# Patient Record
Sex: FEMALE | Race: WHITE | Employment: OTHER | URBAN - NONMETROPOLITAN AREA
[De-identification: names, ages, dates, MRNs, and addresses within clinical notes are randomized per-mention and may not be internally consistent; named-entity substitution may affect disease eponyms.]

---

## 2017-02-07 ENCOUNTER — OFFICE VISIT (OUTPATIENT)
Dept: PRIMARY CARE CLINIC | Age: 72
End: 2017-02-07
Payer: MEDICARE

## 2017-02-07 VITALS
OXYGEN SATURATION: 96 % | SYSTOLIC BLOOD PRESSURE: 118 MMHG | RESPIRATION RATE: 20 BRPM | BODY MASS INDEX: 39.93 KG/M2 | WEIGHT: 217 LBS | DIASTOLIC BLOOD PRESSURE: 68 MMHG | HEART RATE: 82 BPM | HEIGHT: 62 IN

## 2017-02-07 DIAGNOSIS — M85.80 OSTEOPENIA: Primary | ICD-10-CM

## 2017-02-07 DIAGNOSIS — E03.9 ACQUIRED HYPOTHYROIDISM: ICD-10-CM

## 2017-02-07 DIAGNOSIS — I10 ESSENTIAL HYPERTENSION: ICD-10-CM

## 2017-02-07 DIAGNOSIS — N18.30 CKD (CHRONIC KIDNEY DISEASE), STAGE III (HCC): ICD-10-CM

## 2017-02-07 PROCEDURE — G8427 DOCREV CUR MEDS BY ELIG CLIN: HCPCS | Performed by: INTERNAL MEDICINE

## 2017-02-07 PROCEDURE — G8419 CALC BMI OUT NRM PARAM NOF/U: HCPCS | Performed by: INTERNAL MEDICINE

## 2017-02-07 PROCEDURE — 1090F PRES/ABSN URINE INCON ASSESS: CPT | Performed by: INTERNAL MEDICINE

## 2017-02-07 PROCEDURE — 3017F COLORECTAL CA SCREEN DOC REV: CPT | Performed by: INTERNAL MEDICINE

## 2017-02-07 PROCEDURE — 1036F TOBACCO NON-USER: CPT | Performed by: INTERNAL MEDICINE

## 2017-02-07 PROCEDURE — 1123F ACP DISCUSS/DSCN MKR DOCD: CPT | Performed by: INTERNAL MEDICINE

## 2017-02-07 PROCEDURE — 99214 OFFICE O/P EST MOD 30 MIN: CPT | Performed by: INTERNAL MEDICINE

## 2017-02-07 PROCEDURE — G8399 PT W/DXA RESULTS DOCUMENT: HCPCS | Performed by: INTERNAL MEDICINE

## 2017-02-07 PROCEDURE — 4040F PNEUMOC VAC/ADMIN/RCVD: CPT | Performed by: INTERNAL MEDICINE

## 2017-02-07 PROCEDURE — G8484 FLU IMMUNIZE NO ADMIN: HCPCS | Performed by: INTERNAL MEDICINE

## 2017-02-07 PROCEDURE — 3014F SCREEN MAMMO DOC REV: CPT | Performed by: INTERNAL MEDICINE

## 2017-02-07 ASSESSMENT — ENCOUNTER SYMPTOMS
BACK PAIN: 1
DIARRHEA: 0
CONSTIPATION: 0
SORE THROAT: 0
VOMITING: 0
COUGH: 0
NAUSEA: 0
SHORTNESS OF BREATH: 0

## 2017-03-24 RX ORDER — RALOXIFENE HYDROCHLORIDE 60 MG/1
60 TABLET, FILM COATED ORAL DAILY
Qty: 90 TABLET | Refills: 3 | Status: SHIPPED | OUTPATIENT
Start: 2017-03-24 | End: 2018-03-20 | Stop reason: SDUPTHER

## 2017-05-09 DIAGNOSIS — M85.80 OSTEOPENIA: ICD-10-CM

## 2017-05-09 RX ORDER — RISEDRONATE SODIUM 35 MG/1
35 TABLET, FILM COATED ORAL
Qty: 4 TABLET | Refills: 11 | Status: SHIPPED | OUTPATIENT
Start: 2017-05-09 | End: 2018-05-17 | Stop reason: SDUPTHER

## 2017-06-12 DIAGNOSIS — M85.80 OSTEOPENIA: ICD-10-CM

## 2017-06-12 DIAGNOSIS — E03.9 ACQUIRED HYPOTHYROIDISM: ICD-10-CM

## 2017-06-12 DIAGNOSIS — N18.30 CKD (CHRONIC KIDNEY DISEASE), STAGE III (HCC): ICD-10-CM

## 2017-06-12 DIAGNOSIS — I10 ESSENTIAL HYPERTENSION: ICD-10-CM

## 2017-06-12 LAB
ALBUMIN SERPL-MCNC: 4 G/DL (ref 3.5–5.2)
ALP BLD-CCNC: 32 U/L (ref 35–104)
ALT SERPL-CCNC: 15 U/L (ref 5–33)
ANION GAP SERPL CALCULATED.3IONS-SCNC: 16 MMOL/L (ref 7–19)
AST SERPL-CCNC: 17 U/L (ref 5–32)
BACTERIA: ABNORMAL /HPF
BILIRUB SERPL-MCNC: 0.4 MG/DL (ref 0.2–1.2)
BILIRUBIN URINE: NEGATIVE
BLOOD, URINE: ABNORMAL
BUN BLDV-MCNC: 17 MG/DL (ref 8–23)
CALCIUM SERPL-MCNC: 9.3 MG/DL (ref 8.8–10.2)
CHLORIDE BLD-SCNC: 104 MMOL/L (ref 98–111)
CHOLESTEROL, TOTAL: 175 MG/DL (ref 160–199)
CLARITY: ABNORMAL
CO2: 21 MMOL/L (ref 22–29)
COLOR: YELLOW
CREAT SERPL-MCNC: 1.1 MG/DL (ref 0.5–0.9)
CREATININE URINE: 107.7 MG/DL (ref 4.2–622)
EPITHELIAL CELLS, UA: 1 /HPF (ref 0–5)
GFR NON-AFRICAN AMERICAN: 49
GLUCOSE BLD-MCNC: 89 MG/DL (ref 74–109)
GLUCOSE URINE: NEGATIVE MG/DL
HCT VFR BLD CALC: 35.6 % (ref 37–47)
HDLC SERPL-MCNC: 29 MG/DL (ref 65–121)
HEMOGLOBIN: 11.6 G/DL (ref 12–16)
HYALINE CASTS: 2 /HPF (ref 0–8)
KETONES, URINE: NEGATIVE MG/DL
LDL CHOLESTEROL CALCULATED: 66 MG/DL
LEUKOCYTE ESTERASE, URINE: ABNORMAL
MCH RBC QN AUTO: 30.1 PG (ref 27–31)
MCHC RBC AUTO-ENTMCNC: 32.6 G/DL (ref 33–37)
MCV RBC AUTO: 92.2 FL (ref 81–99)
NITRITE, URINE: POSITIVE
PARATHYROID HORMONE INTACT: 81.8 PG/ML (ref 15–65)
PDW BLD-RTO: 13.2 % (ref 11.5–14.5)
PH UA: 5.5
PLATELET # BLD: 166 K/UL (ref 130–400)
PMV BLD AUTO: 9.2 FL (ref 9.4–12.3)
POTASSIUM SERPL-SCNC: 4.2 MMOL/L (ref 3.5–5)
PROTEIN PROTEIN: 27 MG/DL (ref 15–45)
PROTEIN UA: ABNORMAL MG/DL
RBC # BLD: 3.86 M/UL (ref 4.2–5.4)
RBC UA: 3 /HPF (ref 0–4)
SODIUM BLD-SCNC: 141 MMOL/L (ref 136–145)
SPECIFIC GRAVITY UA: 1.01
TOTAL PROTEIN: 7.2 G/DL (ref 6.6–8.7)
TRIGL SERPL-MCNC: 399 MG/DL (ref 150–199)
TSH SERPL DL<=0.05 MIU/L-ACNC: 0.15 UIU/ML (ref 0.27–4.2)
URIC ACID, SERUM: 6.5 MG/DL (ref 2.4–5.7)
UROBILINOGEN, URINE: 0.2 E.U./DL
WBC # BLD: 7.3 K/UL (ref 4.8–10.8)
WBC UA: 37 /HPF (ref 0–5)

## 2017-06-13 RX ORDER — LEVOTHYROXINE SODIUM 88 UG/1
88 TABLET ORAL DAILY
Qty: 30 TABLET | Refills: 3 | Status: SHIPPED | OUTPATIENT
Start: 2017-06-13 | End: 2017-09-13 | Stop reason: SDUPTHER

## 2017-06-14 LAB
ORGANISM: ABNORMAL
URINE CULTURE, ROUTINE: ABNORMAL
URINE CULTURE, ROUTINE: ABNORMAL

## 2017-06-16 ENCOUNTER — TELEPHONE (OUTPATIENT)
Dept: PRIMARY CARE CLINIC | Age: 72
End: 2017-06-16

## 2017-06-16 RX ORDER — PHENAZOPYRIDINE HYDROCHLORIDE 100 MG/1
100 TABLET, FILM COATED ORAL 3 TIMES DAILY PRN
Qty: 10 TABLET | Refills: 0 | Status: SHIPPED | OUTPATIENT
Start: 2017-06-16 | End: 2017-06-19

## 2017-06-16 RX ORDER — LEVOFLOXACIN 500 MG/1
500 TABLET, FILM COATED ORAL DAILY
Qty: 10 TABLET | Refills: 0 | Status: SHIPPED | OUTPATIENT
Start: 2017-06-16 | End: 2017-06-26

## 2017-06-20 ENCOUNTER — OFFICE VISIT (OUTPATIENT)
Dept: PRIMARY CARE CLINIC | Age: 72
End: 2017-06-20
Payer: MEDICARE

## 2017-06-20 VITALS
OXYGEN SATURATION: 96 % | HEART RATE: 69 BPM | HEIGHT: 61 IN | SYSTOLIC BLOOD PRESSURE: 122 MMHG | DIASTOLIC BLOOD PRESSURE: 68 MMHG | RESPIRATION RATE: 20 BRPM | WEIGHT: 220 LBS | BODY MASS INDEX: 41.54 KG/M2

## 2017-06-20 DIAGNOSIS — I10 ESSENTIAL HYPERTENSION: ICD-10-CM

## 2017-06-20 DIAGNOSIS — Z86.39 H/O HYPERALDOSTERONISM: ICD-10-CM

## 2017-06-20 DIAGNOSIS — N18.30 CKD (CHRONIC KIDNEY DISEASE), STAGE III (HCC): Primary | ICD-10-CM

## 2017-06-20 DIAGNOSIS — E03.9 ACQUIRED HYPOTHYROIDISM: ICD-10-CM

## 2017-06-20 PROCEDURE — G8399 PT W/DXA RESULTS DOCUMENT: HCPCS | Performed by: INTERNAL MEDICINE

## 2017-06-20 PROCEDURE — 1090F PRES/ABSN URINE INCON ASSESS: CPT | Performed by: INTERNAL MEDICINE

## 2017-06-20 PROCEDURE — 3017F COLORECTAL CA SCREEN DOC REV: CPT | Performed by: INTERNAL MEDICINE

## 2017-06-20 PROCEDURE — 1123F ACP DISCUSS/DSCN MKR DOCD: CPT | Performed by: INTERNAL MEDICINE

## 2017-06-20 PROCEDURE — 4040F PNEUMOC VAC/ADMIN/RCVD: CPT | Performed by: INTERNAL MEDICINE

## 2017-06-20 PROCEDURE — G8417 CALC BMI ABV UP PARAM F/U: HCPCS | Performed by: INTERNAL MEDICINE

## 2017-06-20 PROCEDURE — G8427 DOCREV CUR MEDS BY ELIG CLIN: HCPCS | Performed by: INTERNAL MEDICINE

## 2017-06-20 PROCEDURE — 3014F SCREEN MAMMO DOC REV: CPT | Performed by: INTERNAL MEDICINE

## 2017-06-20 PROCEDURE — 99214 OFFICE O/P EST MOD 30 MIN: CPT | Performed by: INTERNAL MEDICINE

## 2017-06-20 PROCEDURE — 1036F TOBACCO NON-USER: CPT | Performed by: INTERNAL MEDICINE

## 2017-06-20 RX ORDER — MELOXICAM 7.5 MG/1
7.5 TABLET ORAL DAILY
COMMUNITY
End: 2017-09-13 | Stop reason: SDUPTHER

## 2017-06-20 ASSESSMENT — ENCOUNTER SYMPTOMS
CONSTIPATION: 0
DIARRHEA: 0
BACK PAIN: 1
COUGH: 0
SORE THROAT: 0
SHORTNESS OF BREATH: 0
VOMITING: 0
NAUSEA: 0

## 2017-09-13 ENCOUNTER — OFFICE VISIT (OUTPATIENT)
Dept: PRIMARY CARE CLINIC | Age: 72
End: 2017-09-13
Payer: MEDICARE

## 2017-09-13 VITALS
BODY MASS INDEX: 41.31 KG/M2 | SYSTOLIC BLOOD PRESSURE: 120 MMHG | OXYGEN SATURATION: 95 % | WEIGHT: 218.8 LBS | HEART RATE: 71 BPM | TEMPERATURE: 97.9 F | HEIGHT: 61 IN | DIASTOLIC BLOOD PRESSURE: 78 MMHG

## 2017-09-13 DIAGNOSIS — E03.9 ACQUIRED HYPOTHYROIDISM: Primary | ICD-10-CM

## 2017-09-13 DIAGNOSIS — J40 BRONCHITIS: ICD-10-CM

## 2017-09-13 DIAGNOSIS — E03.9 ACQUIRED HYPOTHYROIDISM: ICD-10-CM

## 2017-09-13 LAB — TSH SERPL DL<=0.05 MIU/L-ACNC: 1.32 UIU/ML (ref 0.27–4.2)

## 2017-09-13 PROCEDURE — 4040F PNEUMOC VAC/ADMIN/RCVD: CPT | Performed by: INTERNAL MEDICINE

## 2017-09-13 PROCEDURE — 1123F ACP DISCUSS/DSCN MKR DOCD: CPT | Performed by: INTERNAL MEDICINE

## 2017-09-13 PROCEDURE — 99213 OFFICE O/P EST LOW 20 MIN: CPT | Performed by: INTERNAL MEDICINE

## 2017-09-13 PROCEDURE — 1036F TOBACCO NON-USER: CPT | Performed by: INTERNAL MEDICINE

## 2017-09-13 PROCEDURE — 1090F PRES/ABSN URINE INCON ASSESS: CPT | Performed by: INTERNAL MEDICINE

## 2017-09-13 PROCEDURE — G8428 CUR MEDS NOT DOCUMENT: HCPCS | Performed by: INTERNAL MEDICINE

## 2017-09-13 PROCEDURE — 3014F SCREEN MAMMO DOC REV: CPT | Performed by: INTERNAL MEDICINE

## 2017-09-13 PROCEDURE — G8399 PT W/DXA RESULTS DOCUMENT: HCPCS | Performed by: INTERNAL MEDICINE

## 2017-09-13 PROCEDURE — 3017F COLORECTAL CA SCREEN DOC REV: CPT | Performed by: INTERNAL MEDICINE

## 2017-09-13 PROCEDURE — G8417 CALC BMI ABV UP PARAM F/U: HCPCS | Performed by: INTERNAL MEDICINE

## 2017-09-13 RX ORDER — ERGOCALCIFEROL 1.25 MG/1
50000 CAPSULE ORAL WEEKLY
Qty: 12 CAPSULE | Refills: 3 | Status: SHIPPED | OUTPATIENT
Start: 2017-09-13 | End: 2017-11-08 | Stop reason: SDUPTHER

## 2017-09-13 RX ORDER — GABAPENTIN 100 MG/1
100 CAPSULE ORAL 3 TIMES DAILY
Qty: 270 CAPSULE | Refills: 3 | Status: SHIPPED | OUTPATIENT
Start: 2017-09-13 | End: 2018-03-20 | Stop reason: SDUPTHER

## 2017-09-13 RX ORDER — ATENOLOL 50 MG/1
50 TABLET ORAL DAILY
Qty: 90 TABLET | Refills: 3 | Status: CANCELLED | OUTPATIENT
Start: 2017-09-13

## 2017-09-13 RX ORDER — LEVOTHYROXINE SODIUM 88 UG/1
88 TABLET ORAL DAILY
Qty: 90 TABLET | Refills: 3 | Status: SHIPPED | OUTPATIENT
Start: 2017-09-13 | End: 2017-09-14 | Stop reason: SDUPTHER

## 2017-09-13 RX ORDER — METOPROLOL SUCCINATE 100 MG/1
100 TABLET, EXTENDED RELEASE ORAL DAILY
Qty: 90 TABLET | Refills: 3 | Status: SHIPPED | OUTPATIENT
Start: 2017-09-13 | End: 2018-12-10 | Stop reason: SDUPTHER

## 2017-09-13 RX ORDER — GABAPENTIN 100 MG/1
100 CAPSULE ORAL 3 TIMES DAILY
Qty: 90 CAPSULE | Refills: 5 | Status: CANCELLED | OUTPATIENT
Start: 2017-09-13

## 2017-09-13 RX ORDER — DULOXETIN HYDROCHLORIDE 30 MG/1
30 CAPSULE, DELAYED RELEASE ORAL EVERY OTHER DAY
Qty: 90 CAPSULE | Refills: 3 | Status: SHIPPED | OUTPATIENT
Start: 2017-09-13 | End: 2017-09-14 | Stop reason: SDUPTHER

## 2017-09-13 RX ORDER — MELOXICAM 7.5 MG/1
7.5 TABLET ORAL DAILY
Qty: 90 TABLET | Refills: 3 | Status: SHIPPED | OUTPATIENT
Start: 2017-09-13 | End: 2017-09-14 | Stop reason: SDUPTHER

## 2017-09-13 RX ORDER — PREDNISONE 20 MG/1
20 TABLET ORAL DAILY
Qty: 7 TABLET | Refills: 0 | Status: SHIPPED | OUTPATIENT
Start: 2017-09-13 | End: 2017-09-20

## 2017-09-13 RX ORDER — OMEPRAZOLE 20 MG/1
20 CAPSULE, DELAYED RELEASE ORAL DAILY
Qty: 90 CAPSULE | Refills: 3 | Status: SHIPPED | OUTPATIENT
Start: 2017-09-13 | End: 2017-09-14 | Stop reason: SDUPTHER

## 2017-09-13 RX ORDER — AZITHROMYCIN 250 MG/1
250 TABLET, FILM COATED ORAL DAILY
Qty: 10 TABLET | Refills: 0 | Status: SHIPPED | OUTPATIENT
Start: 2017-09-13 | End: 2017-09-23

## 2017-09-13 RX ORDER — GUAIFENESIN 600 MG/1
1200 TABLET, EXTENDED RELEASE ORAL 2 TIMES DAILY
Qty: 14 TABLET | Refills: 1 | Status: SHIPPED | OUTPATIENT
Start: 2017-09-13 | End: 2019-08-20

## 2017-09-13 ASSESSMENT — ENCOUNTER SYMPTOMS
COUGH: 1
SINUS PRESSURE: 1
RHINORRHEA: 1
SHORTNESS OF BREATH: 1

## 2017-09-13 NOTE — MR AVS SNAPSHOT
After Visit Summary             Frida Ferris   2017 2:15 PM   Office Visit    Description:  Female : 1945   Provider:  Carlos Walker DO   Department:  [facility]              Your Follow-Up and Future Appointments         Below is a list of your follow-up and future appointments. This may not be a complete list as you may have made appointments directly with providers that we are not aware of or your providers may have made some for you. Please call your providers to confirm appointments. It is important to keep your appointments. Please bring your current insurance card, photo ID, co-pay, and all medication bottles to your appointment. If self-pay, payment is expected at the time of service. Your To-Do List     Future Appointments Provider Department Dept Phone    2017 11:00 AM Carlos Walker DO [facility] [phone]    Please arrive 15 minutes prior to appointment, bring photo ID and insurance card. Future Orders Complete By Expires    TSH without Reflex [[id number] Custom]  2017    Follow-Up    Return if symptoms worsen or fail to improve. Information from Your Visit        Department     Name Address Phone Fax    [address] [phone]      You Were Seen for:         Comments    Acquired hypothyroidism   [[id number]]         Vital Signs     Blood Pressure Pulse Temperature Height Weight Oxygen Saturation    120/78 (Site: Left Arm) 71 97.9 °F (36.6 °C) 5' 1\" (1.549 m) 218 lb 12.8 oz (99.2 kg) 95%    Body Mass Index Smoking Status                41.34 kg/m2 Never Smoker          Additional Information about your Body Mass Index (BMI)           Your BMI as listed above is considered obese (30 or more). BMI is an estimate of body fat, calculated from your height and weight.   The higher your BMI, the greater your risk of heart disease, high blood pressure, type 2 diabetes, stroke, gallstones, arthritis, sleep apnea, and certain cancers. BMI is not perfect. It may overestimate body fat in athletes and people who are more muscular. Even a small weight loss (between 5 and 10 percent of your current weight) by decreasing your calorie intake and becoming more physically active will help lower your risk of developing or worsening diseases associated with obesity. Learn more at: [employer]          Instructions    Estefani Hernández as directed. Prednisone 20 mg once a day until gone. Mucinex twice a day. Continue the remainder of your medications. We will follow up on thyroid tests. Keep regular follow up. Today's Medication Changes          These changes are accurate as of: 9/13/17  3:01 PM.  If you have any questions, ask your nurse or doctor. START taking these medications           azithromycin 250 MG tablet   Commonly known as:  ZITHROMAX Z-ORTIZ   Instructions: Take 1 tablet by mouth daily for 10 days   Quantity:  10 tablet   Refills:  0   Started by:  Gary Ortega, DO       guaiFENesin 600 MG extended release tablet   Commonly known as:  MUCINEX   Instructions: Take 2 tablets by mouth 2 times daily   Quantity:  14 tablet   Refills:  1   Started by:  Gary Ortega, DO       metoprolol succinate 100 MG extended release tablet   Commonly known as:  TOPROL XL   Instructions: Take 1 tablet by mouth daily   Quantity:  90 tablet   Refills:  3   Started by:  Gary Ortega, DO       predniSONE 20 MG tablet   Commonly known as:  DELTASONE   Instructions: Take 1 tablet by mouth daily for 7 days   Quantity:  7 tablet   Refills:  0   Started by:  Gary Ortega, DO         CHANGE how you take these medications           DULoxetine 30 MG extended release capsule   Commonly known as:  CYMBALTA   Instructions:   Take 1 capsule by mouth every other day   Quantity:  90 capsule   Refills:  3 What changed:  medication strength   Changed by:  Alyce Naidu,          STOP taking these medications           atenolol 50 MG tablet   Commonly known as:  TENORMIN   Stopped by:  Alyce Naidu DO            Where to Get Your Medications      These medications were sent to [facility], [address] [facility] [phone]  [address] [address] [place] [address]     Phone:  [phone]     azithromycin 250 MG tablet    guaiFENesin 600 MG extended release tablet    metoprolol succinate 100 MG extended release tablet    predniSONE 20 MG tablet         You can get these medications from any pharmacy     Bring a paper prescription for each of these medications     DULoxetine 30 MG extended release capsule    gabapentin 100 MG capsule    levothyroxine 88 MCG tablet    meloxicam 7.5 MG tablet    omeprazole 20 MG delayed release capsule    vitamin D 64785 units Caps capsule               Your Current Medications Are              gabapentin (NEURONTIN) 100 MG capsule Take 1 capsule by mouth 3 times daily    omeprazole (PRILOSEC) 20 MG delayed release capsule Take 1 capsule by mouth daily    levothyroxine (LEVOTHROID) 88 MCG tablet Take 1 tablet by mouth daily    vitamin D (ERGOCALCIFEROL) 32537 units CAPS capsule Take 1 capsule by mouth once a week    DULoxetine (CYMBALTA) 30 MG extended release capsule Take 1 capsule by mouth every other day    meloxicam (MOBIC) 7.5 MG tablet Take 1 tablet by mouth daily Indications: taking only 1/2 a pill daily    metoprolol succinate (TOPROL XL) 100 MG extended release tablet Take 1 tablet by mouth daily    azithromycin (ZITHROMAX Z-ORTIZ) 250 MG tablet Take 1 tablet by mouth daily for 10 days    guaiFENesin (MUCINEX) 600 MG extended release tablet Take 2 tablets by mouth 2 times daily    predniSONE (DELTASONE) 20 MG tablet Take 1 tablet by mouth daily for 7 days Member Sign Up page. 3. Enter your Backplane Access Code exactly as it appears below. You will not need to use this code after youve completed the sign-up process. If you do not sign up before the expiration date, you must request a new code. Backplane Access Code: [email or web address]  Expires: 11/12/2017  3:01 PM    4. Enter your Social Security Number ([id number]) and Date of Birth (mm/dd/yyyy) as indicated and click Submit. You will be taken to the next sign-up page. 5. Create a Backplane ID. This will be your Backplane login ID and cannot be changed, so think of one that is secure and easy to remember. 6. Create a Backplane password. You can change your password at any time. 7. Enter your Password Reset Question and Answer. This can be used at a later time if you forget your password. 8. Enter your e-mail address. You will receive e-mail notification when new information is available in [address] 9. Click Sign Up. You can now view your medical record. Additional Information  If you have questions, please contact the physician practice where you receive care. Remember, Backplane is NOT to be used for urgent needs. For medical emergencies, dial 911. For questions regarding your Backplane account call [phone]. If you have a clinical question, please call your doctor's office.

## 2017-09-14 ENCOUNTER — TELEPHONE (OUTPATIENT)
Dept: PRIMARY CARE CLINIC | Age: 72
End: 2017-09-14

## 2017-09-14 DIAGNOSIS — Z76.0 MEDICATION REFILL: Primary | ICD-10-CM

## 2017-09-14 RX ORDER — DULOXETIN HYDROCHLORIDE 30 MG/1
30 CAPSULE, DELAYED RELEASE ORAL EVERY OTHER DAY
Qty: 90 CAPSULE | Refills: 3 | Status: ON HOLD | OUTPATIENT
Start: 2017-09-14 | End: 2020-07-22

## 2017-09-14 RX ORDER — OMEPRAZOLE 20 MG/1
20 CAPSULE, DELAYED RELEASE ORAL DAILY
Qty: 90 CAPSULE | Refills: 3 | Status: SHIPPED | OUTPATIENT
Start: 2017-09-14 | End: 2017-12-21 | Stop reason: SDUPTHER

## 2017-09-14 RX ORDER — LEVOTHYROXINE SODIUM 88 UG/1
88 TABLET ORAL DAILY
Qty: 90 TABLET | Refills: 3 | Status: SHIPPED | OUTPATIENT
Start: 2017-09-14 | End: 2017-12-21 | Stop reason: SDUPTHER

## 2017-09-14 RX ORDER — MELOXICAM 7.5 MG/1
7.5 TABLET ORAL DAILY
Qty: 90 TABLET | Refills: 3 | Status: SHIPPED | OUTPATIENT
Start: 2017-09-14 | End: 2019-03-26 | Stop reason: SDUPTHER

## 2017-09-25 ENCOUNTER — TELEPHONE (OUTPATIENT)
Dept: PRIMARY CARE CLINIC | Age: 72
End: 2017-09-25

## 2017-11-08 RX ORDER — ERGOCALCIFEROL 1.25 MG/1
50000 CAPSULE ORAL WEEKLY
Qty: 12 CAPSULE | Refills: 3 | Status: SHIPPED | OUTPATIENT
Start: 2017-11-08 | End: 2018-10-12 | Stop reason: SDUPTHER

## 2017-11-17 RX ORDER — MELOXICAM 7.5 MG/1
7.5 TABLET ORAL DAILY
COMMUNITY

## 2017-11-17 RX ORDER — OMEPRAZOLE 20 MG/1
20 CAPSULE, DELAYED RELEASE ORAL DAILY
COMMUNITY

## 2017-11-17 RX ORDER — GABAPENTIN 100 MG/1
100 CAPSULE ORAL 3 TIMES DAILY
COMMUNITY

## 2017-11-17 RX ORDER — FUROSEMIDE 40 MG/1
40 TABLET ORAL 2 TIMES DAILY
COMMUNITY

## 2017-11-17 RX ORDER — CHLORPHENIRAMINE MALEATE 4 MG/1
4 TABLET ORAL EVERY 6 HOURS PRN
COMMUNITY

## 2017-11-17 RX ORDER — LEVOTHYROXINE SODIUM 0.1 MG/1
100 TABLET ORAL DAILY
COMMUNITY

## 2017-11-17 RX ORDER — ASPIRIN 81 MG/1
81 TABLET ORAL DAILY
COMMUNITY

## 2017-11-17 RX ORDER — DULOXETIN HYDROCHLORIDE 30 MG/1
30 CAPSULE, DELAYED RELEASE ORAL DAILY
COMMUNITY

## 2017-11-17 RX ORDER — CHLORAL HYDRATE 500 MG
CAPSULE ORAL
Status: ON HOLD | COMMUNITY
End: 2018-08-17

## 2017-11-17 RX ORDER — RALOXIFENE HYDROCHLORIDE 60 MG/1
60 TABLET, FILM COATED ORAL DAILY
COMMUNITY

## 2017-11-17 RX ORDER — ATENOLOL 50 MG/1
50 TABLET ORAL DAILY
COMMUNITY

## 2017-12-06 ENCOUNTER — OFFICE VISIT (OUTPATIENT)
Dept: PRIMARY CARE CLINIC | Age: 72
End: 2017-12-06
Payer: MEDICARE

## 2017-12-06 VITALS
OXYGEN SATURATION: 97 % | WEIGHT: 217.4 LBS | DIASTOLIC BLOOD PRESSURE: 80 MMHG | SYSTOLIC BLOOD PRESSURE: 138 MMHG | HEART RATE: 96 BPM | HEIGHT: 61 IN | BODY MASS INDEX: 41.04 KG/M2 | TEMPERATURE: 97.8 F

## 2017-12-06 DIAGNOSIS — I10 ESSENTIAL HYPERTENSION: ICD-10-CM

## 2017-12-06 DIAGNOSIS — N18.30 CKD (CHRONIC KIDNEY DISEASE), STAGE III (HCC): ICD-10-CM

## 2017-12-06 DIAGNOSIS — E03.9 ACQUIRED HYPOTHYROIDISM: Primary | ICD-10-CM

## 2017-12-06 DIAGNOSIS — Z86.39 H/O HYPERALDOSTERONISM: ICD-10-CM

## 2017-12-06 PROCEDURE — G8399 PT W/DXA RESULTS DOCUMENT: HCPCS | Performed by: INTERNAL MEDICINE

## 2017-12-06 PROCEDURE — G8427 DOCREV CUR MEDS BY ELIG CLIN: HCPCS | Performed by: INTERNAL MEDICINE

## 2017-12-06 PROCEDURE — 90688 IIV4 VACCINE SPLT 0.5 ML IM: CPT | Performed by: INTERNAL MEDICINE

## 2017-12-06 PROCEDURE — 1036F TOBACCO NON-USER: CPT | Performed by: INTERNAL MEDICINE

## 2017-12-06 PROCEDURE — 3014F SCREEN MAMMO DOC REV: CPT | Performed by: INTERNAL MEDICINE

## 2017-12-06 PROCEDURE — 3017F COLORECTAL CA SCREEN DOC REV: CPT | Performed by: INTERNAL MEDICINE

## 2017-12-06 PROCEDURE — G8417 CALC BMI ABV UP PARAM F/U: HCPCS | Performed by: INTERNAL MEDICINE

## 2017-12-06 PROCEDURE — G0008 ADMIN INFLUENZA VIRUS VAC: HCPCS | Performed by: INTERNAL MEDICINE

## 2017-12-06 PROCEDURE — 1090F PRES/ABSN URINE INCON ASSESS: CPT | Performed by: INTERNAL MEDICINE

## 2017-12-06 PROCEDURE — G8484 FLU IMMUNIZE NO ADMIN: HCPCS | Performed by: INTERNAL MEDICINE

## 2017-12-06 PROCEDURE — 99213 OFFICE O/P EST LOW 20 MIN: CPT | Performed by: INTERNAL MEDICINE

## 2017-12-06 PROCEDURE — 1123F ACP DISCUSS/DSCN MKR DOCD: CPT | Performed by: INTERNAL MEDICINE

## 2017-12-06 PROCEDURE — 4040F PNEUMOC VAC/ADMIN/RCVD: CPT | Performed by: INTERNAL MEDICINE

## 2017-12-06 RX ORDER — ALLOPURINOL 100 MG/1
100 TABLET ORAL DAILY
Qty: 30 TABLET | Refills: 3 | Status: SHIPPED | OUTPATIENT
Start: 2017-12-06 | End: 2018-05-17 | Stop reason: SDUPTHER

## 2017-12-06 ASSESSMENT — ENCOUNTER SYMPTOMS
BACK PAIN: 0
COUGH: 0
VOMITING: 0
NAUSEA: 0
SHORTNESS OF BREATH: 0
CONSTIPATION: 0
DIARRHEA: 0

## 2017-12-06 NOTE — PROGRESS NOTES
(PRILOSEC) 20 MG delayed release capsule Take 1 capsule by mouth daily 90 capsule 3    vitamin D (ERGOCALCIFEROL) 00768 units CAPS capsule Take 1 capsule by mouth once a week 12 capsule 3    meloxicam (MOBIC) 7.5 MG tablet Take 1 tablet by mouth daily Indications: taking only 1/2 a pill daily 90 tablet 3    DULoxetine (CYMBALTA) 30 MG extended release capsule Take 1 capsule by mouth every other day 90 capsule 3    gabapentin (NEURONTIN) 100 MG capsule Take 1 capsule by mouth 3 times daily 270 capsule 3    metoprolol succinate (TOPROL XL) 100 MG extended release tablet Take 1 tablet by mouth daily 90 tablet 3    guaiFENesin (MUCINEX) 600 MG extended release tablet Take 2 tablets by mouth 2 times daily 14 tablet 1    risedronate (ACTONEL) 35 MG tablet Take 1 tablet by mouth every 7 days 4 tablet 11    raloxifene (EVISTA) 60 MG tablet Take 1 tablet by mouth daily 90 tablet 3    furosemide (LASIX) 20 MG tablet Take 20 mg by mouth every other day Indications: M-W-F       fexofenadine (ALLEGRA ALLERGY) 180 MG tablet Take 180 mg by mouth daily      calcitRIOL (ROCALTROL) 0.25 MCG capsule Take 0.25 mcg by mouth daily      aspirin 81 MG chewable tablet Take 81 mg by mouth daily      Calcium Carb-Cholecalciferol (CALCIUM 600 + D PO) Take by mouth      B Complex-Folic Acid (SUPER B COMPLEX MAXI PO) Take by mouth      Multiple Vitamin (MULTI VITAMIN DAILY PO) Take by mouth       No current facility-administered medications for this visit.       No Known Allergies    Health Maintenance   Topic Date Due    Colon cancer screen colonoscopy  08/30/1994    Zostavax vaccine  08/30/2004    Pneumococcal low/med risk (2 of 2 - PCV13) 11/07/2017    Potassium monitoring  06/12/2018    Creatinine monitoring  06/12/2018    Breast cancer screen  12/11/2019    DTaP/Tdap/Td vaccine (2 - Td) 08/01/2020    Lipid screen  06/12/2022    DEXA (modify frequency per FRAX score)  Completed    Flu vaccine  Completed

## 2017-12-11 ENCOUNTER — HOSPITAL ENCOUNTER (OUTPATIENT)
Dept: WOMENS IMAGING | Age: 72
Discharge: HOME | End: 2017-12-11
Payer: MEDICARE

## 2017-12-11 DIAGNOSIS — Z12.31 ENCOUNTER FOR SCREENING MAMMOGRAM FOR MALIGNANT NEOPLASM OF BREAST: ICD-10-CM

## 2017-12-11 PROCEDURE — 77063 BREAST TOMOSYNTHESIS BI: CPT

## 2017-12-21 DIAGNOSIS — Z76.0 MEDICATION REFILL: ICD-10-CM

## 2017-12-21 RX ORDER — OMEPRAZOLE 20 MG/1
20 CAPSULE, DELAYED RELEASE ORAL DAILY
Qty: 90 CAPSULE | Refills: 3 | Status: SHIPPED | OUTPATIENT
Start: 2017-12-21 | End: 2018-12-29 | Stop reason: SDUPTHER

## 2017-12-21 RX ORDER — LEVOTHYROXINE SODIUM 88 UG/1
88 TABLET ORAL DAILY
Qty: 90 TABLET | Refills: 3 | Status: SHIPPED | OUTPATIENT
Start: 2017-12-21 | End: 2018-01-02 | Stop reason: DRUGHIGH

## 2017-12-21 NOTE — TELEPHONE ENCOUNTER
Patient called to request #90 day supply refill of the Thyroid and reflux medications. E-scripts sent.

## 2018-01-02 RX ORDER — LEVOTHYROXINE SODIUM 0.1 MG/1
100 TABLET ORAL DAILY
Qty: 90 TABLET | Refills: 3 | Status: SHIPPED | OUTPATIENT
Start: 2018-01-02 | End: 2018-05-17 | Stop reason: SDUPTHER

## 2018-03-20 RX ORDER — RALOXIFENE HYDROCHLORIDE 60 MG/1
TABLET, FILM COATED ORAL
Qty: 90 TABLET | Refills: 5 | Status: SHIPPED | OUTPATIENT
Start: 2018-03-20 | End: 2019-04-08 | Stop reason: SDUPTHER

## 2018-03-20 RX ORDER — GABAPENTIN 100 MG/1
CAPSULE ORAL
Qty: 270 CAPSULE | Refills: 3 | Status: SHIPPED | OUTPATIENT
Start: 2018-03-20 | End: 2018-05-17 | Stop reason: SDUPTHER

## 2018-03-28 NOTE — PROGRESS NOTES
Chief Complaint   Patient presents with   • Colonoscopy     10-1-14 had colon polyps     Subjective   HPI    Susy Shepherd is a 72 y.o. female who presents to office for preventative maintenance.  There is  a personal history of colon polyps.  There is not a history of colon cancer.  She does not have complaints of nausea/vomiting, change in bowels, weight loss, no BRBPR, no melena.  There is a family history of colon cancer-mother dx apx age of 78.  There is not a family history of colon polyps.  Pt last colonoscopy-2014 .  Bowels do move on regular basis.  Pt has hx of breast cancer    Mother and sister with breast cancer    CScope (Dr Mcclendon) 2014 tubular adenoma-prox ascending colon, mid ascending colon, hyperplastic polyp removed from 65 cm    Past Medical History:   Diagnosis Date   • Asthma    • Hyperlipidemia    • Hypertension      Past Surgical History:   Procedure Laterality Date   • APPENDECTOMY     • CHOLECYSTECTOMY     • COLONOSCOPY  10/01/2014    three polyps all removed the most complex of which was in the mid ascending colon   • ENDOSCOPY  10/01/2014    gasatritis    • HYSTERECTOMY     • TONSILLECTOMY       Outpatient Prescriptions Marked as Taking for the 3/29/18 encounter (Office Visit) with MAGALIE Chairez   Medication Sig Dispense Refill   • aspirin 81 MG EC tablet Take 81 mg by mouth Daily.     • atenolol (TENORMIN) 50 MG tablet Take 50 mg by mouth Daily.     • calcitriol (ROCALTROL) 0.25 MCG capsule Take 0.25 mcg by mouth Daily.     • calcium citrate-vitamin d (CALCITRATE) 315-250 MG-UNIT tablet tablet Take  by mouth Daily.     • chlorpheniramine (ALLERGY RELIEF) 4 MG tablet Take 4 mg by mouth Every 6 (Six) Hours As Needed for Allergies.     • cholecalciferol (VITAMIN D3) 31095 units capsule Take 50,000 Units by mouth Every 30 (Thirty) Days.     • DULoxetine (CYMBALTA) 30 MG capsule Take 30 mg by mouth Daily.     • furosemide (LASIX) 40 MG tablet Take 40 mg by mouth 2 (Two) Times a  Day.     • gabapentin (NEURONTIN) 100 MG capsule Take 100 mg by mouth 3 (Three) Times a Day.     • levothyroxine (SYNTHROID, LEVOTHROID) 100 MCG tablet Take 100 mcg by mouth Daily.     • meloxicam (MOBIC) 7.5 MG tablet Take 7.5 mg by mouth Daily.     • Multiple Vitamin (MULTI-VITAMIN DAILY PO) Take  by mouth.     • Omega-3 Fatty Acids (FISH OIL) 1000 MG capsule capsule Take  by mouth Daily With Breakfast.     • omeprazole (priLOSEC) 20 MG capsule Take 20 mg by mouth Daily.     • raloxifene (EVISTA) 60 MG tablet Take 60 mg by mouth Daily.     • risedronate (ACTONEL) 150 MG tablet Take 150 mg by mouth Every 30 (Thirty) Days. with water on empty stomach, nothing by mouth or lie down for next 30 minutes.       No Known Allergies  Social History     Social History   • Marital status:      Spouse name: N/A   • Number of children: N/A   • Years of education: N/A     Occupational History   • Not on file.     Social History Main Topics   • Smoking status: Never Smoker   • Smokeless tobacco: Never Used   • Alcohol use No   • Drug use: No   • Sexual activity: Not on file     Other Topics Concern   • Not on file     Social History Narrative   • No narrative on file     Family History   Problem Relation Age of Onset   • Colon cancer Mother    • Colon cancer Paternal Grandfather      Review of Systems   Constitutional: Negative for fatigue, fever and unexpected weight change.   HENT: Negative for hearing loss, sore throat and voice change.    Eyes: Negative for visual disturbance.   Respiratory: Negative for cough, shortness of breath and wheezing.    Cardiovascular: Negative for chest pain and palpitations.   Gastrointestinal: Negative for abdominal pain, blood in stool and vomiting.   Endocrine: Negative for polydipsia and polyuria.   Genitourinary: Negative for difficulty urinating, dysuria, hematuria and urgency.   Musculoskeletal: Negative for joint swelling and myalgias.   Skin: Negative for color change, rash and  wound.   Neurological: Negative for dizziness, tremors, seizures and syncope.   Hematological: Does not bruise/bleed easily.   Psychiatric/Behavioral: Negative for agitation and confusion. The patient is not nervous/anxious.      Objective   Vitals:    03/29/18 0913   BP: 134/76   Pulse: 76   Temp: 98.4 °F (36.9 °C)   SpO2: 98%     Physical Exam   Constitutional: She is oriented to person, place, and time. She appears well-developed and well-nourished. She is cooperative.   HENT:   Head: Normocephalic and atraumatic.   Eyes: Conjunctivae are normal. Pupils are equal, round, and reactive to light. No scleral icterus.   Neck: Normal range of motion. Neck supple. No JVD present. No thyroid mass and no thyromegaly present.   Cardiovascular: Normal rate, regular rhythm and normal heart sounds.  Exam reveals no gallop and no friction rub.    No murmur heard.  Pulmonary/Chest: Effort normal and breath sounds normal. No accessory muscle usage. No respiratory distress. She has no wheezes. She has no rales.   Abdominal: Soft. Normal appearance and bowel sounds are normal. She exhibits no distension, no ascites and no mass. There is no hepatosplenomegaly. There is no tenderness. There is no rebound and no guarding.   Musculoskeletal: Normal range of motion. She exhibits no edema or tenderness.     Vascular Status -  Her right foot exhibits normal foot vasculature  and no edema. Her left foot exhibits normal foot vasculature  and no edema.  Lymphadenopathy:     She has no cervical adenopathy.   Neurological: She is alert and oriented to person, place, and time. She has normal strength. Gait normal.   Skin: Skin is warm, dry and intact. No rash noted.     Imaging Results (most recent)     None        Body mass index is 39.68 kg/m².    Assessment/Plan   Susy was seen today for colonoscopy.    Diagnoses and all orders for this visit:    History of adenomatous polyp of colon  -     Discontinue: polyethylene glycol (GoLYTELY) 236 g  solution; Take 3,785 mL by mouth 1 (One) Time for 1 dose. Take as directed  -     Case Request; Standing  -     Implement Anesthesia Orders Day of Procedure; Standing  -     Obtain Informed Consent; Standing  -     Case Request    Family history of colon cancer  Comments:  mother dx age of 78 and paternal GF      COLONOSCOPY WITH ANESTHESIA (N/A)   Declined Golytely prep, educated on Miralax prep    Advised pt to stop ASA day prior to procedure and to stop use of NSAIDs, Fish Oil, and MV 5 days prior to procedure.  Tylenol based products are ok to take.  Pt verbalized understanding.    Patient is to continue all blood pressure and cardiac medications prior to procedure and has been advised to take medications morning of procedure   Pt verbalized understanding    All risks, benefits, alternatives, and indications of colonoscopy procedure have been discussed with the patient. Risks to include perforation of the colon requiring possible surgery or colostomy, risk of bleeding from biopsies or removal of colon tissue, possibility of missing a colon polyp or cancer, or adverse drug reaction.  Benefits to include the diagnosis and management of disease of the colon and rectum. Alternatives to include barium enema, radiographic evaluation, lab testing or no intervention. Pt verbalizes understanding and agrees.     Discussed the patient's BMI with her. BMI is above normal parameters. Follow-up plan includes:  no follow-up required.    There are no Patient Instructions on file for this visit.

## 2018-03-29 ENCOUNTER — OFFICE VISIT (OUTPATIENT)
Dept: GASTROENTEROLOGY | Facility: CLINIC | Age: 73
End: 2018-03-29

## 2018-03-29 VITALS
WEIGHT: 210 LBS | HEART RATE: 76 BPM | SYSTOLIC BLOOD PRESSURE: 134 MMHG | BODY MASS INDEX: 39.65 KG/M2 | DIASTOLIC BLOOD PRESSURE: 76 MMHG | OXYGEN SATURATION: 98 % | TEMPERATURE: 98.4 F | HEIGHT: 61 IN

## 2018-03-29 DIAGNOSIS — Z86.010 HISTORY OF ADENOMATOUS POLYP OF COLON: Primary | ICD-10-CM

## 2018-03-29 DIAGNOSIS — Z80.0 FAMILY HISTORY OF COLON CANCER: ICD-10-CM

## 2018-03-29 PROCEDURE — S0260 H&P FOR SURGERY: HCPCS | Performed by: NURSE PRACTITIONER

## 2018-03-29 RX ORDER — CALCITRIOL 0.25 UG/1
0.25 CAPSULE, LIQUID FILLED ORAL DAILY
COMMUNITY

## 2018-03-29 RX ORDER — RISEDRONATE SODIUM 150 MG/1
150 TABLET, FILM COATED ORAL WEEKLY
COMMUNITY

## 2018-03-30 PROBLEM — Z86.010 HISTORY OF ADENOMATOUS POLYP OF COLON: Status: ACTIVE | Noted: 2018-03-30

## 2018-05-17 ENCOUNTER — OFFICE VISIT (OUTPATIENT)
Dept: PRIMARY CARE CLINIC | Age: 73
End: 2018-05-17
Payer: MEDICARE

## 2018-05-17 VITALS
TEMPERATURE: 98 F | WEIGHT: 209 LBS | HEART RATE: 81 BPM | OXYGEN SATURATION: 95 % | HEIGHT: 61 IN | DIASTOLIC BLOOD PRESSURE: 80 MMHG | SYSTOLIC BLOOD PRESSURE: 124 MMHG | BODY MASS INDEX: 39.46 KG/M2

## 2018-05-17 DIAGNOSIS — Z86.39 H/O HYPERALDOSTERONISM: ICD-10-CM

## 2018-05-17 DIAGNOSIS — E03.9 ACQUIRED HYPOTHYROIDISM: ICD-10-CM

## 2018-05-17 DIAGNOSIS — I10 ESSENTIAL HYPERTENSION: ICD-10-CM

## 2018-05-17 DIAGNOSIS — Z23 NEED FOR PROPHYLACTIC VACCINATION AND INOCULATION AGAINST VARICELLA: ICD-10-CM

## 2018-05-17 DIAGNOSIS — E03.9 ACQUIRED HYPOTHYROIDISM: Primary | ICD-10-CM

## 2018-05-17 DIAGNOSIS — Z23 NEED FOR PROPHYLACTIC VACCINATION AGAINST STREPTOCOCCUS PNEUMONIAE (PNEUMOCOCCUS): ICD-10-CM

## 2018-05-17 DIAGNOSIS — M85.80 OSTEOPENIA, UNSPECIFIED LOCATION: ICD-10-CM

## 2018-05-17 DIAGNOSIS — G62.9 NEUROPATHY: ICD-10-CM

## 2018-05-17 DIAGNOSIS — N18.30 CKD (CHRONIC KIDNEY DISEASE), STAGE III (HCC): ICD-10-CM

## 2018-05-17 LAB — TSH SERPL DL<=0.05 MIU/L-ACNC: 0.06 UIU/ML (ref 0.27–4.2)

## 2018-05-17 PROCEDURE — 1036F TOBACCO NON-USER: CPT | Performed by: INTERNAL MEDICINE

## 2018-05-17 PROCEDURE — G0009 ADMIN PNEUMOCOCCAL VACCINE: HCPCS | Performed by: INTERNAL MEDICINE

## 2018-05-17 PROCEDURE — G8399 PT W/DXA RESULTS DOCUMENT: HCPCS | Performed by: INTERNAL MEDICINE

## 2018-05-17 PROCEDURE — 1090F PRES/ABSN URINE INCON ASSESS: CPT | Performed by: INTERNAL MEDICINE

## 2018-05-17 PROCEDURE — 90670 PCV13 VACCINE IM: CPT | Performed by: INTERNAL MEDICINE

## 2018-05-17 PROCEDURE — G8417 CALC BMI ABV UP PARAM F/U: HCPCS | Performed by: INTERNAL MEDICINE

## 2018-05-17 PROCEDURE — G8427 DOCREV CUR MEDS BY ELIG CLIN: HCPCS | Performed by: INTERNAL MEDICINE

## 2018-05-17 PROCEDURE — 4040F PNEUMOC VAC/ADMIN/RCVD: CPT | Performed by: INTERNAL MEDICINE

## 2018-05-17 PROCEDURE — 1123F ACP DISCUSS/DSCN MKR DOCD: CPT | Performed by: INTERNAL MEDICINE

## 2018-05-17 PROCEDURE — 3017F COLORECTAL CA SCREEN DOC REV: CPT | Performed by: INTERNAL MEDICINE

## 2018-05-17 PROCEDURE — 99214 OFFICE O/P EST MOD 30 MIN: CPT | Performed by: INTERNAL MEDICINE

## 2018-05-17 RX ORDER — RISEDRONATE SODIUM 35 MG/1
35 TABLET, FILM COATED ORAL
Qty: 12 TABLET | Refills: 3 | Status: SHIPPED | OUTPATIENT
Start: 2018-05-17 | End: 2019-04-25 | Stop reason: SDUPTHER

## 2018-05-17 RX ORDER — GABAPENTIN 100 MG/1
CAPSULE ORAL
Qty: 360 CAPSULE | Refills: 3 | Status: SHIPPED | OUTPATIENT
Start: 2018-05-17 | End: 2019-08-07 | Stop reason: SDUPTHER

## 2018-05-17 RX ORDER — ALLOPURINOL 100 MG/1
100 TABLET ORAL DAILY
Qty: 90 TABLET | Refills: 3 | Status: SHIPPED | OUTPATIENT
Start: 2018-05-17 | End: 2019-06-07 | Stop reason: SDUPTHER

## 2018-05-17 RX ORDER — LEVOTHYROXINE SODIUM 0.1 MG/1
100 TABLET ORAL DAILY
Qty: 90 TABLET | Refills: 3 | Status: SHIPPED | OUTPATIENT
Start: 2018-05-17 | End: 2018-09-11 | Stop reason: SDUPTHER

## 2018-05-17 ASSESSMENT — ENCOUNTER SYMPTOMS
VOMITING: 0
SINUS PAIN: 0
SHORTNESS OF BREATH: 1
DIARRHEA: 0
NAUSEA: 0
COUGH: 0
SINUS PRESSURE: 0
CONSTIPATION: 0
BACK PAIN: 1

## 2018-05-21 ENCOUNTER — TELEPHONE (OUTPATIENT)
Dept: PRIMARY CARE CLINIC | Age: 73
End: 2018-05-21

## 2018-05-22 ENCOUNTER — TELEPHONE (OUTPATIENT)
Dept: PRIMARY CARE CLINIC | Age: 73
End: 2018-05-22

## 2018-05-22 ENCOUNTER — HOSPITAL ENCOUNTER (OUTPATIENT)
Dept: GENERAL RADIOLOGY | Age: 73
Discharge: HOME | End: 2018-05-22
Payer: MEDICARE

## 2018-05-22 ENCOUNTER — OFFICE VISIT (OUTPATIENT)
Dept: PRIMARY CARE CLINIC | Age: 73
End: 2018-05-22
Payer: MEDICARE

## 2018-05-22 VITALS
OXYGEN SATURATION: 97 % | SYSTOLIC BLOOD PRESSURE: 132 MMHG | DIASTOLIC BLOOD PRESSURE: 81 MMHG | HEIGHT: 61 IN | BODY MASS INDEX: 39.23 KG/M2 | WEIGHT: 207.8 LBS | TEMPERATURE: 98.7 F | HEART RATE: 83 BPM

## 2018-05-22 DIAGNOSIS — J40 BRONCHITIS: ICD-10-CM

## 2018-05-22 DIAGNOSIS — E03.9 HYPOTHYROIDISM, UNSPECIFIED TYPE: ICD-10-CM

## 2018-05-22 DIAGNOSIS — J00 ACUTE NASOPHARYNGITIS: Primary | ICD-10-CM

## 2018-05-22 PROCEDURE — 71046 X-RAY EXAM CHEST 2 VIEWS: CPT

## 2018-05-22 PROCEDURE — 99213 OFFICE O/P EST LOW 20 MIN: CPT | Performed by: NURSE PRACTITIONER

## 2018-05-22 RX ORDER — AZITHROMYCIN 250 MG/1
TABLET, FILM COATED ORAL
Qty: 1 PACKET | Refills: 0 | Status: SHIPPED | OUTPATIENT
Start: 2018-05-22 | End: 2019-04-24

## 2018-05-22 RX ORDER — PSEUDOEPHEDRINE HYDROCHLORIDE 30 MG/1
30 TABLET ORAL EVERY 12 HOURS PRN
Qty: 14 TABLET | Refills: 0 | Status: SHIPPED | OUTPATIENT
Start: 2018-05-22 | End: 2018-05-29

## 2018-05-22 RX ORDER — OXYMETAZOLINE HYDROCHLORIDE 0.05 G/100ML
2 SPRAY NASAL 2 TIMES DAILY
Qty: 1 BOTTLE | Refills: 0 | Status: SHIPPED | OUTPATIENT
Start: 2018-05-22 | End: 2018-06-21

## 2018-05-22 ASSESSMENT — ENCOUNTER SYMPTOMS
DIARRHEA: 1
WHEEZING: 1
VOMITING: 0
NAUSEA: 0
EYE PAIN: 0
COUGH: 1
ABDOMINAL PAIN: 0
SINUS PAIN: 0
SINUS PRESSURE: 0
SHORTNESS OF BREATH: 1
SORE THROAT: 1
RHINORRHEA: 1

## 2018-05-22 ASSESSMENT — PATIENT HEALTH QUESTIONNAIRE - PHQ9
2. FEELING DOWN, DEPRESSED OR HOPELESS: 0
SUM OF ALL RESPONSES TO PHQ9 QUESTIONS 1 & 2: 0
1. LITTLE INTEREST OR PLEASURE IN DOING THINGS: 0
SUM OF ALL RESPONSES TO PHQ QUESTIONS 1-9: 0

## 2018-06-01 ENCOUNTER — OFFICE VISIT (OUTPATIENT)
Dept: PRIMARY CARE CLINIC | Age: 73
End: 2018-06-01
Payer: MEDICARE

## 2018-06-01 VITALS
DIASTOLIC BLOOD PRESSURE: 72 MMHG | SYSTOLIC BLOOD PRESSURE: 112 MMHG | BODY MASS INDEX: 39.3 KG/M2 | RESPIRATION RATE: 20 BRPM | TEMPERATURE: 98 F | HEART RATE: 70 BPM | OXYGEN SATURATION: 98 % | WEIGHT: 208 LBS

## 2018-06-01 DIAGNOSIS — J18.9 COMMUNITY ACQUIRED PNEUMONIA OF LEFT LOWER LOBE OF LUNG: Primary | ICD-10-CM

## 2018-06-01 DIAGNOSIS — J20.9 ACUTE BRONCHITIS, UNSPECIFIED ORGANISM: ICD-10-CM

## 2018-06-01 PROCEDURE — 1123F ACP DISCUSS/DSCN MKR DOCD: CPT | Performed by: FAMILY MEDICINE

## 2018-06-01 PROCEDURE — 1036F TOBACCO NON-USER: CPT | Performed by: FAMILY MEDICINE

## 2018-06-01 PROCEDURE — G8399 PT W/DXA RESULTS DOCUMENT: HCPCS | Performed by: FAMILY MEDICINE

## 2018-06-01 PROCEDURE — 4040F PNEUMOC VAC/ADMIN/RCVD: CPT | Performed by: FAMILY MEDICINE

## 2018-06-01 PROCEDURE — 1090F PRES/ABSN URINE INCON ASSESS: CPT | Performed by: FAMILY MEDICINE

## 2018-06-01 PROCEDURE — 3017F COLORECTAL CA SCREEN DOC REV: CPT | Performed by: FAMILY MEDICINE

## 2018-06-01 PROCEDURE — 99213 OFFICE O/P EST LOW 20 MIN: CPT | Performed by: FAMILY MEDICINE

## 2018-06-01 PROCEDURE — G8417 CALC BMI ABV UP PARAM F/U: HCPCS | Performed by: FAMILY MEDICINE

## 2018-06-01 PROCEDURE — G8427 DOCREV CUR MEDS BY ELIG CLIN: HCPCS | Performed by: FAMILY MEDICINE

## 2018-06-01 RX ORDER — LEVOFLOXACIN 750 MG/1
750 TABLET ORAL DAILY
Qty: 10 TABLET | Refills: 0 | Status: SHIPPED | OUTPATIENT
Start: 2018-06-01 | End: 2018-06-11

## 2018-06-01 RX ORDER — BENZONATATE 100 MG/1
100 CAPSULE ORAL 3 TIMES DAILY PRN
Qty: 20 CAPSULE | Refills: 0 | Status: SHIPPED | OUTPATIENT
Start: 2018-06-01 | End: 2018-06-08

## 2018-06-01 RX ORDER — PREDNISONE 20 MG/1
40 TABLET ORAL DAILY
Qty: 10 TABLET | Refills: 0 | Status: SHIPPED | OUTPATIENT
Start: 2018-06-01 | End: 2018-06-06

## 2018-06-01 ASSESSMENT — ENCOUNTER SYMPTOMS
ABDOMINAL PAIN: 0
NAUSEA: 0
COUGH: 1
SHORTNESS OF BREATH: 0
DIARRHEA: 0
SORE THROAT: 1
VOMITING: 0
SINUS PRESSURE: 1
CHEST TIGHTNESS: 0
CONSTIPATION: 0
RHINORRHEA: 1
WHEEZING: 0

## 2018-06-13 ENCOUNTER — OFFICE VISIT (OUTPATIENT)
Dept: PRIMARY CARE CLINIC | Age: 73
End: 2018-06-13
Payer: MEDICARE

## 2018-06-13 VITALS
DIASTOLIC BLOOD PRESSURE: 70 MMHG | WEIGHT: 208 LBS | BODY MASS INDEX: 39.27 KG/M2 | HEART RATE: 73 BPM | OXYGEN SATURATION: 93 % | SYSTOLIC BLOOD PRESSURE: 122 MMHG | RESPIRATION RATE: 20 BRPM | HEIGHT: 61 IN

## 2018-06-13 DIAGNOSIS — R05.8 RECURRENT COUGH: ICD-10-CM

## 2018-06-13 DIAGNOSIS — R06.02 SHORTNESS OF BREATH: ICD-10-CM

## 2018-06-13 DIAGNOSIS — J20.9 ACUTE BRONCHITIS, UNSPECIFIED ORGANISM: ICD-10-CM

## 2018-06-13 DIAGNOSIS — Z87.01 HISTORY OF BACTERIAL PNEUMONIA: Primary | ICD-10-CM

## 2018-06-13 PROCEDURE — 1090F PRES/ABSN URINE INCON ASSESS: CPT | Performed by: FAMILY MEDICINE

## 2018-06-13 PROCEDURE — 1123F ACP DISCUSS/DSCN MKR DOCD: CPT | Performed by: FAMILY MEDICINE

## 2018-06-13 PROCEDURE — 99214 OFFICE O/P EST MOD 30 MIN: CPT | Performed by: FAMILY MEDICINE

## 2018-06-13 PROCEDURE — 1036F TOBACCO NON-USER: CPT | Performed by: FAMILY MEDICINE

## 2018-06-13 PROCEDURE — G8399 PT W/DXA RESULTS DOCUMENT: HCPCS | Performed by: FAMILY MEDICINE

## 2018-06-13 PROCEDURE — G8427 DOCREV CUR MEDS BY ELIG CLIN: HCPCS | Performed by: FAMILY MEDICINE

## 2018-06-13 PROCEDURE — 4040F PNEUMOC VAC/ADMIN/RCVD: CPT | Performed by: FAMILY MEDICINE

## 2018-06-13 PROCEDURE — G8417 CALC BMI ABV UP PARAM F/U: HCPCS | Performed by: FAMILY MEDICINE

## 2018-06-13 PROCEDURE — 3017F COLORECTAL CA SCREEN DOC REV: CPT | Performed by: FAMILY MEDICINE

## 2018-06-13 RX ORDER — ALBUTEROL SULFATE 90 UG/1
2 AEROSOL, METERED RESPIRATORY (INHALATION) EVERY 6 HOURS PRN
Qty: 1 INHALER | Refills: 3 | Status: SHIPPED | OUTPATIENT
Start: 2018-06-13 | End: 2019-04-24 | Stop reason: SDUPTHER

## 2018-06-13 ASSESSMENT — ENCOUNTER SYMPTOMS
CHEST TIGHTNESS: 0
CONSTIPATION: 0
NAUSEA: 0
ABDOMINAL PAIN: 0
DIARRHEA: 0
WHEEZING: 0
SHORTNESS OF BREATH: 0
COUGH: 1
VOMITING: 0

## 2018-06-22 ENCOUNTER — HOSPITAL ENCOUNTER (OUTPATIENT)
Dept: CT IMAGING | Age: 73
Discharge: HOME | End: 2018-06-22
Payer: MEDICARE

## 2018-06-22 DIAGNOSIS — J20.9 ACUTE BRONCHITIS, UNSPECIFIED ORGANISM: ICD-10-CM

## 2018-06-22 DIAGNOSIS — R05.8 RECURRENT COUGH: ICD-10-CM

## 2018-06-22 DIAGNOSIS — Z87.01 HISTORY OF BACTERIAL PNEUMONIA: ICD-10-CM

## 2018-06-22 DIAGNOSIS — R06.02 SHORTNESS OF BREATH: ICD-10-CM

## 2018-06-22 PROCEDURE — 71250 CT THORAX DX C-: CPT

## 2018-06-25 ENCOUNTER — TELEPHONE (OUTPATIENT)
Dept: PRIMARY CARE CLINIC | Age: 73
End: 2018-06-25

## 2018-07-10 ENCOUNTER — TELEPHONE (OUTPATIENT)
Dept: GASTROENTEROLOGY | Facility: CLINIC | Age: 73
End: 2018-07-10

## 2018-07-10 ENCOUNTER — HOSPITAL ENCOUNTER (OUTPATIENT)
Facility: HOSPITAL | Age: 73
Setting detail: HOSPITAL OUTPATIENT SURGERY
Discharge: HOME | End: 2018-07-10
Attending: INTERNAL MEDICINE | Admitting: ANESTHESIOLOGY

## 2018-07-10 ENCOUNTER — ANESTHESIA (OUTPATIENT)
Dept: GASTROENTEROLOGY | Facility: HOSPITAL | Age: 73
End: 2018-07-10

## 2018-07-10 ENCOUNTER — ANESTHESIA EVENT (OUTPATIENT)
Dept: GASTROENTEROLOGY | Facility: HOSPITAL | Age: 73
End: 2018-07-10

## 2018-07-10 VITALS
DIASTOLIC BLOOD PRESSURE: 67 MMHG | WEIGHT: 209 LBS | OXYGEN SATURATION: 100 % | RESPIRATION RATE: 16 BRPM | BODY MASS INDEX: 39.46 KG/M2 | HEIGHT: 61 IN | SYSTOLIC BLOOD PRESSURE: 130 MMHG | HEART RATE: 83 BPM | TEMPERATURE: 98.1 F

## 2018-07-10 DIAGNOSIS — Z86.010 HISTORY OF ADENOMATOUS POLYP OF COLON: ICD-10-CM

## 2018-07-10 PROCEDURE — 88305 TISSUE EXAM BY PATHOLOGIST: CPT | Performed by: INTERNAL MEDICINE

## 2018-07-10 PROCEDURE — 45385 COLONOSCOPY W/LESION REMOVAL: CPT | Performed by: INTERNAL MEDICINE

## 2018-07-10 PROCEDURE — 25010000002 PROPOFOL 10 MG/ML EMULSION: Performed by: NURSE ANESTHETIST, CERTIFIED REGISTERED

## 2018-07-10 RX ORDER — SODIUM CHLORIDE 9 MG/ML
500 INJECTION, SOLUTION INTRAVENOUS CONTINUOUS PRN
Status: DISCONTINUED | OUTPATIENT
Start: 2018-07-10 | End: 2018-07-10 | Stop reason: HOSPADM

## 2018-07-10 RX ORDER — SODIUM CHLORIDE 9 MG/ML
100 INJECTION, SOLUTION INTRAVENOUS CONTINUOUS
Status: DISCONTINUED | OUTPATIENT
Start: 2018-07-10 | End: 2018-07-10 | Stop reason: HOSPADM

## 2018-07-10 RX ORDER — SODIUM CHLORIDE 0.9 % (FLUSH) 0.9 %
3 SYRINGE (ML) INJECTION AS NEEDED
Status: DISCONTINUED | OUTPATIENT
Start: 2018-07-10 | End: 2018-07-10 | Stop reason: HOSPADM

## 2018-07-10 RX ORDER — PROPOFOL 10 MG/ML
VIAL (ML) INTRAVENOUS AS NEEDED
Status: DISCONTINUED | OUTPATIENT
Start: 2018-07-10 | End: 2018-07-10 | Stop reason: SURG

## 2018-07-10 RX ORDER — SODIUM CHLORIDE 0.9 % (FLUSH) 0.9 %
1-10 SYRINGE (ML) INJECTION AS NEEDED
Status: DISCONTINUED | OUTPATIENT
Start: 2018-07-10 | End: 2018-07-10 | Stop reason: HOSPADM

## 2018-07-10 RX ORDER — METOPROLOL TARTRATE 5 MG/5ML
2 INJECTION INTRAVENOUS ONCE AS NEEDED
Status: COMPLETED | OUTPATIENT
Start: 2018-07-10 | End: 2018-07-10

## 2018-07-10 RX ORDER — METOPROLOL TARTRATE 5 MG/5ML
INJECTION INTRAVENOUS AS NEEDED
Status: DISCONTINUED | OUTPATIENT
Start: 2018-07-10 | End: 2018-07-10 | Stop reason: SURG

## 2018-07-10 RX ADMIN — METOPROLOL TARTRATE 2 MG: 5 INJECTION, SOLUTION INTRAVENOUS at 10:12

## 2018-07-10 RX ADMIN — SODIUM CHLORIDE 100 ML/HR: 9 INJECTION, SOLUTION INTRAVENOUS at 10:08

## 2018-07-10 RX ADMIN — LIDOCAINE HYDROCHLORIDE 0.5 ML: 10 INJECTION, SOLUTION EPIDURAL; INFILTRATION; INTRACAUDAL; PERINEURAL at 10:08

## 2018-07-10 RX ADMIN — PROPOFOL 20 MG: 10 INJECTION, EMULSION INTRAVENOUS at 10:30

## 2018-07-10 RX ADMIN — PROPOFOL 30 MG: 10 INJECTION, EMULSION INTRAVENOUS at 10:32

## 2018-07-10 RX ADMIN — PROPOFOL 30 MG: 10 INJECTION, EMULSION INTRAVENOUS at 10:35

## 2018-07-10 RX ADMIN — PROPOFOL 30 MG: 10 INJECTION, EMULSION INTRAVENOUS at 10:28

## 2018-07-10 RX ADMIN — METOPROLOL TARTRATE 2 MG: 5 INJECTION, SOLUTION INTRAVENOUS at 10:09

## 2018-07-10 RX ADMIN — PROPOFOL 50 MG: 10 INJECTION, EMULSION INTRAVENOUS at 10:25

## 2018-07-10 RX ADMIN — PROPOFOL 30 MG: 10 INJECTION, EMULSION INTRAVENOUS at 10:40

## 2018-07-10 RX ADMIN — PROPOFOL 30 MG: 10 INJECTION, EMULSION INTRAVENOUS at 10:37

## 2018-07-10 NOTE — ANESTHESIA PREPROCEDURE EVALUATION
Anesthesia Evaluation     Patient summary reviewed   no history of anesthetic complications:  NPO Solid Status: > 8 hours  NPO Liquid Status: > 8 hours           Airway   Mallampati: III  TM distance: >3 FB  Small opening  Dental          Pulmonary    (+) pneumonia resolved , asthma, shortness of breath,   (-) sleep apnea, not a smoker  Cardiovascular   Exercise tolerance: good (4-7 METS)    Patient on routine beta blocker and Beta blocker not taken-may be given intraoperatively    (+) hypertension, hyperlipidemia,       Neuro/Psych  (-) seizures, TIA, CVA  GI/Hepatic/Renal/Endo    (+) obesity,  GERD,  hypothyroidism,   (-) liver disease, no renal disease, diabetes    Musculoskeletal     Abdominal    Substance History      OB/GYN          Other                        Anesthesia Plan    ASA 3     general   total IV anesthesia  intravenous induction   Anesthetic plan and risks discussed with patient.

## 2018-07-10 NOTE — ANESTHESIA POSTPROCEDURE EVALUATION
Patient: Susy Shepherd    Procedure Summary     Date:  07/10/18 Room / Location:  [facility] ENDOSCOPY 5 / [facility]    Anesthesia Start:  1011 Anesthesia Stop:  1047    Procedure:  COLONOSCOPY WITH ANESTHESIA (N/A ) Diagnosis:       History of adenomatous polyp of colon      (History of adenomatous polyp of colon [Z86.010])    Surgeon:  Jaron Mcclendon DO Provider:  Chung Sahu CRNA    Anesthesia Type:  general ASA Status:  3          Anesthesia Type: general  Last vitals  BP   165/86 (07/10/18 0946)   Temp   98.1 °F (36.7 °C) (07/10/18 0946)   Pulse   98 (07/10/18 0946)   Resp   20 (07/10/18 0946)     SpO2   97 % (07/10/18 0946)     Post Anesthesia Care and Evaluation    Patient location during evaluation: PHASE II  Patient participation: complete - patient participated  Level of consciousness: awake and alert  Pain score: 0  Pain management: adequate  Airway patency: patent  Anesthetic complications: No anesthetic complications  PONV Status: none  Cardiovascular status: acceptable and stable  Respiratory status: acceptable and unassisted  Hydration status: acceptable

## 2018-07-10 NOTE — H&P
[facility] Gastroenterology  Pre Procedure History & Physical    Chief Complaint:   Polyps    Subjective     HPI:   Polyps    Past Medical History:   Past Medical History:   Diagnosis Date   • Arthritis    • Asthma    • Cancer (CMS/HCC)     breast   • Hyperlipidemia    • Hypertension        Past Surgical History:  Past Surgical History:   Procedure Laterality Date   • ADRENAL GLAND SURGERY     • APPENDECTOMY     • BACK SURGERY      cervical   • BREAST SURGERY Right     lumpectomy   • CARPAL TUNNEL RELEASE Bilateral    • CHOLECYSTECTOMY     • COLONOSCOPY  10/01/2014    three polyps all removed the most complex of which was in the mid ascending colon   • ENDOSCOPY  10/01/2014    gasatritis    • EYE SURGERY      cateracts   • GANGLION CYST EXCISION Right    • HYSTERECTOMY     • JOINT REPLACEMENT Bilateral     bilat knee replacement   • SHOULDER SURGERY Right    • TONSILLECTOMY     • TUMOR REMOVAL      fatty tumor off arm       Family History:  Family History   Problem Relation Age of Onset   • Colon cancer Mother    • Colon cancer Paternal Grandfather        Social History:   reports that she has never smoked. She has never used smokeless tobacco. She reports that she does not drink alcohol or use drugs.    Medications:   Prior to Admission medications    Medication Sig Start Date End Date Taking? Authorizing Provider   aspirin 81 MG EC tablet Take 81 mg by mouth Daily.    Historical Provider, MD   atenolol (TENORMIN) 50 MG tablet Take 50 mg by mouth Daily.    Historical Provider, MD   calcitriol (ROCALTROL) 0.25 MCG capsule Take 0.25 mcg by mouth Daily.    Historical Provider, MD   calcium citrate-vitamin d (CALCITRATE) 315-250 MG-UNIT tablet tablet Take  by mouth Daily.    Historical Provider, MD   chlorpheniramine (ALLERGY RELIEF) 4 MG tablet Take 4 mg by mouth Every 6 (Six) Hours As Needed for Allergies.    Historical Provider, MD   cholecalciferol (VITAMIN D3) 29814 units capsule Take 50,000 Units by mouth  "Every 30 (Thirty) Days.    Historical Provider, MD   DULoxetine (CYMBALTA) 30 MG capsule Take 30 mg by mouth Daily.    Historical Provider, MD   furosemide (LASIX) 40 MG tablet Take 40 mg by mouth 2 (Two) Times a Day.    Historical Provider, MD   gabapentin (NEURONTIN) 100 MG capsule Take 100 mg by mouth 3 (Three) Times a Day.    Historical Provider, MD   levothyroxine (SYNTHROID, LEVOTHROID) 100 MCG tablet Take 100 mcg by mouth Daily.    Historical Provider, MD   meloxicam (MOBIC) 7.5 MG tablet Take 7.5 mg by mouth Daily.    Historical Provider, MD   Multiple Vitamin (MULTI-VITAMIN DAILY PO) Take  by mouth.    Historical Provider, MD   Omega-3 Fatty Acids (FISH OIL) 1000 MG capsule capsule Take  by mouth Daily With Breakfast.    Historical Provider, MD   omeprazole (priLOSEC) 20 MG capsule Take 20 mg by mouth Daily.    Historical Provider, MD   raloxifene (EVISTA) 60 MG tablet Take 60 mg by mouth Daily.    Historical Provider, MD   risedronate (ACTONEL) 150 MG tablet Take 150 mg by mouth Every 30 (Thirty) Days. with water on empty stomach, nothing by mouth or lie down for next 30 minutes.    Historical Provider, MD       Allergies:  Patient has no known allergies.    ROS:    General: Weight stable  Resp: No SOA  Cardiovascular: No CP    Objective     Blood pressure 165/86, pulse 98, temperature 98.1 °F (36.7 °C), temperature source Temporal Artery , resp. rate 20, height 154.9 cm (61\"), weight 94.8 kg (209 lb), SpO2 97 %.    Physical Exam   Constitutional: Pt is oriented to person, place, and in no distress.   HENT: Mouth/Throat: Oropharynx is clear.   Cardiovascular: Normal rate, regular rhythm.    Pulmonary/Chest: Effort normal. No respiratory distress. No  wheezes.   Abdominal: Soft. Non-distended.  Skin: Skin is warm and dry.   Psychiatric: Mood, memory, affect and judgment appear normal.     Assessment/Plan     Diagnosis:  Polyps    Anticipated Surgical Procedure:  c-scope    The risks, benefits, and " alternatives of this procedure have been discussed with the patient or the responsible party- the patient understands and agrees to proceed.

## 2018-07-11 ENCOUNTER — OFFICE VISIT (OUTPATIENT)
Dept: PRIMARY CARE CLINIC | Age: 73
End: 2018-07-11
Payer: MEDICARE

## 2018-07-11 DIAGNOSIS — E03.9 ACQUIRED HYPOTHYROIDISM: ICD-10-CM

## 2018-07-11 DIAGNOSIS — N18.30 CKD (CHRONIC KIDNEY DISEASE) STAGE 3, GFR 30-59 ML/MIN (HCC): ICD-10-CM

## 2018-07-11 DIAGNOSIS — J44.9 STAGE 2 MODERATE COPD BY GOLD CLASSIFICATION (HCC): Primary | ICD-10-CM

## 2018-07-11 LAB
ALBUMIN SERPL-MCNC: 3.8 G/DL (ref 3.5–5.2)
ALP BLD-CCNC: 45 U/L (ref 35–104)
ALT SERPL-CCNC: 13 U/L (ref 5–33)
ANION GAP SERPL CALCULATED.3IONS-SCNC: 13 MMOL/L (ref 7–19)
AST SERPL-CCNC: 18 U/L (ref 5–32)
BILIRUB SERPL-MCNC: <0.2 MG/DL (ref 0.2–1.2)
BUN BLDV-MCNC: 17 MG/DL (ref 8–23)
CALCIUM SERPL-MCNC: 9.5 MG/DL (ref 8.8–10.2)
CHLORIDE BLD-SCNC: 109 MMOL/L (ref 98–111)
CO2: 19 MMOL/L (ref 22–29)
CREAT SERPL-MCNC: 1.2 MG/DL (ref 0.5–0.9)
GFR NON-AFRICAN AMERICAN: 44
GLUCOSE BLD-MCNC: 111 MG/DL (ref 74–109)
POTASSIUM SERPL-SCNC: 4 MMOL/L (ref 3.5–5)
SODIUM BLD-SCNC: 141 MMOL/L (ref 136–145)
TOTAL PROTEIN: 7 G/DL (ref 6.6–8.7)

## 2018-07-11 PROCEDURE — 1090F PRES/ABSN URINE INCON ASSESS: CPT | Performed by: FAMILY MEDICINE

## 2018-07-11 PROCEDURE — 94060 EVALUATION OF WHEEZING: CPT | Performed by: FAMILY MEDICINE

## 2018-07-11 PROCEDURE — 4040F PNEUMOC VAC/ADMIN/RCVD: CPT | Performed by: FAMILY MEDICINE

## 2018-07-11 PROCEDURE — G8926 SPIRO NO PERF OR DOC: HCPCS | Performed by: FAMILY MEDICINE

## 2018-07-11 PROCEDURE — 1101F PT FALLS ASSESS-DOCD LE1/YR: CPT | Performed by: FAMILY MEDICINE

## 2018-07-11 PROCEDURE — 1036F TOBACCO NON-USER: CPT | Performed by: FAMILY MEDICINE

## 2018-07-11 PROCEDURE — G8428 CUR MEDS NOT DOCUMENT: HCPCS | Performed by: FAMILY MEDICINE

## 2018-07-11 PROCEDURE — 1123F ACP DISCUSS/DSCN MKR DOCD: CPT | Performed by: FAMILY MEDICINE

## 2018-07-11 PROCEDURE — 3023F SPIROM DOC REV: CPT | Performed by: FAMILY MEDICINE

## 2018-07-11 PROCEDURE — 3017F COLORECTAL CA SCREEN DOC REV: CPT | Performed by: FAMILY MEDICINE

## 2018-07-11 PROCEDURE — 99214 OFFICE O/P EST MOD 30 MIN: CPT | Performed by: FAMILY MEDICINE

## 2018-07-11 PROCEDURE — G8417 CALC BMI ABV UP PARAM F/U: HCPCS | Performed by: FAMILY MEDICINE

## 2018-07-11 PROCEDURE — G8399 PT W/DXA RESULTS DOCUMENT: HCPCS | Performed by: FAMILY MEDICINE

## 2018-07-11 RX ORDER — AZITHROMYCIN 250 MG/1
250 TABLET, FILM COATED ORAL DAILY
Qty: 6 TABLET | Refills: 0 | Status: SHIPPED | OUTPATIENT
Start: 2018-07-11 | End: 2018-07-16

## 2018-07-11 RX ORDER — PREDNISONE 20 MG/1
40 TABLET ORAL DAILY
Qty: 10 TABLET | Refills: 0 | Status: SHIPPED | OUTPATIENT
Start: 2018-07-11 | End: 2018-07-16

## 2018-07-11 ASSESSMENT — ENCOUNTER SYMPTOMS
NAUSEA: 0
ABDOMINAL PAIN: 0
WHEEZING: 0
COUGH: 1
VOMITING: 0
CONSTIPATION: 0
SHORTNESS OF BREATH: 1
DIARRHEA: 0
CHEST TIGHTNESS: 0

## 2018-07-11 NOTE — PROGRESS NOTES
days 12 tablet 3    gabapentin (NEURONTIN) 100 MG capsule Take 2 tablets in the morning and 2 at bedtime. 360 capsule 3    raloxifene (EVISTA) 60 MG tablet TAKE ONE TABLET BY MOUTH ONCE DAILY 90 tablet 5    omeprazole (PRILOSEC) 20 MG delayed release capsule Take 1 capsule by mouth daily 90 capsule 3    vitamin D (ERGOCALCIFEROL) 48875 units CAPS capsule Take 1 capsule by mouth once a week 12 capsule 3    meloxicam (MOBIC) 7.5 MG tablet Take 1 tablet by mouth daily Indications: taking only 1/2 a pill daily 90 tablet 3    DULoxetine (CYMBALTA) 30 MG extended release capsule Take 1 capsule by mouth every other day 90 capsule 3    metoprolol succinate (TOPROL XL) 100 MG extended release tablet Take 1 tablet by mouth daily 90 tablet 3    guaiFENesin (MUCINEX) 600 MG extended release tablet Take 2 tablets by mouth 2 times daily 14 tablet 1    furosemide (LASIX) 20 MG tablet Take 20 mg by mouth every other day Indications: M-W-F       fexofenadine (ALLEGRA ALLERGY) 180 MG tablet Take 180 mg by mouth daily      calcitRIOL (ROCALTROL) 0.25 MCG capsule Take 0.25 mcg by mouth daily      aspirin 81 MG chewable tablet Take 81 mg by mouth daily      Calcium Carb-Cholecalciferol (CALCIUM 600 + D PO) Take by mouth      B Complex-Folic Acid (SUPER B COMPLEX MAXI PO) Take by mouth      Multiple Vitamin (MULTI VITAMIN DAILY PO) Take by mouth       No current facility-administered medications for this visit.         No Known Allergies    Past Surgical History:   Procedure Laterality Date    ADRENALECTOMY      APPENDECTOMY      BACK SURGERY      BREAST LUMPECTOMY      CARPAL TUNNEL RELEASE      CARPAL TUNNEL RELEASE  1990    CATARACT REMOVAL      CORONARY ANGIOPLASTY      GALLBLADDER SURGERY      PARTIAL HYSTERECTOMY      TONSILLECTOMY  1997    TOTAL KNEE ARTHROPLASTY         Social History   Substance Use Topics    Smoking status: Never Smoker    Smokeless tobacco: Never Used    Alcohol use No       No

## 2018-07-11 NOTE — PATIENT INSTRUCTIONS
Follow COPD action plan daily. Start spiriva daily. Please arrive 15 minutes early to next follow up appointment in 2 months or schedule an appointment sooner if needed.

## 2018-07-13 LAB — TSH, 3RD GENERATION: 0.12 MU/L (ref 0.3–4)

## 2018-07-16 ENCOUNTER — TELEPHONE (OUTPATIENT)
Dept: PRIMARY CARE CLINIC | Age: 73
End: 2018-07-16

## 2018-07-17 LAB
CYTO UR: NORMAL
LAB AP CASE REPORT: NORMAL
LAB AP CLINICAL INFORMATION: NORMAL
PATH REPORT.FINAL DX SPEC: NORMAL
PATH REPORT.GROSS SPEC: NORMAL

## 2018-07-18 ENCOUNTER — TELEPHONE (OUTPATIENT)
Dept: GASTROENTEROLOGY | Facility: CLINIC | Age: 73
End: 2018-07-18

## 2018-07-18 NOTE — TELEPHONE ENCOUNTER
----- Message from Jaron Mcclendon DO sent at 7/18/2018  7:13 AM CDT -----  Can u call and add her on to my office for thurs to discuss polyp bx results      Called patient scheduled her for 2 tomorrow 7-19-18

## 2018-07-19 ENCOUNTER — OFFICE VISIT (OUTPATIENT)
Dept: GASTROENTEROLOGY | Facility: CLINIC | Age: 73
End: 2018-07-19

## 2018-07-19 ENCOUNTER — TELEPHONE (OUTPATIENT)
Dept: GASTROENTEROLOGY | Facility: CLINIC | Age: 73
End: 2018-07-19

## 2018-07-19 VITALS
SYSTOLIC BLOOD PRESSURE: 134 MMHG | BODY MASS INDEX: 39.65 KG/M2 | TEMPERATURE: 97 F | DIASTOLIC BLOOD PRESSURE: 74 MMHG | HEIGHT: 61 IN | OXYGEN SATURATION: 97 % | HEART RATE: 80 BPM | WEIGHT: 210 LBS

## 2018-07-19 DIAGNOSIS — C18.9 MALIGNANT NEOPLASM OF COLON, UNSPECIFIED PART OF COLON (HCC): Primary | ICD-10-CM

## 2018-07-19 PROCEDURE — 99213 OFFICE O/P EST LOW 20 MIN: CPT | Performed by: INTERNAL MEDICINE

## 2018-07-19 RX ORDER — ALBUTEROL SULFATE 90 UG/1
2 AEROSOL, METERED RESPIRATORY (INHALATION) EVERY 4 HOURS PRN
COMMUNITY

## 2018-07-19 NOTE — PROGRESS NOTES
Chief Complaint   Patient presents with   • Colonoscopy     7-10-18 had colon here for biopsy results       Subjective     Colon Cancer   This is a new problem. The current episode started 1 to 4 weeks ago. The problem has been resolved. Pertinent negatives include no abdominal pain, change in bowel habit, chest pain, coughing, fatigue, fever, joint swelling, myalgias, nausea, rash, sore throat, vomiting or weakness. Nothing aggravates the symptoms.       Past Medical History:   Diagnosis Date   • Arthritis    • Asthma    • Cancer (CMS/HCC)     breast   • Hyperlipidemia    • Hypertension        Past Surgical History:   Procedure Laterality Date   • ADRENAL GLAND SURGERY     • APPENDECTOMY     • BACK SURGERY      cervical   • BREAST SURGERY Right     lumpectomy   • CARPAL TUNNEL RELEASE Bilateral    • CHOLECYSTECTOMY     • COLONOSCOPY  10/01/2014    three polyps all removed the most complex of which was in the mid ascending colon   • COLONOSCOPY N/A 7/10/2018    Procedure: COLONOSCOPY WITH ANESTHESIA;  Surgeon: Jaron Mcclendon DO;  Location: [facility] ENDOSCOPY;  Service: Gastroenterology   • ENDOSCOPY  10/01/2014    gasatritis    • EYE SURGERY      cateracts   • GANGLION CYST EXCISION Right    • HYSTERECTOMY     • JOINT REPLACEMENT Bilateral     bilat knee replacement   • SHOULDER SURGERY Right    • TONSILLECTOMY     • TUMOR REMOVAL      fatty tumor off arm       Outpatient Prescriptions Marked as Taking for the 7/19/18 encounter (Office Visit) with Jaron Mcclendon DO   Medication Sig Dispense Refill   • albuterol (PROVENTIL HFA;VENTOLIN HFA) 108 (90 Base) MCG/ACT inhaler Inhale 2 puffs Every 4 (Four) Hours As Needed for Wheezing.     • aspirin 81 MG EC tablet Take 81 mg by mouth Daily.     • atenolol (TENORMIN) 50 MG tablet Take 50 mg by mouth Daily.     • calcitriol (ROCALTROL) 0.25 MCG capsule Take 0.25 mcg by mouth Daily.     • calcium citrate-vitamin d (CALCITRATE) 315-250 MG-UNIT tablet tablet Take  by mouth  Daily.     • chlorpheniramine (ALLERGY RELIEF) 4 MG tablet Take 4 mg by mouth Every 6 (Six) Hours As Needed for Allergies.     • cholecalciferol (VITAMIN D3) 71597 units capsule Take 50,000 Units by mouth Every 30 (Thirty) Days.     • DULoxetine (CYMBALTA) 30 MG capsule Take 30 mg by mouth Daily.     • furosemide (LASIX) 40 MG tablet Take 40 mg by mouth 2 (Two) Times a Day.     • gabapentin (NEURONTIN) 100 MG capsule Take 100 mg by mouth 3 (Three) Times a Day.     • levothyroxine (SYNTHROID, LEVOTHROID) 100 MCG tablet Take 100 mcg by mouth Daily.     • meloxicam (MOBIC) 7.5 MG tablet Take 7.5 mg by mouth Daily.     • Multiple Vitamin (MULTI-VITAMIN DAILY PO) Take  by mouth.     • Omega-3 Fatty Acids (FISH OIL) 1000 MG capsule capsule Take  by mouth Daily With Breakfast.     • omeprazole (priLOSEC) 20 MG capsule Take 20 mg by mouth Daily.     • raloxifene (EVISTA) 60 MG tablet Take 60 mg by mouth Daily.     • risedronate (ACTONEL) 150 MG tablet Take 150 mg by mouth Every 30 (Thirty) Days. with water on empty stomach, nothing by mouth or lie down for next 30 minutes.     • tiotropium (SPIRIVA HANDIHALER) 18 MCG per inhalation capsule Place 1 capsule into inhaler and inhale Daily.         No Known Allergies    Social History     Social History   • Marital status:      Spouse name: N/A   • Number of children: N/A   • Years of education: N/A     Occupational History   • Not on file.     Social History Main Topics   • Smoking status: Never Smoker   • Smokeless tobacco: Never Used   • Alcohol use No   • Drug use: No   • Sexual activity: Defer     Other Topics Concern   • Not on file     Social History Narrative   • No narrative on file       Family History   Problem Relation Age of Onset   • Colon cancer Mother    • Colon cancer Paternal Grandfather        Review of Systems   Constitutional: Negative for fatigue, fever and unexpected weight change.   HENT: Negative for hearing loss, sore throat and voice change.   "  Eyes: Negative for visual disturbance.   Respiratory: Negative for cough, shortness of breath and wheezing.    Cardiovascular: Negative for chest pain and palpitations.   Gastrointestinal: Negative for abdominal pain, blood in stool, change in bowel habit, nausea and vomiting.   Endocrine: Negative for polydipsia and polyuria.   Genitourinary: Negative for difficulty urinating, dysuria, hematuria and urgency.   Musculoskeletal: Negative for joint swelling and myalgias.   Skin: Negative for color change, rash and wound.   Neurological: Negative for dizziness, tremors, seizures, syncope and weakness.   Hematological: Does not bruise/bleed easily.   Psychiatric/Behavioral: Negative for agitation and confusion. The patient is not nervous/anxious.        Objective     Vitals:    07/19/18 1437   BP: 134/74   Pulse: 80   Temp: 97 °F (36.1 °C)   SpO2: 97%   Weight: 95.3 kg (210 lb)   Height: 154.9 cm (61\")     Body mass index is 39.68 kg/m².    Physical Exam   Constitutional: She is oriented to person, place, and time. She appears well-developed and well-nourished.   HENT:   Head: Normocephalic and atraumatic.   Eyes: Pupils are equal, round, and reactive to light. Conjunctivae are normal. No scleral icterus.   Neck: No JVD present. No thyroid mass and no thyromegaly present.   Cardiovascular: Normal rate, regular rhythm and normal heart sounds.  Exam reveals no gallop and no friction rub.    No murmur heard.  Pulmonary/Chest: Effort normal and breath sounds normal. No accessory muscle usage. No respiratory distress. She has no wheezes. She has no rales.   Abdominal: Soft. Bowel sounds are normal. She exhibits no distension, no ascites and no mass. There is no splenomegaly or hepatomegaly. There is no tenderness. There is no rebound and no guarding.   Genitourinary:   Genitourinary Comments: Rectal-Did not examine   Musculoskeletal: Normal range of motion. She exhibits no edema.   Neurological: She is alert and oriented " to person, place, and time.   Deemed a reliable historian, able to converse without difficulty and able to move all extremities without difficulty   Skin: Skin is warm and dry.   Psychiatric: She has a normal mood and affect. Her behavior is normal.       Imaging Results (most recent)     None          Body mass index is 39.68 kg/m².    Assessment/Plan     Susy was seen today for colonoscopy.    Diagnoses and all orders for this visit:    Malignant neoplasm of colon, unspecified part of colon (CMS/HCC)  -     Case Request; Standing  -     Case Request    Other orders  -     Follow Anesthesia Guidelines / Standing Orders; Future  -     Implement Anesthesia Orders Day of Procedure; Standing  -     Obtain Informed Consent; Standing      Pt wants to talk to Dr gates ( for the possibility of colon resection) if after I re cehck and tatoo the area  COLONOSCOPY WITH ANESTHESIA (N/A)    Patient's Body mass index is 39.68 kg/m². BMI is above normal parameters. Recommendations include: no follow-up required.      There are no Patient Instructions on file for this visit.

## 2018-08-02 ENCOUNTER — TELEPHONE (OUTPATIENT)
Dept: GASTROENTEROLOGY | Facility: CLINIC | Age: 73
End: 2018-08-02

## 2018-08-02 PROBLEM — C18.9 MALIGNANT NEOPLASM OF COLON (HCC): Status: ACTIVE | Noted: 2018-08-02

## 2018-08-17 ENCOUNTER — TELEPHONE (OUTPATIENT)
Dept: GASTROENTEROLOGY | Facility: CLINIC | Age: 73
End: 2018-08-17

## 2018-08-17 ENCOUNTER — ANESTHESIA EVENT (OUTPATIENT)
Dept: GASTROENTEROLOGY | Facility: HOSPITAL | Age: 73
End: 2018-08-17

## 2018-08-17 ENCOUNTER — ANESTHESIA (OUTPATIENT)
Dept: GASTROENTEROLOGY | Facility: HOSPITAL | Age: 73
End: 2018-08-17

## 2018-08-17 ENCOUNTER — HOSPITAL ENCOUNTER (OUTPATIENT)
Facility: HOSPITAL | Age: 73
Setting detail: HOSPITAL OUTPATIENT SURGERY
Discharge: HOME | End: 2018-08-17
Attending: INTERNAL MEDICINE | Admitting: ANESTHESIOLOGY

## 2018-08-17 VITALS
DIASTOLIC BLOOD PRESSURE: 73 MMHG | OXYGEN SATURATION: 99 % | HEART RATE: 65 BPM | BODY MASS INDEX: 38.51 KG/M2 | WEIGHT: 204 LBS | TEMPERATURE: 97.4 F | HEIGHT: 61 IN | SYSTOLIC BLOOD PRESSURE: 114 MMHG | RESPIRATION RATE: 19 BRPM

## 2018-08-17 PROCEDURE — 45381 COLONOSCOPY SUBMUCOUS NJX: CPT | Performed by: INTERNAL MEDICINE

## 2018-08-17 PROCEDURE — 25010000002 PROPOFOL 10 MG/ML EMULSION: Performed by: NURSE ANESTHETIST, CERTIFIED REGISTERED

## 2018-08-17 RX ORDER — PROPOFOL 10 MG/ML
VIAL (ML) INTRAVENOUS AS NEEDED
Status: DISCONTINUED | OUTPATIENT
Start: 2018-08-17 | End: 2018-08-17 | Stop reason: SURG

## 2018-08-17 RX ORDER — LIDOCAINE HYDROCHLORIDE 20 MG/ML
INJECTION, SOLUTION INFILTRATION; PERINEURAL AS NEEDED
Status: DISCONTINUED | OUTPATIENT
Start: 2018-08-17 | End: 2018-08-17 | Stop reason: SURG

## 2018-08-17 RX ORDER — SODIUM CHLORIDE 9 MG/ML
500 INJECTION, SOLUTION INTRAVENOUS CONTINUOUS PRN
Status: DISCONTINUED | OUTPATIENT
Start: 2018-08-17 | End: 2018-08-17 | Stop reason: HOSPADM

## 2018-08-17 RX ORDER — SODIUM CHLORIDE 0.9 % (FLUSH) 0.9 %
1-10 SYRINGE (ML) INJECTION AS NEEDED
Status: CANCELLED | OUTPATIENT
Start: 2018-08-17

## 2018-08-17 RX ORDER — SODIUM CHLORIDE 9 MG/ML
100 INJECTION, SOLUTION INTRAVENOUS CONTINUOUS
Status: CANCELLED | OUTPATIENT
Start: 2018-08-17

## 2018-08-17 RX ORDER — SODIUM CHLORIDE 0.9 % (FLUSH) 0.9 %
3 SYRINGE (ML) INJECTION AS NEEDED
Status: DISCONTINUED | OUTPATIENT
Start: 2018-08-17 | End: 2018-08-17 | Stop reason: HOSPADM

## 2018-08-17 RX ADMIN — LIDOCAINE HYDROCHLORIDE 50 MG: 20 INJECTION, SOLUTION INFILTRATION; PERINEURAL at 09:59

## 2018-08-17 RX ADMIN — PROPOFOL 280 MG: 10 INJECTION, EMULSION INTRAVENOUS at 09:59

## 2018-08-17 RX ADMIN — SODIUM CHLORIDE 500 ML: 9 INJECTION, SOLUTION INTRAVENOUS at 09:21

## 2018-08-17 RX ADMIN — LIDOCAINE HYDROCHLORIDE 0.5 ML: 10 INJECTION, SOLUTION EPIDURAL; INFILTRATION; INTRACAUDAL; PERINEURAL at 09:21

## 2018-08-17 NOTE — H&P (VIEW-ONLY)
Chief Complaint   Patient presents with   • Colonoscopy     7-10-18 had colon here for biopsy results       Subjective     Colon Cancer   This is a new problem. The current episode started 1 to 4 weeks ago. The problem has been resolved. Pertinent negatives include no abdominal pain, change in bowel habit, chest pain, coughing, fatigue, fever, joint swelling, myalgias, nausea, rash, sore throat, vomiting or weakness. Nothing aggravates the symptoms.       Past Medical History:   Diagnosis Date   • Arthritis    • Asthma    • Cancer (CMS/HCC)     breast   • Hyperlipidemia    • Hypertension        Past Surgical History:   Procedure Laterality Date   • ADRENAL GLAND SURGERY     • APPENDECTOMY     • BACK SURGERY      cervical   • BREAST SURGERY Right     lumpectomy   • CARPAL TUNNEL RELEASE Bilateral    • CHOLECYSTECTOMY     • COLONOSCOPY  10/01/2014    three polyps all removed the most complex of which was in the mid ascending colon   • COLONOSCOPY N/A 7/10/2018    Procedure: COLONOSCOPY WITH ANESTHESIA;  Surgeon: Jaron Mcclendon DO;  Location: [facility] ENDOSCOPY;  Service: Gastroenterology   • ENDOSCOPY  10/01/2014    gasatritis    • EYE SURGERY      cateracts   • GANGLION CYST EXCISION Right    • HYSTERECTOMY     • JOINT REPLACEMENT Bilateral     bilat knee replacement   • SHOULDER SURGERY Right    • TONSILLECTOMY     • TUMOR REMOVAL      fatty tumor off arm       Outpatient Prescriptions Marked as Taking for the 7/19/18 encounter (Office Visit) with Jaron Mcclendon DO   Medication Sig Dispense Refill   • albuterol (PROVENTIL HFA;VENTOLIN HFA) 108 (90 Base) MCG/ACT inhaler Inhale 2 puffs Every 4 (Four) Hours As Needed for Wheezing.     • aspirin 81 MG EC tablet Take 81 mg by mouth Daily.     • atenolol (TENORMIN) 50 MG tablet Take 50 mg by mouth Daily.     • calcitriol (ROCALTROL) 0.25 MCG capsule Take 0.25 mcg by mouth Daily.     • calcium citrate-vitamin d (CALCITRATE) 315-250 MG-UNIT tablet tablet Take  by mouth  Daily.     • chlorpheniramine (ALLERGY RELIEF) 4 MG tablet Take 4 mg by mouth Every 6 (Six) Hours As Needed for Allergies.     • cholecalciferol (VITAMIN D3) 03741 units capsule Take 50,000 Units by mouth Every 30 (Thirty) Days.     • DULoxetine (CYMBALTA) 30 MG capsule Take 30 mg by mouth Daily.     • furosemide (LASIX) 40 MG tablet Take 40 mg by mouth 2 (Two) Times a Day.     • gabapentin (NEURONTIN) 100 MG capsule Take 100 mg by mouth 3 (Three) Times a Day.     • levothyroxine (SYNTHROID, LEVOTHROID) 100 MCG tablet Take 100 mcg by mouth Daily.     • meloxicam (MOBIC) 7.5 MG tablet Take 7.5 mg by mouth Daily.     • Multiple Vitamin (MULTI-VITAMIN DAILY PO) Take  by mouth.     • Omega-3 Fatty Acids (FISH OIL) 1000 MG capsule capsule Take  by mouth Daily With Breakfast.     • omeprazole (priLOSEC) 20 MG capsule Take 20 mg by mouth Daily.     • raloxifene (EVISTA) 60 MG tablet Take 60 mg by mouth Daily.     • risedronate (ACTONEL) 150 MG tablet Take 150 mg by mouth Every 30 (Thirty) Days. with water on empty stomach, nothing by mouth or lie down for next 30 minutes.     • tiotropium (SPIRIVA HANDIHALER) 18 MCG per inhalation capsule Place 1 capsule into inhaler and inhale Daily.         No Known Allergies    Social History     Social History   • Marital status:      Spouse name: N/A   • Number of children: N/A   • Years of education: N/A     Occupational History   • Not on file.     Social History Main Topics   • Smoking status: Never Smoker   • Smokeless tobacco: Never Used   • Alcohol use No   • Drug use: No   • Sexual activity: Defer     Other Topics Concern   • Not on file     Social History Narrative   • No narrative on file       Family History   Problem Relation Age of Onset   • Colon cancer Mother    • Colon cancer Paternal Grandfather        Review of Systems   Constitutional: Negative for fatigue, fever and unexpected weight change.   HENT: Negative for hearing loss, sore throat and voice change.   "  Eyes: Negative for visual disturbance.   Respiratory: Negative for cough, shortness of breath and wheezing.    Cardiovascular: Negative for chest pain and palpitations.   Gastrointestinal: Negative for abdominal pain, blood in stool, change in bowel habit, nausea and vomiting.   Endocrine: Negative for polydipsia and polyuria.   Genitourinary: Negative for difficulty urinating, dysuria, hematuria and urgency.   Musculoskeletal: Negative for joint swelling and myalgias.   Skin: Negative for color change, rash and wound.   Neurological: Negative for dizziness, tremors, seizures, syncope and weakness.   Hematological: Does not bruise/bleed easily.   Psychiatric/Behavioral: Negative for agitation and confusion. The patient is not nervous/anxious.        Objective     Vitals:    07/19/18 1437   BP: 134/74   Pulse: 80   Temp: 97 °F (36.1 °C)   SpO2: 97%   Weight: 95.3 kg (210 lb)   Height: 154.9 cm (61\")     Body mass index is 39.68 kg/m².    Physical Exam   Constitutional: She is oriented to person, place, and time. She appears well-developed and well-nourished.   HENT:   Head: Normocephalic and atraumatic.   Eyes: Pupils are equal, round, and reactive to light. Conjunctivae are normal. No scleral icterus.   Neck: No JVD present. No thyroid mass and no thyromegaly present.   Cardiovascular: Normal rate, regular rhythm and normal heart sounds.  Exam reveals no gallop and no friction rub.    No murmur heard.  Pulmonary/Chest: Effort normal and breath sounds normal. No accessory muscle usage. No respiratory distress. She has no wheezes. She has no rales.   Abdominal: Soft. Bowel sounds are normal. She exhibits no distension, no ascites and no mass. There is no splenomegaly or hepatomegaly. There is no tenderness. There is no rebound and no guarding.   Genitourinary:   Genitourinary Comments: Rectal-Did not examine   Musculoskeletal: Normal range of motion. She exhibits no edema.   Neurological: She is alert and oriented " to person, place, and time.   Deemed a reliable historian, able to converse without difficulty and able to move all extremities without difficulty   Skin: Skin is warm and dry.   Psychiatric: She has a normal mood and affect. Her behavior is normal.       Imaging Results (most recent)     None          Body mass index is 39.68 kg/m².    Assessment/Plan     Susy was seen today for colonoscopy.    Diagnoses and all orders for this visit:    Malignant neoplasm of colon, unspecified part of colon (CMS/HCC)  -     Case Request; Standing  -     Case Request    Other orders  -     Follow Anesthesia Guidelines / Standing Orders; Future  -     Implement Anesthesia Orders Day of Procedure; Standing  -     Obtain Informed Consent; Standing      Pt wants to talk to Dr gates ( for the possibility of colon resection) if after I re cehck and tatoo the area  COLONOSCOPY WITH ANESTHESIA (N/A)    Patient's Body mass index is 39.68 kg/m². BMI is above normal parameters. Recommendations include: no follow-up required.      There are no Patient Instructions on file for this visit.

## 2018-08-17 NOTE — ANESTHESIA POSTPROCEDURE EVALUATION
"Patient: Susy Shepherd    Procedure Summary     Date:  08/17/18 Room / Location:  [facility] ENDOSCOPY 5 / [facility] ENDOSCOPY    Anesthesia Start:  0954 Anesthesia Stop:  1024    Procedure:  COLONOSCOPY WITH ANESTHESIA (N/A ) Diagnosis:       Malignant neoplasm of colon, unspecified part of colon (CMS/HCC)      (Malignant neoplasm of colon, unspecified part of colon (CMS/HCC) [C18.9])    Surgeon:  Jaron Mcclendon DO Provider:  Fernando Garcia CRNA    Anesthesia Type:  general ASA Status:  3          Anesthesia Type: general  Last vitals  BP   150/68 (08/17/18 0848)   Temp   97.4 °F (36.3 °C) (08/17/18 0848)   Pulse   84 (08/17/18 0848)   Resp   20 (08/17/18 0848)     SpO2   98 % (08/17/18 0848)     Post Anesthesia Care and Evaluation    Patient location during evaluation: PHASE II  Patient participation: complete - patient participated  Level of consciousness: awake and alert  Pain management: adequate  Airway patency: patent  Anesthetic complications: No anesthetic complications    Cardiovascular status: acceptable  Respiratory status: acceptable  Hydration status: acceptable    Comments: Blood pressure 150/68, pulse 84, temperature 97.4 °F (36.3 °C), temperature source Temporal Artery , resp. rate 20, height 154.9 cm (61\"), weight 92.5 kg (204 lb), SpO2 98 %.    Pt discharged from PACU based on ruth ann score >8      "

## 2018-08-17 NOTE — ANESTHESIA PREPROCEDURE EVALUATION
Anesthesia Evaluation     history of anesthetic complications: PONV  NPO Solid Status: > 8 hours  NPO Liquid Status: > 2 hours           Airway   Mallampati: III  TM distance: >3 FB  Neck ROM: limited  Comment: Previous cervical surgery  Dental - normal exam         Pulmonary - normal exam    breath sounds clear to auscultation  (+) asthma,   (-) recent URI, sleep apnea, not a smoker  Cardiovascular - normal exam  Exercise tolerance: good (4-7 METS)    Patient on routine beta blocker and Beta blocker given within 24 hours of surgery  Rhythm: regular  Rate: normal    (+) hypertension well controlled, hyperlipidemia,   (-) pacemaker, past MI, angina, cardiac stents, CABG      Neuro/Psych  (-) seizures, TIA, CVA  GI/Hepatic/Renal/Endo    (+) obesity,  GERD,  renal disease CRI, hypothyroidism,   (-) liver disease, diabetes, hyperthyroidism    Musculoskeletal     Abdominal    Substance History      OB/GYN          Other                        Anesthesia Plan    ASA 3     general   total IV anesthesia  intravenous induction   Anesthetic plan and risks discussed with patient.

## 2018-09-11 ENCOUNTER — OFFICE VISIT (OUTPATIENT)
Dept: PRIMARY CARE CLINIC | Age: 73
End: 2018-09-11
Payer: MEDICARE

## 2018-09-11 VITALS
SYSTOLIC BLOOD PRESSURE: 112 MMHG | WEIGHT: 213 LBS | RESPIRATION RATE: 20 BRPM | DIASTOLIC BLOOD PRESSURE: 70 MMHG | OXYGEN SATURATION: 97 % | HEIGHT: 61 IN | TEMPERATURE: 98 F | HEART RATE: 76 BPM | BODY MASS INDEX: 40.22 KG/M2

## 2018-09-11 DIAGNOSIS — E66.01 MORBID OBESITY WITH BMI OF 40.0-44.9, ADULT (HCC): ICD-10-CM

## 2018-09-11 DIAGNOSIS — E03.9 ACQUIRED HYPOTHYROIDISM: ICD-10-CM

## 2018-09-11 DIAGNOSIS — J44.9 STAGE 2 MODERATE COPD BY GOLD CLASSIFICATION (HCC): Primary | ICD-10-CM

## 2018-09-11 PROCEDURE — G8399 PT W/DXA RESULTS DOCUMENT: HCPCS | Performed by: FAMILY MEDICINE

## 2018-09-11 PROCEDURE — G0008 ADMIN INFLUENZA VIRUS VAC: HCPCS | Performed by: FAMILY MEDICINE

## 2018-09-11 PROCEDURE — 1123F ACP DISCUSS/DSCN MKR DOCD: CPT | Performed by: FAMILY MEDICINE

## 2018-09-11 PROCEDURE — G8926 SPIRO NO PERF OR DOC: HCPCS | Performed by: FAMILY MEDICINE

## 2018-09-11 PROCEDURE — 1036F TOBACCO NON-USER: CPT | Performed by: FAMILY MEDICINE

## 2018-09-11 PROCEDURE — 1090F PRES/ABSN URINE INCON ASSESS: CPT | Performed by: FAMILY MEDICINE

## 2018-09-11 PROCEDURE — 4040F PNEUMOC VAC/ADMIN/RCVD: CPT | Performed by: FAMILY MEDICINE

## 2018-09-11 PROCEDURE — 3023F SPIROM DOC REV: CPT | Performed by: FAMILY MEDICINE

## 2018-09-11 PROCEDURE — G8417 CALC BMI ABV UP PARAM F/U: HCPCS | Performed by: FAMILY MEDICINE

## 2018-09-11 PROCEDURE — 99214 OFFICE O/P EST MOD 30 MIN: CPT | Performed by: FAMILY MEDICINE

## 2018-09-11 PROCEDURE — 3017F COLORECTAL CA SCREEN DOC REV: CPT | Performed by: FAMILY MEDICINE

## 2018-09-11 PROCEDURE — 1101F PT FALLS ASSESS-DOCD LE1/YR: CPT | Performed by: FAMILY MEDICINE

## 2018-09-11 PROCEDURE — 90662 IIV NO PRSV INCREASED AG IM: CPT | Performed by: FAMILY MEDICINE

## 2018-09-11 PROCEDURE — G8427 DOCREV CUR MEDS BY ELIG CLIN: HCPCS | Performed by: FAMILY MEDICINE

## 2018-09-11 RX ORDER — LEVOTHYROXINE SODIUM 88 UG/1
88 TABLET ORAL DAILY
Qty: 90 TABLET | Refills: 3
Start: 2018-09-11 | End: 2019-03-06 | Stop reason: SDUPTHER

## 2018-09-11 ASSESSMENT — ENCOUNTER SYMPTOMS
COUGH: 0
WHEEZING: 0
DIARRHEA: 0
NAUSEA: 0
CONSTIPATION: 0
ABDOMINAL PAIN: 0
CHEST TIGHTNESS: 0
VOMITING: 0
SHORTNESS OF BREATH: 0

## 2018-09-11 NOTE — PATIENT INSTRUCTIONS
Stay on current medications. Get your labs checked in [address] of [address] building. Please arrive 15 minutes early to next follow up appointment in 3 months or schedule an appointment sooner if needed.

## 2018-09-11 NOTE — PROGRESS NOTES
Constitutional: She is oriented to person, place, and time. She appears well-developed and well-nourished. No distress. HENT:   Head: Normocephalic and atraumatic. Cardiovascular: Normal rate, regular rhythm and normal heart sounds. No murmur heard. Pulmonary/Chest: Effort normal. No respiratory distress. She has decreased breath sounds. She has no wheezes. She has no rales. Abdominal: Soft. Bowel sounds are normal. She exhibits no distension. There is no tenderness. Musculoskeletal:   No pretibial edema b/l. Neurological: She is alert and oriented to person, place, and time. Skin: Skin is warm and dry. She is not diaphoretic. No cyanosis. Nursing note and vitals reviewed. Assessment:    ICD-10-CM ICD-9-CM    1. Stage 2 moderate COPD by GOLD classification ([facility]) J44.9 496    2. Acquired hypothyroidism E03.9 244.9 TSH 3RD GENERATION   3. Morbid obesity with BMI of 40.0-44.9, adult ([facility] E66.01 278.01     Z68.41 V85.41    4. Morbid obesity with BMI of 40.0-44.9, adult ([facility]) E66.01 278.01     Z68.41 V85.41        Plan:   Hypothyroidism remains uncontrolled, we will follow-up TSH and adjust accordingly. Stage II COPD she treated and also provided additional education today with a action plan update. Obesity remains stable, she is to increase her activity as tolerated with her breathing. Orders Placed This Encounter   Procedures    INFLUENZA, HIGH DOSE, 65 YRS +, IM, PF, PREFILL SYR, 0.5ML (FLUZONE HD)    TSH 3RD GENERATION     Standing Status:   Standing     Number of Occurrences:   15     Standing Expiration Date:   8/24/2029     Orders Placed This Encounter   Medications    levothyroxine (SYNTHROID) 88 MCG tablet     Sig: Take 1 tablet by mouth Daily     Dispense:  90 tablet     Refill:  3     Medications Discontinued During This Encounter   Medication Reason    levothyroxine (SYNTHROID) 100 MCG tablet REORDER     Patient Instructions   Stay on current medications.      Get your labs

## 2018-09-13 LAB — TSH, 3RD GENERATION: 0.31 MU/L (ref 0.3–4)

## 2018-10-14 RX ORDER — ERGOCALCIFEROL 1.25 MG/1
CAPSULE ORAL
Qty: 12 CAPSULE | Refills: 3 | Status: SHIPPED | OUTPATIENT
Start: 2018-10-14 | End: 2019-09-27 | Stop reason: SDUPTHER

## 2018-11-18 DIAGNOSIS — G62.9 NEUROPATHY: Primary | ICD-10-CM

## 2018-11-19 RX ORDER — GABAPENTIN 100 MG/1
CAPSULE ORAL
Qty: 90 CAPSULE | Refills: 0 | OUTPATIENT
Start: 2018-11-19 | End: 2018-12-29 | Stop reason: SDUPTHER

## 2018-12-29 DIAGNOSIS — G62.9 NEUROPATHY: ICD-10-CM

## 2018-12-30 RX ORDER — GABAPENTIN 100 MG/1
CAPSULE ORAL
Qty: 90 CAPSULE | Refills: 0 | OUTPATIENT
Start: 2018-12-30 | End: 2019-06-07 | Stop reason: SDUPTHER

## 2018-12-30 NOTE — TELEPHONE ENCOUNTER
Ez sent request for refill on gabapentin-.  Last office visit 9-11 with next scheduled appointment 12-11    Last UDS n/a  Last fill 11-19   Called to pharmacy with no further refills until appt

## 2019-01-16 DIAGNOSIS — I10 ESSENTIAL HYPERTENSION: ICD-10-CM

## 2019-01-16 RX ORDER — METOPROLOL SUCCINATE 100 MG/1
TABLET, EXTENDED RELEASE ORAL
Qty: 30 TABLET | Refills: 0 | OUTPATIENT
Start: 2019-01-16

## 2019-01-16 RX ORDER — METOPROLOL SUCCINATE 100 MG/1
TABLET, EXTENDED RELEASE ORAL
Qty: 90 TABLET | Refills: 0 | Status: SHIPPED | OUTPATIENT
Start: 2019-01-16 | End: 2019-04-18 | Stop reason: SDUPTHER

## 2019-01-22 ENCOUNTER — TELEPHONE (OUTPATIENT)
Dept: PRIMARY CARE CLINIC | Age: 74
End: 2019-01-22

## 2019-01-25 ENCOUNTER — HOSPITAL ENCOUNTER (OUTPATIENT)
Dept: WOMENS IMAGING | Age: 74
Discharge: HOME | End: 2019-01-25
Payer: MEDICARE

## 2019-01-25 DIAGNOSIS — Z12.39 BREAST CANCER SCREENING: ICD-10-CM

## 2019-01-25 PROCEDURE — 77063 BREAST TOMOSYNTHESIS BI: CPT

## 2019-01-30 DIAGNOSIS — Z76.0 MEDICATION REFILL: ICD-10-CM

## 2019-01-30 RX ORDER — OMEPRAZOLE 20 MG/1
CAPSULE, DELAYED RELEASE ORAL
Qty: 30 CAPSULE | Refills: 1 | Status: SHIPPED | OUTPATIENT
Start: 2019-01-30 | End: 2019-04-08 | Stop reason: SDUPTHER

## 2019-03-06 RX ORDER — LEVOTHYROXINE SODIUM 88 UG/1
88 TABLET ORAL DAILY
Qty: 90 TABLET | Refills: 3 | Status: SHIPPED | OUTPATIENT
Start: 2019-03-06 | End: 2020-06-04

## 2019-03-26 DIAGNOSIS — Z76.0 MEDICATION REFILL: ICD-10-CM

## 2019-03-26 RX ORDER — MELOXICAM 7.5 MG/1
7.5 TABLET ORAL DAILY
Qty: 90 TABLET | Refills: 3 | Status: SHIPPED | OUTPATIENT
Start: 2019-03-26 | End: 2019-12-11 | Stop reason: SDUPTHER

## 2019-04-08 DIAGNOSIS — Z76.0 MEDICATION REFILL: ICD-10-CM

## 2019-04-08 RX ORDER — RALOXIFENE HYDROCHLORIDE 60 MG/1
TABLET, FILM COATED ORAL
Qty: 30 TABLET | Refills: 0 | Status: SHIPPED | OUTPATIENT
Start: 2019-04-08 | End: 2019-05-03 | Stop reason: SDUPTHER

## 2019-04-08 RX ORDER — OMEPRAZOLE 20 MG/1
CAPSULE, DELAYED RELEASE ORAL
Qty: 30 CAPSULE | Refills: 1 | Status: SHIPPED | OUTPATIENT
Start: 2019-04-08 | End: 2019-06-07 | Stop reason: SDUPTHER

## 2019-04-18 DIAGNOSIS — I10 ESSENTIAL HYPERTENSION: ICD-10-CM

## 2019-04-18 RX ORDER — METOPROLOL SUCCINATE 100 MG/1
TABLET, EXTENDED RELEASE ORAL
Qty: 90 TABLET | Refills: 3 | Status: SHIPPED | OUTPATIENT
Start: 2019-04-18 | End: 2019-05-01 | Stop reason: ALTCHOICE

## 2019-04-24 ENCOUNTER — OFFICE VISIT (OUTPATIENT)
Dept: PRIMARY CARE CLINIC | Age: 74
End: 2019-04-24
Payer: MEDICARE

## 2019-04-24 ENCOUNTER — HOSPITAL ENCOUNTER (OUTPATIENT)
Dept: GENERAL RADIOLOGY | Age: 74
Discharge: HOME | End: 2019-04-24
Payer: MEDICARE

## 2019-04-24 ENCOUNTER — NURSE TRIAGE (OUTPATIENT)
Dept: OTHER | Facility: CLINIC | Age: 74
End: 2019-04-24

## 2019-04-24 VITALS
BODY MASS INDEX: 41.72 KG/M2 | TEMPERATURE: 99 F | HEIGHT: 61 IN | OXYGEN SATURATION: 98 % | HEART RATE: 90 BPM | SYSTOLIC BLOOD PRESSURE: 174 MMHG | DIASTOLIC BLOOD PRESSURE: 86 MMHG | WEIGHT: 221 LBS

## 2019-04-24 DIAGNOSIS — J44.9 STAGE 2 MODERATE COPD BY GOLD CLASSIFICATION (HCC): ICD-10-CM

## 2019-04-24 DIAGNOSIS — R60.9 EDEMA, UNSPECIFIED TYPE: ICD-10-CM

## 2019-04-24 DIAGNOSIS — R06.00 DYSPNEA, UNSPECIFIED TYPE: ICD-10-CM

## 2019-04-24 DIAGNOSIS — R07.89 CHEST PRESSURE: ICD-10-CM

## 2019-04-24 DIAGNOSIS — I10 HYPERTENSION, ESSENTIAL, BENIGN: ICD-10-CM

## 2019-04-24 DIAGNOSIS — R06.00 DYSPNEA, UNSPECIFIED TYPE: Primary | ICD-10-CM

## 2019-04-24 DIAGNOSIS — E55.9 VITAMIN D DEFICIENCY: ICD-10-CM

## 2019-04-24 DIAGNOSIS — R68.89 ACTIVITY INTOLERANCE: ICD-10-CM

## 2019-04-24 DIAGNOSIS — E03.9 HYPOTHYROIDISM, UNSPECIFIED TYPE: ICD-10-CM

## 2019-04-24 DIAGNOSIS — E03.9 ACQUIRED HYPOTHYROIDISM: ICD-10-CM

## 2019-04-24 LAB
ALBUMIN SERPL-MCNC: 4.1 G/DL (ref 3.5–5.2)
ALP BLD-CCNC: 44 U/L (ref 35–104)
ALT SERPL-CCNC: 22 U/L (ref 5–33)
ANION GAP SERPL CALCULATED.3IONS-SCNC: 14 MMOL/L (ref 7–19)
AST SERPL-CCNC: 30 U/L (ref 5–32)
BACTERIA: NORMAL /HPF
BASOPHILS ABSOLUTE: 0 K/UL (ref 0–0.2)
BASOPHILS RELATIVE PERCENT: 0.4 % (ref 0–1)
BILIRUB SERPL-MCNC: <0.2 MG/DL (ref 0.2–1.2)
BILIRUBIN URINE: NEGATIVE
BLOOD, URINE: ABNORMAL
BUN BLDV-MCNC: 22 MG/DL (ref 8–23)
CALCIUM SERPL-MCNC: 10.2 MG/DL (ref 8.8–10.2)
CHLORIDE BLD-SCNC: 106 MMOL/L (ref 98–111)
CLARITY: CLEAR
CO2: 23 MMOL/L (ref 22–29)
COLOR: YELLOW
CREAT SERPL-MCNC: 1.4 MG/DL (ref 0.5–0.9)
EOSINOPHILS ABSOLUTE: 0.2 K/UL (ref 0–0.6)
EOSINOPHILS RELATIVE PERCENT: 2 % (ref 0–5)
EPITHELIAL CELLS, UA: 0 /HPF (ref 0–5)
GFR NON-AFRICAN AMERICAN: 37
GLUCOSE BLD-MCNC: 85 MG/DL (ref 74–109)
GLUCOSE URINE: NEGATIVE MG/DL
HCT VFR BLD CALC: 37.9 % (ref 37–47)
HEMOGLOBIN: 12.1 G/DL (ref 12–16)
HYALINE CASTS: 1 /HPF (ref 0–8)
KETONES, URINE: NEGATIVE MG/DL
LEUKOCYTE ESTERASE, URINE: NEGATIVE
LYMPHOCYTES ABSOLUTE: 3.1 K/UL (ref 1.1–4.5)
LYMPHOCYTES RELATIVE PERCENT: 38.7 % (ref 20–40)
MCH RBC QN AUTO: 30.6 PG (ref 27–31)
MCHC RBC AUTO-ENTMCNC: 31.9 G/DL (ref 33–37)
MCV RBC AUTO: 95.9 FL (ref 81–99)
MONOCYTES ABSOLUTE: 0.6 K/UL (ref 0–0.9)
MONOCYTES RELATIVE PERCENT: 7.6 % (ref 0–10)
NEUTROPHILS ABSOLUTE: 4 K/UL (ref 1.5–7.5)
NEUTROPHILS RELATIVE PERCENT: 49.3 % (ref 50–65)
NITRITE, URINE: NEGATIVE
PDW BLD-RTO: 14.5 % (ref 11.5–14.5)
PH UA: 5.5 (ref 5–8)
PLATELET # BLD: 241 K/UL (ref 130–400)
PMV BLD AUTO: 9.2 FL (ref 9.4–12.3)
POTASSIUM SERPL-SCNC: 4 MMOL/L (ref 3.5–5)
PRO-BNP: 124 PG/ML (ref 0–900)
PROTEIN UA: NEGATIVE MG/DL
RBC # BLD: 3.95 M/UL (ref 4.2–5.4)
RBC UA: 1 /HPF (ref 0–4)
SODIUM BLD-SCNC: 143 MMOL/L (ref 136–145)
SPECIFIC GRAVITY UA: 1.01 (ref 1–1.03)
TOTAL PROTEIN: 7.9 G/DL (ref 6.6–8.7)
TSH SERPL DL<=0.05 MIU/L-ACNC: 0.47 UIU/ML (ref 0.27–4.2)
UROBILINOGEN, URINE: 0.2 E.U./DL
VITAMIN D 25-HYDROXY: 46.2 NG/ML
WBC # BLD: 8.1 K/UL (ref 4.8–10.8)
WBC UA: 3 /HPF (ref 0–5)

## 2019-04-24 PROCEDURE — G8926 SPIRO NO PERF OR DOC: HCPCS | Performed by: NURSE PRACTITIONER

## 2019-04-24 PROCEDURE — 3017F COLORECTAL CA SCREEN DOC REV: CPT | Performed by: NURSE PRACTITIONER

## 2019-04-24 PROCEDURE — G8427 DOCREV CUR MEDS BY ELIG CLIN: HCPCS | Performed by: NURSE PRACTITIONER

## 2019-04-24 PROCEDURE — 3023F SPIROM DOC REV: CPT | Performed by: NURSE PRACTITIONER

## 2019-04-24 PROCEDURE — 1090F PRES/ABSN URINE INCON ASSESS: CPT | Performed by: NURSE PRACTITIONER

## 2019-04-24 PROCEDURE — G8399 PT W/DXA RESULTS DOCUMENT: HCPCS | Performed by: NURSE PRACTITIONER

## 2019-04-24 PROCEDURE — 1123F ACP DISCUSS/DSCN MKR DOCD: CPT | Performed by: NURSE PRACTITIONER

## 2019-04-24 PROCEDURE — 4040F PNEUMOC VAC/ADMIN/RCVD: CPT | Performed by: NURSE PRACTITIONER

## 2019-04-24 PROCEDURE — 1036F TOBACCO NON-USER: CPT | Performed by: NURSE PRACTITIONER

## 2019-04-24 PROCEDURE — 71046 X-RAY EXAM CHEST 2 VIEWS: CPT

## 2019-04-24 PROCEDURE — 99214 OFFICE O/P EST MOD 30 MIN: CPT | Performed by: NURSE PRACTITIONER

## 2019-04-24 PROCEDURE — 99497 ADVNCD CARE PLAN 30 MIN: CPT | Performed by: NURSE PRACTITIONER

## 2019-04-24 PROCEDURE — G8417 CALC BMI ABV UP PARAM F/U: HCPCS | Performed by: NURSE PRACTITIONER

## 2019-04-24 RX ORDER — AMLODIPINE BESYLATE 2.5 MG/1
2.5 TABLET ORAL DAILY
Qty: 30 TABLET | Refills: 3 | Status: SHIPPED | OUTPATIENT
Start: 2019-04-24 | End: 2019-08-20

## 2019-04-24 RX ORDER — ALBUTEROL SULFATE 90 UG/1
2 AEROSOL, METERED RESPIRATORY (INHALATION) EVERY 6 HOURS PRN
Qty: 1 INHALER | Refills: 3 | Status: ON HOLD | OUTPATIENT
Start: 2019-04-24 | End: 2020-07-22

## 2019-04-24 ASSESSMENT — ENCOUNTER SYMPTOMS
TROUBLE SWALLOWING: 0
ABDOMINAL PAIN: 0
RHINORRHEA: 0
SORE THROAT: 0
VOICE CHANGE: 0
DIARRHEA: 0
CONSTIPATION: 0
WHEEZING: 0
VOMITING: 0
BLOOD IN STOOL: 0
NAUSEA: 0
CHEST TIGHTNESS: 0
COUGH: 0
EYE REDNESS: 0
SHORTNESS OF BREATH: 1

## 2019-04-24 ASSESSMENT — PATIENT HEALTH QUESTIONNAIRE - PHQ9
1. LITTLE INTEREST OR PLEASURE IN DOING THINGS: 0
SUM OF ALL RESPONSES TO PHQ9 QUESTIONS 1 & 2: 0
2. FEELING DOWN, DEPRESSED OR HOPELESS: 0
SUM OF ALL RESPONSES TO PHQ QUESTIONS 1-9: 0
SUM OF ALL RESPONSES TO PHQ QUESTIONS 1-9: 0

## 2019-04-24 NOTE — PATIENT INSTRUCTIONS
Take metoprolol 100mg at bedtime. Have labs on the 2nd floor     Have chest xray on the 1st floor. Begin norvasc 2.5mg daily. Stop the spiriva and begin trelegy inhaler one inhalation daily.

## 2019-04-24 NOTE — PROGRESS NOTES
meloxicam (MOBIC) 7.5 MG tablet Take 1 tablet by mouth daily Indications: taking only 1/2 a pill daily 90 tablet 3    levothyroxine (SYNTHROID) 88 MCG tablet Take 1 tablet by mouth Daily 90 tablet 3    vitamin D (ERGOCALCIFEROL) 81948 units CAPS capsule TAKE ONE CAPSULE BY MOUTH ONCE A WEEK 12 capsule 3    tiotropium (SPIRIVA RESPIMAT) 2.5 MCG/ACT AERS inhaler Inhale 2 puffs into the lungs daily 1 Inhaler 3    allopurinol (ZYLOPRIM) 100 MG tablet Take 1 tablet by mouth daily 90 tablet 3    gabapentin (NEURONTIN) 100 MG capsule Take 2 tablets in the morning and 2 at bedtime. 360 capsule 3    DULoxetine (CYMBALTA) 30 MG extended release capsule Take 1 capsule by mouth every other day 90 capsule 3    guaiFENesin (MUCINEX) 600 MG extended release tablet Take 2 tablets by mouth 2 times daily 14 tablet 1    furosemide (LASIX) 20 MG tablet Take 20 mg by mouth every other day Indications: M-W-F       fexofenadine (ALLEGRA ALLERGY) 180 MG tablet Take 180 mg by mouth daily      calcitRIOL (ROCALTROL) 0.25 MCG capsule Take 0.25 mcg by mouth daily      risedronate (ACTONEL) 35 MG tablet Take 1 tablet by mouth every 7 days 12 tablet 3    gabapentin (NEURONTIN) 100 MG capsule TAKE 1 CAPSULE BY MOUTH THREE TIMES DAILY 90 capsule 0     No current facility-administered medications for this visit. No Known Allergies    Lab Review not applicable  notapplicable    Subjective:   Review of Systems   Constitutional: Positive for activity change and fatigue. Negative for appetite change, fever and unexpected weight change. HENT: Negative for congestion, ear pain, nosebleeds, rhinorrhea, sore throat, trouble swallowing and voice change. Eyes: Negative for redness and visual disturbance. Respiratory: Positive for shortness of breath. Negative for cough, chest tightness and wheezing. Cardiovascular: Positive for chest pain and palpitations. Negative for leg swelling.    Gastrointestinal: Negative for abdominal pain, blood in stool, constipation, diarrhea, nausea and vomiting. Endocrine: Negative for polydipsia, polyphagia and polyuria. Genitourinary: Negative for dysuria, frequency and urgency. Musculoskeletal: Negative for myalgias. Skin: Negative for rash and wound. Neurological: Negative for dizziness, speech difficulty, light-headedness and headaches. Psychiatric/Behavioral: Negative for agitation, confusion, self-injury and suicidal ideas. The patient is not nervous/anxious. Objective:     Physical Exam   Constitutional: She is oriented to person, place, and time. She appears well-developed and well-nourished. No distress. HENT:   Head: Normocephalic and atraumatic. Cardiovascular: Normal rate, regular rhythm and normal heart sounds. No murmur heard. Pulmonary/Chest: Effort normal. No respiratory distress. She has decreased breath sounds. She has no wheezes. She has no rales. Abdominal: Soft. Bowel sounds are normal. She exhibits no distension. There is no tenderness. Musculoskeletal:   No pretibial edema b/l. Neurological: She is alert and oriented to person, place, and time. Skin: Skin is warm and dry. She is not diaphoretic. No cyanosis. Nursing note and vitals reviewed. BP (!) 174/86   Pulse 90   Temp 99 °F (37.2 °C)   Ht 5' 1\" (1.549 m)   Wt 221 lb (100.2 kg)   SpO2 98%   BMI 41.76 kg/m²     Assessment:      Diagnosis Orders   1. Dyspnea, unspecified type  Comprehensive Metabolic Panel    CBC Auto Differential    proBNP, N-TERMINAL    Urinalysis    XR CHEST STANDARD (2 VW)    ECHO (Dobutamine) Pharmacological Stress Test    EKG 12 lead    CT ADVANCED CARE PLAN FACE TO FACE, 1ST 30MIN   2. Vitamin D deficiency  Vitamin D 25 Hydroxy    CT ADVANCED CARE PLAN FACE TO FACE, 1ST 30MIN   3. Chest pressure  ECHO (Dobutamine) Pharmacological Stress Test    EKG 12 lead    CT ADVANCED CARE PLAN FACE TO FACE, 1ST 30MIN   4.  Edema, unspecified type  ECHO (Dobutamine) Pharmacological Stress Test    MO ADVANCED CARE PLAN FACE TO FACE, 1ST 30MIN   5. Activity intolerance  proBNP, N-TERMINAL    ECHO (Dobutamine) Pharmacological Stress Test    MO ADVANCED CARE PLAN FACE TO FACE, 1ST 30MIN   6. Hypertension, essential, benign  amLODIPine (NORVASC) 2.5 MG tablet    EKG 12 lead    MO ADVANCED CARE PLAN FACE TO FACE, 1ST 30MIN   7. Stage 2 moderate COPD by GOLD classification ([facility])  albuterol sulfate HFA (PROAIR HFA) 108 (90 Base) MCG/ACT inhaler    Fluticasone-Umeclidin-Vilant 100-62.5-25 MCG/INH AEPB    MO ADVANCED CARE PLAN FACE TO FACE, 1ST 30MIN     No results found for this visit on 04/24/19. Plan:     1. Dyspnea, unspecified type    2. Vitamin D deficiency    3. Chest pressure    4. Edema, unspecified type    5. Activity intolerance    6. Hypertension, essential, benign    7. Stage 2 moderate COPD by GOLD classification ([address]    More than 50% of the time was spent counseling and coordinating care for a total time of greater than 30 min face to face. Return in about 2 weeks (around 5/8/2019) for 30 min appointment, medication recheck.   Orders Placed This Encounter   Procedures    XR CHEST STANDARD (2 VW)     Standing Status:   Future     Number of Occurrences:   1     Standing Expiration Date:   4/23/2020     Order Specific Question:   Reason for exam:     Answer:   dyspnea    Comprehensive Metabolic Panel     Standing Status:   Future     Number of Occurrences:   1     Standing Expiration Date:   4/23/2020    CBC Auto Differential     Standing Status:   Future     Number of Occurrences:   1     Standing Expiration Date:   4/23/2020    proBNP, N-TERMINAL     Standing Status:   Future     Number of Occurrences:   1     Standing Expiration Date:   4/23/2020    Urinalysis     Standing Status:   Future     Number of Occurrences:   1     Standing Expiration Date:   4/23/2020    Vitamin D 25 Hydroxy     Standing Status:   Future     Number of Occurrences:   1     Standing Expiration Date:   4/24/2020    EKG 12 lead     Standing Status:   Future     Standing Expiration Date:   5/24/2019     Order Specific Question:   Reason for Exam?     Answer:   Chest pain     Comments:   dyspnea    ECHO (Dobutamine) Pharmacological Stress Test     Standing Status:   Future     Standing Expiration Date:   6/23/2019     Order Specific Question:   Reason for exam:     Answer:   dyspnea    AR ADVANCED CARE PLAN FACE TO FACE, 1ST 30MIN     Orders Placed This Encounter   Medications    amLODIPine (NORVASC) 2.5 MG tablet     Sig: Take 1 tablet by mouth daily     Dispense:  30 tablet     Refill:  3    albuterol sulfate HFA (PROAIR HFA) 108 (90 Base) MCG/ACT inhaler     Sig: Inhale 2 puffs into the lungs every 6 hours as needed for Wheezing     Dispense:  1 Inhaler     Refill:  3    Fluticasone-Umeclidin-Vilant 100-62.5-25 MCG/INH AEPB     Sig: One inhalation daily. Dispense:  1 each     Refill:  0       Patient Instructions   Take metoprolol 100mg at bedtime. Have labs on the 2nd floor     Have chest xray on the 1st floor. Begin norvasc 2.5mg daily. Stop the spiriva and begin trelegy inhaler one inhalation daily. Patient/family given educational materials - see patient instructions. Discussed use, benefit, and side effects of prescribed medications. All patient/family questions answered and voiced understanding. Instructed to continue current medications, diet and exercise. Pt/family agreed with treatment plan. Follow up as directed and sooner if needed. Patient/ family instructed that is symptoms worsen or persist they are to contact office or report to nearest ER. They voice understanding and agreement with this plan.      Electronically signed by DAVID Hooper on 4/29/2019 at 1:25 PM

## 2019-04-25 DIAGNOSIS — M85.80 OSTEOPENIA, UNSPECIFIED LOCATION: ICD-10-CM

## 2019-04-25 RX ORDER — RISEDRONATE SODIUM 35 MG/1
35 TABLET, FILM COATED ORAL
Qty: 12 TABLET | Refills: 3 | Status: SHIPPED | OUTPATIENT
Start: 2019-04-25 | End: 2020-01-01 | Stop reason: ALTCHOICE

## 2019-04-26 LAB — TSH, 3RD GENERATION: 0.5 MU/L (ref 0.3–4)

## 2019-05-01 ENCOUNTER — TELEPHONE (OUTPATIENT)
Dept: PRIMARY CARE CLINIC | Age: 74
End: 2019-05-01

## 2019-05-01 ENCOUNTER — OFFICE VISIT (OUTPATIENT)
Dept: PRIMARY CARE CLINIC | Age: 74
End: 2019-05-01
Payer: MEDICARE

## 2019-05-01 VITALS
WEIGHT: 215 LBS | HEART RATE: 107 BPM | TEMPERATURE: 98.8 F | SYSTOLIC BLOOD PRESSURE: 138 MMHG | OXYGEN SATURATION: 96 % | DIASTOLIC BLOOD PRESSURE: 82 MMHG | BODY MASS INDEX: 40.59 KG/M2 | HEIGHT: 61 IN

## 2019-05-01 DIAGNOSIS — I10 HYPERTENSION, ESSENTIAL, BENIGN: ICD-10-CM

## 2019-05-01 DIAGNOSIS — J18.9 PNEUMONIA DUE TO INFECTIOUS ORGANISM, UNSPECIFIED LATERALITY, UNSPECIFIED PART OF LUNG: Primary | ICD-10-CM

## 2019-05-01 DIAGNOSIS — J44.9 STAGE 2 MODERATE COPD BY GOLD CLASSIFICATION (HCC): Primary | ICD-10-CM

## 2019-05-01 PROCEDURE — G8417 CALC BMI ABV UP PARAM F/U: HCPCS | Performed by: NURSE PRACTITIONER

## 2019-05-01 PROCEDURE — 99214 OFFICE O/P EST MOD 30 MIN: CPT | Performed by: NURSE PRACTITIONER

## 2019-05-01 PROCEDURE — 4040F PNEUMOC VAC/ADMIN/RCVD: CPT | Performed by: NURSE PRACTITIONER

## 2019-05-01 PROCEDURE — G8427 DOCREV CUR MEDS BY ELIG CLIN: HCPCS | Performed by: NURSE PRACTITIONER

## 2019-05-01 PROCEDURE — 94640 AIRWAY INHALATION TREATMENT: CPT | Performed by: NURSE PRACTITIONER

## 2019-05-01 PROCEDURE — 1123F ACP DISCUSS/DSCN MKR DOCD: CPT | Performed by: NURSE PRACTITIONER

## 2019-05-01 PROCEDURE — G8399 PT W/DXA RESULTS DOCUMENT: HCPCS | Performed by: NURSE PRACTITIONER

## 2019-05-01 PROCEDURE — 1090F PRES/ABSN URINE INCON ASSESS: CPT | Performed by: NURSE PRACTITIONER

## 2019-05-01 PROCEDURE — 3017F COLORECTAL CA SCREEN DOC REV: CPT | Performed by: NURSE PRACTITIONER

## 2019-05-01 PROCEDURE — 1036F TOBACCO NON-USER: CPT | Performed by: NURSE PRACTITIONER

## 2019-05-01 RX ORDER — ALBUTEROL SULFATE 2.5 MG/3ML
2.5 SOLUTION RESPIRATORY (INHALATION) EVERY 6 HOURS PRN
Qty: 120 EACH | Refills: 1 | Status: ON HOLD | OUTPATIENT
Start: 2019-05-01 | End: 2020-07-22

## 2019-05-01 RX ORDER — IPRATROPIUM BROMIDE AND ALBUTEROL SULFATE 2.5; .5 MG/3ML; MG/3ML
1 SOLUTION RESPIRATORY (INHALATION) ONCE
Status: COMPLETED | OUTPATIENT
Start: 2019-05-01 | End: 2019-05-01

## 2019-05-01 RX ORDER — LEVOFLOXACIN 500 MG/1
250 TABLET, FILM COATED ORAL DAILY
Qty: 7 TABLET | Refills: 0 | Status: SHIPPED | OUTPATIENT
Start: 2019-05-01 | End: 2019-05-08

## 2019-05-01 RX ORDER — METOPROLOL SUCCINATE 50 MG/1
50 TABLET, EXTENDED RELEASE ORAL DAILY
Qty: 30 TABLET | Refills: 5 | Status: SHIPPED | OUTPATIENT
Start: 2019-05-01 | End: 2020-06-22 | Stop reason: ALTCHOICE

## 2019-05-01 RX ADMIN — IPRATROPIUM BROMIDE AND ALBUTEROL SULFATE 1 AMPULE: 2.5; .5 SOLUTION RESPIRATORY (INHALATION) at 13:42

## 2019-05-01 NOTE — PATIENT INSTRUCTIONS
Begin albuterol breathing treatments four times a day while on Levaquin. Take Levaquin for 7 days and have follow up cxr for resolution. Restart the metoprolol at 50 mg at bedtime. Take Norvasc in the morning. Have chest xray on the 1st floor next Tuesday. Call for results and please tell phone nurses that you need to talk to Roberta Sutherland about results of your chest xray for you have stress test on Wednesday.

## 2019-05-02 ENCOUNTER — TELEPHONE (OUTPATIENT)
Dept: PRIMARY CARE CLINIC | Age: 74
End: 2019-05-02

## 2019-05-02 NOTE — TELEPHONE ENCOUNTER
Result Notes for TSH without Reflex     Notes recorded by DAVID Dye on 4/24/2019 at 7:34 PM CDT  Please notify patient of normal result. Looks like Mariam Reynoso saw her today and ordered labs This came to me. Please let her know when you call her with her other labs Thanks. Contacted pt and notified of normal results above. Pt verbalized understanding.  PP, LPN

## 2019-05-03 RX ORDER — RALOXIFENE HYDROCHLORIDE 60 MG/1
TABLET, FILM COATED ORAL
Qty: 30 TABLET | Refills: 0 | Status: SHIPPED | OUTPATIENT
Start: 2019-05-03 | End: 2019-06-07 | Stop reason: SDUPTHER

## 2019-05-06 ASSESSMENT — ENCOUNTER SYMPTOMS
VOICE CHANGE: 0
SHORTNESS OF BREATH: 1
SORE THROAT: 0
TROUBLE SWALLOWING: 0
VOMITING: 0
RHINORRHEA: 0
ABDOMINAL PAIN: 0
CONSTIPATION: 0
BLOOD IN STOOL: 0
COUGH: 0
EYE REDNESS: 0
DIARRHEA: 0
NAUSEA: 0
WHEEZING: 0
CHEST TIGHTNESS: 0

## 2019-05-07 ENCOUNTER — HOSPITAL ENCOUNTER (OUTPATIENT)
Dept: GENERAL RADIOLOGY | Age: 74
Discharge: HOME | End: 2019-05-07
Payer: MEDICARE

## 2019-05-07 DIAGNOSIS — J18.9 PNEUMONIA DUE TO INFECTIOUS ORGANISM, UNSPECIFIED LATERALITY, UNSPECIFIED PART OF LUNG: ICD-10-CM

## 2019-05-07 PROCEDURE — 71046 X-RAY EXAM CHEST 2 VIEWS: CPT

## 2019-05-08 ENCOUNTER — HOSPITAL ENCOUNTER (OUTPATIENT)
Dept: NON INVASIVE DIAGNOSTICS | Age: 74
Discharge: HOME | End: 2019-05-08
Payer: MEDICARE

## 2019-05-08 VITALS
SYSTOLIC BLOOD PRESSURE: 143 MMHG | WEIGHT: 211.64 LBS | HEART RATE: 99 BPM | BODY MASS INDEX: 39.99 KG/M2 | DIASTOLIC BLOOD PRESSURE: 57 MMHG

## 2019-05-08 DIAGNOSIS — R60.9 EDEMA, UNSPECIFIED TYPE: ICD-10-CM

## 2019-05-08 DIAGNOSIS — R07.89 CHEST PRESSURE: ICD-10-CM

## 2019-05-08 DIAGNOSIS — R06.00 DYSPNEA, UNSPECIFIED TYPE: ICD-10-CM

## 2019-05-08 DIAGNOSIS — R68.89 ACTIVITY INTOLERANCE: ICD-10-CM

## 2019-05-08 PROCEDURE — 93350 STRESS TTE ONLY: CPT

## 2019-05-08 PROCEDURE — 2580000003 HC RX 258: Performed by: INTERNAL MEDICINE

## 2019-05-08 PROCEDURE — 6360000002 HC RX W HCPCS: Performed by: INTERNAL MEDICINE

## 2019-05-08 RX ORDER — DOBUTAMINE HYDROCHLORIDE 200 MG/100ML
10 INJECTION INTRAVENOUS CONTINUOUS PRN
Status: DISCONTINUED | OUTPATIENT
Start: 2019-05-08 | End: 2019-05-09 | Stop reason: HOSPADM

## 2019-05-08 RX ORDER — SODIUM CHLORIDE 9 MG/ML
INJECTION, SOLUTION INTRAVENOUS
Status: COMPLETED | OUTPATIENT
Start: 2019-05-08 | End: 2019-05-08

## 2019-05-08 RX ADMIN — DOBUTAMINE HYDROCHLORIDE 10 MCG/KG/MIN: 200 INJECTION INTRAVENOUS at 15:45

## 2019-05-08 RX ADMIN — SODIUM CHLORIDE: 9 INJECTION, SOLUTION INTRAVENOUS at 15:45

## 2019-05-09 ENCOUNTER — TELEPHONE (OUTPATIENT)
Dept: PRIMARY CARE CLINIC | Age: 74
End: 2019-05-09

## 2019-05-14 ENCOUNTER — OFFICE VISIT (OUTPATIENT)
Dept: PRIMARY CARE CLINIC | Age: 74
End: 2019-05-14
Payer: MEDICARE

## 2019-05-14 VITALS
BODY MASS INDEX: 41.16 KG/M2 | OXYGEN SATURATION: 97 % | DIASTOLIC BLOOD PRESSURE: 78 MMHG | HEIGHT: 61 IN | HEART RATE: 80 BPM | WEIGHT: 218 LBS | SYSTOLIC BLOOD PRESSURE: 126 MMHG | TEMPERATURE: 98.8 F

## 2019-05-14 DIAGNOSIS — J44.9 STAGE 2 MODERATE COPD BY GOLD CLASSIFICATION (HCC): Primary | ICD-10-CM

## 2019-05-14 DIAGNOSIS — I10 ESSENTIAL HYPERTENSION: ICD-10-CM

## 2019-05-14 PROCEDURE — G8399 PT W/DXA RESULTS DOCUMENT: HCPCS | Performed by: NURSE PRACTITIONER

## 2019-05-14 PROCEDURE — 3017F COLORECTAL CA SCREEN DOC REV: CPT | Performed by: NURSE PRACTITIONER

## 2019-05-14 PROCEDURE — G8427 DOCREV CUR MEDS BY ELIG CLIN: HCPCS | Performed by: NURSE PRACTITIONER

## 2019-05-14 PROCEDURE — 1036F TOBACCO NON-USER: CPT | Performed by: NURSE PRACTITIONER

## 2019-05-14 PROCEDURE — 1090F PRES/ABSN URINE INCON ASSESS: CPT | Performed by: NURSE PRACTITIONER

## 2019-05-14 PROCEDURE — 4040F PNEUMOC VAC/ADMIN/RCVD: CPT | Performed by: NURSE PRACTITIONER

## 2019-05-14 PROCEDURE — 99213 OFFICE O/P EST LOW 20 MIN: CPT | Performed by: NURSE PRACTITIONER

## 2019-05-14 PROCEDURE — G8417 CALC BMI ABV UP PARAM F/U: HCPCS | Performed by: NURSE PRACTITIONER

## 2019-05-14 PROCEDURE — G8926 SPIRO NO PERF OR DOC: HCPCS | Performed by: NURSE PRACTITIONER

## 2019-05-14 PROCEDURE — 1123F ACP DISCUSS/DSCN MKR DOCD: CPT | Performed by: NURSE PRACTITIONER

## 2019-05-14 PROCEDURE — 3023F SPIROM DOC REV: CPT | Performed by: NURSE PRACTITIONER

## 2019-05-14 ASSESSMENT — ENCOUNTER SYMPTOMS
CHEST TIGHTNESS: 0
SHORTNESS OF BREATH: 1
EYE REDNESS: 0
BLOOD IN STOOL: 0
COUGH: 0
WHEEZING: 0
SORE THROAT: 0
DIARRHEA: 0
TROUBLE SWALLOWING: 0
VOICE CHANGE: 0
ABDOMINAL PAIN: 0
NAUSEA: 0
RHINORRHEA: 0
CONSTIPATION: 0
VOMITING: 0

## 2019-05-14 NOTE — PROGRESS NOTES
[facility]  [address] [place] [address]  [id number]  [place] [place] [employer] [id number]  Dept: [phone]  Dept Fax: [phone]  Loc: [phone]        Anders Pallas is a 75 y.o. female who presents today for her medical conditions/ complaints as noted below. Anders Pallas is c/o Follow-up (pneumo, htn); Medication Check (begin albuterol, restart metoprolol, take levaquin and norvasc); and Discuss Labs (chest xr)        Chief Complaint   Patient presents with    Follow-up     pneumo, htn    Medication Check     begin albuterol, restart metoprolol, take levaquin and norvasc    Discuss Labs     chest xr       HPI:     HPI    Pneumonia: 5/14/19 Pt states that she has completed the levaquin and is not using albuterol or the trelegy inhaler at this time. Discussed with pt that she is to return to using the trelegy inhaler medication everyday. CXR 5/7/19  History:   79-year-old with pneumonia. Shortness of breath. Reference:   Chest radiograph April 24, 2019. Findings:   Frontal and lateral chest radiographs performed. The heart and mediastinum appear normal. The lungs are clear. No   pleural fluid or pneumothorax. No acute osseous abnormality. Degenerative changes along the thoracic spine. Prior ACDF.     5/1/19: Pt has completed zithromax and steroids. She has also been taking trelegy inhaler. She states that she thinks that this is, \"ok. \" Pt has not been taking albuterol nebulizer but has been using albuterol every 6 hours. HTN 5/14/19 Pt states that she is taking norvasc in the morning and metoprolol at bedtime. V/s stable today. Dobutamine stress echocardiogram without clinical, electrocardiographic,   or echocardiographic evidence of myocardial ischemia.   Test was supervised by Dr. Leopoldo Patch      5/1/19Pt stopped metoprolol 100mg due to the fact that she was confused about continuing this medication and beginning norvasc. Pt b/p is stable today but hr is elevated at 107.    Patient reports that they have been compliant with taking medications as directed. History reviewed. No pertinent past medical history. Past Surgical History:   Procedure Laterality Date    ADRENALECTOMY      APPENDECTOMY      BACK SURGERY      BREAST LUMPECTOMY      CARPAL TUNNEL RELEASE      CARPAL TUNNEL RELEASE  1990    CATARACT REMOVAL      CORONARY ANGIOPLASTY      GALLBLADDER SURGERY      PARTIAL HYSTERECTOMY      TONSILLECTOMY  1997    TOTAL KNEE ARTHROPLASTY         Social History     Socioeconomic History    Marital status:      Spouse name: None    Number of children: None    Years of education: None    Highest education level: None   Occupational History    None   Social Needs    Financial resource strain: None    Food insecurity:     Worry: None     Inability: None    Transportation needs:     Medical: None     Non-medical: None   Tobacco Use    Smoking status: Never Smoker    Smokeless tobacco: Never Used   Substance and Sexual Activity    Alcohol use: No    Drug use: No    Sexual activity: None   Lifestyle    Physical activity:     Days per week: None     Minutes per session: None    Stress: None   Relationships    Social connections:     Talks on phone: None     Gets together: None     Attends Restoration service: None     Active member of club or organization: None     Attends meetings of clubs or organizations: None     Relationship status: None    Intimate partner violence:     Fear of current or ex partner: None     Emotionally abused: None     Physically abused: None     Forced sexual activity: None   Other Topics Concern    None   Social History Narrative    None       History reviewed. No pertinent family history.     Current Outpatient Medications   Medication Sig Dispense Refill    Fluticasone-Umeclidin-Vilant 100-62.5-25 MCG/INH AEPB Inhale 1 Inhaler into the lungs daily 1 each 2    raloxifene (EVISTA) 60 MG tablet TAKE 1 TABLET BY MOUTH ONCE DAILY 30 tablet 0    albuterol (PROVENTIL) (2.5 MG/3ML) 0.083% nebulizer solution Take 3 mLs by nebulization every 6 hours as needed for Wheezing or Shortness of Breath 120 each 1    metoprolol succinate (TOPROL XL) 50 MG extended release tablet Take 1 tablet by mouth daily 30 tablet 5    Nebulizers (AIRIAL COMPACT MINI NEBULIZER) MISC 1 Units by Does not apply route 2 times daily 1 each 0    risedronate (ACTONEL) 35 MG tablet Take 1 tablet by mouth every 7 days 12 tablet 3    amLODIPine (NORVASC) 2.5 MG tablet Take 1 tablet by mouth daily 30 tablet 3    albuterol sulfate HFA (PROAIR HFA) 108 (90 Base) MCG/ACT inhaler Inhale 2 puffs into the lungs every 6 hours as needed for Wheezing 1 Inhaler 3    omeprazole (PRILOSEC) 20 MG delayed release capsule TAKE ONE CAPSULE BY MOUTH ONCE DAILY 30 capsule 1    meloxicam (MOBIC) 7.5 MG tablet Take 1 tablet by mouth daily Indications: taking only 1/2 a pill daily 90 tablet 3    levothyroxine (SYNTHROID) 88 MCG tablet Take 1 tablet by mouth Daily 90 tablet 3    vitamin D (ERGOCALCIFEROL) 93121 units CAPS capsule TAKE ONE CAPSULE BY MOUTH ONCE A WEEK 12 capsule 3    gabapentin (NEURONTIN) 100 MG capsule Take 2 tablets in the morning and 2 at bedtime. 360 capsule 3    DULoxetine (CYMBALTA) 30 MG extended release capsule Take 1 capsule by mouth every other day 90 capsule 3    guaiFENesin (MUCINEX) 600 MG extended release tablet Take 2 tablets by mouth 2 times daily 14 tablet 1    furosemide (LASIX) 20 MG tablet Take 20 mg by mouth every other day Indications: M-W-F       fexofenadine (ALLEGRA ALLERGY) 180 MG tablet Take 180 mg by mouth daily      calcitRIOL (ROCALTROL) 0.25 MCG capsule Take 0.25 mcg by mouth daily      gabapentin (NEURONTIN) 100 MG capsule TAKE 1 CAPSULE BY MOUTH THREE TIMES DAILY 90 capsule 0    allopurinol (ZYLOPRIM) 100 MG tablet Take 1 tablet by mouth daily 90 tablet 3     No current facility-administered medications for this visit.         No Known Allergies    Lab Review not applicable  notapplicable    Subjective:   Review of Systems   Constitutional: Negative for activity change, appetite change, fatigue, fever and unexpected weight change. HENT: Negative for congestion, ear pain, nosebleeds, rhinorrhea, sore throat, trouble swallowing and voice change. Eyes: Negative for redness and visual disturbance. Respiratory: Positive for shortness of breath (improving). Negative for cough, chest tightness and wheezing. Cardiovascular: Negative for chest pain, palpitations and leg swelling. Gastrointestinal: Negative for abdominal pain, blood in stool, constipation, diarrhea, nausea and vomiting. Endocrine: Negative for polydipsia, polyphagia and polyuria. Genitourinary: Negative for dysuria, frequency and urgency. Musculoskeletal: Negative for myalgias. Skin: Negative for rash and wound. Neurological: Negative for dizziness, speech difficulty, light-headedness and headaches. Psychiatric/Behavioral: Negative for agitation, confusion, self-injury and suicidal ideas. The patient is not nervous/anxious. Objective:     Physical Exam   Constitutional: She is oriented to person, place, and time. She appears well-developed and well-nourished. No distress. HENT:   Head: Normocephalic and atraumatic. Cardiovascular: Normal rate, regular rhythm and normal heart sounds. No murmur heard. Pulmonary/Chest: Effort normal. No respiratory distress. She has decreased breath sounds. She has no wheezes. She has no rales. Abdominal: Soft. Bowel sounds are normal. She exhibits no distension. There is no tenderness. Musculoskeletal:   No pretibial edema b/l. Neurological: She is alert and oriented to person, place, and time. Skin: Skin is warm and dry. She is not diaphoretic. No cyanosis. Nursing note and vitals reviewed.     /78   Pulse 80   Temp 98.8 °F (37.1 °C)   Ht 5' 1\" (1.549 m)   Wt 218 lb (98.9 kg)   LMP  (LMP Unknown)   SpO2 97%   BMI 41.19 kg/m²     Assessment:      Diagnosis Orders   1. Stage 2 moderate COPD by GOLD classification ([address]  Fluticasone-Umeclidin-Vilant 100-62.5-25 MCG/INH AEPB   2. Essential hypertension       No results found for this visit on 05/14/19. Plan:     1. Stage 2 moderate COPD by GOLD classification ([address]    2. Essential hypertension      Return in about 3 months (around 8/14/2019) for 30 min appointment, medication recheck. No orders of the defined types were placed in this encounter. Orders Placed This Encounter   Medications    Fluticasone-Umeclidin-Vilant 100-62.5-25 MCG/INH AEPB     Sig: Inhale 1 Inhaler into the lungs daily     Dispense:  1 each     Refill:  2       Patient Instructions   Continue trelegy    Continue metoprolol 25 mg at bedtime    Continue norvasc 2.5 mg      Patient/family given educational materials - see patient instructions. Discussed use, benefit, and side effects of prescribed medications. All patient/family questions answered and voiced understanding. Instructed to continue current medications, diet and exercise. Pt/family agreed with treatment plan. Follow up as directed and sooner if needed. Patient/ family instructed that is symptoms worsen or persist they are to contact office or report to nearest ER. They voice understanding and agreement with this plan.      Electronically signed by DAVID Odell on 5/20/2019 at 1:02 PM

## 2019-05-23 RX ORDER — CALCITRIOL 0.25 UG/1
0.25 CAPSULE, LIQUID FILLED ORAL DAILY
Qty: 30 CAPSULE | Refills: 3 | Status: SHIPPED | OUTPATIENT
Start: 2019-05-23 | End: 2019-09-27 | Stop reason: SDUPTHER

## 2019-06-07 DIAGNOSIS — Z76.0 MEDICATION REFILL: ICD-10-CM

## 2019-06-07 DIAGNOSIS — G62.9 NEUROPATHY: ICD-10-CM

## 2019-06-07 RX ORDER — ALLOPURINOL 100 MG/1
100 TABLET ORAL DAILY
Qty: 90 TABLET | Refills: 3 | Status: SHIPPED | OUTPATIENT
Start: 2019-06-07 | End: 2020-06-21

## 2019-06-07 RX ORDER — GABAPENTIN 100 MG/1
CAPSULE ORAL
Qty: 180 CAPSULE | Refills: 0 | OUTPATIENT
Start: 2019-06-07 | End: 2019-08-20 | Stop reason: SDUPTHER

## 2019-06-07 RX ORDER — RALOXIFENE HYDROCHLORIDE 60 MG/1
TABLET, FILM COATED ORAL
Qty: 90 TABLET | Refills: 0 | Status: SHIPPED | OUTPATIENT
Start: 2019-06-07 | End: 2019-09-11 | Stop reason: SDUPTHER

## 2019-06-07 RX ORDER — OMEPRAZOLE 20 MG/1
CAPSULE, DELAYED RELEASE ORAL
Qty: 30 CAPSULE | Refills: 1 | Status: SHIPPED | OUTPATIENT
Start: 2019-06-07 | End: 2019-08-14 | Stop reason: SDUPTHER

## 2019-08-07 ENCOUNTER — OFFICE VISIT (OUTPATIENT)
Dept: PRIMARY CARE CLINIC | Age: 74
End: 2019-08-07
Payer: MEDICARE

## 2019-08-07 VITALS
TEMPERATURE: 98.4 F | DIASTOLIC BLOOD PRESSURE: 78 MMHG | OXYGEN SATURATION: 97 % | HEART RATE: 97 BPM | SYSTOLIC BLOOD PRESSURE: 130 MMHG

## 2019-08-07 DIAGNOSIS — J06.9 ACUTE UPPER RESPIRATORY INFECTION: Primary | ICD-10-CM

## 2019-08-07 DIAGNOSIS — K02.9 DENTAL DECAY: ICD-10-CM

## 2019-08-07 PROCEDURE — G8399 PT W/DXA RESULTS DOCUMENT: HCPCS | Performed by: NURSE PRACTITIONER

## 2019-08-07 PROCEDURE — 4040F PNEUMOC VAC/ADMIN/RCVD: CPT | Performed by: NURSE PRACTITIONER

## 2019-08-07 PROCEDURE — 3017F COLORECTAL CA SCREEN DOC REV: CPT | Performed by: NURSE PRACTITIONER

## 2019-08-07 PROCEDURE — 99214 OFFICE O/P EST MOD 30 MIN: CPT | Performed by: NURSE PRACTITIONER

## 2019-08-07 PROCEDURE — 1123F ACP DISCUSS/DSCN MKR DOCD: CPT | Performed by: NURSE PRACTITIONER

## 2019-08-07 PROCEDURE — G8417 CALC BMI ABV UP PARAM F/U: HCPCS | Performed by: NURSE PRACTITIONER

## 2019-08-07 PROCEDURE — G8427 DOCREV CUR MEDS BY ELIG CLIN: HCPCS | Performed by: NURSE PRACTITIONER

## 2019-08-07 PROCEDURE — 1090F PRES/ABSN URINE INCON ASSESS: CPT | Performed by: NURSE PRACTITIONER

## 2019-08-07 PROCEDURE — 1036F TOBACCO NON-USER: CPT | Performed by: NURSE PRACTITIONER

## 2019-08-07 RX ORDER — AMOXICILLIN 875 MG/1
875 TABLET, COATED ORAL 2 TIMES DAILY
Qty: 20 TABLET | Refills: 0 | Status: SHIPPED | OUTPATIENT
Start: 2019-08-07 | End: 2019-08-17

## 2019-08-07 ASSESSMENT — ENCOUNTER SYMPTOMS
PHOTOPHOBIA: 0
NAUSEA: 0
VOICE CHANGE: 0
COLOR CHANGE: 0
SHORTNESS OF BREATH: 0
COUGH: 1
BACK PAIN: 0
VOMITING: 0
RHINORRHEA: 1

## 2019-08-07 NOTE — PROGRESS NOTES
[facility]  [address] [address]  [address] [address]  Dept: [phone]  Dept Fax: [phone]  Loc: [phone]    Catherine Deshpande is a 75 y.o. female who presents today for her medical conditions/complaints as noted below. Catherine Deshpande is c/o of Pharyngitis (Sore throat, cough that started Saturday, no fever)        HPI:     HPI   Chief Complaint   Patient presents with    Pharyngitis     Sore throat, cough that started Saturday, no fever     Patient presents today for evaluation of sore throat and cough that has been present for 5 days. She denies fever. She states she is having thick mucous production; it is clear. She states she gets pneumonia easily. She has been coughing when she lays down and feels like she has drainage to back of throat. Patient states she has been having dental pain to left lower teeth; she has appointment with dentist this month for likely root canal. She is in a lot of pain and having chills. No past medical history on file. Past Surgical History:   Procedure Laterality Date    ADRENALECTOMY      APPENDECTOMY      BACK SURGERY      BREAST LUMPECTOMY      CARPAL TUNNEL RELEASE      CARPAL TUNNEL RELEASE  1990    CATARACT REMOVAL      CORONARY ANGIOPLASTY      GALLBLADDER SURGERY      PARTIAL HYSTERECTOMY      TONSILLECTOMY  1997    TOTAL KNEE ARTHROPLASTY         Vitals 8/7/2019 5/14/2019 5/8/2019 5/8/2019 5/8/2019 2/6/6218   SYSTOLIC [phone] - [phone]   DIASTOLIC 78 78 57 - 75 82   Site Right Upper Arm - - - - -   Position Sitting - - - - -   Pulse 97 80 99 - 90 107   Temp 98.4 98.8 - - - 98.8   Resp - - - - - -   SpO2 97 97 - - - 96   Weight - 218 lb - 211 lb 10.3 oz - 215 lb   Height - 5' 1\" - - - 5' 1\"   BMI (wt*703/ht~2) - 41.19 kg/m2 - - - 40.62 kg/m2   Some recent data might be hidden       No family history on file.     Social History     Tobacco Use    Smoking status: Never Smoker   

## 2019-08-14 DIAGNOSIS — Z76.0 MEDICATION REFILL: ICD-10-CM

## 2019-08-14 RX ORDER — OMEPRAZOLE 20 MG/1
CAPSULE, DELAYED RELEASE ORAL
Qty: 90 CAPSULE | Refills: 1 | Status: SHIPPED | OUTPATIENT
Start: 2019-08-14 | End: 2020-02-20

## 2019-08-14 NOTE — TELEPHONE ENCOUNTER
Dafne Hopkins called to request a refill on her medication.       Last office visit : 8/7/2019   Next office visit : 8/20/2019     Requested Prescriptions     Pending Prescriptions Disp Refills    omeprazole (PRILOSEC) 20 MG delayed release capsule 30 capsule 1     Sig: TAKE ONE CAPSULE BY MOUTH ONCE DAILY            Leonel Murphy

## 2019-08-18 NOTE — PROGRESS NOTES
Chief Complaint   Patient presents with   • Colonoscopy     8-17-18 had colon 1 year surveillance       PCP: Marquise Reed MD  REFER: No ref. provider found    Subjective     HPI    Susy Shepherd is a 73 y.o. female who presents to office for preventative maintenance.  There is  a personal history of colon polyps.  There is not a history of colon cancer.  She does not have complaints of nausea/vomiting, change in bowels, weight loss, no BRBPR, no melena.  There is a family history of colon cancer-mother.  There is not a family history of colon polyps.  She last colonoscopy-2018 .  Bowels do move on regular basis.    CScope (Dr Mcclendon) 8/2018-normal, previous polyp site inked    CScope (Dr Mcclendon) 7/10/2018-tubulovillous adenoma, tubular adenoma in prox ascending colon, adenocarcinoma arising in adenomatous polyp at 70 cm      Past Medical History:   Diagnosis Date   • Arthritis    • Asthma    • Cancer (CMS/HCC)     breast   • Hyperlipidemia    • Hypertension    • PONV (postoperative nausea and vomiting)      Past Surgical History:   Procedure Laterality Date   • ADRENAL GLAND SURGERY     • APPENDECTOMY     • BACK SURGERY      cervical   • BREAST SURGERY Right     lumpectomy   • CARPAL TUNNEL RELEASE Bilateral    • CHOLECYSTECTOMY     • COLONOSCOPY  10/01/2014    three polyps all removed the most complex of which was in the mid ascending colon   • COLONOSCOPY N/A 7/10/2018    Procedure: COLONOSCOPY WITH ANESTHESIA;  Surgeon: Jaron Mcclendon DO;  Location: [facility] ENDOSCOPY;  Service: Gastroenterology   • COLONOSCOPY N/A 8/17/2018    Procedure: COLONOSCOPY WITH ANESTHESIA;  Surgeon: Jaron Mcclendon DO;  Location: [facility] ENDOSCOPY;  Service: Gastroenterology   • ENDOSCOPY  10/01/2014    gasatritis    • EYE SURGERY      cateracts   • GANGLION CYST EXCISION Right    • HYSTERECTOMY     • JOINT REPLACEMENT Bilateral     bilat knee replacement   • SHOULDER SURGERY Right    • TONSILLECTOMY     • TUMOR REMOVAL       fatty tumor off arm     Outpatient Medications Marked as Taking for the 8/19/19 encounter (Office Visit) with Madhu Monge APRN   Medication Sig Dispense Refill   • albuterol (PROVENTIL HFA;VENTOLIN HFA) 108 (90 Base) MCG/ACT inhaler Inhale 2 puffs Every 4 (Four) Hours As Needed for Wheezing.     • allopurinol (ZYLOPRIM) 100 MG tablet Take 100 mg by mouth Daily.     • aspirin 81 MG EC tablet Take 81 mg by mouth Daily.     • calcitriol (ROCALTROL) 0.25 MCG capsule Take 0.25 mcg by mouth Daily.     • calcium citrate-vitamin d (CALCITRATE) 315-250 MG-UNIT tablet tablet Take  by mouth Daily.     • chlorpheniramine (ALLERGY RELIEF) 4 MG tablet Take 4 mg by mouth Every 6 (Six) Hours As Needed for Allergies.     • cholecalciferol (VITAMIN D3) 72330 units capsule Take 50,000 Units by mouth 1 (One) Time Per Week.     • DULoxetine (CYMBALTA) 30 MG capsule Take 30 mg by mouth Daily.     • furosemide (LASIX) 40 MG tablet Take 40 mg by mouth 2 (Two) Times a Day.     • gabapentin (NEURONTIN) 100 MG capsule Take 100 mg by mouth 3 (Three) Times a Day.     • levothyroxine (SYNTHROID, LEVOTHROID) 100 MCG tablet Take 100 mcg by mouth Daily.     • meloxicam (MOBIC) 7.5 MG tablet Take 7.5 mg by mouth Daily.     • metoprolol tartrate (LOPRESSOR) 100 MG tablet Take 100 mg by mouth 2 (Two) Times a Day.     • omeprazole (priLOSEC) 20 MG capsule Take 20 mg by mouth Daily.     • raloxifene (EVISTA) 60 MG tablet Take 60 mg by mouth Daily.     • risedronate (ACTONEL) 150 MG tablet Take 150 mg by mouth 1 (One) Time Per Week. with water on empty stomach, nothing by mouth or lie down for next 30 minutes.      • tiotropium (SPIRIVA HANDIHALER) 18 MCG per inhalation capsule Place 1 capsule into inhaler and inhale Daily.       No Known Allergies  Social History     Socioeconomic History   • Marital status:      Spouse name: Not on file   • Number of children: Not on file   • Years of education: Not on file   • Highest education level:  Not on file   Tobacco Use   • Smoking status: Never Smoker   • Smokeless tobacco: Never Used   Substance and Sexual Activity   • Alcohol use: No   • Drug use: No   • Sexual activity: Defer     Family History   Problem Relation Age of Onset   • Colon cancer Mother    • Colon cancer Paternal Grandfather      Review of Systems   Constitutional: Negative for fatigue, fever and unexpected weight change.   HENT: Negative for hearing loss, sore throat and voice change.    Eyes: Negative for visual disturbance.   Respiratory: Negative for cough, shortness of breath and wheezing.    Cardiovascular: Negative for chest pain and palpitations.   Gastrointestinal: Negative for abdominal pain, blood in stool and vomiting.   Endocrine: Negative for polydipsia and polyuria.   Genitourinary: Negative for difficulty urinating, dysuria, hematuria and urgency.   Musculoskeletal: Negative for joint swelling and myalgias.   Skin: Negative for color change, rash and wound.   Neurological: Negative for dizziness, tremors, seizures and syncope.   Hematological: Does not bruise/bleed easily.   Psychiatric/Behavioral: Negative for agitation and confusion. The patient is not nervous/anxious.      Objective   Vitals:    08/19/19 0942   BP: 124/80   Pulse: 63   Temp: 98.4 °F (36.9 °C)   SpO2: 98%     Physical Exam   Constitutional: She is oriented to person, place, and time. She appears well-developed and well-nourished. She is cooperative.   HENT:   Head: Normocephalic and atraumatic.   Eyes: Conjunctivae are normal. Pupils are equal, round, and reactive to light. No scleral icterus.   Neck: Normal range of motion. Neck supple. No JVD present. No thyroid mass and no thyromegaly present.   Cardiovascular: Normal rate, regular rhythm and normal heart sounds. Exam reveals no gallop and no friction rub.   No murmur heard.  Pulmonary/Chest: Effort normal and breath sounds normal. No accessory muscle usage. No respiratory distress. She has no wheezes.  She has no rales.   Abdominal: Soft. Normal appearance and bowel sounds are normal. She exhibits no distension, no ascites and no mass. There is no hepatosplenomegaly. There is no tenderness. There is no rebound and no guarding.   Musculoskeletal: Normal range of motion. She exhibits no edema or tenderness.     Vascular Status -  Her right foot exhibits normal foot vasculature  and no edema. Her left foot exhibits normal foot vasculature  and no edema.  Lymphadenopathy:     She has no cervical adenopathy.   Neurological: She is alert and oriented to person, place, and time. She has normal strength. Gait normal.   Skin: Skin is warm, dry and intact. No rash noted.     Imaging Results (most recent)     None        Body mass index is 39.3 kg/m².    Assessment/Plan   Susy was seen today for colonoscopy.    Diagnoses and all orders for this visit:    Malignant neoplasm of colon, unspecified part of colon (CMS/HCC)  -     Case Request; Standing  -     Implement Anesthesia Orders Day of Procedure; Standing  -     Obtain Informed Consent; Standing  -     Case Request    Family history of colon cancer  Comments:  mother    Other orders  -     polyethylene glycol (GoLYTELY) 236 g solution; Take 3,785 mL by mouth 1 (One) Time for 1 dose. Take as directed      COLONOSCOPY WITH ANESTHESIA (N/A)  Declined Golytely, educated on miralax/gatorade    Patient is to continue all blood pressure and cardiac medications prior to procedure and has been advised to take medications morning of procedure   Pt verbalized understanding    Advised pt to stop ASA, use of NSAIDs, Fish Oil, and MV 5 days prior to procedure, per Dr Mcclendon protocol.  Tylenol based products are ok to take.  Pt verbalized understanding.    All risks, benefits, alternatives, and indications of colonoscopy procedure have been discussed with the patient. Risks to include perforation of the colon requiring possible surgery or colostomy, risk of bleeding from biopsies or  removal of colon tissue, possibility of missing a colon polyp or cancer, or adverse drug reaction.  Benefits to include the diagnosis and management of disease of the colon and rectum. Alternatives to include barium enema, radiographic evaluation, lab testing or no intervention. She verbalizes understanding and agrees.     Patient's Body mass index is 39.3 kg/m². BMI is above normal parameters. Recommendations include: no follow up.      There are no Patient Instructions on file for this visit.

## 2019-08-19 ENCOUNTER — OFFICE VISIT (OUTPATIENT)
Dept: GASTROENTEROLOGY | Facility: CLINIC | Age: 74
End: 2019-08-19

## 2019-08-19 VITALS
WEIGHT: 208 LBS | BODY MASS INDEX: 39.27 KG/M2 | SYSTOLIC BLOOD PRESSURE: 124 MMHG | TEMPERATURE: 98.4 F | OXYGEN SATURATION: 98 % | DIASTOLIC BLOOD PRESSURE: 80 MMHG | HEART RATE: 63 BPM | HEIGHT: 61 IN

## 2019-08-19 DIAGNOSIS — C18.9 MALIGNANT NEOPLASM OF COLON, UNSPECIFIED PART OF COLON (HCC): Primary | ICD-10-CM

## 2019-08-19 DIAGNOSIS — Z80.0 FAMILY HISTORY OF COLON CANCER: ICD-10-CM

## 2019-08-19 PROCEDURE — S0260 H&P FOR SURGERY: HCPCS | Performed by: NURSE PRACTITIONER

## 2019-08-19 RX ORDER — METOPROLOL TARTRATE 100 MG/1
100 TABLET ORAL DAILY
COMMUNITY

## 2019-08-19 RX ORDER — ALLOPURINOL 100 MG/1
100 TABLET ORAL DAILY
COMMUNITY

## 2019-08-20 ENCOUNTER — OFFICE VISIT (OUTPATIENT)
Dept: PRIMARY CARE CLINIC | Age: 74
End: 2019-08-20
Payer: MEDICARE

## 2019-08-20 VITALS
HEART RATE: 82 BPM | RESPIRATION RATE: 20 BRPM | OXYGEN SATURATION: 98 % | HEIGHT: 61 IN | WEIGHT: 210 LBS | DIASTOLIC BLOOD PRESSURE: 68 MMHG | SYSTOLIC BLOOD PRESSURE: 136 MMHG | BODY MASS INDEX: 39.65 KG/M2

## 2019-08-20 DIAGNOSIS — E03.9 ACQUIRED HYPOTHYROIDISM: ICD-10-CM

## 2019-08-20 DIAGNOSIS — Z00.00 ROUTINE GENERAL MEDICAL EXAMINATION AT A HEALTH CARE FACILITY: Primary | ICD-10-CM

## 2019-08-20 DIAGNOSIS — G62.9 NEUROPATHY: ICD-10-CM

## 2019-08-20 DIAGNOSIS — R42 ORTHOSTATIC DIZZINESS: ICD-10-CM

## 2019-08-20 DIAGNOSIS — J44.9 STAGE 2 MODERATE COPD BY GOLD CLASSIFICATION (HCC): ICD-10-CM

## 2019-08-20 DIAGNOSIS — I10 HYPERTENSION, ESSENTIAL, BENIGN: ICD-10-CM

## 2019-08-20 DIAGNOSIS — N18.30 CHRONIC KIDNEY DISEASE, STAGE III (MODERATE) (HCC): ICD-10-CM

## 2019-08-20 PROCEDURE — G8926 SPIRO NO PERF OR DOC: HCPCS | Performed by: FAMILY MEDICINE

## 2019-08-20 PROCEDURE — 3017F COLORECTAL CA SCREEN DOC REV: CPT | Performed by: FAMILY MEDICINE

## 2019-08-20 PROCEDURE — 4040F PNEUMOC VAC/ADMIN/RCVD: CPT | Performed by: FAMILY MEDICINE

## 2019-08-20 PROCEDURE — 99213 OFFICE O/P EST LOW 20 MIN: CPT | Performed by: FAMILY MEDICINE

## 2019-08-20 PROCEDURE — G8427 DOCREV CUR MEDS BY ELIG CLIN: HCPCS | Performed by: FAMILY MEDICINE

## 2019-08-20 PROCEDURE — G8399 PT W/DXA RESULTS DOCUMENT: HCPCS | Performed by: FAMILY MEDICINE

## 2019-08-20 PROCEDURE — 3023F SPIROM DOC REV: CPT | Performed by: FAMILY MEDICINE

## 2019-08-20 PROCEDURE — 1036F TOBACCO NON-USER: CPT | Performed by: FAMILY MEDICINE

## 2019-08-20 PROCEDURE — 1123F ACP DISCUSS/DSCN MKR DOCD: CPT | Performed by: FAMILY MEDICINE

## 2019-08-20 PROCEDURE — 1090F PRES/ABSN URINE INCON ASSESS: CPT | Performed by: FAMILY MEDICINE

## 2019-08-20 PROCEDURE — G8417 CALC BMI ABV UP PARAM F/U: HCPCS | Performed by: FAMILY MEDICINE

## 2019-08-20 PROCEDURE — G0438 PPPS, INITIAL VISIT: HCPCS | Performed by: FAMILY MEDICINE

## 2019-08-20 RX ORDER — GABAPENTIN 100 MG/1
CAPSULE ORAL
Qty: 180 CAPSULE | Refills: 5 | Status: SHIPPED | OUTPATIENT
Start: 2019-08-20 | End: 2020-01-01 | Stop reason: SDUPTHER

## 2019-08-20 ASSESSMENT — PATIENT HEALTH QUESTIONNAIRE - PHQ9
SUM OF ALL RESPONSES TO PHQ QUESTIONS 1-9: 0
SUM OF ALL RESPONSES TO PHQ QUESTIONS 1-9: 0

## 2019-08-20 NOTE — PROGRESS NOTES
Screenings with Interventions:     General Health:  General  In general, how would you say your health is?: Very Good  In the past 7 days, have you experienced any of the following? New or Increased Pain, New or Increased Fatigue, Loneliness, Social Isolation, Stress or Anger?: None of These  Do you get the social and emotional support that you need?: (!) No  Do you have a Living Will?: (!) No  General Health Risk Interventions:  · Inadequate social/emotional support: patient declines any further intervention for this issue  · No Living Will: provided the state-specific advance directive document to the patient    Health Habits/Nutrition:  Health Habits/Nutrition  Do you exercise for at least 20 minutes 2-3 times per week?: Yes  Have you lost any weight without trying in the past 3 months?: No  Do you eat fewer than 2 meals per day?: No  Have you seen a dentist within the past year?: Yes  Body mass index is 39.68 kg/m².   Health Habits/Nutrition Interventions:  · Inadequate physical activity:  educational materials provided to promote increased physical activity    Personalized Preventive Plan   Current Health Maintenance Status  Immunization History   Administered Date(s) Administered    Influenza, High Dose (Fluzone 65 yrs and older) 09/11/2018    Influenza, Quadv, IM, (6 mo and older Fluzone, Flulaval, Fluarix and 3 yrs and older Afluria) 12/06/2017    Pneumococcal Conjugate 13-valent ([id number]) 05/17/2018    Pneumococcal Polysaccharide ([id number]) 11/07/2016    Tdap (Boostrix, Adacel) 08/01/2010        Health Maintenance   Topic Date Due    Shingles Vaccine (1 of 2) 08/30/1994    Flu vaccine (1) 09/01/2019    TSH testing  04/24/2020    Potassium monitoring  04/24/2020    Creatinine monitoring  04/24/2020    Colon cancer screen colonoscopy  07/10/2020    DTaP/Tdap/Td vaccine (2 - Td) 08/01/2020    Annual Wellness Visit (AWV)  08/19/2020    Lipid screen  06/12/2022    DEXA (modify frequency per FRAX score)  Completed    Pneumococcal 65+ years Vaccine  Completed     Recommendations for Preventive Services Due: see orders and patient instructions/AVS.  . Recommended screening schedule for the next 5-10 years is provided to the patient in written form: see Patient Instructions/AVS.    Qing Beverly was seen today for 3 month follow-up and medicare awv. Diagnoses and all orders for this visit:    Routine general medical examination at a health care facility    Orthostatic dizziness    Chronic kidney disease, stage III (moderate) (HCC)  -     Lipid Panel; Standing  -     Comprehensive Metabolic Panel; Standing  -     CBC; Standing  -     TSH 3RD GENERATION; Standing  -     Urinalysis; Standing    Neuropathy  -     gabapentin (NEURONTIN) 100 MG capsule; Indications: Takes 2 in AM and 1 QHS TAKE 1 CAPSULE BY MOUTH THREE TIMES DAILY  -     TSH 3RD GENERATION; Standing    Hypertension, essential, benign  -     Lipid Panel; Standing    Stage 2 moderate COPD by GOLD classification ([address]    Acquired hypothyroidism  -     TSH 3RD GENERATION; Standing    Other orders  -     zoster recombinant adjuvanted vaccine (SHINGRIX) 50 MCG/0.5ML SUSR injection;  Inject 0.5 mLs into the muscle once for 1 dose

## 2019-09-03 PROBLEM — N18.30 CHRONIC KIDNEY DISEASE, STAGE III (MODERATE) (HCC): Status: ACTIVE | Noted: 2019-09-03

## 2019-09-03 ASSESSMENT — ENCOUNTER SYMPTOMS
WHEEZING: 0
DIARRHEA: 0
COUGH: 0
VOMITING: 0
CHEST TIGHTNESS: 0
NAUSEA: 0
ABDOMINAL PAIN: 0
SHORTNESS OF BREATH: 0
CONSTIPATION: 0

## 2019-09-03 NOTE — PROGRESS NOTES
Standing Status:   Standing     Number of Occurrences:   15     Standing Expiration Date:   8/2/2030    TSH 3RD GENERATION     Standing Status:   Standing     Number of Occurrences:   15     Standing Expiration Date:   8/2/2030    Urinalysis     Standing Status:   Standing     Number of Occurrences:   15     Standing Expiration Date:   8/17/2029     Orders Placed This Encounter   Medications    gabapentin (NEURONTIN) 100 MG capsule     Sig: Indications: Takes 2 in AM and 1 QHS TAKE 1 CAPSULE BY MOUTH THREE TIMES DAILY     Dispense:  180 capsule     Refill:  5    zoster recombinant adjuvanted vaccine (SHINGRIX) 50 MCG/0.5ML SUSR injection     Sig: Inject 0.5 mLs into the muscle once for 1 dose     Dispense:  1 each     Refill:  1        Medications Discontinued During This Encounter   Medication Reason    amLODIPine (NORVASC) 2.5 MG tablet LIST CLEANUP    guaiFENesin (MUCINEX) 600 MG extended release tablet LIST CLEANUP    gabapentin (NEURONTIN) 100 MG capsule REORDER     Patient Instructions     Personalized Preventive Plan for Anival De La Torre - 8/20/2019  Medicare offers a range of preventive health benefits. Some of the tests and screenings are paid in full while other may be subject to a deductible, co-insurance, and/or copay. Some of these benefits include a comprehensive review of your medical history including lifestyle, illnesses that may run in your family, and various assessments and screenings as appropriate. After reviewing your medical record and screening and assessments performed today your provider may have ordered immunizations, labs, imaging, and/or referrals for you. A list of these orders (if applicable) as well as your Preventive Care list are included within your After Visit Summary for your review.     Other Preventive Recommendations:    · A preventive eye exam performed by an eye specialist is recommended every 1-2 years to screen for glaucoma; cataracts, macular

## 2019-09-10 ENCOUNTER — TELEPHONE (OUTPATIENT)
Dept: GASTROENTEROLOGY | Facility: CLINIC | Age: 74
End: 2019-09-10

## 2019-09-10 ENCOUNTER — ANESTHESIA (OUTPATIENT)
Dept: GASTROENTEROLOGY | Facility: HOSPITAL | Age: 74
End: 2019-09-10

## 2019-09-10 ENCOUNTER — HOSPITAL ENCOUNTER (OUTPATIENT)
Facility: HOSPITAL | Age: 74
Setting detail: HOSPITAL OUTPATIENT SURGERY
Discharge: HOME | End: 2019-09-10
Attending: INTERNAL MEDICINE | Admitting: INTERNAL MEDICINE

## 2019-09-10 ENCOUNTER — ANESTHESIA EVENT (OUTPATIENT)
Dept: GASTROENTEROLOGY | Facility: HOSPITAL | Age: 74
End: 2019-09-10

## 2019-09-10 VITALS
WEIGHT: 206 LBS | SYSTOLIC BLOOD PRESSURE: 130 MMHG | RESPIRATION RATE: 16 BRPM | TEMPERATURE: 96.4 F | HEIGHT: 61 IN | OXYGEN SATURATION: 100 % | DIASTOLIC BLOOD PRESSURE: 61 MMHG | BODY MASS INDEX: 38.89 KG/M2 | HEART RATE: 59 BPM

## 2019-09-10 DIAGNOSIS — C18.9 MALIGNANT NEOPLASM OF COLON, UNSPECIFIED PART OF COLON (HCC): ICD-10-CM

## 2019-09-10 PROCEDURE — 25010000002 PROPOFOL 10 MG/ML EMULSION: Performed by: NURSE ANESTHETIST, CERTIFIED REGISTERED

## 2019-09-10 PROCEDURE — 88305 TISSUE EXAM BY PATHOLOGIST: CPT | Performed by: INTERNAL MEDICINE

## 2019-09-10 PROCEDURE — 45385 COLONOSCOPY W/LESION REMOVAL: CPT | Performed by: INTERNAL MEDICINE

## 2019-09-10 RX ORDER — PROPOFOL 10 MG/ML
VIAL (ML) INTRAVENOUS AS NEEDED
Status: DISCONTINUED | OUTPATIENT
Start: 2019-09-10 | End: 2019-09-10 | Stop reason: SURG

## 2019-09-10 RX ORDER — SODIUM CHLORIDE 9 MG/ML
500 INJECTION, SOLUTION INTRAVENOUS CONTINUOUS PRN
Status: DISCONTINUED | OUTPATIENT
Start: 2019-09-10 | End: 2019-09-10 | Stop reason: HOSPADM

## 2019-09-10 RX ORDER — SODIUM CHLORIDE 0.9 % (FLUSH) 0.9 %
10 SYRINGE (ML) INJECTION AS NEEDED
Status: DISCONTINUED | OUTPATIENT
Start: 2019-09-10 | End: 2019-09-10 | Stop reason: HOSPADM

## 2019-09-10 RX ADMIN — SODIUM CHLORIDE: 0.9 INJECTION, SOLUTION INTRAVENOUS at 09:31

## 2019-09-10 RX ADMIN — LIDOCAINE HYDROCHLORIDE 100 MG: 20 INJECTION, SOLUTION INTRAVENOUS at 09:34

## 2019-09-10 RX ADMIN — PROPOFOL 250 MG: 10 INJECTION, EMULSION INTRAVENOUS at 09:34

## 2019-09-10 NOTE — ANESTHESIA POSTPROCEDURE EVALUATION
"Patient: Susy Shepherd    Procedure Summary     Date:  09/10/19 Room / Location:  [facility] ENDOSCOPY 6 / [facility] ENDOSCOPY    Anesthesia Start:  0931 Anesthesia Stop:  0947    Procedure:  COLONOSCOPY WITH ANESTHESIA (N/A ) Diagnosis:       Malignant neoplasm of colon, unspecified part of colon (CMS/HCC)      (Malignant neoplasm of colon, unspecified part of colon (CMS/HCC) [C18.9])    Surgeon:  Jaron Mcclendon DO Provider:  Dean Carrillo CRNA    Anesthesia Type:  MAC ASA Status:  3          Anesthesia Type: MAC  Last vitals  BP   108/50 (09/10/19 0951)   Temp   96.4 °F (35.8 °C) (09/10/19 0748)   Pulse   66 (09/10/19 0951)   Resp   23 (09/10/19 0951)     SpO2   98 % (09/10/19 0951)     Post Anesthesia Care and Evaluation    Patient location during evaluation: PHASE II  Patient participation: complete - patient participated  Level of consciousness: awake and alert  Pain score: 0  Pain management: adequate  Airway patency: patent  Anesthetic complications: No anesthetic complications  PONV Status: none  Cardiovascular status: acceptable  Respiratory status: acceptable  Hydration status: acceptable    Comments: Blood pressure 108/50, pulse 66, temperature 96.4 °F (35.8 °C), temperature source Tympanic, resp. rate 23, height 154.9 cm (61\"), weight 93.4 kg (206 lb), SpO2 98 %, not currently breastfeeding.    Pt discharged from PACU based on ruth ann score >8      "

## 2019-09-10 NOTE — ANESTHESIA PREPROCEDURE EVALUATION
Anesthesia Evaluation     no history of anesthetic complications:  NPO Solid Status: > 8 hours  NPO Liquid Status: > 2 hours           Airway   Mallampati: III  TM distance: >3 FB  Neck ROM: limited  Comment: Previous cervical surgery  Dental - normal exam         Pulmonary    (+) asthma,   (-) recent URI, sleep apnea, not a smoker  Cardiovascular   Exercise tolerance: good (4-7 METS)    Patient on routine beta blocker and Beta blocker given within 24 hours of surgery    (+) hypertension well controlled, hyperlipidemia,   (-) pacemaker, past MI, angina, cardiac stents, CABG      Neuro/Psych  (-) seizures, TIA, CVA  GI/Hepatic/Renal/Endo    (+) obesity,  GERD,  hypothyroidism,   (-) liver disease, no renal disease, diabetes, hyperthyroidism    Musculoskeletal     Abdominal    Substance History      OB/GYN          Other      history of cancer (breast)                      Anesthesia Plan    ASA 3     MAC     intravenous induction   Anesthetic plan, all risks, benefits, and alternatives have been provided, discussed and informed consent has been obtained with: patient.

## 2019-09-11 DIAGNOSIS — E66.01 MORBID OBESITY WITH BMI OF 40.0-44.9, ADULT (HCC): Primary | ICD-10-CM

## 2019-09-11 LAB
CYTO UR: NORMAL
LAB AP CASE REPORT: NORMAL
PATH REPORT.FINAL DX SPEC: NORMAL
PATH REPORT.GROSS SPEC: NORMAL

## 2019-09-11 RX ORDER — RALOXIFENE HYDROCHLORIDE 60 MG/1
TABLET, FILM COATED ORAL
Qty: 90 TABLET | Refills: 0 | Status: SHIPPED | OUTPATIENT
Start: 2019-09-11 | End: 2019-12-20

## 2019-09-11 NOTE — TELEPHONE ENCOUNTER
Kimberley Lazaro called to request a refill on her medication.       Last office visit : 8/20/2019   Next office visit : 11/20/2019     Requested Prescriptions     Signed Prescriptions Disp Refills    raloxifene (EVISTA) 60 MG tablet 90 tablet 0     Sig: TAKE 1 TABLET BY MOUTH ONCE DAILY     Authorizing Provider: Jeanette Regan     Ordering User: Alonso Arreola MA

## 2019-09-27 DIAGNOSIS — E55.9 VITAMIN D DEFICIENCY: Primary | ICD-10-CM

## 2019-09-27 RX ORDER — CALCITRIOL 0.25 UG/1
0.25 CAPSULE, LIQUID FILLED ORAL DAILY
Qty: 90 CAPSULE | Refills: 3 | Status: SHIPPED | OUTPATIENT
Start: 2019-09-27 | End: 2020-06-22 | Stop reason: ALTCHOICE

## 2019-09-27 RX ORDER — ERGOCALCIFEROL 1.25 MG/1
CAPSULE ORAL
Qty: 12 CAPSULE | Refills: 3 | Status: SHIPPED | OUTPATIENT
Start: 2019-09-27 | End: 2020-01-01

## 2019-09-27 NOTE — TELEPHONE ENCOUNTER
Patient called for a 90 day supply refill on 2 medications Calcitirol and Vit D to [facility]. E-script sent.

## 2019-12-11 ENCOUNTER — OFFICE VISIT (OUTPATIENT)
Dept: PRIMARY CARE CLINIC | Age: 74
End: 2019-12-11
Payer: MEDICARE

## 2019-12-11 VITALS
OXYGEN SATURATION: 97 % | HEIGHT: 61 IN | HEART RATE: 78 BPM | WEIGHT: 219 LBS | TEMPERATURE: 97.8 F | RESPIRATION RATE: 18 BRPM | BODY MASS INDEX: 41.35 KG/M2 | DIASTOLIC BLOOD PRESSURE: 82 MMHG | SYSTOLIC BLOOD PRESSURE: 152 MMHG

## 2019-12-11 DIAGNOSIS — Z76.0 MEDICATION REFILL: ICD-10-CM

## 2019-12-11 DIAGNOSIS — N32.81 OVERACTIVE BLADDER: ICD-10-CM

## 2019-12-11 DIAGNOSIS — J06.9 ACUTE UPPER RESPIRATORY INFECTION: Primary | ICD-10-CM

## 2019-12-11 PROCEDURE — 4040F PNEUMOC VAC/ADMIN/RCVD: CPT | Performed by: NURSE PRACTITIONER

## 2019-12-11 PROCEDURE — 1123F ACP DISCUSS/DSCN MKR DOCD: CPT | Performed by: NURSE PRACTITIONER

## 2019-12-11 PROCEDURE — 3017F COLORECTAL CA SCREEN DOC REV: CPT | Performed by: NURSE PRACTITIONER

## 2019-12-11 PROCEDURE — 1090F PRES/ABSN URINE INCON ASSESS: CPT | Performed by: NURSE PRACTITIONER

## 2019-12-11 PROCEDURE — G8417 CALC BMI ABV UP PARAM F/U: HCPCS | Performed by: NURSE PRACTITIONER

## 2019-12-11 PROCEDURE — 99214 OFFICE O/P EST MOD 30 MIN: CPT | Performed by: NURSE PRACTITIONER

## 2019-12-11 PROCEDURE — G8482 FLU IMMUNIZE ORDER/ADMIN: HCPCS | Performed by: NURSE PRACTITIONER

## 2019-12-11 PROCEDURE — 1036F TOBACCO NON-USER: CPT | Performed by: NURSE PRACTITIONER

## 2019-12-11 PROCEDURE — G8427 DOCREV CUR MEDS BY ELIG CLIN: HCPCS | Performed by: NURSE PRACTITIONER

## 2019-12-11 PROCEDURE — G8399 PT W/DXA RESULTS DOCUMENT: HCPCS | Performed by: NURSE PRACTITIONER

## 2019-12-11 RX ORDER — METHYLPREDNISOLONE 4 MG/1
TABLET ORAL
Qty: 1 KIT | Refills: 0 | Status: SHIPPED | OUTPATIENT
Start: 2019-12-11 | End: 2020-02-06

## 2019-12-11 RX ORDER — OXYBUTYNIN CHLORIDE 10 MG/1
10 TABLET, EXTENDED RELEASE ORAL DAILY
Qty: 30 TABLET | Refills: 3 | Status: SHIPPED | OUTPATIENT
Start: 2019-12-11 | End: 2020-04-21

## 2019-12-11 RX ORDER — MELOXICAM 7.5 MG/1
7.5 TABLET ORAL DAILY
Qty: 90 TABLET | Refills: 3 | Status: SHIPPED | OUTPATIENT
Start: 2019-12-11 | End: 2019-12-11

## 2019-12-11 RX ORDER — FUROSEMIDE 20 MG/1
20 TABLET ORAL EVERY OTHER DAY
Qty: 60 TABLET | Refills: 3 | Status: SHIPPED | OUTPATIENT
Start: 2019-12-11 | End: 2020-01-01 | Stop reason: SDUPTHER

## 2019-12-11 RX ORDER — CEFDINIR 300 MG/1
300 CAPSULE ORAL 2 TIMES DAILY
Qty: 20 CAPSULE | Refills: 0 | Status: SHIPPED | OUTPATIENT
Start: 2019-12-11 | End: 2020-02-06

## 2019-12-11 RX ORDER — MELOXICAM 7.5 MG/1
TABLET ORAL
Qty: 45 TABLET | Refills: 3 | Status: ON HOLD | OUTPATIENT
Start: 2019-12-11 | End: 2020-07-22

## 2019-12-11 ASSESSMENT — ENCOUNTER SYMPTOMS
RHINORRHEA: 1
SHORTNESS OF BREATH: 0
VOMITING: 0
COUGH: 1
BACK PAIN: 0
NAUSEA: 0
VOICE CHANGE: 0
COLOR CHANGE: 0
PHOTOPHOBIA: 0

## 2019-12-20 DIAGNOSIS — E66.01 MORBID OBESITY WITH BMI OF 40.0-44.9, ADULT (HCC): ICD-10-CM

## 2019-12-20 RX ORDER — RALOXIFENE HYDROCHLORIDE 60 MG/1
TABLET, FILM COATED ORAL
Qty: 90 TABLET | Refills: 0 | Status: SHIPPED | OUTPATIENT
Start: 2019-12-20 | End: 2020-03-29

## 2020-01-01 ENCOUNTER — APPOINTMENT (OUTPATIENT)
Dept: CT IMAGING | Age: 75
DRG: 242 | End: 2020-01-01
Payer: MEDICARE

## 2020-01-01 ENCOUNTER — TELEPHONE (OUTPATIENT)
Dept: PRIMARY CARE CLINIC | Age: 75
End: 2020-01-01

## 2020-01-01 ENCOUNTER — OFFICE VISIT (OUTPATIENT)
Dept: CARDIOLOGY | Age: 75
End: 2020-01-01
Payer: MEDICARE

## 2020-01-01 ENCOUNTER — APPOINTMENT (OUTPATIENT)
Dept: GENERAL RADIOLOGY | Age: 75
DRG: 260 | End: 2020-01-01
Attending: INTERNAL MEDICINE
Payer: MEDICARE

## 2020-01-01 ENCOUNTER — OFFICE VISIT (OUTPATIENT)
Dept: PRIMARY CARE CLINIC | Age: 75
End: 2020-01-01
Payer: MEDICARE

## 2020-01-01 ENCOUNTER — CARE COORDINATION (OUTPATIENT)
Dept: CASE MANAGEMENT | Age: 75
End: 2020-01-01

## 2020-01-01 ENCOUNTER — NURSE ONLY (OUTPATIENT)
Dept: CARDIOLOGY | Age: 75
End: 2020-01-01

## 2020-01-01 ENCOUNTER — APPOINTMENT (OUTPATIENT)
Dept: GENERAL RADIOLOGY | Age: 75
DRG: 242 | End: 2020-01-01
Payer: MEDICARE

## 2020-01-01 ENCOUNTER — HOSPITAL ENCOUNTER (INPATIENT)
Age: 75
LOS: 2 days | Discharge: HOME | DRG: 260 | End: 2020-08-08
Attending: INTERNAL MEDICINE | Admitting: INTERNAL MEDICINE
Payer: MEDICARE

## 2020-01-01 ENCOUNTER — TELEPHONE (OUTPATIENT)
Dept: CARDIOLOGY | Age: 75
End: 2020-01-01

## 2020-01-01 ENCOUNTER — OFFICE VISIT (OUTPATIENT)
Dept: CARDIOLOGY | Age: 75
End: 2020-01-01

## 2020-01-01 ENCOUNTER — HOSPITAL ENCOUNTER (OUTPATIENT)
Dept: GENERAL RADIOLOGY | Age: 75
Discharge: HOME | End: 2020-08-05
Payer: MEDICARE

## 2020-01-01 ENCOUNTER — HOSPITAL ENCOUNTER (OUTPATIENT)
Dept: ULTRASOUND IMAGING | Age: 75
Discharge: HOME | End: 2020-09-29
Payer: MEDICARE

## 2020-01-01 VITALS
DIASTOLIC BLOOD PRESSURE: 65 MMHG | OXYGEN SATURATION: 95 % | BODY MASS INDEX: 40.63 KG/M2 | SYSTOLIC BLOOD PRESSURE: 139 MMHG | TEMPERATURE: 96.9 F | HEIGHT: 61 IN | HEART RATE: 73 BPM | RESPIRATION RATE: 16 BRPM | WEIGHT: 215.2 LBS

## 2020-01-01 VITALS
BODY MASS INDEX: 41.16 KG/M2 | SYSTOLIC BLOOD PRESSURE: 134 MMHG | WEIGHT: 218 LBS | HEIGHT: 61 IN | DIASTOLIC BLOOD PRESSURE: 68 MMHG | HEART RATE: 68 BPM

## 2020-01-01 VITALS
DIASTOLIC BLOOD PRESSURE: 68 MMHG | TEMPERATURE: 98.7 F | HEIGHT: 61 IN | SYSTOLIC BLOOD PRESSURE: 132 MMHG | HEART RATE: 70 BPM | OXYGEN SATURATION: 98 % | BODY MASS INDEX: 39.27 KG/M2 | WEIGHT: 208 LBS | RESPIRATION RATE: 20 BRPM

## 2020-01-01 VITALS
HEIGHT: 61 IN | BODY MASS INDEX: 40.4 KG/M2 | SYSTOLIC BLOOD PRESSURE: 146 MMHG | HEART RATE: 64 BPM | WEIGHT: 214 LBS | DIASTOLIC BLOOD PRESSURE: 88 MMHG

## 2020-01-01 VITALS
WEIGHT: 215 LBS | BODY MASS INDEX: 40.59 KG/M2 | DIASTOLIC BLOOD PRESSURE: 70 MMHG | TEMPERATURE: 98.2 F | OXYGEN SATURATION: 97 % | HEIGHT: 61 IN | HEART RATE: 68 BPM | SYSTOLIC BLOOD PRESSURE: 126 MMHG

## 2020-01-01 VITALS
RESPIRATION RATE: 18 BRPM | DIASTOLIC BLOOD PRESSURE: 66 MMHG | HEIGHT: 61 IN | SYSTOLIC BLOOD PRESSURE: 134 MMHG | HEART RATE: 65 BPM | TEMPERATURE: 98.2 F | WEIGHT: 224.25 LBS | BODY MASS INDEX: 42.34 KG/M2 | OXYGEN SATURATION: 98 %

## 2020-01-01 VITALS
HEIGHT: 60 IN | WEIGHT: 217 LBS | DIASTOLIC BLOOD PRESSURE: 74 MMHG | BODY MASS INDEX: 42.6 KG/M2 | RESPIRATION RATE: 18 BRPM | TEMPERATURE: 98.9 F | HEART RATE: 69 BPM | SYSTOLIC BLOOD PRESSURE: 128 MMHG | OXYGEN SATURATION: 98 %

## 2020-01-01 LAB
ALBUMIN SERPL-MCNC: 3.2 G/DL (ref 3.5–5.2)
ALBUMIN SERPL-MCNC: 3.7 G/DL (ref 3.5–5.2)
ALP BLD-CCNC: 36 U/L (ref 35–104)
ALP BLD-CCNC: 39 U/L (ref 35–104)
ALT SERPL-CCNC: 16 U/L (ref 5–33)
ALT SERPL-CCNC: 19 U/L (ref 5–33)
ANAEROBIC CULTURE: ABNORMAL
ANAEROBIC CULTURE: NORMAL
ANION GAP SERPL CALCULATED.3IONS-SCNC: 11 MMOL/L (ref 7–19)
ANION GAP SERPL CALCULATED.3IONS-SCNC: 12 MMOL/L (ref 7–19)
ANION GAP SERPL CALCULATED.3IONS-SCNC: 13 MMOL/L (ref 7–19)
ANION GAP SERPL CALCULATED.3IONS-SCNC: 14 MMOL/L (ref 7–19)
ANION GAP SERPL CALCULATED.3IONS-SCNC: 15 MMOL/L (ref 7–19)
ANION GAP SERPL CALCULATED.3IONS-SCNC: 8 MMOL/L (ref 7–19)
APTT: 29.6 SEC (ref 26–36.2)
APTT: 36.2 SEC (ref 26–36.2)
APTT: 41.2 SEC (ref 26–36.2)
APTT: 50.3 SEC (ref 26–36.2)
APTT: 58.3 SEC (ref 26–36.2)
APTT: 71 SEC (ref 26–36.2)
APTT: >200 SEC (ref 26–36.2)
AST SERPL-CCNC: 32 U/L (ref 5–32)
AST SERPL-CCNC: 33 U/L (ref 5–32)
BACTERIA: ABNORMAL /HPF
BILIRUB SERPL-MCNC: 0.3 MG/DL (ref 0.2–1.2)
BILIRUB SERPL-MCNC: 0.4 MG/DL (ref 0.2–1.2)
BILIRUBIN URINE: NEGATIVE
BLOOD, URINE: ABNORMAL
BUN BLDV-MCNC: 18 MG/DL (ref 8–23)
BUN BLDV-MCNC: 21 MG/DL (ref 8–23)
BUN BLDV-MCNC: 22 MG/DL (ref 8–23)
BUN BLDV-MCNC: 23 MG/DL (ref 8–23)
BUN BLDV-MCNC: 26 MG/DL (ref 8–23)
BUN BLDV-MCNC: 33 MG/DL (ref 8–23)
BUN BLDV-MCNC: 36 MG/DL (ref 8–23)
CALCIUM SERPL-MCNC: 10 MG/DL (ref 8.8–10.2)
CALCIUM SERPL-MCNC: 10.1 MG/DL (ref 8.8–10.2)
CALCIUM SERPL-MCNC: 10.2 MG/DL (ref 8.8–10.2)
CALCIUM SERPL-MCNC: 10.6 MG/DL (ref 8.8–10.2)
CALCIUM SERPL-MCNC: 9.2 MG/DL (ref 8.8–10.2)
CALCIUM SERPL-MCNC: 9.5 MG/DL (ref 8.8–10.2)
CALCIUM SERPL-MCNC: 9.8 MG/DL (ref 8.8–10.2)
CALCIUM SERPL-MCNC: 9.9 MG/DL (ref 8.8–10.2)
CALCIUM SERPL-MCNC: 9.9 MG/DL (ref 8.8–10.2)
CHLORIDE BLD-SCNC: 106 MMOL/L (ref 98–111)
CHLORIDE BLD-SCNC: 107 MMOL/L (ref 98–111)
CHLORIDE BLD-SCNC: 109 MMOL/L (ref 98–111)
CHLORIDE BLD-SCNC: 114 MMOL/L (ref 98–111)
CLARITY: ABNORMAL
CO2: 19 MMOL/L (ref 22–29)
CO2: 20 MMOL/L (ref 22–29)
CO2: 22 MMOL/L (ref 22–29)
CO2: 23 MMOL/L (ref 22–29)
CO2: 23 MMOL/L (ref 22–29)
COLOR: YELLOW
CREAT SERPL-MCNC: 1.1 MG/DL (ref 0.5–0.9)
CREAT SERPL-MCNC: 1.3 MG/DL (ref 0.5–0.9)
CREAT SERPL-MCNC: 1.5 MG/DL (ref 0.5–0.9)
CREAT SERPL-MCNC: 1.6 MG/DL (ref 0.5–0.9)
CREAT SERPL-MCNC: 1.7 MG/DL (ref 0.5–0.9)
CRYSTALS, UA: ABNORMAL /HPF
D DIMER: 16.5 UG/ML FEU (ref 0–0.48)
EKG P AXIS: 38 DEGREES
EKG P AXIS: 47 DEGREES
EKG P AXIS: NORMAL DEGREES
EKG P-R INTERVAL: 264 MS
EKG P-R INTERVAL: 278 MS
EKG P-R INTERVAL: NORMAL MS
EKG Q-T INTERVAL: 464 MS
EKG Q-T INTERVAL: 470 MS
EKG Q-T INTERVAL: 474 MS
EKG Q-T INTERVAL: 490 MS
EKG Q-T INTERVAL: 504 MS
EKG Q-T INTERVAL: 504 MS
EKG Q-T INTERVAL: 530 MS
EKG Q-T INTERVAL: 534 MS
EKG QRS DURATION: 136 MS
EKG QRS DURATION: 148 MS
EKG QRS DURATION: 150 MS
EKG QRS DURATION: 154 MS
EKG QRS DURATION: 154 MS
EKG QRS DURATION: 186 MS
EKG QRS DURATION: 192 MS
EKG QRS DURATION: 192 MS
EKG QTC CALCULATION (BAZETT): 445 MS
EKG QTC CALCULATION (BAZETT): 459 MS
EKG QTC CALCULATION (BAZETT): 462 MS
EKG QTC CALCULATION (BAZETT): 476 MS
EKG QTC CALCULATION (BAZETT): 477 MS
EKG QTC CALCULATION (BAZETT): 482 MS
EKG QTC CALCULATION (BAZETT): 488 MS
EKG QTC CALCULATION (BAZETT): 489 MS
EKG T AXIS: -1 DEGREES
EKG T AXIS: -5 DEGREES
EKG T AXIS: -7 DEGREES
EKG T AXIS: 1 DEGREES
EKG T AXIS: 4 DEGREES
EKG T AXIS: 70 DEGREES
EKG T AXIS: 80 DEGREES
EKG T AXIS: 85 DEGREES
EPITHELIAL CELLS, UA: 0 /HPF (ref 0–5)
GFR AFRICAN AMERICAN: 35
GFR AFRICAN AMERICAN: 38
GFR AFRICAN AMERICAN: 41
GFR AFRICAN AMERICAN: 48
GFR AFRICAN AMERICAN: 58
GFR NON-AFRICAN AMERICAN: 29
GFR NON-AFRICAN AMERICAN: 31
GFR NON-AFRICAN AMERICAN: 34
GFR NON-AFRICAN AMERICAN: 40
GFR NON-AFRICAN AMERICAN: 48
GLUCOSE BLD-MCNC: 109 MG/DL (ref 74–109)
GLUCOSE BLD-MCNC: 111 MG/DL (ref 74–109)
GLUCOSE BLD-MCNC: 111 MG/DL (ref 74–109)
GLUCOSE BLD-MCNC: 114 MG/DL (ref 74–109)
GLUCOSE BLD-MCNC: 116 MG/DL (ref 74–109)
GLUCOSE BLD-MCNC: 118 MG/DL (ref 74–109)
GLUCOSE BLD-MCNC: 120 MG/DL (ref 74–109)
GLUCOSE BLD-MCNC: 121 MG/DL (ref 74–109)
GLUCOSE BLD-MCNC: 130 MG/DL (ref 74–109)
GLUCOSE URINE: NEGATIVE MG/DL
GRAM STAIN RESULT: ABNORMAL
GRAM STAIN RESULT: NORMAL
HCT VFR BLD CALC: 33.1 % (ref 37–47)
HCT VFR BLD CALC: 33.8 % (ref 37–47)
HCT VFR BLD CALC: 34.7 % (ref 37–47)
HCT VFR BLD CALC: 34.8 % (ref 37–47)
HCT VFR BLD CALC: 35 % (ref 37–47)
HCT VFR BLD CALC: 35.4 % (ref 37–47)
HCT VFR BLD CALC: 35.5 % (ref 37–47)
HCT VFR BLD CALC: 36 % (ref 37–47)
HCT VFR BLD CALC: 37.3 % (ref 37–47)
HEMOGLOBIN: 10.2 G/DL (ref 12–16)
HEMOGLOBIN: 10.7 G/DL (ref 12–16)
HEMOGLOBIN: 10.8 G/DL (ref 12–16)
HEMOGLOBIN: 11.2 G/DL (ref 12–16)
HEMOGLOBIN: 11.5 G/DL (ref 12–16)
HEMOGLOBIN: 11.5 G/DL (ref 12–16)
HEMOGLOBIN: 11.8 G/DL (ref 12–16)
HYALINE CASTS: 0 /HPF (ref 0–8)
INR BLD: 1.05 (ref 0.88–1.18)
INR BLD: 1.36 (ref 0.88–1.18)
KETONES, URINE: NEGATIVE MG/DL
LEUKOCYTE ESTERASE, URINE: ABNORMAL
LV EF: 55 %
LVEF MODALITY: NORMAL
MCH RBC QN AUTO: 30.7 PG (ref 27–31)
MCH RBC QN AUTO: 30.9 PG (ref 27–31)
MCH RBC QN AUTO: 30.9 PG (ref 27–31)
MCH RBC QN AUTO: 31 PG (ref 27–31)
MCH RBC QN AUTO: 31.2 PG (ref 27–31)
MCH RBC QN AUTO: 31.3 PG (ref 27–31)
MCH RBC QN AUTO: 31.3 PG (ref 27–31)
MCH RBC QN AUTO: 31.6 PG (ref 27–31)
MCH RBC QN AUTO: 31.7 PG (ref 27–31)
MCHC RBC AUTO-ENTMCNC: 30.8 G/DL (ref 33–37)
MCHC RBC AUTO-ENTMCNC: 31 G/DL (ref 33–37)
MCHC RBC AUTO-ENTMCNC: 31.1 G/DL (ref 33–37)
MCHC RBC AUTO-ENTMCNC: 31.6 G/DL (ref 33–37)
MCHC RBC AUTO-ENTMCNC: 31.7 G/DL (ref 33–37)
MCHC RBC AUTO-ENTMCNC: 32 G/DL (ref 33–37)
MCHC RBC AUTO-ENTMCNC: 32.3 G/DL (ref 33–37)
MCHC RBC AUTO-ENTMCNC: 32.4 G/DL (ref 33–37)
MCHC RBC AUTO-ENTMCNC: 32.5 G/DL (ref 33–37)
MCV RBC AUTO: 101.8 FL (ref 81–99)
MCV RBC AUTO: 102.8 FL (ref 81–99)
MCV RBC AUTO: 95.1 FL (ref 81–99)
MCV RBC AUTO: 95.4 FL (ref 81–99)
MCV RBC AUTO: 96.5 FL (ref 81–99)
MCV RBC AUTO: 97.5 FL (ref 81–99)
MCV RBC AUTO: 97.9 FL (ref 81–99)
MCV RBC AUTO: 98.8 FL (ref 81–99)
MCV RBC AUTO: 99.4 FL (ref 81–99)
NITRITE, URINE: POSITIVE
ORGANISM: ABNORMAL
PDW BLD-RTO: 14.3 % (ref 11.5–14.5)
PDW BLD-RTO: 14.5 % (ref 11.5–14.5)
PDW BLD-RTO: 14.6 % (ref 11.5–14.5)
PDW BLD-RTO: 14.7 % (ref 11.5–14.5)
PDW BLD-RTO: 14.7 % (ref 11.5–14.5)
PDW BLD-RTO: 14.8 % (ref 11.5–14.5)
PDW BLD-RTO: 15.1 % (ref 11.5–14.5)
PH UA: 5.5 (ref 5–8)
PLATELET # BLD: 137 K/UL (ref 130–400)
PLATELET # BLD: 144 K/UL (ref 130–400)
PLATELET # BLD: 147 K/UL (ref 130–400)
PLATELET # BLD: 156 K/UL (ref 130–400)
PLATELET # BLD: 160 K/UL (ref 130–400)
PLATELET # BLD: 166 K/UL (ref 130–400)
PLATELET # BLD: 169 K/UL (ref 130–400)
PLATELET # BLD: 216 K/UL (ref 130–400)
PLATELET # BLD: 230 K/UL (ref 130–400)
PMV BLD AUTO: 10 FL (ref 9.4–12.3)
PMV BLD AUTO: 10.1 FL (ref 9.4–12.3)
PMV BLD AUTO: 10.1 FL (ref 9.4–12.3)
PMV BLD AUTO: 10.2 FL (ref 9.4–12.3)
PMV BLD AUTO: 9.7 FL (ref 9.4–12.3)
PMV BLD AUTO: 9.7 FL (ref 9.4–12.3)
POTASSIUM REFLEX MAGNESIUM: 4 MMOL/L (ref 3.5–5)
POTASSIUM REFLEX MAGNESIUM: 4.2 MMOL/L (ref 3.5–5)
POTASSIUM REFLEX MAGNESIUM: 4.3 MMOL/L (ref 3.5–5)
POTASSIUM REFLEX MAGNESIUM: 4.3 MMOL/L (ref 3.5–5)
POTASSIUM REFLEX MAGNESIUM: 4.4 MMOL/L (ref 3.5–5)
POTASSIUM REFLEX MAGNESIUM: 4.4 MMOL/L (ref 3.5–5)
POTASSIUM REFLEX MAGNESIUM: 4.5 MMOL/L (ref 3.5–5)
POTASSIUM REFLEX MAGNESIUM: 4.8 MMOL/L (ref 3.5–5)
POTASSIUM REFLEX MAGNESIUM: 5 MMOL/L (ref 3.5–5)
PROTEIN UA: NEGATIVE MG/DL
PROTHROMBIN TIME: 13.6 SEC (ref 12–14.6)
PROTHROMBIN TIME: 16.9 SEC (ref 12–14.6)
RBC # BLD: 3.22 M/UL (ref 4.2–5.4)
RBC # BLD: 3.42 M/UL (ref 4.2–5.4)
RBC # BLD: 3.42 M/UL (ref 4.2–5.4)
RBC # BLD: 3.59 M/UL (ref 4.2–5.4)
RBC # BLD: 3.62 M/UL (ref 4.2–5.4)
RBC # BLD: 3.65 M/UL (ref 4.2–5.4)
RBC # BLD: 3.67 M/UL (ref 4.2–5.4)
RBC # BLD: 3.72 M/UL (ref 4.2–5.4)
RBC # BLD: 3.81 M/UL (ref 4.2–5.4)
RBC UA: 1 /HPF (ref 0–4)
SODIUM BLD-SCNC: 137 MMOL/L (ref 136–145)
SODIUM BLD-SCNC: 139 MMOL/L (ref 136–145)
SODIUM BLD-SCNC: 140 MMOL/L (ref 136–145)
SODIUM BLD-SCNC: 141 MMOL/L (ref 136–145)
SODIUM BLD-SCNC: 142 MMOL/L (ref 136–145)
SODIUM BLD-SCNC: 143 MMOL/L (ref 136–145)
SPECIFIC GRAVITY UA: 1.01 (ref 1–1.03)
TOTAL PROTEIN: 5.7 G/DL (ref 6.6–8.7)
TOTAL PROTEIN: 6.1 G/DL (ref 6.6–8.7)
UROBILINOGEN, URINE: 0.2 E.U./DL
WBC # BLD: 6.8 K/UL (ref 4.8–10.8)
WBC # BLD: 7 K/UL (ref 4.8–10.8)
WBC # BLD: 7.5 K/UL (ref 4.8–10.8)
WBC # BLD: 7.6 K/UL (ref 4.8–10.8)
WBC # BLD: 8 K/UL (ref 4.8–10.8)
WBC # BLD: 8 K/UL (ref 4.8–10.8)
WBC # BLD: 8.5 K/UL (ref 4.8–10.8)
WBC # BLD: 8.8 K/UL (ref 4.8–10.8)
WBC # BLD: 9.8 K/UL (ref 4.8–10.8)
WBC UA: 112 /HPF (ref 0–5)
WOUND/ABSCESS: ABNORMAL
WOUND/ABSCESS: NORMAL

## 2020-01-01 PROCEDURE — 94640 AIRWAY INHALATION TREATMENT: CPT

## 2020-01-01 PROCEDURE — 2580000003 HC RX 258: Performed by: FAMILY MEDICINE

## 2020-01-01 PROCEDURE — 99152 MOD SED SAME PHYS/QHP 5/>YRS: CPT

## 2020-01-01 PROCEDURE — 93005 ELECTROCARDIOGRAM TRACING: CPT | Performed by: INTERNAL MEDICINE

## 2020-01-01 PROCEDURE — 6370000000 HC RX 637 (ALT 250 FOR IP): Performed by: FAMILY MEDICINE

## 2020-01-01 PROCEDURE — 87070 CULTURE OTHR SPECIMN AEROBIC: CPT

## 2020-01-01 PROCEDURE — 36415 COLL VENOUS BLD VENIPUNCTURE: CPT

## 2020-01-01 PROCEDURE — 1036F TOBACCO NON-USER: CPT | Performed by: NURSE PRACTITIONER

## 2020-01-01 PROCEDURE — 2140000000 HC CCU INTERMEDIATE R&B

## 2020-01-01 PROCEDURE — 71045 X-RAY EXAM CHEST 1 VIEW: CPT

## 2020-01-01 PROCEDURE — 33208 INSRT HEART PM ATRIAL & VENT: CPT | Performed by: INTERNAL MEDICINE

## 2020-01-01 PROCEDURE — 99024 POSTOP FOLLOW-UP VISIT: CPT | Performed by: CLINICAL NURSE SPECIALIST

## 2020-01-01 PROCEDURE — G8417 CALC BMI ABV UP PARAM F/U: HCPCS | Performed by: INTERNAL MEDICINE

## 2020-01-01 PROCEDURE — G8427 DOCREV CUR MEDS BY ELIG CLIN: HCPCS | Performed by: INTERNAL MEDICINE

## 2020-01-01 PROCEDURE — 1123F ACP DISCUSS/DSCN MKR DOCD: CPT | Performed by: FAMILY MEDICINE

## 2020-01-01 PROCEDURE — 80048 BASIC METABOLIC PNL TOTAL CA: CPT

## 2020-01-01 PROCEDURE — 6360000002 HC RX W HCPCS: Performed by: FAMILY MEDICINE

## 2020-01-01 PROCEDURE — 93010 ELECTROCARDIOGRAM REPORT: CPT | Performed by: INTERNAL MEDICINE

## 2020-01-01 PROCEDURE — 33215 REPOSITION PACING-DEFIB LEAD: CPT | Performed by: INTERNAL MEDICINE

## 2020-01-01 PROCEDURE — 85027 COMPLETE CBC AUTOMATED: CPT

## 2020-01-01 PROCEDURE — G0378 HOSPITAL OBSERVATION PER HR: HCPCS

## 2020-01-01 PROCEDURE — 6370000000 HC RX 637 (ALT 250 FOR IP): Performed by: INTERNAL MEDICINE

## 2020-01-01 PROCEDURE — 99024 POSTOP FOLLOW-UP VISIT: CPT | Performed by: INTERNAL MEDICINE

## 2020-01-01 PROCEDURE — 6360000002 HC RX W HCPCS

## 2020-01-01 PROCEDURE — 2580000003 HC RX 258: Performed by: INTERNAL MEDICINE

## 2020-01-01 PROCEDURE — 99232 SBSQ HOSP IP/OBS MODERATE 35: CPT | Performed by: INTERNAL MEDICINE

## 2020-01-01 PROCEDURE — G0439 PPPS, SUBSEQ VISIT: HCPCS | Performed by: FAMILY MEDICINE

## 2020-01-01 PROCEDURE — 87077 CULTURE AEROBIC IDENTIFY: CPT

## 2020-01-01 PROCEDURE — 85610 PROTHROMBIN TIME: CPT

## 2020-01-01 PROCEDURE — C1785 PMKR, DUAL, RATE-RESP: HCPCS

## 2020-01-01 PROCEDURE — 93971 EXTREMITY STUDY: CPT

## 2020-01-01 PROCEDURE — 2500000003 HC RX 250 WO HCPCS

## 2020-01-01 PROCEDURE — 1036F TOBACCO NON-USER: CPT | Performed by: INTERNAL MEDICINE

## 2020-01-01 PROCEDURE — 99152 MOD SED SAME PHYS/QHP 5/>YRS: CPT | Performed by: INTERNAL MEDICINE

## 2020-01-01 PROCEDURE — 96365 THER/PROPH/DIAG IV INF INIT: CPT

## 2020-01-01 PROCEDURE — G8399 PT W/DXA RESULTS DOCUMENT: HCPCS | Performed by: NURSE PRACTITIONER

## 2020-01-01 PROCEDURE — G8417 CALC BMI ABV UP PARAM F/U: HCPCS | Performed by: FAMILY MEDICINE

## 2020-01-01 PROCEDURE — 99153 MOD SED SAME PHYS/QHP EA: CPT

## 2020-01-01 PROCEDURE — 93350 STRESS TTE ONLY: CPT

## 2020-01-01 PROCEDURE — 90694 VACC AIIV4 NO PRSRV 0.5ML IM: CPT | Performed by: FAMILY MEDICINE

## 2020-01-01 PROCEDURE — 6360000002 HC RX W HCPCS: Performed by: INTERNAL MEDICINE

## 2020-01-01 PROCEDURE — 80053 COMPREHEN METABOLIC PANEL: CPT

## 2020-01-01 PROCEDURE — 99223 1ST HOSP IP/OBS HIGH 75: CPT | Performed by: INTERNAL MEDICINE

## 2020-01-01 PROCEDURE — 93005 ELECTROCARDIOGRAM TRACING: CPT | Performed by: FAMILY MEDICINE

## 2020-01-01 PROCEDURE — 85379 FIBRIN DEGRADATION QUANT: CPT

## 2020-01-01 PROCEDURE — 1036F TOBACCO NON-USER: CPT | Performed by: FAMILY MEDICINE

## 2020-01-01 PROCEDURE — 87075 CULTR BACTERIA EXCEPT BLOOD: CPT

## 2020-01-01 PROCEDURE — 2780000010 HC IMPLANT OTHER

## 2020-01-01 PROCEDURE — G8417 CALC BMI ABV UP PARAM F/U: HCPCS | Performed by: NURSE PRACTITIONER

## 2020-01-01 PROCEDURE — 93294 REM INTERROG EVL PM/LDLS PM: CPT | Performed by: CLINICAL NURSE SPECIALIST

## 2020-01-01 PROCEDURE — G8399 PT W/DXA RESULTS DOCUMENT: HCPCS | Performed by: INTERNAL MEDICINE

## 2020-01-01 PROCEDURE — 93308 TTE F-UP OR LMTD: CPT

## 2020-01-01 PROCEDURE — G8484 FLU IMMUNIZE NO ADMIN: HCPCS | Performed by: NURSE PRACTITIONER

## 2020-01-01 PROCEDURE — 71275 CT ANGIOGRAPHY CHEST: CPT

## 2020-01-01 PROCEDURE — 77080 DXA BONE DENSITY AXIAL: CPT

## 2020-01-01 PROCEDURE — 87205 SMEAR GRAM STAIN: CPT

## 2020-01-01 PROCEDURE — G0379 DIRECT REFER HOSPITAL OBSERV: HCPCS

## 2020-01-01 PROCEDURE — 33999 UNLISTED PX CARDIAC SURGERY: CPT

## 2020-01-01 PROCEDURE — 99213 OFFICE O/P EST LOW 20 MIN: CPT | Performed by: INTERNAL MEDICINE

## 2020-01-01 PROCEDURE — 0JH606Z INSERTION OF PACEMAKER, DUAL CHAMBER INTO CHEST SUBCUTANEOUS TISSUE AND FASCIA, OPEN APPROACH: ICD-10-PCS | Performed by: INTERNAL MEDICINE

## 2020-01-01 PROCEDURE — 99213 OFFICE O/P EST LOW 20 MIN: CPT | Performed by: NURSE PRACTITIONER

## 2020-01-01 PROCEDURE — 6360000004 HC RX CONTRAST MEDICATION: Performed by: FAMILY MEDICINE

## 2020-01-01 PROCEDURE — 02H63JZ INSERTION OF PACEMAKER LEAD INTO RIGHT ATRIUM, PERCUTANEOUS APPROACH: ICD-10-PCS | Performed by: INTERNAL MEDICINE

## 2020-01-01 PROCEDURE — 99495 TRANSJ CARE MGMT MOD F2F 14D: CPT | Performed by: NURSE PRACTITIONER

## 2020-01-01 PROCEDURE — 93280 PM DEVICE PROGR EVAL DUAL: CPT | Performed by: INTERNAL MEDICINE

## 2020-01-01 PROCEDURE — 96366 THER/PROPH/DIAG IV INF ADDON: CPT

## 2020-01-01 PROCEDURE — G0008 ADMIN INFLUENZA VIRUS VAC: HCPCS | Performed by: FAMILY MEDICINE

## 2020-01-01 PROCEDURE — 4040F PNEUMOC VAC/ADMIN/RCVD: CPT | Performed by: INTERNAL MEDICINE

## 2020-01-01 PROCEDURE — 2709999900 HC NON-CHARGEABLE SUPPLY

## 2020-01-01 PROCEDURE — 3E0102A INTRODUCTION OF ANTI-INFECTIVE ENVELOPE INTO SUBCUTANEOUS TISSUE, OPEN APPROACH: ICD-10-PCS | Performed by: INTERNAL MEDICINE

## 2020-01-01 PROCEDURE — 02HK3JZ INSERTION OF PACEMAKER LEAD INTO RIGHT VENTRICLE, PERCUTANEOUS APPROACH: ICD-10-PCS | Performed by: INTERNAL MEDICINE

## 2020-01-01 PROCEDURE — 93296 REM INTERROG EVL PM/IDS: CPT | Performed by: CLINICAL NURSE SPECIALIST

## 2020-01-01 PROCEDURE — G8399 PT W/DXA RESULTS DOCUMENT: HCPCS | Performed by: FAMILY MEDICINE

## 2020-01-01 PROCEDURE — 99231 SBSQ HOSP IP/OBS SF/LOW 25: CPT | Performed by: INTERNAL MEDICINE

## 2020-01-01 PROCEDURE — G8427 DOCREV CUR MEDS BY ELIG CLIN: HCPCS | Performed by: FAMILY MEDICINE

## 2020-01-01 PROCEDURE — 99213 OFFICE O/P EST LOW 20 MIN: CPT | Performed by: FAMILY MEDICINE

## 2020-01-01 PROCEDURE — 85730 THROMBOPLASTIN TIME PARTIAL: CPT

## 2020-01-01 PROCEDURE — 1123F ACP DISCUSS/DSCN MKR DOCD: CPT | Performed by: INTERNAL MEDICINE

## 2020-01-01 PROCEDURE — 33215 REPOSITION PACING-DEFIB LEAD: CPT

## 2020-01-01 PROCEDURE — 1090F PRES/ABSN URINE INCON ASSESS: CPT | Performed by: NURSE PRACTITIONER

## 2020-01-01 PROCEDURE — 33208 INSRT HEART PM ATRIAL & VENT: CPT

## 2020-01-01 PROCEDURE — 1090F PRES/ABSN URINE INCON ASSESS: CPT | Performed by: INTERNAL MEDICINE

## 2020-01-01 PROCEDURE — G8427 DOCREV CUR MEDS BY ELIG CLIN: HCPCS | Performed by: NURSE PRACTITIONER

## 2020-01-01 PROCEDURE — 4040F PNEUMOC VAC/ADMIN/RCVD: CPT | Performed by: NURSE PRACTITIONER

## 2020-01-01 PROCEDURE — 02WA0MZ REVISION OF CARDIAC LEAD IN HEART, OPEN APPROACH: ICD-10-PCS | Performed by: INTERNAL MEDICINE

## 2020-01-01 PROCEDURE — 1123F ACP DISCUSS/DSCN MKR DOCD: CPT | Performed by: NURSE PRACTITIONER

## 2020-01-01 PROCEDURE — 81003 URINALYSIS AUTO W/O SCOPE: CPT

## 2020-01-01 PROCEDURE — 1111F DSCHRG MED/CURRENT MED MERGE: CPT | Performed by: NURSE PRACTITIONER

## 2020-01-01 PROCEDURE — 4040F PNEUMOC VAC/ADMIN/RCVD: CPT | Performed by: FAMILY MEDICINE

## 2020-01-01 PROCEDURE — 1090F PRES/ABSN URINE INCON ASSESS: CPT | Performed by: FAMILY MEDICINE

## 2020-01-01 PROCEDURE — C1898 LEAD, PMKR, OTHER THAN TRANS: HCPCS

## 2020-01-01 RX ORDER — SODIUM CHLORIDE 0.9 % (FLUSH) 0.9 %
10 SYRINGE (ML) INJECTION PRN
Status: ACTIVE | OUTPATIENT
Start: 2020-01-01 | End: 2020-01-01

## 2020-01-01 RX ORDER — ERGOCALCIFEROL 1.25 MG/1
CAPSULE ORAL
Qty: 12 CAPSULE | Refills: 0 | Status: SHIPPED | OUTPATIENT
Start: 2020-01-01 | End: 2021-01-01

## 2020-01-01 RX ORDER — HYDROCORTISONE 25 MG/G
CREAM TOPICAL
Qty: 28.4 G | Refills: 2 | Status: SHIPPED | OUTPATIENT
Start: 2020-01-01 | End: 2021-01-01 | Stop reason: ALTCHOICE

## 2020-01-01 RX ORDER — LEVOTHYROXINE SODIUM 88 UG/1
88 TABLET ORAL DAILY
Status: DISCONTINUED | OUTPATIENT
Start: 2020-01-01 | End: 2020-01-01 | Stop reason: HOSPADM

## 2020-01-01 RX ORDER — ATENOLOL 50 MG/1
50 TABLET ORAL NIGHTLY
Status: DISCONTINUED | OUTPATIENT
Start: 2020-01-01 | End: 2020-01-01 | Stop reason: HOSPADM

## 2020-01-01 RX ORDER — CHLORHEXIDINE GLUCONATE 4 G/100ML
SOLUTION TOPICAL ONCE
Status: COMPLETED | OUTPATIENT
Start: 2020-01-01 | End: 2020-01-01

## 2020-01-01 RX ORDER — ACETAMINOPHEN 325 MG/1
650 TABLET ORAL EVERY 6 HOURS PRN
Status: DISCONTINUED | OUTPATIENT
Start: 2020-01-01 | End: 2020-01-01 | Stop reason: HOSPADM

## 2020-01-01 RX ORDER — SODIUM CHLORIDE 9 MG/ML
INJECTION, SOLUTION INTRAVENOUS
Status: COMPLETED | OUTPATIENT
Start: 2020-01-01 | End: 2020-01-01

## 2020-01-01 RX ORDER — FUROSEMIDE 20 MG/1
20 TABLET ORAL
Qty: 30 TABLET | Refills: 1 | Status: SHIPPED | OUTPATIENT
Start: 2020-01-01 | End: 2020-01-01

## 2020-01-01 RX ORDER — SODIUM CHLORIDE 9 MG/ML
500 INJECTION, SOLUTION INTRAVENOUS CONTINUOUS PRN
Status: ACTIVE | OUTPATIENT
Start: 2020-01-01 | End: 2020-01-01

## 2020-01-01 RX ORDER — LACTOBACILLUS RHAMNOSUS GG 10B CELL
1 CAPSULE ORAL DAILY
Status: DISCONTINUED | OUTPATIENT
Start: 2020-01-01 | End: 2020-01-01 | Stop reason: HOSPADM

## 2020-01-01 RX ORDER — ATENOLOL 50 MG/1
50 TABLET ORAL NIGHTLY
Qty: 30 TABLET | Refills: 3 | Status: SHIPPED | OUTPATIENT
Start: 2020-01-01 | End: 2021-01-01 | Stop reason: SDUPTHER

## 2020-01-01 RX ORDER — AMOXICILLIN AND CLAVULANATE POTASSIUM 875; 125 MG/1; MG/1
1 TABLET, FILM COATED ORAL 2 TIMES DAILY
Qty: 20 TABLET | Refills: 0 | Status: SHIPPED | OUTPATIENT
Start: 2020-01-01 | End: 2020-01-01

## 2020-01-01 RX ORDER — DOBUTAMINE HYDROCHLORIDE 200 MG/100ML
10 INJECTION INTRAVENOUS CONTINUOUS
Status: DISCONTINUED | OUTPATIENT
Start: 2020-01-01 | End: 2020-01-01 | Stop reason: HOSPADM

## 2020-01-01 RX ORDER — OXYBUTYNIN CHLORIDE 5 MG/1
10 TABLET, EXTENDED RELEASE ORAL NIGHTLY
Status: DISCONTINUED | OUTPATIENT
Start: 2020-01-01 | End: 2020-01-01 | Stop reason: HOSPADM

## 2020-01-01 RX ORDER — ONDANSETRON 2 MG/ML
4 INJECTION INTRAMUSCULAR; INTRAVENOUS EVERY 6 HOURS PRN
Status: DISCONTINUED | OUTPATIENT
Start: 2020-01-01 | End: 2020-01-01 | Stop reason: HOSPADM

## 2020-01-01 RX ORDER — ALBUTEROL SULFATE 2.5 MG/3ML
2.5 SOLUTION RESPIRATORY (INHALATION) EVERY 4 HOURS PRN
Status: DISCONTINUED | OUTPATIENT
Start: 2020-01-01 | End: 2020-01-01 | Stop reason: HOSPADM

## 2020-01-01 RX ORDER — SODIUM CHLORIDE 0.9 % (FLUSH) 0.9 %
10 SYRINGE (ML) INJECTION PRN
Status: DISCONTINUED | OUTPATIENT
Start: 2020-01-01 | End: 2020-01-01 | Stop reason: HOSPADM

## 2020-01-01 RX ORDER — ALBUTEROL SULFATE 90 UG/1
2 AEROSOL, METERED RESPIRATORY (INHALATION) PRN
Status: ACTIVE | OUTPATIENT
Start: 2020-01-01 | End: 2020-01-01

## 2020-01-01 RX ORDER — OXYBUTYNIN CHLORIDE 10 MG/1
TABLET, EXTENDED RELEASE ORAL
Qty: 30 TABLET | Refills: 0 | Status: SHIPPED | OUTPATIENT
Start: 2020-01-01 | End: 2020-01-01

## 2020-01-01 RX ORDER — NITROGLYCERIN 0.4 MG/1
0.4 TABLET SUBLINGUAL EVERY 5 MIN PRN
Status: ACTIVE | OUTPATIENT
Start: 2020-01-01 | End: 2020-01-01

## 2020-01-01 RX ORDER — GABAPENTIN 100 MG/1
100 CAPSULE ORAL 2 TIMES DAILY
Status: DISCONTINUED | OUTPATIENT
Start: 2020-01-01 | End: 2020-01-01 | Stop reason: HOSPADM

## 2020-01-01 RX ORDER — CETIRIZINE HYDROCHLORIDE 10 MG/1
10 TABLET ORAL DAILY
Status: DISCONTINUED | OUTPATIENT
Start: 2020-01-01 | End: 2020-01-01 | Stop reason: HOSPADM

## 2020-01-01 RX ORDER — RISEDRONATE SODIUM 35 MG/1
35 TABLET, FILM COATED ORAL WEEKLY
Status: DISCONTINUED | OUTPATIENT
Start: 2020-01-01 | End: 2020-01-01 | Stop reason: RX

## 2020-01-01 RX ORDER — SODIUM CHLORIDE 9 MG/ML
INJECTION, SOLUTION INTRAVENOUS CONTINUOUS
Status: DISCONTINUED | OUTPATIENT
Start: 2020-01-01 | End: 2020-01-01 | Stop reason: HOSPADM

## 2020-01-01 RX ORDER — GABAPENTIN 100 MG/1
CAPSULE ORAL
Qty: 180 CAPSULE | Refills: 5 | Status: SHIPPED | OUTPATIENT
Start: 2020-01-01 | End: 2021-01-01 | Stop reason: ALTCHOICE

## 2020-01-01 RX ORDER — PROMETHAZINE HYDROCHLORIDE 12.5 MG/1
12.5 TABLET ORAL EVERY 6 HOURS PRN
Status: DISCONTINUED | OUTPATIENT
Start: 2020-01-01 | End: 2020-01-01 | Stop reason: HOSPADM

## 2020-01-01 RX ORDER — CEFDINIR 300 MG/1
300 CAPSULE ORAL 2 TIMES DAILY
Qty: 20 CAPSULE | Refills: 0 | Status: SHIPPED | OUTPATIENT
Start: 2020-01-01 | End: 2021-01-01 | Stop reason: ALTCHOICE

## 2020-01-01 RX ORDER — SODIUM CHLORIDE 0.9 % (FLUSH) 0.9 %
10 SYRINGE (ML) INJECTION EVERY 12 HOURS SCHEDULED
Status: DISCONTINUED | OUTPATIENT
Start: 2020-01-01 | End: 2020-01-01 | Stop reason: HOSPADM

## 2020-01-01 RX ORDER — HEPARIN SODIUM 10000 [USP'U]/100ML
9.93 INJECTION, SOLUTION INTRAVENOUS CONTINUOUS
Status: DISPENSED | OUTPATIENT
Start: 2020-01-01 | End: 2020-01-01

## 2020-01-01 RX ORDER — ALLOPURINOL 100 MG/1
TABLET ORAL
Qty: 90 TABLET | Refills: 1 | Status: SHIPPED | OUTPATIENT
Start: 2020-01-01 | End: 2021-01-01

## 2020-01-01 RX ORDER — ERGOCALCIFEROL 1.25 MG/1
CAPSULE ORAL
Qty: 12 CAPSULE | Refills: 0 | Status: SHIPPED | OUTPATIENT
Start: 2020-01-01 | End: 2020-01-01

## 2020-01-01 RX ORDER — METHYLPREDNISOLONE 4 MG/1
TABLET ORAL
Qty: 1 KIT | Refills: 0 | Status: SHIPPED | OUTPATIENT
Start: 2020-01-01 | End: 2021-01-01 | Stop reason: ALTCHOICE

## 2020-01-01 RX ORDER — IODIXANOL 320 MG/ML
50 INJECTION, SOLUTION INTRAVASCULAR
Status: COMPLETED | OUTPATIENT
Start: 2020-01-01 | End: 2020-01-01

## 2020-01-01 RX ORDER — ASPIRIN 81 MG/1
81 TABLET, CHEWABLE ORAL DAILY
Status: DISCONTINUED | OUTPATIENT
Start: 2020-01-01 | End: 2020-01-01 | Stop reason: HOSPADM

## 2020-01-01 RX ORDER — HEPARIN SODIUM 1000 [USP'U]/ML
4000 INJECTION, SOLUTION INTRAVENOUS; SUBCUTANEOUS ONCE
Status: COMPLETED | OUTPATIENT
Start: 2020-01-01 | End: 2020-01-01

## 2020-01-01 RX ORDER — FUROSEMIDE 20 MG/1
TABLET ORAL
Qty: 36 TABLET | Refills: 0 | Status: SHIPPED | OUTPATIENT
Start: 2020-01-01 | End: 2021-01-01

## 2020-01-01 RX ORDER — ALLOPURINOL 100 MG/1
100 TABLET ORAL NIGHTLY
Status: DISCONTINUED | OUTPATIENT
Start: 2020-01-01 | End: 2020-01-01 | Stop reason: HOSPADM

## 2020-01-01 RX ORDER — DOBUTAMINE HYDROCHLORIDE 200 MG/100ML
10 INJECTION INTRAVENOUS CONTINUOUS PRN
Status: ACTIVE | OUTPATIENT
Start: 2020-01-01 | End: 2020-01-01

## 2020-01-01 RX ORDER — RALOXIFENE HYDROCHLORIDE 60 MG/1
TABLET, FILM COATED ORAL
Qty: 90 TABLET | Refills: 3 | Status: SHIPPED | OUTPATIENT
Start: 2020-01-01

## 2020-01-01 RX ORDER — HEPARIN SODIUM 1000 [USP'U]/ML
4000 INJECTION, SOLUTION INTRAVENOUS; SUBCUTANEOUS PRN
Status: DISCONTINUED | OUTPATIENT
Start: 2020-01-01 | End: 2020-01-01 | Stop reason: ALTCHOICE

## 2020-01-01 RX ORDER — FUROSEMIDE 20 MG/1
20 TABLET ORAL
Status: DISCONTINUED | OUTPATIENT
Start: 2020-01-01 | End: 2020-01-01 | Stop reason: HOSPADM

## 2020-01-01 RX ORDER — CYANOCOBALAMIN (VITAMIN B-12) 5000 MCG
5000 TABLET,DISINTEGRATING ORAL NIGHTLY
Status: DISCONTINUED | OUTPATIENT
Start: 2020-01-01 | End: 2020-01-01 | Stop reason: HOSPADM

## 2020-01-01 RX ORDER — LEVOTHYROXINE SODIUM 75 UG/1
75 TABLET ORAL DAILY
Qty: 90 TABLET | Refills: 1 | Status: ON HOLD | OUTPATIENT
Start: 2020-01-01 | End: 2021-01-01

## 2020-01-01 RX ORDER — SODIUM CHLORIDE 0.9 % (FLUSH) 0.9 %
10 SYRINGE (ML) INJECTION PRN
Status: DISCONTINUED | OUTPATIENT
Start: 2020-01-01 | End: 2020-01-01 | Stop reason: SDUPTHER

## 2020-01-01 RX ORDER — ATROPINE SULFATE 0.1 MG/ML
0.5 INJECTION INTRAVENOUS EVERY 5 MIN PRN
Status: ACTIVE | OUTPATIENT
Start: 2020-01-01 | End: 2020-01-01

## 2020-01-01 RX ORDER — SODIUM CHLORIDE 0.9 % (FLUSH) 0.9 %
10 SYRINGE (ML) INJECTION EVERY 12 HOURS SCHEDULED
Status: DISCONTINUED | OUTPATIENT
Start: 2020-01-01 | End: 2020-01-01

## 2020-01-01 RX ORDER — HEPARIN SODIUM 1000 [USP'U]/ML
2000 INJECTION, SOLUTION INTRAVENOUS; SUBCUTANEOUS PRN
Status: DISCONTINUED | OUTPATIENT
Start: 2020-01-01 | End: 2020-01-01 | Stop reason: ALTCHOICE

## 2020-01-01 RX ORDER — ACETAMINOPHEN 650 MG/1
650 SUPPOSITORY RECTAL EVERY 6 HOURS PRN
Status: DISCONTINUED | OUTPATIENT
Start: 2020-01-01 | End: 2020-01-01 | Stop reason: HOSPADM

## 2020-01-01 RX ORDER — POLYETHYLENE GLYCOL 3350 17 G/17G
17 POWDER, FOR SOLUTION ORAL DAILY PRN
Status: DISCONTINUED | OUTPATIENT
Start: 2020-01-01 | End: 2020-01-01 | Stop reason: HOSPADM

## 2020-01-01 RX ORDER — ATROPINE SULFATE 0.1 MG/ML
1 INJECTION INTRAVENOUS PRN
Status: ACTIVE | OUTPATIENT
Start: 2020-01-01 | End: 2020-01-01

## 2020-01-01 RX ORDER — METOPROLOL TARTRATE 5 MG/5ML
5 INJECTION INTRAVENOUS EVERY 5 MIN PRN
Status: ACTIVE | OUTPATIENT
Start: 2020-01-01 | End: 2020-01-01

## 2020-01-01 RX ORDER — ONDANSETRON 2 MG/ML
4 INJECTION INTRAMUSCULAR; INTRAVENOUS EVERY 6 HOURS PRN
Status: DISCONTINUED | OUTPATIENT
Start: 2020-01-01 | End: 2020-01-01 | Stop reason: SDUPTHER

## 2020-01-01 RX ORDER — RALOXIFENE HYDROCHLORIDE 60 MG/1
60 TABLET, FILM COATED ORAL DAILY
Status: DISCONTINUED | OUTPATIENT
Start: 2020-01-01 | End: 2020-01-01 | Stop reason: RX

## 2020-01-01 RX ORDER — OXYBUTYNIN CHLORIDE 10 MG/1
TABLET, EXTENDED RELEASE ORAL
Qty: 30 TABLET | Refills: 0 | Status: SHIPPED | OUTPATIENT
Start: 2020-01-01 | End: 2020-01-01 | Stop reason: SDUPTHER

## 2020-01-01 RX ADMIN — IPRATROPIUM BROMIDE 0.5 MG: 0.5 SOLUTION RESPIRATORY (INHALATION) at 19:16

## 2020-01-01 RX ADMIN — GABAPENTIN 100 MG: 100 CAPSULE ORAL at 09:56

## 2020-01-01 RX ADMIN — ENOXAPARIN SODIUM 40 MG: 40 INJECTION SUBCUTANEOUS at 10:03

## 2020-01-01 RX ADMIN — CETIRIZINE HYDROCHLORIDE 10 MG: 10 TABLET, FILM COATED ORAL at 10:03

## 2020-01-01 RX ADMIN — GABAPENTIN 100 MG: 100 CAPSULE ORAL at 21:43

## 2020-01-01 RX ADMIN — IPRATROPIUM BROMIDE AND ALBUTEROL SULFATE 1 AMPULE: .5; 3 SOLUTION RESPIRATORY (INHALATION) at 18:41

## 2020-01-01 RX ADMIN — ALLOPURINOL 100 MG: 100 TABLET ORAL at 20:16

## 2020-01-01 RX ADMIN — IPRATROPIUM BROMIDE AND ALBUTEROL SULFATE 1 AMPULE: .5; 3 SOLUTION RESPIRATORY (INHALATION) at 07:14

## 2020-01-01 RX ADMIN — GABAPENTIN 100 MG: 100 CAPSULE ORAL at 22:32

## 2020-01-01 RX ADMIN — LEVOTHYROXINE SODIUM 88 MCG: 0.09 TABLET ORAL at 06:25

## 2020-01-01 RX ADMIN — Medication 10 ML: at 21:43

## 2020-01-01 RX ADMIN — IPRATROPIUM BROMIDE AND ALBUTEROL SULFATE 1 AMPULE: .5; 3 SOLUTION RESPIRATORY (INHALATION) at 10:26

## 2020-01-01 RX ADMIN — ALLOPURINOL 100 MG: 100 TABLET ORAL at 20:26

## 2020-01-01 RX ADMIN — HEPARIN SODIUM 4000 UNITS: 1000 INJECTION INTRAVENOUS; SUBCUTANEOUS at 20:09

## 2020-01-01 RX ADMIN — SODIUM CHLORIDE: 9 INJECTION, SOLUTION INTRAVENOUS at 21:35

## 2020-01-01 RX ADMIN — CEFAZOLIN SODIUM 2 G: 10 INJECTION, POWDER, FOR SOLUTION INTRAVENOUS at 18:55

## 2020-01-01 RX ADMIN — LEVOTHYROXINE SODIUM 88 MCG: 0.09 TABLET ORAL at 06:15

## 2020-01-01 RX ADMIN — IPRATROPIUM BROMIDE 0.5 MG: 0.5 SOLUTION RESPIRATORY (INHALATION) at 10:16

## 2020-01-01 RX ADMIN — IPRATROPIUM BROMIDE 0.5 MG: 0.5 SOLUTION RESPIRATORY (INHALATION) at 14:55

## 2020-01-01 RX ADMIN — Medication 1 CAPSULE: at 08:05

## 2020-01-01 RX ADMIN — IPRATROPIUM BROMIDE 0.5 MG: 0.5 SOLUTION RESPIRATORY (INHALATION) at 10:53

## 2020-01-01 RX ADMIN — Medication 1 CAPSULE: at 08:37

## 2020-01-01 RX ADMIN — HEPARIN SODIUM 9.93 UNITS/KG/HR: 10000 INJECTION, SOLUTION INTRAVENOUS at 20:10

## 2020-01-01 RX ADMIN — ASPIRIN 81 MG: 81 TABLET, CHEWABLE ORAL at 15:49

## 2020-01-01 RX ADMIN — CETIRIZINE HYDROCHLORIDE 10 MG: 10 TABLET, FILM COATED ORAL at 13:36

## 2020-01-01 RX ADMIN — ATENOLOL 50 MG: 50 TABLET ORAL at 21:26

## 2020-01-01 RX ADMIN — LEVOTHYROXINE SODIUM 88 MCG: 88 TABLET ORAL at 07:16

## 2020-01-01 RX ADMIN — CHLORHEXIDINE GLUCONATE: 4 SOLUTION TOPICAL at 00:36

## 2020-01-01 RX ADMIN — Medication 10 ML: at 20:26

## 2020-01-01 RX ADMIN — ALLOPURINOL 100 MG: 100 TABLET ORAL at 21:20

## 2020-01-01 RX ADMIN — CETIRIZINE HYDROCHLORIDE 10 MG: 10 TABLET, FILM COATED ORAL at 08:47

## 2020-01-01 RX ADMIN — IPRATROPIUM BROMIDE AND ALBUTEROL SULFATE 1 AMPULE: .5; 3 SOLUTION RESPIRATORY (INHALATION) at 18:40

## 2020-01-01 RX ADMIN — SODIUM CHLORIDE: 9 INJECTION, SOLUTION INTRAVENOUS at 06:19

## 2020-01-01 RX ADMIN — Medication 1 CAPSULE: at 10:07

## 2020-01-01 RX ADMIN — GABAPENTIN 100 MG: 100 CAPSULE ORAL at 10:01

## 2020-01-01 RX ADMIN — OXYBUTYNIN CHLORIDE 10 MG: 5 TABLET, EXTENDED RELEASE ORAL at 21:34

## 2020-01-01 RX ADMIN — SODIUM CHLORIDE, PRESERVATIVE FREE 10 ML: 5 INJECTION INTRAVENOUS at 20:17

## 2020-01-01 RX ADMIN — Medication 1 CAPSULE: at 09:38

## 2020-01-01 RX ADMIN — ASPIRIN 81 MG: 81 TABLET, CHEWABLE ORAL at 17:04

## 2020-01-01 RX ADMIN — CEFAZOLIN SODIUM 1 G: 1 INJECTION, POWDER, FOR SOLUTION INTRAMUSCULAR; INTRAVENOUS at 00:41

## 2020-01-01 RX ADMIN — ATENOLOL 50 MG: 50 TABLET ORAL at 21:20

## 2020-01-01 RX ADMIN — Medication 10 ML: at 10:03

## 2020-01-01 RX ADMIN — ACETAMINOPHEN 650 MG: 325 TABLET, FILM COATED ORAL at 15:53

## 2020-01-01 RX ADMIN — IPRATROPIUM BROMIDE 0.5 MG: 0.5 SOLUTION RESPIRATORY (INHALATION) at 18:48

## 2020-01-01 RX ADMIN — GABAPENTIN 100 MG: 100 CAPSULE ORAL at 20:26

## 2020-01-01 RX ADMIN — IPRATROPIUM BROMIDE 0.5 MG: 0.5 SOLUTION RESPIRATORY (INHALATION) at 15:16

## 2020-01-01 RX ADMIN — CETIRIZINE HYDROCHLORIDE 10 MG: 10 TABLET, FILM COATED ORAL at 09:37

## 2020-01-01 RX ADMIN — IPRATROPIUM BROMIDE AND ALBUTEROL SULFATE 1 AMPULE: .5; 3 SOLUTION RESPIRATORY (INHALATION) at 10:12

## 2020-01-01 RX ADMIN — CHLORHEXIDINE GLUCONATE: 213 SOLUTION TOPICAL at 21:41

## 2020-01-01 RX ADMIN — LEVOTHYROXINE SODIUM 88 MCG: 0.09 TABLET ORAL at 13:36

## 2020-01-01 RX ADMIN — SODIUM CHLORIDE, PRESERVATIVE FREE 10 ML: 5 INJECTION INTRAVENOUS at 22:33

## 2020-01-01 RX ADMIN — OXYBUTYNIN CHLORIDE 10 MG: 5 TABLET, EXTENDED RELEASE ORAL at 21:19

## 2020-01-01 RX ADMIN — OXYBUTYNIN CHLORIDE 10 MG: 5 TABLET, EXTENDED RELEASE ORAL at 22:32

## 2020-01-01 RX ADMIN — Medication 1 CAPSULE: at 10:02

## 2020-01-01 RX ADMIN — Medication 1 CAPSULE: at 13:38

## 2020-01-01 RX ADMIN — IPRATROPIUM BROMIDE 0.5 MG: 0.5 SOLUTION RESPIRATORY (INHALATION) at 07:25

## 2020-01-01 RX ADMIN — GABAPENTIN 100 MG: 100 CAPSULE ORAL at 21:24

## 2020-01-01 RX ADMIN — GABAPENTIN 100 MG: 100 CAPSULE ORAL at 08:36

## 2020-01-01 RX ADMIN — ATENOLOL 50 MG: 50 TABLET ORAL at 21:34

## 2020-01-01 RX ADMIN — OXYBUTYNIN CHLORIDE 10 MG: 5 TABLET, EXTENDED RELEASE ORAL at 21:38

## 2020-01-01 RX ADMIN — IPRATROPIUM BROMIDE AND ALBUTEROL SULFATE 1 AMPULE: .5; 3 SOLUTION RESPIRATORY (INHALATION) at 18:34

## 2020-01-01 RX ADMIN — GABAPENTIN 100 MG: 100 CAPSULE ORAL at 10:03

## 2020-01-01 RX ADMIN — Medication 10 ML: at 08:36

## 2020-01-01 RX ADMIN — SODIUM CHLORIDE: 9 INJECTION, SOLUTION INTRAVENOUS at 15:02

## 2020-01-01 RX ADMIN — ENOXAPARIN SODIUM 40 MG: 40 INJECTION SUBCUTANEOUS at 10:01

## 2020-01-01 RX ADMIN — SODIUM CHLORIDE: 9 INJECTION, SOLUTION INTRAVENOUS at 14:32

## 2020-01-01 RX ADMIN — ASPIRIN 81 MG: 81 TABLET, COATED ORAL at 21:43

## 2020-01-01 RX ADMIN — ALLOPURINOL 100 MG: 100 TABLET ORAL at 21:38

## 2020-01-01 RX ADMIN — SODIUM CHLORIDE, PRESERVATIVE FREE 10 ML: 5 INJECTION INTRAVENOUS at 08:04

## 2020-01-01 RX ADMIN — CETIRIZINE HYDROCHLORIDE 10 MG: 10 TABLET, FILM COATED ORAL at 08:03

## 2020-01-01 RX ADMIN — ALLOPURINOL 100 MG: 100 TABLET ORAL at 22:32

## 2020-01-01 RX ADMIN — ALLOPURINOL 100 MG: 100 TABLET ORAL at 21:43

## 2020-01-01 RX ADMIN — SODIUM CHLORIDE, PRESERVATIVE FREE 10 ML: 5 INJECTION INTRAVENOUS at 13:37

## 2020-01-01 RX ADMIN — CETIRIZINE HYDROCHLORIDE 10 MG: 10 TABLET, FILM COATED ORAL at 08:36

## 2020-01-01 RX ADMIN — IPRATROPIUM BROMIDE AND ALBUTEROL SULFATE 1 AMPULE: .5; 3 SOLUTION RESPIRATORY (INHALATION) at 06:57

## 2020-01-01 RX ADMIN — APIXABAN 10 MG: 5 TABLET, FILM COATED ORAL at 09:37

## 2020-01-01 RX ADMIN — OXYBUTYNIN CHLORIDE 10 MG: 5 TABLET, EXTENDED RELEASE ORAL at 21:24

## 2020-01-01 RX ADMIN — GABAPENTIN 100 MG: 100 CAPSULE ORAL at 08:47

## 2020-01-01 RX ADMIN — Medication 10 ML: at 10:01

## 2020-01-01 RX ADMIN — ALLOPURINOL 100 MG: 100 TABLET ORAL at 21:39

## 2020-01-01 RX ADMIN — LEVOTHYROXINE SODIUM 88 MCG: 88 TABLET ORAL at 08:04

## 2020-01-01 RX ADMIN — LEVOTHYROXINE SODIUM 88 MCG: 0.09 TABLET ORAL at 09:00

## 2020-01-01 RX ADMIN — ALLOPURINOL 100 MG: 100 TABLET ORAL at 21:34

## 2020-01-01 RX ADMIN — CEFAZOLIN SODIUM 1 G: 1 INJECTION, POWDER, FOR SOLUTION INTRAMUSCULAR; INTRAVENOUS at 09:56

## 2020-01-01 RX ADMIN — IPRATROPIUM BROMIDE 0.5 MG: 0.5 SOLUTION RESPIRATORY (INHALATION) at 19:53

## 2020-01-01 RX ADMIN — ACETAMINOPHEN 325 MG: 325 TABLET, FILM COATED ORAL at 21:24

## 2020-01-01 RX ADMIN — OXYBUTYNIN CHLORIDE 10 MG: 5 TABLET, EXTENDED RELEASE ORAL at 20:15

## 2020-01-01 RX ADMIN — SODIUM CHLORIDE, PRESERVATIVE FREE 10 ML: 5 INJECTION INTRAVENOUS at 09:38

## 2020-01-01 RX ADMIN — SODIUM CHLORIDE, PRESERVATIVE FREE 10 ML: 5 INJECTION INTRAVENOUS at 21:20

## 2020-01-01 RX ADMIN — IPRATROPIUM BROMIDE 0.5 MG: 0.5 SOLUTION RESPIRATORY (INHALATION) at 14:23

## 2020-01-01 RX ADMIN — Medication 10 ML: at 08:47

## 2020-01-01 RX ADMIN — IPRATROPIUM BROMIDE AND ALBUTEROL SULFATE 1 AMPULE: .5; 3 SOLUTION RESPIRATORY (INHALATION) at 06:34

## 2020-01-01 RX ADMIN — IPRATROPIUM BROMIDE AND ALBUTEROL SULFATE 1 AMPULE: .5; 3 SOLUTION RESPIRATORY (INHALATION) at 11:14

## 2020-01-01 RX ADMIN — CETIRIZINE HYDROCHLORIDE 10 MG: 10 TABLET, FILM COATED ORAL at 10:01

## 2020-01-01 RX ADMIN — Medication 10 ML: at 21:39

## 2020-01-01 RX ADMIN — IOPAMIDOL 90 ML: 755 INJECTION, SOLUTION INTRAVENOUS at 17:41

## 2020-01-01 RX ADMIN — GABAPENTIN 100 MG: 100 CAPSULE ORAL at 08:04

## 2020-01-01 RX ADMIN — CETIRIZINE HYDROCHLORIDE 10 MG: 10 TABLET, FILM COATED ORAL at 09:55

## 2020-01-01 RX ADMIN — GABAPENTIN 100 MG: 100 CAPSULE ORAL at 09:13

## 2020-01-01 RX ADMIN — ALLOPURINOL 100 MG: 100 TABLET ORAL at 21:25

## 2020-01-01 RX ADMIN — ENOXAPARIN SODIUM 40 MG: 40 INJECTION SUBCUTANEOUS at 14:26

## 2020-01-01 RX ADMIN — IODIXANOL 40 ML: 320 INJECTION, SOLUTION INTRAVASCULAR at 10:58

## 2020-01-01 RX ADMIN — APIXABAN 10 MG: 5 TABLET, FILM COATED ORAL at 21:25

## 2020-01-01 RX ADMIN — SODIUM CHLORIDE, PRESERVATIVE FREE 10 ML: 5 INJECTION INTRAVENOUS at 09:57

## 2020-01-01 RX ADMIN — GABAPENTIN 100 MG: 100 CAPSULE ORAL at 21:38

## 2020-01-01 RX ADMIN — IPRATROPIUM BROMIDE AND ALBUTEROL SULFATE 1 AMPULE: .5; 3 SOLUTION RESPIRATORY (INHALATION) at 10:05

## 2020-01-01 RX ADMIN — ACETAMINOPHEN 650 MG: 325 TABLET, FILM COATED ORAL at 02:05

## 2020-01-01 RX ADMIN — LEVOTHYROXINE SODIUM 88 MCG: 88 TABLET ORAL at 09:55

## 2020-01-01 RX ADMIN — IPRATROPIUM BROMIDE AND ALBUTEROL SULFATE 1 AMPULE: .5; 3 SOLUTION RESPIRATORY (INHALATION) at 18:45

## 2020-01-01 RX ADMIN — GABAPENTIN 100 MG: 100 CAPSULE ORAL at 08:03

## 2020-01-01 RX ADMIN — GABAPENTIN 100 MG: 100 CAPSULE ORAL at 20:15

## 2020-01-01 RX ADMIN — LEVOTHYROXINE SODIUM 88 MCG: 0.09 TABLET ORAL at 08:39

## 2020-01-01 RX ADMIN — LEVOTHYROXINE SODIUM 88 MCG: 0.09 TABLET ORAL at 10:03

## 2020-01-01 RX ADMIN — GABAPENTIN 100 MG: 100 CAPSULE ORAL at 21:20

## 2020-01-01 RX ADMIN — IPRATROPIUM BROMIDE AND ALBUTEROL SULFATE 1 AMPULE: .5; 3 SOLUTION RESPIRATORY (INHALATION) at 14:46

## 2020-01-01 RX ADMIN — Medication 1 CAPSULE: at 10:03

## 2020-01-01 RX ADMIN — IPRATROPIUM BROMIDE 0.5 MG: 0.5 SOLUTION RESPIRATORY (INHALATION) at 14:22

## 2020-01-01 RX ADMIN — CETIRIZINE HYDROCHLORIDE 10 MG: 10 TABLET, FILM COATED ORAL at 08:04

## 2020-01-01 RX ADMIN — ASPIRIN 81 MG: 81 TABLET, CHEWABLE ORAL at 09:55

## 2020-01-01 RX ADMIN — CETIRIZINE HYDROCHLORIDE 10 MG: 10 TABLET, FILM COATED ORAL at 09:13

## 2020-01-01 RX ADMIN — IPRATROPIUM BROMIDE AND ALBUTEROL SULFATE 1 AMPULE: .5; 3 SOLUTION RESPIRATORY (INHALATION) at 19:15

## 2020-01-01 RX ADMIN — OXYBUTYNIN CHLORIDE 10 MG: 5 TABLET, EXTENDED RELEASE ORAL at 21:43

## 2020-01-01 RX ADMIN — GABAPENTIN 100 MG: 100 CAPSULE ORAL at 21:39

## 2020-01-01 RX ADMIN — IPRATROPIUM BROMIDE AND ALBUTEROL SULFATE 1 AMPULE: .5; 3 SOLUTION RESPIRATORY (INHALATION) at 07:01

## 2020-01-01 RX ADMIN — IPRATROPIUM BROMIDE 0.5 MG: 0.5 SOLUTION RESPIRATORY (INHALATION) at 06:48

## 2020-01-01 RX ADMIN — IPRATROPIUM BROMIDE AND ALBUTEROL SULFATE 1 AMPULE: .5; 3 SOLUTION RESPIRATORY (INHALATION) at 14:48

## 2020-01-01 RX ADMIN — IPRATROPIUM BROMIDE AND ALBUTEROL SULFATE 1 AMPULE: .5; 3 SOLUTION RESPIRATORY (INHALATION) at 14:28

## 2020-01-01 RX ADMIN — ASPIRIN 81 MG: 81 TABLET, COATED ORAL at 20:26

## 2020-01-01 RX ADMIN — CEFAZOLIN SODIUM 2 G: 10 INJECTION, POWDER, FOR SOLUTION INTRAVENOUS at 01:00

## 2020-01-01 RX ADMIN — ATROPINE SULFATE 1 MG: 0.1 INJECTION PARENTERAL at 15:11

## 2020-01-01 RX ADMIN — SODIUM CHLORIDE, PRESERVATIVE FREE 10 ML: 5 INJECTION INTRAVENOUS at 21:00

## 2020-01-01 RX ADMIN — GABAPENTIN 100 MG: 100 CAPSULE ORAL at 21:34

## 2020-01-01 RX ADMIN — IPRATROPIUM BROMIDE AND ALBUTEROL SULFATE 1 AMPULE: .5; 3 SOLUTION RESPIRATORY (INHALATION) at 07:22

## 2020-01-01 RX ADMIN — GABAPENTIN 100 MG: 100 CAPSULE ORAL at 09:37

## 2020-01-01 RX ADMIN — GABAPENTIN 100 MG: 100 CAPSULE ORAL at 13:37

## 2020-01-01 RX ADMIN — DOBUTAMINE HYDROCHLORIDE 10 MCG/KG/MIN: 200 INJECTION INTRAVENOUS at 15:02

## 2020-01-01 RX ADMIN — SODIUM CHLORIDE, PRESERVATIVE FREE 10 ML: 5 INJECTION INTRAVENOUS at 09:23

## 2020-01-01 RX ADMIN — Medication 1 CAPSULE: at 09:23

## 2020-01-01 RX ADMIN — MUPIROCIN: 20 OINTMENT TOPICAL at 08:04

## 2020-01-01 RX ADMIN — ASPIRIN 81 MG: 81 TABLET, COATED ORAL at 21:38

## 2020-01-01 RX ADMIN — IPRATROPIUM BROMIDE 0.5 MG: 0.5 SOLUTION RESPIRATORY (INHALATION) at 06:41

## 2020-01-01 RX ADMIN — OXYBUTYNIN CHLORIDE 10 MG: 5 TABLET, EXTENDED RELEASE ORAL at 21:39

## 2020-01-01 RX ADMIN — OXYBUTYNIN CHLORIDE 10 MG: 5 TABLET, EXTENDED RELEASE ORAL at 20:26

## 2020-01-01 RX ADMIN — FUROSEMIDE 20 MG: 20 TABLET ORAL at 08:03

## 2020-01-01 RX ADMIN — Medication 10 ML: at 21:38

## 2020-01-01 RX ADMIN — SODIUM CHLORIDE: 9 INJECTION, SOLUTION INTRAVENOUS at 13:37

## 2020-01-01 RX ADMIN — ENOXAPARIN SODIUM 40 MG: 40 INJECTION SUBCUTANEOUS at 08:36

## 2020-01-01 RX ADMIN — ENOXAPARIN SODIUM 40 MG: 40 INJECTION SUBCUTANEOUS at 09:13

## 2020-01-01 ASSESSMENT — ENCOUNTER SYMPTOMS
BACK PAIN: 0
RESPIRATORY NEGATIVE: 1
ABDOMINAL PAIN: 0
VOMITING: 0
SINUS PRESSURE: 0
RESPIRATORY NEGATIVE: 1
VOMITING: 0
WHEEZING: 0
VOICE CHANGE: 0
VOMITING: 0
EYES NEGATIVE: 1
COUGH: 0
SINUS PRESSURE: 1
NAUSEA: 0
EYES NEGATIVE: 1
RESPIRATORY NEGATIVE: 1
SHORTNESS OF BREATH: 0
DIARRHEA: 0
VOMITING: 0
DIARRHEA: 0
DIARRHEA: 0
NAUSEA: 0
RHINORRHEA: 1
RHINORRHEA: 0
DIARRHEA: 0
VOMITING: 0
PHOTOPHOBIA: 0
SHORTNESS OF BREATH: 0
GASTROINTESTINAL NEGATIVE: 1
EYES NEGATIVE: 1
GASTROINTESTINAL NEGATIVE: 1
NAUSEA: 0
SORE THROAT: 1
COLOR CHANGE: 0
VOMITING: 0
SHORTNESS OF BREATH: 0
GASTROINTESTINAL NEGATIVE: 1
NAUSEA: 0
SHORTNESS OF BREATH: 0
SHORTNESS OF BREATH: 0
COUGH: 0
EYES NEGATIVE: 1
DIARRHEA: 0
SINUS PRESSURE: 1
CONSTIPATION: 0
VOMITING: 0
WHEEZING: 0
DIARRHEA: 0
GASTROINTESTINAL NEGATIVE: 1
NAUSEA: 0
RESPIRATORY NEGATIVE: 1
SHORTNESS OF BREATH: 0
ABDOMINAL PAIN: 0
EYES NEGATIVE: 1
RESPIRATORY NEGATIVE: 1
DIARRHEA: 0
EYE PAIN: 0
SINUS PAIN: 0
VOMITING: 0
SHORTNESS OF BREATH: 1
GASTROINTESTINAL NEGATIVE: 1
BACK PAIN: 0
COUGH: 0
NAUSEA: 0
CHEST TIGHTNESS: 0
SHORTNESS OF BREATH: 0

## 2020-01-01 ASSESSMENT — PAIN SCALES - GENERAL
PAINLEVEL_OUTOF10: 0
PAINLEVEL_OUTOF10: 4
PAINLEVEL_OUTOF10: 0
PAINLEVEL_OUTOF10: 0
PAINLEVEL_OUTOF10: 8
PAINLEVEL_OUTOF10: 5
PAINLEVEL_OUTOF10: 0
PAINLEVEL_OUTOF10: 3
PAINLEVEL_OUTOF10: 0

## 2020-01-01 ASSESSMENT — PATIENT HEALTH QUESTIONNAIRE - PHQ9
SUM OF ALL RESPONSES TO PHQ QUESTIONS 1-9: 0
SUM OF ALL RESPONSES TO PHQ9 QUESTIONS 1 & 2: 0
2. FEELING DOWN, DEPRESSED OR HOPELESS: 0
1. LITTLE INTEREST OR PLEASURE IN DOING THINGS: 0
SUM OF ALL RESPONSES TO PHQ QUESTIONS 1-9: 0

## 2020-01-01 ASSESSMENT — PAIN DESCRIPTION - LOCATION: LOCATION: HEAD

## 2020-01-01 ASSESSMENT — LIFESTYLE VARIABLES: HOW OFTEN DO YOU HAVE A DRINK CONTAINING ALCOHOL: 0

## 2020-01-03 ENCOUNTER — OFFICE VISIT (OUTPATIENT)
Dept: PRIMARY CARE CLINIC | Age: 75
End: 2020-01-03
Payer: MEDICARE

## 2020-01-03 VITALS
HEART RATE: 86 BPM | SYSTOLIC BLOOD PRESSURE: 138 MMHG | WEIGHT: 219 LBS | OXYGEN SATURATION: 97 % | DIASTOLIC BLOOD PRESSURE: 80 MMHG | HEIGHT: 61 IN | BODY MASS INDEX: 41.35 KG/M2 | TEMPERATURE: 98.2 F

## 2020-01-03 DIAGNOSIS — I10 ESSENTIAL HYPERTENSION: ICD-10-CM

## 2020-01-03 DIAGNOSIS — E03.9 ACQUIRED HYPOTHYROIDISM: ICD-10-CM

## 2020-01-03 LAB
ALBUMIN SERPL-MCNC: 4.2 G/DL (ref 3.5–5.2)
ALP BLD-CCNC: 38 U/L (ref 35–104)
ALT SERPL-CCNC: 19 U/L (ref 5–33)
ANION GAP SERPL CALCULATED.3IONS-SCNC: 17 MMOL/L (ref 7–19)
AST SERPL-CCNC: 24 U/L (ref 5–32)
BACTERIA: NEGATIVE /HPF
BASOPHILS ABSOLUTE: 0 K/UL (ref 0–0.2)
BASOPHILS RELATIVE PERCENT: 0.6 % (ref 0–1)
BILIRUB SERPL-MCNC: 0.3 MG/DL (ref 0.2–1.2)
BILIRUBIN URINE: NEGATIVE
BLOOD, URINE: ABNORMAL
BUN BLDV-MCNC: 22 MG/DL (ref 8–23)
CALCIUM SERPL-MCNC: 10.5 MG/DL (ref 8.8–10.2)
CHLORIDE BLD-SCNC: 106 MMOL/L (ref 98–111)
CHOLESTEROL, TOTAL: 197 MG/DL (ref 160–199)
CLARITY: CLEAR
CO2: 21 MMOL/L (ref 22–29)
COLOR: ABNORMAL
CREAT SERPL-MCNC: 1.5 MG/DL (ref 0.5–0.9)
EOSINOPHILS ABSOLUTE: 0.3 K/UL (ref 0–0.6)
EOSINOPHILS RELATIVE PERCENT: 3.6 % (ref 0–5)
EPITHELIAL CELLS, UA: 0 /HPF (ref 0–5)
GFR NON-AFRICAN AMERICAN: 34
GLUCOSE BLD-MCNC: 117 MG/DL (ref 74–109)
GLUCOSE URINE: NEGATIVE MG/DL
HBA1C MFR BLD: 6.2 % (ref 4–6)
HCT VFR BLD CALC: 41.4 % (ref 37–47)
HDLC SERPL-MCNC: 38 MG/DL (ref 65–121)
HEMOGLOBIN: 13.1 G/DL (ref 12–16)
HYALINE CASTS: 2 /HPF (ref 0–8)
IMMATURE GRANULOCYTES #: 0.1 K/UL
KETONES, URINE: NEGATIVE MG/DL
LDL CHOLESTEROL CALCULATED: 79 MG/DL
LEUKOCYTE ESTERASE, URINE: NEGATIVE
LYMPHOCYTES ABSOLUTE: 3.1 K/UL (ref 1.1–4.5)
LYMPHOCYTES RELATIVE PERCENT: 43.3 % (ref 20–40)
MCH RBC QN AUTO: 30.4 PG (ref 27–31)
MCHC RBC AUTO-ENTMCNC: 31.6 G/DL (ref 33–37)
MCV RBC AUTO: 96.1 FL (ref 81–99)
MONOCYTES ABSOLUTE: 0.5 K/UL (ref 0–0.9)
MONOCYTES RELATIVE PERCENT: 7.2 % (ref 0–10)
NEUTROPHILS ABSOLUTE: 3.1 K/UL (ref 1.5–7.5)
NEUTROPHILS RELATIVE PERCENT: 44.4 % (ref 50–65)
NITRITE, URINE: NEGATIVE
PDW BLD-RTO: 14.9 % (ref 11.5–14.5)
PH UA: 5.5 (ref 5–8)
PLATELET # BLD: 175 K/UL (ref 130–400)
PMV BLD AUTO: 9.3 FL (ref 9.4–12.3)
POTASSIUM SERPL-SCNC: 4.5 MMOL/L (ref 3.5–5)
PRO-BNP: 42 PG/ML (ref 0–1800)
PROTEIN UA: NEGATIVE MG/DL
RBC # BLD: 4.31 M/UL (ref 4.2–5.4)
RBC UA: 1 /HPF (ref 0–4)
SODIUM BLD-SCNC: 144 MMOL/L (ref 136–145)
SPECIFIC GRAVITY UA: 1.01 (ref 1–1.03)
TOTAL PROTEIN: 7.3 G/DL (ref 6.6–8.7)
TRIGL SERPL-MCNC: 400 MG/DL (ref 0–149)
TSH SERPL DL<=0.05 MIU/L-ACNC: 0.46 UIU/ML (ref 0.27–4.2)
URINE REFLEX TO CULTURE: ABNORMAL
UROBILINOGEN, URINE: 0.2 E.U./DL
WBC # BLD: 7 K/UL (ref 4.8–10.8)
WBC UA: 1 /HPF (ref 0–5)

## 2020-01-03 PROCEDURE — 1036F TOBACCO NON-USER: CPT | Performed by: NURSE PRACTITIONER

## 2020-01-03 PROCEDURE — 4040F PNEUMOC VAC/ADMIN/RCVD: CPT | Performed by: NURSE PRACTITIONER

## 2020-01-03 PROCEDURE — G8482 FLU IMMUNIZE ORDER/ADMIN: HCPCS | Performed by: NURSE PRACTITIONER

## 2020-01-03 PROCEDURE — G8427 DOCREV CUR MEDS BY ELIG CLIN: HCPCS | Performed by: NURSE PRACTITIONER

## 2020-01-03 PROCEDURE — G8399 PT W/DXA RESULTS DOCUMENT: HCPCS | Performed by: NURSE PRACTITIONER

## 2020-01-03 PROCEDURE — G8417 CALC BMI ABV UP PARAM F/U: HCPCS | Performed by: NURSE PRACTITIONER

## 2020-01-03 PROCEDURE — 99214 OFFICE O/P EST MOD 30 MIN: CPT | Performed by: NURSE PRACTITIONER

## 2020-01-03 PROCEDURE — 1090F PRES/ABSN URINE INCON ASSESS: CPT | Performed by: NURSE PRACTITIONER

## 2020-01-03 PROCEDURE — 1123F ACP DISCUSS/DSCN MKR DOCD: CPT | Performed by: NURSE PRACTITIONER

## 2020-01-03 PROCEDURE — 3017F COLORECTAL CA SCREEN DOC REV: CPT | Performed by: NURSE PRACTITIONER

## 2020-01-03 ASSESSMENT — PATIENT HEALTH QUESTIONNAIRE - PHQ9
SUM OF ALL RESPONSES TO PHQ QUESTIONS 1-9: 0
1. LITTLE INTEREST OR PLEASURE IN DOING THINGS: 0
SUM OF ALL RESPONSES TO PHQ9 QUESTIONS 1 & 2: 0
2. FEELING DOWN, DEPRESSED OR HOPELESS: 0
SUM OF ALL RESPONSES TO PHQ QUESTIONS 1-9: 0

## 2020-01-03 NOTE — PROGRESS NOTES
No pertinent family history. Social History     Tobacco Use    Smoking status: Never Smoker    Smokeless tobacco: Never Used   Substance Use Topics    Alcohol use: No      Current Outpatient Medications   Medication Sig Dispense Refill    raloxifene (EVISTA) 60 MG tablet TAKE 1 TABLET BY MOUTH ONCE DAILY 90 tablet 0    furosemide (LASIX) 20 MG tablet Take 1 tablet by mouth every other day Indications: M-W-F 60 tablet 3    meloxicam (MOBIC) 7.5 MG tablet Indications: taking only 1/2 a pill daily Take 1/2 tablet daily 45 tablet 3    vitamin D (ERGOCALCIFEROL) 66832 units CAPS capsule TAKE ONE CAPSULE BY MOUTH ONCE A WEEK 12 capsule 3    omeprazole (PRILOSEC) 20 MG delayed release capsule TAKE ONE CAPSULE BY MOUTH ONCE DAILY 90 capsule 1    allopurinol (ZYLOPRIM) 100 MG tablet Take 1 tablet by mouth daily 90 tablet 3    Nebulizers (AIRIAL COMPACT MINI NEBULIZER) MISC 1 Units by Does not apply route 2 times daily 1 each 0    risedronate (ACTONEL) 35 MG tablet Take 1 tablet by mouth every 7 days 12 tablet 3    albuterol sulfate HFA (PROAIR HFA) 108 (90 Base) MCG/ACT inhaler Inhale 2 puffs into the lungs every 6 hours as needed for Wheezing 1 Inhaler 3    methylPREDNISolone (MEDROL DOSEPACK) 4 MG tablet Take by mouth.  (Patient not taking: Reported on 1/3/2020) 1 kit 0    cefdinir (OMNICEF) 300 MG capsule Take 1 capsule by mouth 2 times daily (Patient not taking: Reported on 1/3/2020) 20 capsule 0    oxybutynin (DITROPAN XL) 10 MG extended release tablet Take 1 tablet by mouth daily (Patient not taking: Reported on 1/3/2020) 30 tablet 3    calcitRIOL (ROCALTROL) 0.25 MCG capsule Take 1 capsule by mouth daily 90 capsule 3    gabapentin (NEURONTIN) 100 MG capsule Indications: Takes 2 in AM and 1 QHS TAKE 1 CAPSULE BY MOUTH THREE TIMES DAILY 180 capsule 5    albuterol (PROVENTIL) (2.5 MG/3ML) 0.083% nebulizer solution Take 3 mLs by nebulization every 6 hours as needed for Wheezing or Shortness of Breath that goes up into left ear. She has obvious dental decay and has follow-up with dentist.      Patient given educational materials -see patient instructions. Discussed use, benefit, and side effects of prescribed medications. All patient questions answered. Pt voiced understanding. Reviewed health maintenance. Instructed to continue currentmedications, diet and exercise. Patient agreed with treatment plan. Follow up as directed. MEDICATIONS:  No orders of the defined types were placed in this encounter. ORDERS:  Orders Placed This Encounter   Procedures    CBC Auto Differential    Comprehensive Metabolic Panel    Lipid Panel    Hemoglobin A1C    TSH without Reflex    Urinalysis Reflex to Culture    Brain Natriuretic Peptide       Follow-up:  No follow-ups on file. PATIENT INSTRUCTIONS:  There are no Patient Instructions on file for this visit. Electronically signed by DAVID Valadez on 1/6/2020 at 1:11 PM    EMR Dragon/transcription disclaimer:  Much of thisencounter note is electronic transcription/translation of spoken language to printed texts. The electronic translation of spoken language may be erroneous, or at times, nonsensical words or phrases may be inadvertentlytranscribed.   Although I have reviewed the note for such errors, some may still exist.

## 2020-01-06 ENCOUNTER — TELEPHONE (OUTPATIENT)
Dept: PRIMARY CARE CLINIC | Age: 75
End: 2020-01-06

## 2020-01-06 ASSESSMENT — ENCOUNTER SYMPTOMS
VOICE CHANGE: 0
NAUSEA: 0
COUGH: 0
SHORTNESS OF BREATH: 0
BACK PAIN: 0
PHOTOPHOBIA: 0
COLOR CHANGE: 0
RHINORRHEA: 0
VOMITING: 0

## 2020-01-22 LAB
ALBUMIN SERPL-MCNC: 4 G/DL (ref 3.5–5.2)
ALP BLD-CCNC: 42 U/L (ref 35–104)
ALT SERPL-CCNC: 18 U/L (ref 5–33)
ANION GAP SERPL CALCULATED.3IONS-SCNC: 15 MMOL/L (ref 7–19)
AST SERPL-CCNC: 27 U/L (ref 5–32)
BACTERIA: NEGATIVE /HPF
BASOPHILS ABSOLUTE: 0.1 K/UL (ref 0–0.2)
BASOPHILS RELATIVE PERCENT: 0.6 % (ref 0–1)
BILIRUB SERPL-MCNC: 0.3 MG/DL (ref 0.2–1.2)
BILIRUBIN URINE: NEGATIVE
BLOOD, URINE: ABNORMAL
BUN BLDV-MCNC: 18 MG/DL (ref 8–23)
CALCIUM SERPL-MCNC: 10.6 MG/DL (ref 8.8–10.2)
CHLORIDE BLD-SCNC: 105 MMOL/L (ref 98–111)
CLARITY: CLEAR
CO2: 21 MMOL/L (ref 22–29)
COLOR: YELLOW
CREAT SERPL-MCNC: 1.3 MG/DL (ref 0.5–0.9)
CREATININE URINE: 71.2 MG/DL (ref 4.2–622)
EOSINOPHILS ABSOLUTE: 0.2 K/UL (ref 0–0.6)
EOSINOPHILS RELATIVE PERCENT: 2.8 % (ref 0–5)
EPITHELIAL CELLS, UA: 1 /HPF (ref 0–5)
GFR NON-AFRICAN AMERICAN: 40
GLUCOSE BLD-MCNC: 88 MG/DL (ref 74–109)
GLUCOSE URINE: NEGATIVE MG/DL
HCT VFR BLD CALC: 38.7 % (ref 37–47)
HEMOGLOBIN: 12.4 G/DL (ref 12–16)
HYALINE CASTS: 2 /HPF (ref 0–8)
IMMATURE GRANULOCYTES #: 0.1 K/UL
KETONES, URINE: NEGATIVE MG/DL
LEUKOCYTE ESTERASE, URINE: ABNORMAL
LYMPHOCYTES ABSOLUTE: 3 K/UL (ref 1.1–4.5)
LYMPHOCYTES RELATIVE PERCENT: 34.3 % (ref 20–40)
MAGNESIUM: 1.7 MG/DL (ref 1.6–2.4)
MCH RBC QN AUTO: 30.4 PG (ref 27–31)
MCHC RBC AUTO-ENTMCNC: 32 G/DL (ref 33–37)
MCV RBC AUTO: 94.9 FL (ref 81–99)
MONOCYTES ABSOLUTE: 0.6 K/UL (ref 0–0.9)
MONOCYTES RELATIVE PERCENT: 7.1 % (ref 0–10)
NEUTROPHILS ABSOLUTE: 4.7 K/UL (ref 1.5–7.5)
NEUTROPHILS RELATIVE PERCENT: 54.2 % (ref 50–65)
NITRITE, URINE: NEGATIVE
PARATHYROID HORMONE INTACT: 55.4 PG/ML (ref 15–65)
PDW BLD-RTO: 14.6 % (ref 11.5–14.5)
PH UA: 5.5 (ref 5–8)
PHOSPHORUS: 2.9 MG/DL (ref 2.5–4.5)
PLATELET # BLD: 159 K/UL (ref 130–400)
PMV BLD AUTO: 9.7 FL (ref 9.4–12.3)
POTASSIUM SERPL-SCNC: 4.2 MMOL/L (ref 3.5–5)
PROTEIN PROTEIN: 8 MG/DL (ref 15–45)
PROTEIN UA: NEGATIVE MG/DL
RBC # BLD: 4.08 M/UL (ref 4.2–5.4)
RBC UA: 0 /HPF (ref 0–4)
SODIUM BLD-SCNC: 141 MMOL/L (ref 136–145)
SPECIFIC GRAVITY UA: 1.01 (ref 1–1.03)
TOTAL PROTEIN: 7 G/DL (ref 6.6–8.7)
URIC ACID, SERUM: 5.5 MG/DL (ref 2.4–5.7)
URINE REFLEX TO CULTURE: YES
UROBILINOGEN, URINE: 0.2 E.U./DL
VITAMIN D 25-HYDROXY: 35.5 NG/ML
WBC # BLD: 8.6 K/UL (ref 4.8–10.8)
WBC UA: 9 /HPF (ref 0–5)

## 2020-01-24 LAB — URINE CULTURE, ROUTINE: NORMAL

## 2020-02-06 ENCOUNTER — OFFICE VISIT (OUTPATIENT)
Dept: PRIMARY CARE CLINIC | Age: 75
End: 2020-02-06
Payer: MEDICARE

## 2020-02-06 VITALS
WEIGHT: 218 LBS | RESPIRATION RATE: 20 BRPM | DIASTOLIC BLOOD PRESSURE: 80 MMHG | HEIGHT: 61 IN | BODY MASS INDEX: 41.16 KG/M2 | SYSTOLIC BLOOD PRESSURE: 120 MMHG

## 2020-02-06 PROBLEM — C50.211 MALIGNANT NEOPLASM OF UPPER-INNER QUADRANT OF RIGHT FEMALE BREAST (HCC): Status: ACTIVE | Noted: 2020-02-06

## 2020-02-06 PROBLEM — C50.211 MALIGNANT NEOPLASM OF UPPER-INNER QUADRANT OF RIGHT FEMALE BREAST (HCC): Chronic | Status: ACTIVE | Noted: 2020-02-06

## 2020-02-06 PROCEDURE — 99214 OFFICE O/P EST MOD 30 MIN: CPT | Performed by: FAMILY MEDICINE

## 2020-02-06 PROCEDURE — 1090F PRES/ABSN URINE INCON ASSESS: CPT | Performed by: FAMILY MEDICINE

## 2020-02-06 PROCEDURE — 3017F COLORECTAL CA SCREEN DOC REV: CPT | Performed by: FAMILY MEDICINE

## 2020-02-06 PROCEDURE — G8417 CALC BMI ABV UP PARAM F/U: HCPCS | Performed by: FAMILY MEDICINE

## 2020-02-06 PROCEDURE — 1123F ACP DISCUSS/DSCN MKR DOCD: CPT | Performed by: FAMILY MEDICINE

## 2020-02-06 PROCEDURE — 4040F PNEUMOC VAC/ADMIN/RCVD: CPT | Performed by: FAMILY MEDICINE

## 2020-02-06 PROCEDURE — G8427 DOCREV CUR MEDS BY ELIG CLIN: HCPCS | Performed by: FAMILY MEDICINE

## 2020-02-06 PROCEDURE — 1036F TOBACCO NON-USER: CPT | Performed by: FAMILY MEDICINE

## 2020-02-06 PROCEDURE — G8399 PT W/DXA RESULTS DOCUMENT: HCPCS | Performed by: FAMILY MEDICINE

## 2020-02-06 PROCEDURE — G8482 FLU IMMUNIZE ORDER/ADMIN: HCPCS | Performed by: FAMILY MEDICINE

## 2020-02-06 RX ORDER — IBANDRONATE SODIUM 150 MG/1
150 TABLET, FILM COATED ORAL
Qty: 3 TABLET | Refills: 3 | Status: SHIPPED | OUTPATIENT
Start: 2020-02-06 | End: 2020-03-11

## 2020-02-06 RX ORDER — ALENDRONATE SODIUM 70 MG/1
70 TABLET ORAL
Qty: 12 TABLET | Refills: 5 | Status: ON HOLD | OUTPATIENT
Start: 2020-02-06 | End: 2020-07-22

## 2020-02-06 RX ORDER — PREDNISONE 20 MG/1
TABLET ORAL
Qty: 30 TABLET | Refills: 0 | Status: SHIPPED | OUTPATIENT
Start: 2020-02-06 | End: 2020-03-11

## 2020-02-06 RX ORDER — CEFDINIR 300 MG/1
300 CAPSULE ORAL 2 TIMES DAILY
Qty: 56 CAPSULE | Refills: 0 | Status: SHIPPED | OUTPATIENT
Start: 2020-02-06 | End: 2020-03-05

## 2020-02-06 RX ORDER — AZELASTINE 1 MG/ML
1 SPRAY, METERED NASAL 2 TIMES DAILY
Qty: 2 BOTTLE | Refills: 1 | Status: ON HOLD | OUTPATIENT
Start: 2020-02-06 | End: 2020-07-22

## 2020-02-06 ASSESSMENT — ENCOUNTER SYMPTOMS
WHEEZING: 0
CHEST TIGHTNESS: 0
VOMITING: 0
ABDOMINAL PAIN: 0
SORE THROAT: 1
NAUSEA: 0
DIARRHEA: 0
CONSTIPATION: 0
COUGH: 0
SHORTNESS OF BREATH: 0

## 2020-02-06 NOTE — PATIENT INSTRUCTIONS
Start a probiotic. Please arrive 15 minutes early to next follow up appointment in 4 weeks or schedule an appointment sooner if needed.

## 2020-02-06 NOTE — PROGRESS NOTES
Hellen Watson is a 75 y.o. female who presents today for   Chief Complaint   Patient presents with    3 Month Follow-Up    Hypertension    Other     got spine injection from [facility] and having a side effect.  Ear Fullness       HPI  Patient is here for f/u HTN. She c/o chronic ongoing pharyngitis and ear fullness. She reports Hx of back surgery and states they approached through the anterior neck. She c/o of a HA and BLE weakness following lower spine injection by Dr. Eugenia Phelps several days ago. She states she was told to consume caffeine. She notes Sx have improved and that she still has dizziness when turning her head. Pt reports Dr. Kenney Silverio recommended she try injections before having surgery. She states she has felt better previously with using antibiotics and Flonase but that Sx return and that she feels chronically fatigued. She states she continues to use Flonase daily. Pt notes Hx of b/l tonsillectomy in her 46s and Hx of R breast lumpectomy with cystosarcoma finding. She reports FHx of breast CA. No change in PMH, family, social, or surgical history unless mentioned above. Review of Systems   Constitutional: Positive for fatigue. Negative for chills and fever. HENT: Positive for sore throat. Ear pain: ear fullness. Respiratory: Negative for cough, chest tightness, shortness of breath and wheezing. Cardiovascular: Negative for chest pain, palpitations and leg swelling. Gastrointestinal: Negative for abdominal pain, constipation, diarrhea, nausea and vomiting. Genitourinary: Negative for difficulty urinating, dysuria and frequency. Neurological: Positive for dizziness and weakness. No past medical history on file.     Current Outpatient Medications   Medication Sig Dispense Refill    azelastine (ASTELIN) 0.1 % nasal spray 1 spray by Nasal route 2 times daily Use in each nostril as directed 2 Bottle 1    cefdinir (OMNICEF) 300 MG capsule Take 1 capsule by mouth 2 times daily for 28 days 56 capsule 0    predniSONE (DELTASONE) 20 MG tablet Take 4 tabs for 3 days, then 3 tabs for 3 days, then 2 tabs for 3 days, then 1 tab for 3 days. 30 tablet 0    alendronate (FOSAMAX) 70 MG tablet Take 1 tablet by mouth every 7 days 12 tablet 5    ibandronate (BONIVA) 150 MG tablet Take 1 tablet by mouth every 30 days Take one (1) tablet once per month in the morning with a full glass of water, on an empty stomach, and do not take anything else by mouth or lie down for the next 30 minutes.  3 tablet 3    raloxifene (EVISTA) 60 MG tablet TAKE 1 TABLET BY MOUTH ONCE DAILY 90 tablet 0    furosemide (LASIX) 20 MG tablet Take 1 tablet by mouth every other day Indications: M-W-F 60 tablet 3    oxybutynin (DITROPAN XL) 10 MG extended release tablet Take 1 tablet by mouth daily 30 tablet 3    meloxicam (MOBIC) 7.5 MG tablet Indications: taking only 1/2 a pill daily Take 1/2 tablet daily 45 tablet 3    vitamin D (ERGOCALCIFEROL) 93046 units CAPS capsule TAKE ONE CAPSULE BY MOUTH ONCE A WEEK 12 capsule 3    omeprazole (PRILOSEC) 20 MG delayed release capsule TAKE ONE CAPSULE BY MOUTH ONCE DAILY 90 capsule 1    allopurinol (ZYLOPRIM) 100 MG tablet Take 1 tablet by mouth daily 90 tablet 3    metoprolol succinate (TOPROL XL) 50 MG extended release tablet Take 1 tablet by mouth daily 30 tablet 5    Nebulizers (AIRIAL COMPACT MINI NEBULIZER) MISC 1 Units by Does not apply route 2 times daily 1 each 0    risedronate (ACTONEL) 35 MG tablet Take 1 tablet by mouth every 7 days 12 tablet 3    albuterol sulfate HFA (PROAIR HFA) 108 (90 Base) MCG/ACT inhaler Inhale 2 puffs into the lungs every 6 hours as needed for Wheezing 1 Inhaler 3    DULoxetine (CYMBALTA) 30 MG extended release capsule Take 1 capsule by mouth every other day 90 capsule 3    fexofenadine (ALLEGRA ALLERGY) 180 MG tablet Take 180 mg by mouth daily      calcitRIOL (ROCALTROL) 0.25 MCG capsule Take 1 capsule by mouth daily (Patient taking differently: Take 0.25 mcg by mouth daily Take M-W-F) 90 capsule 3    gabapentin (NEURONTIN) 100 MG capsule Indications: Takes 2 in AM and 1 QHS TAKE 1 CAPSULE BY MOUTH THREE TIMES DAILY 180 capsule 5    albuterol (PROVENTIL) (2.5 MG/3ML) 0.083% nebulizer solution Take 3 mLs by nebulization every 6 hours as needed for Wheezing or Shortness of Breath 120 each 1    levothyroxine (SYNTHROID) 88 MCG tablet Take 1 tablet by mouth Daily 90 tablet 3     No current facility-administered medications for this visit. No Known Allergies    Past Surgical History:   Procedure Laterality Date    ADRENALECTOMY      APPENDECTOMY      BACK SURGERY      BREAST LUMPECTOMY      CARPAL TUNNEL RELEASE      CARPAL TUNNEL RELEASE  1990    CATARACT REMOVAL      CORONARY ANGIOPLASTY      GALLBLADDER SURGERY      PARTIAL HYSTERECTOMY      TONSILLECTOMY  1997    TOTAL KNEE ARTHROPLASTY         Social History     Tobacco Use    Smoking status: Never Smoker    Smokeless tobacco: Never Used   Substance Use Topics    Alcohol use: No    Drug use: No       No family history on file. /80   Resp 20   Ht 5' 1\" (1.549 m)   Wt 218 lb (98.9 kg)   LMP  (LMP Unknown)   BMI 41.19 kg/m²     Physical Exam  Vitals signs and nursing note reviewed. Constitutional:       General: She is not in acute distress. Appearance: She is well-developed. She is not diaphoretic. HENT:      Head: Normocephalic and atraumatic. Left Ear: Tympanic membrane is retracted. Nose: Rhinorrhea present. Mouth/Throat:      Pharynx: Posterior oropharyngeal erythema present. Cardiovascular:      Rate and Rhythm: Normal rate and regular rhythm. Heart sounds: Murmur present. Pulmonary:      Effort: Pulmonary effort is normal. No respiratory distress. Breath sounds: Normal breath sounds. No wheezing or rales. Abdominal:      General: Bowel sounds are normal. There is no distension. Palpations: Abdomen is soft.

## 2020-02-10 ENCOUNTER — HOSPITAL ENCOUNTER (OUTPATIENT)
Dept: WOMENS IMAGING | Age: 75
Discharge: HOME | End: 2020-02-10
Payer: MEDICARE

## 2020-02-10 PROCEDURE — 77067 SCR MAMMO BI INCL CAD: CPT

## 2020-03-11 ENCOUNTER — OFFICE VISIT (OUTPATIENT)
Dept: PRIMARY CARE CLINIC | Age: 75
End: 2020-03-11
Payer: MEDICARE

## 2020-03-11 VITALS
TEMPERATURE: 97.4 F | WEIGHT: 221 LBS | SYSTOLIC BLOOD PRESSURE: 134 MMHG | HEART RATE: 69 BPM | HEIGHT: 61 IN | OXYGEN SATURATION: 98 % | DIASTOLIC BLOOD PRESSURE: 70 MMHG | BODY MASS INDEX: 41.72 KG/M2

## 2020-03-11 PROCEDURE — G8926 SPIRO NO PERF OR DOC: HCPCS | Performed by: FAMILY MEDICINE

## 2020-03-11 PROCEDURE — G8482 FLU IMMUNIZE ORDER/ADMIN: HCPCS | Performed by: FAMILY MEDICINE

## 2020-03-11 PROCEDURE — G8427 DOCREV CUR MEDS BY ELIG CLIN: HCPCS | Performed by: FAMILY MEDICINE

## 2020-03-11 PROCEDURE — 1123F ACP DISCUSS/DSCN MKR DOCD: CPT | Performed by: FAMILY MEDICINE

## 2020-03-11 PROCEDURE — 4040F PNEUMOC VAC/ADMIN/RCVD: CPT | Performed by: FAMILY MEDICINE

## 2020-03-11 PROCEDURE — 3017F COLORECTAL CA SCREEN DOC REV: CPT | Performed by: FAMILY MEDICINE

## 2020-03-11 PROCEDURE — G8399 PT W/DXA RESULTS DOCUMENT: HCPCS | Performed by: FAMILY MEDICINE

## 2020-03-11 PROCEDURE — 99213 OFFICE O/P EST LOW 20 MIN: CPT | Performed by: FAMILY MEDICINE

## 2020-03-11 PROCEDURE — G8417 CALC BMI ABV UP PARAM F/U: HCPCS | Performed by: FAMILY MEDICINE

## 2020-03-11 PROCEDURE — 1090F PRES/ABSN URINE INCON ASSESS: CPT | Performed by: FAMILY MEDICINE

## 2020-03-11 PROCEDURE — 3023F SPIROM DOC REV: CPT | Performed by: FAMILY MEDICINE

## 2020-03-11 PROCEDURE — 1036F TOBACCO NON-USER: CPT | Performed by: FAMILY MEDICINE

## 2020-03-11 ASSESSMENT — ENCOUNTER SYMPTOMS
COUGH: 0
ABDOMINAL PAIN: 0
DIARRHEA: 0
NAUSEA: 0
CONSTIPATION: 0
BACK PAIN: 1
CHEST TIGHTNESS: 0
SHORTNESS OF BREATH: 0
VOMITING: 0
WHEEZING: 0

## 2020-03-11 NOTE — PROGRESS NOTES
file.    /70   Pulse 69   Temp 97.4 °F (36.3 °C)   Ht 5' 1\" (1.549 m)   Wt 221 lb (100.2 kg)   LMP  (LMP Unknown)   SpO2 98%   BMI 41.76 kg/m²     Physical Exam  Vitals signs and nursing note reviewed. Constitutional:       General: She is not in acute distress. Appearance: She is well-developed. She is not diaphoretic. Comments: General inspection: overall improved. HENT:      Head: Normocephalic and atraumatic. Left Ear: Tympanic membrane is retracted. Cardiovascular:      Rate and Rhythm: Normal rate and regular rhythm. Heart sounds: Normal heart sounds. No murmur. Pulmonary:      Effort: Pulmonary effort is normal. No respiratory distress. Breath sounds: Normal breath sounds. No wheezing or rales. Abdominal:      General: Bowel sounds are normal. There is no distension. Palpations: Abdomen is soft. Tenderness: There is no abdominal tenderness. Musculoskeletal:      Comments: No pretibial edema b/l. Skin:     General: Skin is warm and dry. Neurological:      Mental Status: She is alert and oriented to person, place, and time. Assessment:    ICD-10-CM    1. Chronic pansinusitis J32.4    2. Stage 2 moderate COPD by GOLD classification ([facility] [address] J44.9    3. Osteoporosis of multiple sites M81.0    4. Malignant neoplasm of upper-inner quadrant of right female breast, unspecified estrogen receptor status [facility] C50.211        Plan:   Would prefer to continue Raloxifene due to Hx of breast CA and osteoporosis that has been severe, as it would have multiple benefits. In mean time, and long term, would benefit from Raloxifene. No orders of the defined types were placed in this encounter. No orders of the defined types were placed in this encounter.     Medications Discontinued During This Encounter   Medication Reason    predniSONE (DELTASONE) 20 MG tablet     ibandronate (BONIVA) 150 MG tablet LIST CLEANUP     Patient Instructions   Start flonase

## 2020-03-19 ENCOUNTER — TELEPHONE (OUTPATIENT)
Dept: PRIMARY CARE CLINIC | Age: 75
End: 2020-03-19

## 2020-03-31 NOTE — TELEPHONE ENCOUNTER
Spoke with Shayy Stallworth. She is going to check with her insurance company and see why it more this year. She does not understand the concept of the co-pay and deductible. She mentioned that if you want to try her on something else that would be fine.    Alisha CASTILLO

## 2020-04-21 RX ORDER — OXYBUTYNIN CHLORIDE 10 MG/1
TABLET, EXTENDED RELEASE ORAL
Qty: 30 TABLET | Refills: 0 | Status: SHIPPED | OUTPATIENT
Start: 2020-04-21 | End: 2020-05-21

## 2020-05-21 RX ORDER — OXYBUTYNIN CHLORIDE 10 MG/1
TABLET, EXTENDED RELEASE ORAL
Qty: 30 TABLET | Refills: 0 | Status: SHIPPED | OUTPATIENT
Start: 2020-05-21 | End: 2020-06-19

## 2020-06-04 RX ORDER — LEVOTHYROXINE SODIUM 88 UG/1
TABLET ORAL
Qty: 90 TABLET | Refills: 0 | Status: SHIPPED | OUTPATIENT
Start: 2020-06-04 | End: 2020-01-01 | Stop reason: DRUGHIGH

## 2020-06-19 RX ORDER — OXYBUTYNIN CHLORIDE 10 MG/1
TABLET, EXTENDED RELEASE ORAL
Qty: 30 TABLET | Refills: 0 | Status: SHIPPED | OUTPATIENT
Start: 2020-06-19 | End: 2020-01-01

## 2020-06-22 ENCOUNTER — VIRTUAL VISIT (OUTPATIENT)
Dept: PRIMARY CARE CLINIC | Age: 75
End: 2020-06-22
Payer: MEDICARE

## 2020-06-22 PROBLEM — C74.90: Chronic | Status: ACTIVE | Noted: 2020-06-22

## 2020-06-22 PROCEDURE — 1123F ACP DISCUSS/DSCN MKR DOCD: CPT | Performed by: FAMILY MEDICINE

## 2020-06-22 PROCEDURE — 1036F TOBACCO NON-USER: CPT | Performed by: FAMILY MEDICINE

## 2020-06-22 PROCEDURE — G8417 CALC BMI ABV UP PARAM F/U: HCPCS | Performed by: FAMILY MEDICINE

## 2020-06-22 PROCEDURE — G8428 CUR MEDS NOT DOCUMENT: HCPCS | Performed by: FAMILY MEDICINE

## 2020-06-22 PROCEDURE — G8399 PT W/DXA RESULTS DOCUMENT: HCPCS | Performed by: FAMILY MEDICINE

## 2020-06-22 PROCEDURE — 1090F PRES/ABSN URINE INCON ASSESS: CPT | Performed by: FAMILY MEDICINE

## 2020-06-22 PROCEDURE — 3017F COLORECTAL CA SCREEN DOC REV: CPT | Performed by: FAMILY MEDICINE

## 2020-06-22 PROCEDURE — 99214 OFFICE O/P EST MOD 30 MIN: CPT | Performed by: FAMILY MEDICINE

## 2020-06-22 PROCEDURE — 4040F PNEUMOC VAC/ADMIN/RCVD: CPT | Performed by: FAMILY MEDICINE

## 2020-06-22 RX ORDER — AMOXICILLIN AND CLAVULANATE POTASSIUM 875; 125 MG/1; MG/1
1 TABLET, FILM COATED ORAL 2 TIMES DAILY
Qty: 20 TABLET | Refills: 0 | Status: SHIPPED | OUTPATIENT
Start: 2020-06-22 | End: 2020-07-02

## 2020-06-22 RX ORDER — ATENOLOL 50 MG/1
50 TABLET ORAL DAILY
Qty: 90 TABLET | Refills: 3 | Status: ON HOLD | OUTPATIENT
Start: 2020-06-22 | End: 2020-01-01 | Stop reason: HOSPADM

## 2020-06-22 ASSESSMENT — ENCOUNTER SYMPTOMS
NAUSEA: 0
COUGH: 0
SHORTNESS OF BREATH: 0
CONSTIPATION: 0
ABDOMINAL PAIN: 0
CHEST TIGHTNESS: 0
DIARRHEA: 0
WHEEZING: 0
VOMITING: 0

## 2020-06-22 NOTE — PROGRESS NOTES
daily for 10 days Yes Oliver Gregory MD   allopurinol (ZYLOPRIM) 100 MG tablet Take 1 tablet by mouth once daily  Oliver Gregory MD   oxybutynin (DITROPAN-XL) 10 MG extended release tablet Take 1 tablet by mouth once daily  DAVID Carolina   EUTHYROX 88 MCG tablet Take 1 tablet by mouth once daily  Oliver Gregory MD   raloxifene (EVISTA) 60 MG tablet Take 1 tablet by mouth once daily  Oliver Gregory MD   azelastine (ASTELIN) 0.1 % nasal spray 1 spray by Nasal route 2 times daily Use in each nostril as directed  Oliver Gregory MD   alendronate (FOSAMAX) 70 MG tablet Take 1 tablet by mouth every 7 days  Oliver Gregory MD   furosemide (LASIX) 20 MG tablet Take 1 tablet by mouth every other day Indications: M-W-F  DAVID Carolina   meloxicam (MOBIC) 7.5 MG tablet Indications: taking only 1/2 a pill daily Take 1/2 tablet daily  Oliver Gregory MD   vitamin D (ERGOCALCIFEROL) 20257 units CAPS capsule TAKE ONE CAPSULE BY MOUTH ONCE A WEEK  Oliver Gregory MD   gabapentin (NEURONTIN) 100 MG capsule Indications: Takes 2 in AM and 1 QHS TAKE 1 CAPSULE BY MOUTH THREE TIMES DAILY  Oliver Gregory MD   albuterol (PROVENTIL) (2.5 MG/3ML) 0.083% nebulizer solution Take 3 mLs by nebulization every 6 hours as needed for Wheezing or Shortness of Breath  DAVID Metzger   Nebulizers (AIRIAL COMPACT MINI NEBULIZER) MISC 1 Units by Does not apply route 2 times daily  DAVID Metzger   risedronate (ACTONEL) 35 MG tablet Take 1 tablet by mouth every 7 days  Patient not taking: Reported on 3/11/2020  Oliver Gregory MD   albuterol sulfate HFA (PROAIR HFA) 108 (90 Base) MCG/ACT inhaler Inhale 2 puffs into the lungs every 6 hours as needed for Wheezing  DAVID Metzger   DULoxetine (CYMBALTA) 30 MG extended release capsule Take 1 capsule by mouth every other day  Branden Victor DO   fexofenadine (ALLEGRA ALLERGY) 180 MG tablet Take 180 mg by mouth daily  Historical Provider, MD       Social colonoscopy  09/10/2021    Lipid screen  01/03/2025    DEXA (modify frequency per FRAX score)  Completed    Flu vaccine  Completed    Pneumococcal 65+ years Vaccine  Completed    Hepatitis A vaccine  Aged Out    Hepatitis B vaccine  Aged Out    Hib vaccine  Aged Out    Meningococcal (ACWY) vaccine  Aged Out       PHYSICAL EXAMINATION:  [ INSTRUCTIONS:  \"[x]\" Indicates a positive item  \"[]\" Indicates a negative item  -- DELETE ALL ITEMS NOT EXAMINED]  Vital Signs: (As obtained by patient/caregiver or practitioner observation)    Blood pressure-  Heart rate-    Respiratory rate-    Temperature-  Pulse oximetry-     Constitutional: [x] Appears well-developed and well-nourished [] No apparent distress      [] Abnormal-   Mental status  [x] Alert and awake  [x] Oriented to person/place/time [x]Able to follow commands      Eyes:  EOM    [x]  Normal  [] Abnormal-  Sclera  [x]  Normal  [] Abnormal -         Discharge []  None visible  [] Abnormal -    HENT:   [x] Normocephalic, atraumatic. [] Abnormal   [x] Mouth/Throat: Mucous membranes are moist.     External Ears [x] Normal  [] Abnormal-     Neck: [x] No visualized mass     Pulmonary/Chest: [x] Respiratory effort normal.  [x] No visualized signs of difficulty breathing or respiratory distress        [] Abnormal-      Musculoskeletal:   [x] Normal gait with no signs of ataxia         [x] Normal range of motion of neck        [] Abnormal-       Neurological:        [x] No Facial Asymmetry (Cranial nerve 7 motor function) (limited exam to video visit)          [x] No gaze palsy        [] Abnormal-         Skin:        [x] No significant exanthematous lesions or discoloration noted on facial skin         [] Abnormal-            Psychiatric:       [x] Normal Affect [x] No Hallucinations        [] Abnormal-     Other pertinent observable physical exam findings-     ASSESSMENT/PLAN:    ICD-10-CM    1.  Acute dysfunction of Eustachian tube, bilateral H69.83 Cortisol AM, Total   2. Adrenal gland cancer, unspecified laterality ([facility] [address] C74.90     unilateral removal - in remission        Appears to have b/l eustachian tube dysfunction. Based on dizziness it is likely from this but with ongoing fatigue and Hx of adrenal gland removal, will test for cortisol deficiency. Stockings for venous insufficiency. Orders Placed This Encounter   Medications    atenolol (TENORMIN) 50 MG tablet     Sig: Take 1 tablet by mouth daily     Dispense:  90 tablet     Refill:  3    Compression Stockings MISC     Sig: by Does not apply route Dx: Venous insufficiency, below knee medium compression bilateral     Dispense:  1 each     Refill:  0    amoxicillin-clavulanate (AUGMENTIN) 875-125 MG per tablet     Sig: Take 1 tablet by mouth 2 times daily for 10 days     Dispense:  20 tablet     Refill:  0       Orders Placed This Encounter   Procedures    Cortisol AM, Total     Standing Status:   Future     Standing Expiration Date:   6/22/2021     Scheduling Instructions:      [facility] [address] [place] [address] sushil Ross       Return in about 3 months (around 9/22/2020) for medicare AWV - OV (M-W am). Juan Diego Cummins is a 75 y.o. female being evaluated by a Virtual Visit (video visit) encounter to address concerns as mentioned above. A caregiver was present when appropriate. Due to this being a TeleHealth encounter ([facility]), evaluation of the following organ systems was limited: Vitals/Constitutional/EENT/Resp/CV/GI//MS/Neuro/Skin/Heme-Lymph-Imm. Pursuant to the emergency declaration under the [address] [place] [address], [address] [place] [address] authority and the [employer] Act, this Virtual Visit was conducted with patient's (and/or legal guardian's) consent, to reduce the patient's risk of exposure to COVID-19 and provide necessary medical care.   The patient (and/or legal guardian) has also been advised to contact this office for worsening conditions or problems, and seek emergency medical treatment and/or call 911 if deemed necessary. Services were provided through a video synchronous discussion virtually to substitute for in-person clinic visit. Patient and provider were located at their individual homes or provider at secure site for business. --Merlinda Fear on 6/22/2020 at 9:40 AM  I, Dr. Constance Bruce, the medical provider for the encounter with patient on 6/30/2020 at 6:49 PM CDT have reviewed my scribe's documentation in earnest and take sole ownership of the intellectual property represented in documentation by my medical scribe and myself within this document. Some errors may occur in proofreading. An electronic signature was used to authenticate this note.

## 2020-07-01 ENCOUNTER — HOSPITAL ENCOUNTER (OUTPATIENT)
Dept: NON INVASIVE DIAGNOSTICS | Age: 75
Discharge: HOME | End: 2020-07-01
Payer: MEDICARE

## 2020-07-01 LAB
LV EF: 58 %
LVEF MODALITY: NORMAL

## 2020-07-01 PROCEDURE — 93226 XTRNL ECG REC<48 HR SCAN A/R: CPT

## 2020-07-01 PROCEDURE — 93225 XTRNL ECG REC<48 HRS REC: CPT

## 2020-07-01 PROCEDURE — 93227 XTRNL ECG REC<48 HR R&I: CPT | Performed by: INTERNAL MEDICINE

## 2020-07-01 PROCEDURE — 93306 TTE W/DOPPLER COMPLETE: CPT

## 2020-07-07 NOTE — PROCEDURES
[id number] [facility]    ZIO / Holter Monitor Report      Miky Medina    :       Test Date:  2020    Analysis Date:  2020    Date Interpreted: 2020        1. Nearly 24 hours of rhythm are processed. 2.  The underlying rhythm is normal sinus rhythm at a mean heart rate of 54 bpm, with a range of 34 to 75 bpm. Bradycardia during sleep hours    3. The were frequent extra supraventricular beats. The majority of these were single isolated PAC's; there was a short runs of nonsustained supraventricular tachycardia reported up to 5 beats. No significant documentation of recordings noted. 4.  The were rare extra ventricular beats. The majority of these were single isolated PVC's, there was reported short nonsustained VT but these appear artifactual.    5.  2nd degree type I heart block and first-degree heart block was noted. .    6.  Symptoms were not reported for rhythm - symptom correlation. Impression: This Holter monitor shows supraventricular and ventricular ectopic beats with 2nd degree type I heart block noted and first-degree heart block noted in recordings.     Electronically signed by Brenton Flahrety MD on 20

## 2020-07-09 ENCOUNTER — TELEPHONE (OUTPATIENT)
Dept: PRIMARY CARE CLINIC | Age: 75
End: 2020-07-09

## 2020-07-22 ENCOUNTER — HOSPITAL ENCOUNTER (INPATIENT)
Age: 75
LOS: 7 days | Discharge: HOME | DRG: 242 | End: 2020-07-29
Attending: EMERGENCY MEDICINE | Admitting: FAMILY MEDICINE
Payer: MEDICARE

## 2020-07-22 ENCOUNTER — APPOINTMENT (OUTPATIENT)
Dept: GENERAL RADIOLOGY | Age: 75
DRG: 242 | End: 2020-07-22
Payer: MEDICARE

## 2020-07-22 PROBLEM — R07.9 CHEST PAIN: Status: ACTIVE | Noted: 2020-07-22

## 2020-07-22 PROBLEM — R00.1 BRADYCARDIA: Status: ACTIVE | Noted: 2020-07-22

## 2020-07-22 LAB
ALBUMIN SERPL-MCNC: 4.2 G/DL (ref 3.5–5.2)
ALP BLD-CCNC: 40 U/L (ref 35–104)
ALT SERPL-CCNC: 19 U/L (ref 5–33)
ANION GAP SERPL CALCULATED.3IONS-SCNC: 12 MMOL/L (ref 7–19)
APTT: 29.8 SEC (ref 26–36.2)
AST SERPL-CCNC: 31 U/L (ref 5–32)
BASOPHILS ABSOLUTE: 0.1 K/UL (ref 0–0.2)
BASOPHILS RELATIVE PERCENT: 0.6 % (ref 0–1)
BILIRUB SERPL-MCNC: 0.5 MG/DL (ref 0.2–1.2)
BUN BLDV-MCNC: 35 MG/DL (ref 8–23)
CALCIUM SERPL-MCNC: 10.4 MG/DL (ref 8.8–10.2)
CHLORIDE BLD-SCNC: 107 MMOL/L (ref 98–111)
CO2: 25 MMOL/L (ref 22–29)
CREAT SERPL-MCNC: 1.7 MG/DL (ref 0.5–0.9)
EOSINOPHILS ABSOLUTE: 0.4 K/UL (ref 0–0.6)
EOSINOPHILS RELATIVE PERCENT: 4 % (ref 0–5)
GFR AFRICAN AMERICAN: 35
GFR NON-AFRICAN AMERICAN: 29
GLUCOSE BLD-MCNC: 102 MG/DL (ref 74–109)
HCT VFR BLD CALC: 40 % (ref 37–47)
HEMOGLOBIN: 13 G/DL (ref 12–16)
IMMATURE GRANULOCYTES #: 0.1 K/UL
INR BLD: 1.05 (ref 0.88–1.18)
LV EF: 58 %
LVEF MODALITY: NORMAL
LYMPHOCYTES ABSOLUTE: 3.7 K/UL (ref 1.1–4.5)
LYMPHOCYTES RELATIVE PERCENT: 41.3 % (ref 20–40)
MCH RBC QN AUTO: 31 PG (ref 27–31)
MCHC RBC AUTO-ENTMCNC: 32.5 G/DL (ref 33–37)
MCV RBC AUTO: 95.5 FL (ref 81–99)
MONOCYTES ABSOLUTE: 0.7 K/UL (ref 0–0.9)
MONOCYTES RELATIVE PERCENT: 7.7 % (ref 0–10)
NEUTROPHILS ABSOLUTE: 4.1 K/UL (ref 1.5–7.5)
NEUTROPHILS RELATIVE PERCENT: 45.7 % (ref 50–65)
PDW BLD-RTO: 15.8 % (ref 11.5–14.5)
PLATELET # BLD: 191 K/UL (ref 130–400)
PMV BLD AUTO: 10.5 FL (ref 9.4–12.3)
POTASSIUM SERPL-SCNC: 5.4 MMOL/L (ref 3.5–5)
PRO-BNP: 825 PG/ML (ref 0–1800)
PROTHROMBIN TIME: 13.6 SEC (ref 12–14.6)
RBC # BLD: 4.19 M/UL (ref 4.2–5.4)
REASON FOR REJECTION: NORMAL
REJECTED TEST: NORMAL
SODIUM BLD-SCNC: 144 MMOL/L (ref 136–145)
TOTAL PROTEIN: 6.7 G/DL (ref 6.6–8.7)
TROPONIN: <0.01 NG/ML (ref 0–0.03)
TROPONIN: <0.01 NG/ML (ref 0–0.03)
WBC # BLD: 8.9 K/UL (ref 4.8–10.8)

## 2020-07-22 PROCEDURE — 6370000000 HC RX 637 (ALT 250 FOR IP): Performed by: FAMILY MEDICINE

## 2020-07-22 PROCEDURE — 83880 ASSAY OF NATRIURETIC PEPTIDE: CPT

## 2020-07-22 PROCEDURE — 2580000003 HC RX 258: Performed by: FAMILY MEDICINE

## 2020-07-22 PROCEDURE — 85025 COMPLETE CBC W/AUTO DIFF WBC: CPT

## 2020-07-22 PROCEDURE — 93005 ELECTROCARDIOGRAM TRACING: CPT | Performed by: EMERGENCY MEDICINE

## 2020-07-22 PROCEDURE — 36415 COLL VENOUS BLD VENIPUNCTURE: CPT

## 2020-07-22 PROCEDURE — 2140000000 HC CCU INTERMEDIATE R&B

## 2020-07-22 PROCEDURE — 85730 THROMBOPLASTIN TIME PARTIAL: CPT

## 2020-07-22 PROCEDURE — 71045 X-RAY EXAM CHEST 1 VIEW: CPT

## 2020-07-22 PROCEDURE — 84484 ASSAY OF TROPONIN QUANT: CPT

## 2020-07-22 PROCEDURE — 85610 PROTHROMBIN TIME: CPT

## 2020-07-22 PROCEDURE — 6360000002 HC RX W HCPCS: Performed by: FAMILY MEDICINE

## 2020-07-22 PROCEDURE — 80053 COMPREHEN METABOLIC PANEL: CPT

## 2020-07-22 PROCEDURE — C8929 TTE W OR WO FOL WCON,DOPPLER: HCPCS

## 2020-07-22 PROCEDURE — 94640 AIRWAY INHALATION TREATMENT: CPT

## 2020-07-22 PROCEDURE — 99285 EMERGENCY DEPT VISIT HI MDM: CPT

## 2020-07-22 RX ORDER — NITROGLYCERIN 0.4 MG/1
0.4 TABLET SUBLINGUAL EVERY 5 MIN PRN
Status: DISCONTINUED | OUTPATIENT
Start: 2020-07-22 | End: 2020-01-01 | Stop reason: HOSPADM

## 2020-07-22 RX ORDER — ACETAMINOPHEN 325 MG/1
650 TABLET ORAL EVERY 6 HOURS PRN
Status: DISCONTINUED | OUTPATIENT
Start: 2020-07-22 | End: 2020-01-01 | Stop reason: HOSPADM

## 2020-07-22 RX ORDER — ACETAMINOPHEN 650 MG/1
650 SUPPOSITORY RECTAL EVERY 6 HOURS PRN
Status: DISCONTINUED | OUTPATIENT
Start: 2020-07-22 | End: 2020-01-01 | Stop reason: HOSPADM

## 2020-07-22 RX ORDER — IPRATROPIUM BROMIDE AND ALBUTEROL SULFATE 2.5; .5 MG/3ML; MG/3ML
1 SOLUTION RESPIRATORY (INHALATION)
Status: DISCONTINUED | OUTPATIENT
Start: 2020-07-22 | End: 2020-01-01 | Stop reason: HOSPADM

## 2020-07-22 RX ORDER — ATENOLOL 50 MG/1
50 TABLET ORAL NIGHTLY
Status: DISCONTINUED | OUTPATIENT
Start: 2020-07-22 | End: 2020-01-01 | Stop reason: HOSPADM

## 2020-07-22 RX ORDER — SODIUM CHLORIDE 0.9 % (FLUSH) 0.9 %
10 SYRINGE (ML) INJECTION EVERY 12 HOURS SCHEDULED
Status: DISCONTINUED | OUTPATIENT
Start: 2020-07-22 | End: 2020-01-01 | Stop reason: SDUPTHER

## 2020-07-22 RX ORDER — CETIRIZINE HYDROCHLORIDE 10 MG/1
10 TABLET ORAL DAILY
Status: DISCONTINUED | OUTPATIENT
Start: 2020-07-22 | End: 2020-01-01 | Stop reason: HOSPADM

## 2020-07-22 RX ORDER — SODIUM POLYSTYRENE SULFONATE 15 G/60ML
15 SUSPENSION ORAL; RECTAL ONCE
Status: COMPLETED | OUTPATIENT
Start: 2020-07-22 | End: 2020-07-22

## 2020-07-22 RX ORDER — SODIUM CHLORIDE 0.9 % (FLUSH) 0.9 %
10 SYRINGE (ML) INJECTION PRN
Status: DISCONTINUED | OUTPATIENT
Start: 2020-07-22 | End: 2020-01-01 | Stop reason: SDUPTHER

## 2020-07-22 RX ORDER — OXYBUTYNIN CHLORIDE 5 MG/1
10 TABLET, EXTENDED RELEASE ORAL NIGHTLY
Status: DISCONTINUED | OUTPATIENT
Start: 2020-07-22 | End: 2020-01-01 | Stop reason: HOSPADM

## 2020-07-22 RX ORDER — LACTOBACILLUS RHAMNOSUS GG 10B CELL
1 CAPSULE ORAL DAILY
Status: DISCONTINUED | OUTPATIENT
Start: 2020-07-22 | End: 2020-01-01 | Stop reason: HOSPADM

## 2020-07-22 RX ORDER — ASPIRIN 81 MG/1
81 TABLET ORAL NIGHTLY
Status: DISCONTINUED | OUTPATIENT
Start: 2020-07-22 | End: 2020-01-01

## 2020-07-22 RX ORDER — POLYETHYLENE GLYCOL 3350 17 G/17G
17 POWDER, FOR SOLUTION ORAL DAILY PRN
Status: DISCONTINUED | OUTPATIENT
Start: 2020-07-22 | End: 2020-01-01 | Stop reason: HOSPADM

## 2020-07-22 RX ORDER — ALBUTEROL SULFATE 90 UG/1
2 AEROSOL, METERED RESPIRATORY (INHALATION) EVERY 6 HOURS PRN
COMMUNITY

## 2020-07-22 RX ORDER — ASPIRIN 81 MG/1
81 TABLET ORAL NIGHTLY
COMMUNITY

## 2020-07-22 RX ORDER — LACTOBACILLUS RHAMNOSUS GG 10B CELL
1 CAPSULE ORAL DAILY
COMMUNITY

## 2020-07-22 RX ORDER — CYANOCOBALAMIN (VITAMIN B-12) 5000 MCG
1 TABLET,DISINTEGRATING ORAL NIGHTLY
COMMUNITY

## 2020-07-22 RX ORDER — ONDANSETRON 2 MG/ML
4 INJECTION INTRAMUSCULAR; INTRAVENOUS EVERY 6 HOURS PRN
Status: DISCONTINUED | OUTPATIENT
Start: 2020-07-22 | End: 2020-01-01

## 2020-07-22 RX ORDER — PROMETHAZINE HYDROCHLORIDE 25 MG/1
12.5 TABLET ORAL EVERY 6 HOURS PRN
Status: DISCONTINUED | OUTPATIENT
Start: 2020-07-22 | End: 2020-01-01 | Stop reason: HOSPADM

## 2020-07-22 RX ORDER — LEVOTHYROXINE SODIUM 88 UG/1
88 TABLET ORAL DAILY
Status: DISCONTINUED | OUTPATIENT
Start: 2020-07-22 | End: 2020-01-01 | Stop reason: HOSPADM

## 2020-07-22 RX ORDER — RISEDRONATE SODIUM 35 MG/1
35 TABLET, FILM COATED ORAL WEEKLY
Status: DISCONTINUED | OUTPATIENT
Start: 2020-01-01 | End: 2020-07-22

## 2020-07-22 RX ORDER — ALLOPURINOL 100 MG/1
100 TABLET ORAL NIGHTLY
Status: DISCONTINUED | OUTPATIENT
Start: 2020-07-22 | End: 2020-01-01 | Stop reason: HOSPADM

## 2020-07-22 RX ORDER — RALOXIFENE HYDROCHLORIDE 60 MG/1
60 TABLET, FILM COATED ORAL DAILY
Status: DISCONTINUED | OUTPATIENT
Start: 2020-07-22 | End: 2020-07-22

## 2020-07-22 RX ORDER — GABAPENTIN 100 MG/1
100 CAPSULE ORAL 2 TIMES DAILY
Status: DISCONTINUED | OUTPATIENT
Start: 2020-07-22 | End: 2020-01-01 | Stop reason: HOSPADM

## 2020-07-22 RX ADMIN — IPRATROPIUM BROMIDE AND ALBUTEROL SULFATE 1 AMPULE: .5; 3 SOLUTION RESPIRATORY (INHALATION) at 18:50

## 2020-07-22 RX ADMIN — ASPIRIN 81 MG: 81 TABLET, COATED ORAL at 20:51

## 2020-07-22 RX ADMIN — OXYBUTYNIN CHLORIDE 10 MG: 5 TABLET, EXTENDED RELEASE ORAL at 20:51

## 2020-07-22 RX ADMIN — ALLOPURINOL 100 MG: 100 TABLET ORAL at 20:51

## 2020-07-22 RX ADMIN — Medication 10 ML: at 20:52

## 2020-07-22 RX ADMIN — SODIUM POLYSTYRENE SULFONATE 15 G: 15 SUSPENSION ORAL; RECTAL at 20:51

## 2020-07-22 RX ADMIN — ENOXAPARIN SODIUM 40 MG: 40 INJECTION SUBCUTANEOUS at 18:11

## 2020-07-22 RX ADMIN — GABAPENTIN 100 MG: 100 CAPSULE ORAL at 20:51

## 2020-07-22 ASSESSMENT — ENCOUNTER SYMPTOMS
RHINORRHEA: 0
ABDOMINAL PAIN: 0
VOMITING: 0
DIARRHEA: 0
SORE THROAT: 0
BACK PAIN: 0
NAUSEA: 0
SHORTNESS OF BREATH: 0

## 2020-07-22 ASSESSMENT — PAIN SCALES - GENERAL
PAINLEVEL_OUTOF10: 0

## 2020-07-22 NOTE — PROGRESS NOTES
4 Eyes Skin Assessment    Daniel [place] is being assessed upon: Admission    I agree that I, Babak Pierce, along with *** (either 2 RN's or 1 LPN and 1 RN) have performed a thorough Head to Toe Skin Assessment on the patient. ALL assessment sites listed below have been assessed. Areas assessed by both nurses:     [x]   Head, Face, and Ears   [x]   Shoulders, Back, and Chest  [x]   Arms, Elbows, and Hands   [x]   Coccyx, Sacrum, and Ischium  [x]   Legs, Feet, and Heels    Does the Patient have Skin Breakdown? No    Sandro Prevention initiated: NA  Wound Care Orders initiated: NA    [facility] nurse consulted for Pressure Injury (Stage 3,4, Unstageable, DTI, NWPT, and Complex wounds) and New or Established Ostomies: NA        Primary Nurse eSignature:  Babak Pierce RN on 7/22/2020 at 4:27 PM      Co-Signer eSignature: {Esignature:[id number]}

## 2020-07-22 NOTE — PROGRESS NOTES
PHARMACY NOTE  Damon Hayward was ordered actonel. Per the [address], this medication is non-formulary and not stocked by pharmacy. It has been discontinued. The medication can be reordered at discharge.      Electronically signed by Earl Beltran [facility] on 7/22/2020 at 5:02 PM

## 2020-07-22 NOTE — PROGRESS NOTES
PHARMACY NOTE  Dequan Marquez was ordered evista. Per the [address], this medication is non-formulary and not stocked by pharmacy. It has been discontinued. The medication can be reordered at discharge.      Electronically signed by Spike Dumont [facility] on 7/22/2020 at 5:01 PM

## 2020-07-22 NOTE — H&P
Hospital Medicine  History and Physical    Patient:  Leon Aguilera  MRN: [id number]    CHIEF COMPLAINT:  Dyspnea on exertion    History Obtained From: patient, chart,     PCP: Espinoza Steiner MD    HISTORY OF PRESENT ILLNESS:  75year old white female sent to the emergency department from nephrology clinic where she presented for follow up of routine labs. She was noted to be bradycardic down to a heart rate of 28. She was first diagnosed with bradycardia one month ago. She underwent echocardiogram and 24 hour Holter monitor and was diagnosed with second degree AV block. The patient does not get presyncopal. She does have a complaint of chest heaviness and dyspnea on exertion for which she has been seeking care for several weeks. This has progressed to the point that she has increase in dyspnea bending over to fold laundry, requiring frequent breaks to rest with any activity. No known heart disease. No treatment. Relieved by rest.    REVIEW OF SYSTEMS:  14 systems reviewed and negative except as noted above. Past Medical History:      Diagnosis Date    Acquired hypothyroidism 7/11/2018    Chronic kidney disease, stage III (moderate) ([address] 9/3/2019    Mom and sister + breast CA Lumpectomy - remission    Community acquired pneumonia of left lower lobe of lung ([address] 6/1/2018    Malignant neoplasm of upper-inner quadrant of right female breast ([address] 2/6/2020    Stage 2 moderate COPD by GOLD classification ([address] 7/11/2018       Past Surgical History:      Procedure Laterality Date    ADRENALECTOMY      APPENDECTOMY      BACK SURGERY      BREAST LUMPECTOMY      CARPAL TUNNEL RELEASE      CARPAL TUNNEL RELEASE  1990    CATARACT REMOVAL      CORONARY ANGIOPLASTY      GALLBLADDER SURGERY      PARTIAL HYSTERECTOMY      TONSILLECTOMY  1997    TOTAL KNEE ARTHROPLASTY         Medications Prior to Admission:    Prior to Admission medications    Medication Sig Start Date End Date Taking?  Authorizing Provider   tiotropium (SPIRIVA RESPIMAT) 2.5 MCG/ACT AERS inhaler Inhale 2 puffs into the lungs daily   Yes Historical Provider, MD   albuterol sulfate HFA (VENTOLIN HFA) 108 (90 Base) MCG/ACT inhaler Inhale 2 puffs into the lungs every 6 hours as needed for Wheezing   Yes Historical Provider, MD   aspirin EC 81 MG EC tablet Take 81 mg by mouth nightly   Yes Historical Provider, MD   lactobacillus (CULTURELLE) capsule Take 1 capsule by mouth daily   Yes Historical Provider, MD   Cyanocobalamin (VITAMIN B-12) 5000 MCG TBDP Take 1 tablet by mouth nightly   Yes Historical Provider, MD   Compression Stockings MISC by Does not apply route Dx:   Venous insufficiency, below knee medium compression bilateral 6/24/20   Jamin Bruce MD   atenolol (TENORMIN) 50 MG tablet Take 1 tablet by mouth daily  Patient taking differently: Take 50 mg by mouth nightly  6/22/20   Jamin Bruce MD   allopurinol (ZYLOPRIM) 100 MG tablet Take 1 tablet by mouth once daily  Patient taking differently: nightly  6/21/20   Jamin Bruce MD   oxybutynin (DITROPAN-XL) 10 MG extended release tablet Take 1 tablet by mouth once daily  Patient taking differently: nightly  5/38/25   DAVID Wisdom   EUTHYROX 88 MCG tablet Take 1 tablet by mouth once daily 6/4/20   Jamin Bruce MD   raloxifene (EVISTA) 60 MG tablet Take 1 tablet by mouth once daily 3/29/20   Jamin Bruce MD   furosemide (LASIX) 20 MG tablet Take 1 tablet by mouth every other day Indications: M-W-F  Patient taking differently: Take 20 mg by mouth three times a week Indications: M-W-F Mon, Wed, Fri 93/04/85   DAVID Wisdom   vitamin D (ERGOCALCIFEROL) 04617 units CAPS capsule TAKE ONE CAPSULE BY MOUTH ONCE A WEEK  Patient taking differently: once a week On tuesday 9/27/19   Jamin Bruce MD   gabapentin (NEURONTIN) 100 MG capsule Indications: Takes 2 in AM and 1 QHS TAKE 1 CAPSULE BY MOUTH THREE TIMES DAILY  Patient taking differently: Take 100 mg by mouth 2 times daily.  8/20/19 12/11/19  Joanna Walters MD   risedronate (ACTONEL) 35 MG tablet Take 1 tablet by mouth every 7 days  Patient taking differently: Take 35 mg by mouth once a week On Thursday 4/25/19   Joanna Walters MD   fexofenadine TY [facility] ALLERGY) 180 MG tablet Take 180 mg by mouth daily    Historical Provider, MD       Allergies:  Patient has no known allergies. Social History:   TOBACCO:   reports that she has never smoked. She has never used smokeless tobacco.  ETOH:   reports no history of alcohol use. Family History:   History reviewed. No pertinent family history. Physical Exam:    Vitals: /72   Pulse (!) 30   Temp 97.2 °F (36.2 °C) (Temporal)   Resp 16   Ht 5' 1\" (1.549 m)   Wt 216 lb 6 oz (98.1 kg)   LMP  (LMP Unknown)   SpO2 98%   BMI 40.88 kg/m²   24HR INTAKE/OUTPUT:  No intake or output data in the 24 hours ending 07/22/20 1653  General appearance: alert and cooperative with exam  HEENT: atraumatic, eyes with clear conjunctiva and normal lids, pupils and irises normal, external ears and nose are normal, lips normal. Neck without masses, lympadenopathy, bruit, thyroid normal  Lungs: no increased work of breathing, diminished breath sounds bilaterally  Heart: bradycardic but regular, no discernable murmur  Abdomen: soft, non-tender; bowel sounds normal; no masses,  no organomegaly and obese  Extremities: edema trace bilaterally, modified Sasha's negative  Neurologic: No focal neurologic deficits, normal sensation, alert and oriented, affect and mood appropriate. Skin: no rashes, nodules. CBC:   Recent Labs     07/22/20  1254   WBC 8.9   HGB 13.0        BMP:  No results for input(s): NA, K, CL, CO2, BUN, CREATININE, GLUCOSE in the last 72 hours. Hepatic: No results for input(s): AST, ALT, ALB, BILITOT, ALKPHOS in the last 72 hours. Troponin T: No results for input(s): TROPONINI in the last 72 hours.   ABGs: No results for input(s): PH, PCO2, PO2, HCO3, BE, O2SAT in the last 72 hours. INR:   Recent Labs     07/22/20  1254   INR 1.05     Lactic Acid: No results for input(s): LACTA in the last 72 hours. -----------------------------------------------------------------  PA/lat CXR: chronic interstitial disease, poor inspiratory effort    EKG: Bradycardia, RBBB    Assessment and Plan   Principal Problem:    Bradycardia  Active Problems:    Stage 2 moderate COPD by GOLD classification ([facility])    Acquired hypothyroidism    Morbid obesity with BMI of 40.0-44.9, adult ([facility])    Chronic kidney disease, stage III (moderate) ([facility])    Chest pain  Resolved Problems:    * No resolved hospital problems. *    Admit to PCU. Cardiology consult, appreciate assistance. Documented bradycardia down to rate of 28. Serial troponin, ECG, telemetry. Nitroglycerin as needed for chest pain. Home medications reconciled.     Honorio Elliott DO  Admitting Hospitalist

## 2020-07-22 NOTE — LETTER
[facility]  Cardiac Rehab Department  [address]  [phone]  Toll Free [phone]              July 31, 2020    Dear Morenita Sr,    Please find enclosed information concerning the care of your pacemaker insertion site and the guidelines you should follow for the next four weeks of your recovery. Each of these recommendations apply unless you were specifically instructed otherwise by your cardiologist.    If you have not already received one, a [employer] 'MyCareLink' patient transmitter has been ordered for you on your behalf. When in your possession and appropriate, unbox the transmitter, plug it in and complete your first pacemaker check by following the instructional pictures in the easy-to-follow pamphlet or on the transmitter itself. In the event you have a 'smartphone', an deloris may have been downloaded on your phone during your hospitilization to allow you to use your phone for the same purpose. If any questions or concerns arise or you experience difficulty completing the steps stated above, you should contact your cardiologist's office for assistance. Rest assured that the use of your transmitter will be reviewed with you as needed during your upcoming follow-up visit in 's office or via teleconference. Thank you. To Your [facility],        [facility] Cardiac Rehab Staff    Analisa Singh, RN Arneta Jeans, BS, MA  Registered Nurse  Registered Nurse   Exercise Physiologist

## 2020-07-22 NOTE — ED PROVIDER NOTES
pneumonia of left lower lobe of lung ([facility] 6/1/2018    Malignant neoplasm of upper-inner quadrant of right female breast ([facility] 2/6/2020    Stage 2 moderate COPD by GOLD classification ([facility] 7/11/2018         SURGICAL HISTORY       Past Surgical History:   Procedure Laterality Date    ADRENALECTOMY      APPENDECTOMY      BACK SURGERY      BREAST LUMPECTOMY      CARPAL TUNNEL RELEASE      CARPAL TUNNEL RELEASE  1990    CATARACT REMOVAL      CORONARY ANGIOPLASTY      GALLBLADDER SURGERY      PARTIAL HYSTERECTOMY      TONSILLECTOMY  1997    TOTAL KNEE ARTHROPLASTY           CURRENT MEDICATIONS       Previous Medications    ALBUTEROL (PROVENTIL) (2.5 MG/3ML) 0.083% NEBULIZER SOLUTION    Take 3 mLs by nebulization every 6 hours as needed for Wheezing or Shortness of Breath    ALBUTEROL SULFATE HFA (PROAIR HFA) 108 (90 BASE) MCG/ACT INHALER    Inhale 2 puffs into the lungs every 6 hours as needed for Wheezing    ALENDRONATE (FOSAMAX) 70 MG TABLET    Take 1 tablet by mouth every 7 days    ALLOPURINOL (ZYLOPRIM) 100 MG TABLET    Take 1 tablet by mouth once daily    ATENOLOL (TENORMIN) 50 MG TABLET    Take 1 tablet by mouth daily    AZELASTINE (ASTELIN) 0.1 % NASAL SPRAY    1 spray by Nasal route 2 times daily Use in each nostril as directed    COMPRESSION STOCKINGS MISC    by Does not apply route Dx:   Venous insufficiency, below knee medium compression bilateral    DULOXETINE (CYMBALTA) 30 MG EXTENDED RELEASE CAPSULE    Take 1 capsule by mouth every other day    EUTHYROX 88 MCG TABLET    Take 1 tablet by mouth once daily    FEXOFENADINE (ALLEGRA ALLERGY) 180 MG TABLET    Take 180 mg by mouth daily    FUROSEMIDE (LASIX) 20 MG TABLET    Take 1 tablet by mouth every other day Indications: M-W-F    GABAPENTIN (NEURONTIN) 100 MG CAPSULE    Indications: Takes 2 in AM and 1 QHS TAKE 1 CAPSULE BY MOUTH THREE TIMES DAILY    MELOXICAM (MOBIC) 7.5 MG TABLET    Indications: taking only 1/2 a pill daily Take 1/2 tablet daily    NEBULIZERS (AIRIAL COMPACT MINI NEBULIZER) MISC    1 Units by Does not apply route 2 times daily    OXYBUTYNIN (DITROPAN-XL) 10 MG EXTENDED RELEASE TABLET    Take 1 tablet by mouth once daily    RALOXIFENE (EVISTA) 60 MG TABLET    Take 1 tablet by mouth once daily    RISEDRONATE (ACTONEL) 35 MG TABLET    Take 1 tablet by mouth every 7 days    VITAMIN D (ERGOCALCIFEROL) 87252 UNITS CAPS CAPSULE    TAKE ONE CAPSULE BY MOUTH ONCE A WEEK       ALLERGIES     Patient has no known allergies. FAMILY HISTORY     History reviewed. No pertinent family history.        SOCIAL HISTORY       Social History     Socioeconomic History    Marital status:      Spouse name: None    Number of children: None    Years of education: None    Highest education level: None   Occupational History    None   Social Needs    Financial resource strain: None    Food insecurity     Worry: None     Inability: None    Transportation needs     Medical: None     Non-medical: None   Tobacco Use    Smoking status: Never Smoker    Smokeless tobacco: Never Used   Substance and Sexual Activity    Alcohol use: No    Drug use: No    Sexual activity: None   Lifestyle    Physical activity     Days per week: None     Minutes per session: None    Stress: None   Relationships    Social connections     Talks on phone: None     Gets together: None     Attends Anabaptist service: None     Active member of club or organization: None     Attends meetings of clubs or organizations: None     Relationship status: None    Intimate partner violence     Fear of current or ex partner: None     Emotionally abused: None     Physically abused: None     Forced sexual activity: None   Other Topics Concern    None   Social History Narrative    None       SCREENINGS             PHYSICAL EXAM    (up to 7 for level 4, 8 or more for level 5)     ED Triage Vitals [07/22/20 1236]   BP Temp Temp src Pulse Resp SpO2 Height Weight   (!) 185/88 97.9 °F (36.6 °C) -- (!) 30 17 97 % -- 219 lb (99.3 kg)       Physical Exam  Vitals signs and nursing note reviewed. Constitutional:       General: She is not in acute distress. Appearance: She is well-developed. She is not diaphoretic. HENT:      Head: Normocephalic and atraumatic. Eyes:      Pupils: Pupils are equal, round, and reactive to light. Neck:      Musculoskeletal: Normal range of motion and neck supple. Cardiovascular:      Rate and Rhythm: Regular rhythm. Bradycardia present. Heart sounds: Normal heart sounds. Pulmonary:      Effort: Pulmonary effort is normal. No respiratory distress. Breath sounds: Normal breath sounds. Abdominal:      General: Bowel sounds are normal. There is no distension. Palpations: Abdomen is soft. Tenderness: There is no abdominal tenderness. Musculoskeletal: Normal range of motion. Skin:     General: Skin is warm and dry. Findings: No rash. Neurological:      Mental Status: She is alert and oriented to person, place, and time. Cranial Nerves: No cranial nerve deficit. Motor: No abnormal muscle tone. Coordination: Coordination normal.   Psychiatric:         Behavior: Behavior normal.         DIAGNOSTIC RESULTS     EKG: All EKG's are interpreted by the Emergency Department Physician who either signs or Co-signs this chart in the absence of a cardiologist.    2-1 AV block rate 35 right bundle branch block    RADIOLOGY:   Non-plain film images such as CT, Ultrasound and MRI are read by the radiologist. Plainradiographic images are visualized and preliminarily interpreted by the emergency physician with the below findings:        Interpretation per the Radiologist below, if available at the time of this note:    XR CHEST PORTABLE   Final Result   1. Chronic interstitial lung disease. 2. Hypoaeration.    Signed by Dr Dian Molina on 7/22/2020 1:24 PM            ED BEDSIDE ULTRASOUND:   Performed by ED Physician - none    LABS:  Labs Reviewed   CBC WITH AUTO DIFFERENTIAL - Abnormal; Notable for the following components:       Result Value    RBC 4.19 (*)     MCHC 32.5 (*)     RDW 15.8 (*)     Neutrophils % 45.7 (*)     Lymphocytes % 41.3 (*)     All other components within normal limits   APTT   PROTIME-INR   SPECIMEN REJECTION   SPECIMEN REJECTION   TROPONIN   COMPREHENSIVE METABOLIC PANEL   BRAIN NATRIURETIC PEPTIDE   TROPONIN       All other labs were within normal range or not returned as of this dictation. EMERGENCY DEPARTMENT COURSE and DIFFERENTIALDIAGNOSIS/MDM:   Vitals:    Vitals:    07/22/20 1236 07/22/20 1303   BP: (!) 185/88 (!) 186/69   Pulse: (!) 30 (!) 35   Resp: 17 12   Temp: 97.9 °F (36.6 °C)    SpO2: 97%    Weight: 219 lb (99.3 kg)        MDM  D/w Dr Jovon Keane, admit to hospitalist. ECHO, serial cardiac enzymes, hold beta blockers. Will see in consult.     D/w Dr Jagdish Allred for admission    Labs have hemolyzed twice, lab to come and draw    CONSULTS:  IP CONSULT TO CARDIOLOGY    PROCEDURES:  Unless otherwise notedbelow, none     Procedures    FINAL IMPRESSION     1. 2nd degree atrioventricular block          DISPOSITION/PLAN   DISPOSITION Decision To Admit 07/22/2020 02:09:05 PM      PATIENT REFERRED TO:  @FUP@    DISCHARGE MEDICATIONS:  New Prescriptions    No medications on file          (Please note that portions of this note were completed with a voice recognition program.  Efforts were made to edit the dictations butoccasionally words are mis-transcribed.)    Pearl De Paz MD (electronically signed)  AttendingEmerSiloam Springs Regional Hospitalcy Physician         Pearl De Paz MD  07/22/20 7497

## 2020-07-22 NOTE — ED NOTES
Bed: 10  Expected date:   Expected time:   Means of arrival:   Comments:  Open at 7740 Simone Carey RN  07/22/20 3306

## 2020-07-22 NOTE — PROGRESS NOTES
Edouard Porter arrived to room # 720. Presented with: Bradycardia  Mental Status: Patient is oriented, alert, coherent, logical, thought processes intact and able to concentrate and follow conversation. Vitals:    07/22/20 1548   BP: 138/72   Pulse: (!) 30   Resp: 16   Temp: 97.2 °F (36.2 °C)   SpO2: 98%     Patient safety contract and falls prevention contract reviewed with patient Yes. Oriented Patient to room. Call light within reach. Yes.   Needs, issues or concerns expressed at this time: no.      Electronically signed by Rashaad Cummins RN on 7/22/2020 at 4:28 PM

## 2020-07-23 NOTE — CONSULTS
[facility]  Cardiology Consult      Requesting MD:  Jeet Tenorio DO   Admit Status:  Inpatient [101]       History obtained from:   [] Patient  [] Other (specify):     Patient:  Morenita Orin  [id number]     Chief Complaint:   Chief Complaint   Patient presents with    Bradycardia       HPI: Ms. Gerlene Crigler is a 75 y.o. female with a history of hypertension obesity chronic kidney disease  Comes in now complains of getting gradually more out of breath with activities some squeezing in her chest intermittently. Troponins x2 are negative. proBNP 825 recent Holter monitor average heart rate 54 intermittent 2-1 atrioventricular block. Had heart rate in the mid 30s upon arrival.  Beta-blockers held. Feeling a little bit better today. Review of Systems:  Review of Systems   Constitutional: Negative. Negative for chills, fever and unexpected weight change. HENT: Negative. Eyes: Negative. Respiratory: Negative. Negative for shortness of breath. Cardiovascular: Negative. Negative for chest pain. Gastrointestinal: Negative. Negative for diarrhea, nausea and vomiting. Endocrine: Negative. Genitourinary: Negative. Musculoskeletal: Negative. Skin: Negative. Neurological: Negative. All other systems reviewed and are negative.       Cardiac Specific Data:  Specialty Problems        Cardiology Problems    * (Principal) Bradycardia        Chest pain              Past Medical History:  Past Medical History:   Diagnosis Date    Acquired hypothyroidism 7/11/2018    Chronic kidney disease, stage III (moderate) ([address] 9/3/2019    Mom and sister + breast CA Lumpectomy - remission    Community acquired pneumonia of left lower lobe of lung ([address] 6/1/2018    Malignant neoplasm of upper-inner quadrant of right female breast ([address] 2/6/2020    Stage 2 moderate COPD by GOLD classification ([address] 7/11/2018        Past Surgical History:  Past Surgical History:   Procedure Laterality Date    ADRENALECTOMY      APPENDECTOMY      BACK SURGERY      BREAST LUMPECTOMY      CARPAL TUNNEL RELEASE      CARPAL TUNNEL RELEASE  1990    CATARACT REMOVAL      CORONARY ANGIOPLASTY      GALLBLADDER SURGERY      PARTIAL HYSTERECTOMY      TONSILLECTOMY  1997    TOTAL KNEE ARTHROPLASTY         Past Family History:  History reviewed. No pertinent family history.     Past Social History:  Social History     Socioeconomic History    Marital status:      Spouse name: Not on file    Number of children: Not on file    Years of education: Not on file    Highest education level: Not on file   Occupational History    Not on file   Social Needs    Financial resource strain: Not on file    Food insecurity     Worry: Not on file     Inability: Not on file    Transportation needs     Medical: Not on file     Non-medical: Not on file   Tobacco Use    Smoking status: Never Smoker    Smokeless tobacco: Never Used   Substance and Sexual Activity    Alcohol use: No    Drug use: No    Sexual activity: Not on file   Lifestyle    Physical activity     Days per week: Not on file     Minutes per session: Not on file    Stress: Not on file   Relationships    Social connections     Talks on phone: Not on file     Gets together: Not on file     Attends Anabaptist service: Not on file     Active member of club or organization: Not on file     Attends meetings of clubs or organizations: Not on file     Relationship status: Not on file    Intimate partner violence     Fear of current or ex partner: Not on file     Emotionally abused: Not on file     Physically abused: Not on file     Forced sexual activity: Not on file   Other Topics Concern    Not on file   Social History Narrative     61 years    She has 1 son and 1 daughter    Worked at [employer] and also [employer] not presently working    Education high school    [employer]    She does drive an automobile    Remains moderately active    Denies alcohol or tobacco consumption or substance usage       Allergies:  No Known Allergies    Home Meds:  Prior to Admission medications    Medication Sig Start Date End Date Taking? Authorizing Provider   tiotropium (SPIRIVA RESPIMAT) 2.5 MCG/ACT AERS inhaler Inhale 2 puffs into the lungs daily   Yes Historical Provider, MD   albuterol sulfate HFA (VENTOLIN HFA) 108 (90 Base) MCG/ACT inhaler Inhale 2 puffs into the lungs every 6 hours as needed for Wheezing   Yes Historical Provider, MD   aspirin EC 81 MG EC tablet Take 81 mg by mouth nightly   Yes Historical Provider, MD   lactobacillus (CULTURELLE) capsule Take 1 capsule by mouth daily   Yes Historical Provider, MD   Cyanocobalamin (VITAMIN B-12) 5000 MCG TBDP Take 1 tablet by mouth nightly   Yes Historical Provider, MD   Compression Stockings MISC by Does not apply route Dx:   Venous insufficiency, below knee medium compression bilateral 6/24/20   Bob Junior MD   atenolol (TENORMIN) 50 MG tablet Take 1 tablet by mouth daily  Patient taking differently: Take 50 mg by mouth nightly  6/22/20   Bob Junior MD   allopurinol (ZYLOPRIM) 100 MG tablet Take 1 tablet by mouth once daily  Patient taking differently: nightly  6/21/20   Bob Junior MD   oxybutynin (DITROPAN-XL) 10 MG extended release tablet Take 1 tablet by mouth once daily  Patient taking differently: nightly  3/46/29   DAVID Reyes   EUTHYROX 88 MCG tablet Take 1 tablet by mouth once daily 6/4/20   Bob Junior MD   raloxifene (EVISTA) 60 MG tablet Take 1 tablet by mouth once daily 3/29/20   Bob Junior MD   furosemide (LASIX) 20 MG tablet Take 1 tablet by mouth every other day Indications: M-W-F  Patient taking differently: Take 20 mg by mouth three times a week Indications: M-W-F Mon, Wed, Fri 60/74/27   DAVID Reyes   vitamin D (ERGOCALCIFEROL) 98220 units CAPS capsule TAKE ONE CAPSULE BY MOUTH ONCE A WEEK  Patient taking differently: once a week On tuesday 9/27/19   Enrique Reyna Dolly Winchester MD   gabapentin (NEURONTIN) 100 MG capsule Indications: Takes 2 in AM and 1 QHS TAKE 1 CAPSULE BY MOUTH THREE TIMES DAILY  Patient taking differently: Take 100 mg by mouth 2 times daily. 8/20/19 12/11/19  Radha Medina MD   risedronate (ACTONEL) 35 MG tablet Take 1 tablet by mouth every 7 days  Patient taking differently: Take 35 mg by mouth once a week On Thursday 4/25/19   Radha Medina MD   fexofenadine TY [facility] ALLERGY) 180 MG tablet Take 180 mg by mouth daily    Historical Provider, MD       Current Meds:   allopurinol  100 mg Oral Nightly    aspirin EC  81 mg Oral Nightly    [Held by provider] atenolol  50 mg Oral Nightly    levothyroxine  88 mcg Oral Daily    cetirizine  10 mg Oral Daily    gabapentin  100 mg Oral BID    lactobacillus  1 capsule Oral Daily    oxybutynin  10 mg Oral Nightly    ipratropium-albuterol  1 ampule Inhalation Q4H WA    sodium chloride flush  10 mL Intravenous 2 times per day    enoxaparin  40 mg Subcutaneous Daily       Current Infused Meds:   DOBUTamine      sodium chloride         Physical Exam:  Vitals:    07/23/20 0800   BP:    Pulse: (!) 45   Resp:    Temp:    SpO2:        Intake/Output Summary (Last 24 hours) at 7/23/2020 1210  Last data filed at 7/22/2020 2230  Gross per 24 hour   Intake 690 ml   Output 900 ml   Net -210 ml     Estimated body mass index is 40.74 kg/m² as calculated from the following:    Height as of this encounter: 5' 1\" (1.549 m). Weight as of this encounter: 215 lb 9.6 oz (97.8 kg). Physical Exam  Vitals signs reviewed. Constitutional:       General: She is not in acute distress. Appearance: Normal appearance. She is well-developed. She is obese. She is not ill-appearing, toxic-appearing or diaphoretic. HENT:      Head: Normocephalic and atraumatic. Nose: Nose normal.      Mouth/Throat:      Mouth: Mucous membranes are moist.      Pharynx: Oropharynx is clear. Eyes:      General: No scleral icterus. Extraocular Movements: Extraocular movements intact. Pupils: Pupils are equal, round, and reactive to light. Neck:      Musculoskeletal: Normal range of motion and neck supple. No neck rigidity or muscular tenderness. Vascular: No carotid bruit or JVD. Cardiovascular:      Rate and Rhythm: Normal rate and regular rhythm. Heart sounds: Normal heart sounds. No murmur. No friction rub. No gallop. Pulmonary:      Effort: Pulmonary effort is normal. No respiratory distress. Breath sounds: Normal breath sounds. No stridor. No wheezing, rhonchi or rales. Chest:      Chest wall: No tenderness. Abdominal:      General: Abdomen is flat. Bowel sounds are normal. There is no distension. Palpations: Abdomen is soft. There is no mass. Tenderness: There is no abdominal tenderness. There is no right CVA tenderness, left CVA tenderness, guarding or rebound. Hernia: No hernia is present. Musculoskeletal:         General: No swelling, tenderness, deformity or signs of injury. Right lower leg: No edema. Left lower leg: No edema. Lymphadenopathy:      Cervical: No cervical adenopathy. Skin:     General: Skin is warm and dry. Neurological:      General: No focal deficit present. Mental Status: She is alert and oriented to person, place, and time. Mental status is at baseline. Cranial Nerves: No cranial nerve deficit. Sensory: No sensory deficit. Motor: No weakness. Coordination: Coordination normal.   Psychiatric:         Mood and Affect: Mood normal.         Behavior: Behavior normal.         Thought Content:  Thought content normal.         Judgment: Judgment normal.         Labs:  Recent Labs     07/22/20  1254 07/23/20  0317   WBC 8.9 7.0   HGB 13.0 11.5*    160       Recent Labs     07/22/20  1705 07/23/20  0317    142   K 5.4* 4.4    107   CO2 25 20*   BUN 35* 33*   CREATININE 1.7* 1.6*   LABGLOM 29* 31*   CALCIUM 10.4* 9.8 CK, CKMB, Troponin: @LABRCNT (CKTOTAL:3, CKMB:3, TROPONINI:3)@    Last 3 BNP:  No results for input(s): BNP in the last 72 hours. IMAGING:  Xr Chest Portable    Result Date: 7/22/2020  XR CHEST PORTABLE 7/22/2020 1:23 PM History: Chest pain. Portable chest x-ray compared with 5/17/2019. Magnified heart size. Moderate thoracic spurring. Chronic interstitial lung disease with no focal infiltrate, pneumothorax, or heart failure. 1. Chronic interstitial lung disease. 2. Hypoaeration. Signed by Dr Lisa Ambriz on 7/22/2020 1:24 PM      Assessment:  1. Complaints of worsening exertional dyspnea can walk no longer than 100 feet  2. Complaints of intermittent squeezing chest discomfort possible angina  3. 2-1 atrioventricular block on telemetry monitoring with heart rate mid 30s  4. Chronic kidney disease  5. Obesity  6. Hypertension  7. CT chest 6/22/2018 chronic changes left lower lobe probably fibrotic changes no other processes identified. 8. Holter monitor 7/1/2020 through 7/6/2020 sinus rhythm average heart rate 54 ranging from 34-75 bradycardia during sleep frequent PACs mostly isolated rare runs rare PVC second-degree type I heart block and first-degree heart block noted no symptoms reported  9. Echocardiogram 7/1/2020 normal left ventricular systolic function ejection fraction 55 to 60%      Recommendations:  1. Continue to hold beta-blockers and monitor on telemetry  2. Suggest dobutamine stress echo today further comments to follow  3.  Pacemaker implantation may be an option

## 2020-07-23 NOTE — PROGRESS NOTES
Pt's heart rate was 37. Atenolol held with hospitalist approval. Will continue to monitor.  Electronically signed by Carmela Berrios RN on 7/23/2020 at 1:46 AM

## 2020-07-23 NOTE — PROGRESS NOTES
Hospitalist Progress Note  7/23/2020 1:06 PM  Subjective:   Admit Date: 7/22/2020  PCP: Carol Richmond MD    Chief Complaint: dyspnea on exertion    Subjective: Patient is experiencing palpitations this morning. She has not ambulated and thus has not had any dyspnea or chest pressure. Heart rate is up to high 40s off atenolol. History is otherwise unchanged. Cumulative Hospital History:   7-22: Sent to ED from nephrology clinic due to HR 28. Recent echocardiogram and Holter monitor. Some chest pressure and dyspnea on exertion. Admitted to PCU with cardiology consult. Atenolol held. 7-23: Cardiology consult, dobutamine stress echo ordered. Possibility of pacemaker. HR 48 this morning off atenolol. ROS: 14 point review of systems is negative except as specifically addressed above.     Diet NPO, After Midnight  Diet NPO, After Midnight    Intake/Output Summary (Last 24 hours) at 7/23/2020 1306  Last data filed at 7/22/2020 2230  Gross per 24 hour   Intake 690 ml   Output 900 ml   Net -210 ml     Medications:   DOBUTamine      sodium chloride       Current Facility-Administered Medications   Medication Dose Route Frequency Provider Last Rate Last Dose    DOBUTamine (DOBUTREX) 500 mg in dextrose 5 % 250 mL infusion  10 mcg/kg/min Intravenous Continuous Mahesh Pacheco MD        perflutren lipid microspheres (DEFINITY) injection 1.65 mg  1.5 mL Intravenous ONCE PRN Mahesh Pacheco MD        sodium chloride flush 0.9 % injection 10 mL  10 mL Intravenous PRN Mahesh Pacheco MD        0.9 % sodium chloride infusion  500 mL Intravenous Continuous PRN Mahesh Pacheco MD        albuterol sulfate  (90 Base) MCG/ACT inhaler 2 puff  2 puff Inhalation PRN Mahesh Pacheco MD        atropine injection 0.5 mg  0.5 mg Intravenous Q5 Min PRN Mahesh Pacheco MD        nitroGLYCERIN (NITROSTAT) SL tablet 0.4 mg  0.4 mg Sublingual Q5 Min PRN Mahesh Pacheco MD        metoprolol (LOPRESSOR) injection 5 mg  5 mg Intravenous Q5 Min PRN Peyman Dobbins MD        allopurinol (ZYLOPRIM) tablet 100 mg  100 mg Oral Nightly [facility], DO   100 mg at 07/22/20 2051    aspirin EC tablet 81 mg  81 mg Oral Nightly [facility], DO   81 mg at 07/22/20 2051    [Held by provider] atenolol (TENORMIN) tablet 50 mg  50 mg Oral Nightly Sofie Roach, DO        levothyroxine (SYNTHROID) tablet 88 mcg  88 mcg Oral Daily [facility], DO   88 mcg at 07/23/20 0839    cetirizine (ZYRTEC) tablet 10 mg  10 mg Oral Daily [facility], DO   10 mg at 07/23/20 0836    gabapentin (NEURONTIN) capsule 100 mg  100 mg Oral BID [facility], DO   100 mg at 07/23/20 5212    lactobacillus (CULTURELLE) capsule 1 capsule  1 capsule Oral Daily Sofie Roach, DO   1 capsule at 07/23/20 6406    oxybutynin (DITROPAN-XL) extended release tablet 10 mg  10 mg Oral Nightly [place] Arias, DO   10 mg at 07/22/20 2051    ipratropium-albuterol (DUONEB) nebulizer solution 1 ampule  1 ampule Inhalation Q4H [facility], DO   1 ampule at 07/23/20 1114    sodium chloride flush 0.9 % injection 10 mL  10 mL Intravenous 2 times per day Sofie Roach, DO   10 mL at 07/23/20 0836    sodium chloride flush 0.9 % injection 10 mL  10 mL Intravenous PRN Sofie Roach DO        acetaminophen (TYLENOL) tablet 650 mg  650 mg Oral Q6H PRN Sofie Roach DO        Or    acetaminophen (TYLENOL) suppository 650 mg  650 mg Rectal Q6H PRN Sofie Roach DO        polyethylene glycol (GLYCOLAX) packet 17 g  17 g Oral Daily PRN Sofie Roach DO        promethazine (PHENERGAN) tablet 12.5 mg  12.5 mg Oral Q6H PRN Sofie Roach DO        Or    ondansetron [facility]) injection 4 mg  4 mg Intravenous Q6H PRN [facility], DO        enoxaparin (LOVENOX) injection 40 mg  40 mg Subcutaneous Daily Santiago Arias DO   40 mg at 07/23/20 0836    nitroGLYCERIN (NITROSTAT) SL tablet 0.4 mg  0.4 mg Sublingual Q5 Min PRN Sofie Roach DO            Labs:     Recent Labs     07/22/20  1254 07/23/20  0317   WBC 8.9 7. 0   RBC 4.19* 3.67*   HGB 13.0 11.5*   HCT 40.0 35.4*   MCV 95.5 96.5   MCH 31.0 31.3*   MCHC 32.5* 32.5*    160     Recent Labs     07/22/20  1705 07/23/20  0317    142   K 5.4* 4.4   ANIONGAP 12 15    107   CO2 25 20*   BUN 35* 33*   CREATININE 1.7* 1.6*   GLUCOSE 102 111*   CALCIUM 10.4* 9.8     No results for input(s): MG, PHOS in the last 72 hours. Recent Labs     07/22/20  1705   AST 31   ALT 19   BILITOT 0.5   ALKPHOS 40     ABGs:No results for input(s): PH, PO2, PCO2, HCO3, BE, O2SAT in the last 72 hours. Troponin T:   Recent Labs     07/22/20  1705 07/22/20  1940   TROPONINI <0.01 <0.01     INR:   Recent Labs     07/22/20  1254   INR 1.05     Lactic Acid: No results for input(s): LACTA in the last 72 hours.     Objective:   Vitals: BP (!) 158/97   Pulse (!) 45   Temp 98.2 °F (36.8 °C) (Temporal)   Resp 15   Ht 5' 1\" (1.549 m)   Wt 215 lb 9.6 oz (97.8 kg)   LMP  (LMP Unknown)   SpO2 95%   BMI 40.74 kg/m²   24HR INTAKE/OUTPUT:      Intake/Output Summary (Last 24 hours) at 7/23/2020 1306  Last data filed at 7/22/2020 2230  Gross per 24 hour   Intake 690 ml   Output 900 ml   Net -210 ml     General appearance: alert and cooperative with exam  HEENT: atraumatic, eyes with clear conjunctiva and normal lids, pupils and irises normal, external ears and nose are normal, lips normal  Neck without masses, lympadenopathy, bruit, thyroid normal  Lungs: no increased work of breathing, diminished breath sounds bilaterally  Heart: bradycardic but regular, no murmur  Abdomen: soft, non-tender; bowel sounds normal; no masses,  no organomegaly and obese  Extremities: extremities normal, atraumatic, no cyanosis or edema  Neurologic: no focal neurologic deficits, normal sensation, alert and oriented, affect and mood appropriate  Skin: no rashes, nodules    Assessment and Plan:   Principal Problem:    Bradycardia  Active Problems:    Stage 2 moderate COPD by GOLD classification ([address]    Acquired hypothyroidism    Morbid obesity with BMI of 40.0-44.9, adult (HCC)    Chronic kidney disease, stage III (moderate) (HCC)    Chest pain  Resolved Problems:    * No resolved hospital problems. *      Atenolol held, bradycardia improved but still present. Cardiology consult. DSE ordered.     Advance Directive: Full Code    DVT prophylaxis: enoxaparin    Discharge planning: DO Alexandre Whipple Hospitalist

## 2020-07-24 NOTE — PROGRESS NOTES
Cardiology Daily Note Ramone Balbuena MD      Patient:  Damon Hayward  [id number]    Patient Active Problem List    Diagnosis Date Noted    Bradycardia 07/22/2020     Priority: Low    Chest pain 07/22/2020     Priority: Low    Adrenal gland cancer, unspecified laterality ([facility] 06/22/2020     Priority: Low    Malignant neoplasm of upper-inner quadrant of right female breast ([address] 02/06/2020     Priority: Low    Chronic kidney disease, stage III (moderate) ([address] 09/03/2019     Priority: Low    Morbid obesity with BMI of 40.0-44.9, adult ([address] 09/11/2018     Priority: Low    Stage 2 moderate COPD by GOLD classification ([facility] 07/11/2018     Priority: Low    Acquired hypothyroidism 07/11/2018     Priority: [address] acquired pneumonia of left lower lobe of lung ([facility] [address] [address] 06/01/2018     Priority: Low       Admit Date:  7/22/2020    Admission Problem List: Present on Admission:   Bradycardia   Chest pain   Morbid obesity with BMI of 40.0-44.9, adult (HCC)   Chronic kidney disease, stage III (moderate) (HCC)   Stage 2 moderate COPD by GOLD classification ([facility]   Acquired hypothyroidism      Cardiac Specific Data:  Specialty Problems        Cardiology Problems    * (Principal) Bradycardia        Chest pain              Subjective:  Ms. Hetal Morales seen today resting comfortably. Denies chest pain dyspnea stable no other complaints. Objective:   BP (!) 160/68   Pulse (!) 39   Temp 98.8 °F (37.1 °C) (Temporal)   Resp 20   Ht 5' 1\" (1.549 m)   Wt 212 lb 11.2 oz (96.5 kg)   LMP  (LMP Unknown)   SpO2 94%   BMI 40.19 kg/m²       Intake/Output Summary (Last 24 hours) at 7/24/2020 1434  Last data filed at 7/24/2020 0229  Gross per 24 hour   Intake 510 ml   Output 1050 ml   Net -540 ml       Prior to Admission medications    Medication Sig Start Date End Date Taking?  Authorizing Provider   tiotropium (SPIRIVA RESPIMAT) 2.5 MCG/ACT AERS inhaler Inhale 2 puffs into the lungs daily   Yes Historical Provider, MD   albuterol sulfate HFA (VENTOLIN HFA) 108 (90 Base) MCG/ACT inhaler Inhale 2 puffs into the lungs every 6 hours as needed for Wheezing   Yes Historical Provider, MD   aspirin EC 81 MG EC tablet Take 81 mg by mouth nightly   Yes Historical Provider, MD   lactobacillus (CULTURELLE) capsule Take 1 capsule by mouth daily   Yes Historical Provider, MD   Cyanocobalamin (VITAMIN B-12) 5000 MCG TBDP Take 1 tablet by mouth nightly   Yes Historical Provider, MD   Compression Stockings MISC by Does not apply route Dx: Venous insufficiency, below knee medium compression bilateral 6/24/20   Jovana Ferrari MD   atenolol (TENORMIN) 50 MG tablet Take 1 tablet by mouth daily  Patient taking differently: Take 50 mg by mouth nightly  6/22/20   Jovana Ferrari MD   allopurinol (ZYLOPRIM) 100 MG tablet Take 1 tablet by mouth once daily  Patient taking differently: nightly  6/21/20   Jovana Ferrari MD   oxybutynin (DITROPAN-XL) 10 MG extended release tablet Take 1 tablet by mouth once daily  Patient taking differently: nightly  2/14/75   DAVID Irizarry   EUTHYROX 88 MCG tablet Take 1 tablet by mouth once daily 6/4/20   Jovana Ferrari MD   raloxifene (EVISTA) 60 MG tablet Take 1 tablet by mouth once daily 3/29/20   Jovana Ferrari MD   furosemide (LASIX) 20 MG tablet Take 1 tablet by mouth every other day Indications: M-W-F  Patient taking differently: Take 20 mg by mouth three times a week Indications: M-W-F Mon, Wed, Fri 07/08/90   DAVID Irizarry   vitamin D (ERGOCALCIFEROL) 10743 units CAPS capsule TAKE ONE CAPSULE BY MOUTH ONCE A WEEK  Patient taking differently: once a week On tuesday 9/27/19   Jovana Ferrari MD   gabapentin (NEURONTIN) 100 MG capsule Indications: Takes 2 in AM and 1 QHS TAKE 1 CAPSULE BY MOUTH THREE TIMES DAILY  Patient taking differently: Take 100 mg by mouth 2 times daily.   8/20/19 12/11/19  Jovana Ferrari MD   risedronate (ACTONEL) 35 MG tablet Take 1 tablet by mouth every 7 days  Patient spurring. Chronic interstitial lung disease with no focal infiltrate, pneumothorax, or heart failure. 1. Chronic interstitial lung disease. 2. Hypoaeration. Signed by Dr Carmen Hall on 7/22/2020 1:24 PM        Assessment:  1. Complaints of worsening exertional dyspnea can walk no longer than 100 feet  2. Complaints of intermittent squeezing chest discomfort possible angina  3. 2-1 atrioventricular block on telemetry monitoring with heart rate mid 30s  4. Chronic kidney disease  5. Obesity  6. Hypertension  7. CT chest 6/22/2018 chronic changes left lower lobe probably fibrotic changes no other processes identified. 8. Holter monitor 7/1/2020 through 7/6/2020 sinus rhythm average heart rate 54 ranging from 34-75 bradycardia during sleep frequent PACs mostly isolated rare runs rare PVC second-degree type I heart block and first-degree heart block noted no symptoms reported  9. Echocardiogram 7/1/2020 normal left ventricular systolic function ejection fraction 55 to 60%    Plan:  1. Continue to hold atenolol her last dose was Tuesday evening 721 and given her creatinine of 1.5 she may require an additional 48 to 72 hours to wash this out of her system. Recommend continued observation in the hospital on telemetry further recommendations regarding possible pacemaker over the weekend.   2.       Magan Frank MD 7/24/2020 2:34 PM

## 2020-07-24 NOTE — PROGRESS NOTES
Monitor room reported Mobitz type 2 Heart rate 39. Pt checked on and had no complaints. Will continue to monitor.  Electronically signed by Donnetta Gowers, RN on 7/24/2020 at 2:52 AM

## 2020-07-24 NOTE — PROGRESS NOTES
Hospitalist Progress Note  7/24/2020 12:21 PM  Subjective:   Admit Date: 7/22/2020  PCP: Shyanne Montgomery MD    Chief Complaint: dyspnea on exertion    Subjective: Patient is feeling a little better today. Remains bradycardic, with heart rate down to 36 this morning. Cardiology wishes to continue observation off atenolol prior to deciding on need for pacemaker. History is otherwise unchanged. Cumulative Hospital History:   7-22: Sent to ED from nephrology clinic due to HR 28. Recent echocardiogram and Holter monitor. Some chest pressure and dyspnea on exertion. Admitted to PCU with cardiology consult. Atenolol held. 7-23: Cardiology consult, dobutamine stress echo ordered. Possibility of pacemaker. HR 48 this morning off atenolol. 7-24: Awaiting cardiology decision on pacemaker. Reportedly wants to wait over the weekend and observe while off atenolol. Remains bradycardic with rate on telemetry down to 36 this morning. Feels better. DSE yesterday failed to reach target heart rate but no ischemia. ROS: 14 point review of systems is negative except as specifically addressed above. DIET CARDIAC;     Intake/Output Summary (Last 24 hours) at 7/24/2020 1221  Last data filed at 7/24/2020 8484  Gross per 24 hour   Intake 510 ml   Output 1050 ml   Net -540 ml     Medications:   DOBUTamine      DOBUTamine Stopped (07/23/20 1514)     Current Facility-Administered Medications   Medication Dose Route Frequency Provider Last Rate Last Dose    DOBUTamine (DOBUTREX) 500 mg in dextrose 5 % 250 mL infusion  10 mcg/kg/min Intravenous Continuous Gissell Perry MD        perflutren lipid microspheres (DEFINITY) injection 1.65 mg  1.5 mL Intravenous ONCE PRN Gissell Perry MD        DOBUTamine (DOBUTREX) 500 mg in dextrose 5 % 250 mL infusion  10 mcg/kg/min Intravenous Continuous PRN Gissell Perry MD   Stopped at 07/23/20 1514    atropine injection 1 mg  1 mg Intravenous PRN Gissell Perry MD   1 mg at 07/23/20 1511    allopurinol (ZYLOPRIM) tablet 100 mg  100 mg Oral Nightly Santiago Arias, DO   100 mg at 07/23/20 2026    aspirin EC tablet 81 mg  81 mg Oral Nightly Santiago Arias, DO   81 mg at 07/23/20 2026    [Held by provider] atenolol (TENORMIN) tablet 50 mg  50 mg Oral Nightly [facility], DO        levothyroxine (SYNTHROID) tablet 88 mcg  88 mcg Oral Daily Brady Arias, DO   88 mcg at 07/24/20 0615    cetirizine (ZYRTEC) tablet 10 mg  10 mg Oral Daily Brady Arias, DO   10 mg at 07/24/20 0847    gabapentin (NEURONTIN) capsule 100 mg  100 mg Oral BID Brady Arias, DO   100 mg at 07/24/20 0847    lactobacillus (CULTURELLE) capsule 1 capsule  1 capsule Oral Daily [facility], DO   1 capsule at 07/23/20 2290    oxybutynin (DITROPAN-XL) extended release tablet 10 mg  10 mg Oral Nightly Santiago Arias, DO   10 mg at 07/23/20 2026    ipratropium-albuterol (DUONEB) nebulizer solution 1 ampule  1 ampule Inhalation Q4H [place] Brady Arias, DO   1 ampule at 07/24/20 8558    sodium chloride flush 0.9 % injection 10 mL  10 mL Intravenous 2 times per day [facility], DO   10 mL at 07/24/20 0847    sodium chloride flush 0.9 % injection 10 mL  10 mL Intravenous PRN [facility], DO        acetaminophen (TYLENOL) tablet 650 mg  650 mg Oral Q6H PRN [facility], DO        Or    acetaminophen (TYLENOL) suppository 650 mg  650 mg Rectal Q6H PRN [facility], DO        polyethylene glycol (GLYCOLAX) packet 17 g  17 g Oral Daily PRN [facility], DO        promethazine (PHENERGAN) tablet 12.5 mg  12.5 mg Oral Q6H PRN [facility], DO        Or    ondansetron [facility]) injection 4 mg  4 mg Intravenous Q6H PRN Brady Arias, DO        enoxaparin (LOVENOX) injection 40 mg  40 mg Subcutaneous Daily [facility], DO   40 mg at 07/23/20 0836    nitroGLYCERIN (NITROSTAT) SL tablet 0.4 mg  0.4 mg Sublingual Q5 Min PRN [facility], DO            Labs:     Recent Labs     07/22/20  1254 07/23/20  0317 07/24/20  0219   WBC 8.9 7.0 8.0   RBC 4.19* 3.67* 3.81*   HGB 13.0 11.5* 11.8*   HCT 40.0 35.4* 37.3   MCV 95.5 96.5 97.9   MCH 31.0 31.3* 31.0   MCHC 32.5* 32.5* 31.6*    160 169     Recent Labs     07/22/20  1705 07/23/20  0317 07/24/20  0219    142 142   K 5.4* 4.4 4.2   ANIONGAP 12 15 12    107 107   CO2 25 20* 23   BUN 35* 33* 26*   CREATININE 1.7* 1.6* 1.5*   GLUCOSE 102 111* 121*   CALCIUM 10.4* 9.8 9.9     No results for input(s): MG, PHOS in the last 72 hours. Recent Labs     07/22/20  1705   AST 31   ALT 19   BILITOT 0.5   ALKPHOS 40     ABGs:No results for input(s): PH, PO2, PCO2, HCO3, BE, O2SAT in the last 72 hours. Troponin T:   Recent Labs     07/22/20  1705 07/22/20  1940   TROPONINI <0.01 <0.01     INR:   Recent Labs     07/22/20  1254   INR 1.05     Lactic Acid: No results for input(s): LACTA in the last 72 hours.     Objective:   Vitals: BP (!) 148/72   Pulse (!) 38   Temp 98 °F (36.7 °C) (Temporal)   Resp 16   Ht 5' 1\" (1.549 m)   Wt 212 lb 11.2 oz (96.5 kg)   LMP  (LMP Unknown)   SpO2 94%   BMI 40.19 kg/m²   24HR INTAKE/OUTPUT:      Intake/Output Summary (Last 24 hours) at 7/24/2020 1221  Last data filed at 7/24/2020 0229  Gross per 24 hour   Intake 510 ml   Output 1050 ml   Net -540 ml     General appearance: alert and cooperative with exam  HEENT: atraumatic, eyes with clear conjunctiva and normal lids, pupils and irises normal, external ears and nose are normal, lips normal  Neck without masses, lympadenopathy, bruit, thyroid normal  Lungs: no increased work of breathing, diminished breath sounds bilaterally  Heart: bradycardic but regular, no murmur  Abdomen: soft, non-tender; bowel sounds normal; no masses,  no organomegaly and obese  Extremities: extremities normal, atraumatic, no cyanosis or edema  Neurologic: no focal neurologic deficits, normal sensation, alert and oriented, affect and mood appropriate  Skin: no rashes, nodules    Assessment and Plan:   Principal Problem:    Bradycardia  Active Problems:    Stage 2 moderate COPD by GOLD classification ([facility] [address]    Acquired hypothyroidism    Morbid obesity with BMI of 40.0-44.9, adult ([facility] [address]    Chronic kidney disease, stage III (moderate) (HCC)    Chest pain  Resolved Problems:    * No resolved hospital problems. *      Atenolol held, bradycardia improved but still present. Cardiology wants to continue observing prior to making a decision on pacemaker implantation. DSE 7-23 showing no ischemia.     Advance Directive: Full Code    DVT prophylaxis: enoxaparin    Discharge planning: TBD      [facility], DO  [facility]

## 2020-07-25 NOTE — PROGRESS NOTES
tiotropium (SPIRIVA RESPIMAT) 2.5 MCG/ACT AERS inhaler Inhale 2 puffs into the lungs daily   Yes Historical Provider, MD   albuterol sulfate HFA (VENTOLIN HFA) 108 (90 Base) MCG/ACT inhaler Inhale 2 puffs into the lungs every 6 hours as needed for Wheezing   Yes Historical Provider, MD   aspirin EC 81 MG EC tablet Take 81 mg by mouth nightly   Yes Historical Provider, MD   lactobacillus (CULTURELLE) capsule Take 1 capsule by mouth daily   Yes Historical Provider, MD   Cyanocobalamin (VITAMIN B-12) 5000 MCG TBDP Take 1 tablet by mouth nightly   Yes Historical Provider, MD   Compression Stockings MISC by Does not apply route Dx: Venous insufficiency, below knee medium compression bilateral 6/24/20   Hortencia Warner MD   atenolol (TENORMIN) 50 MG tablet Take 1 tablet by mouth daily  Patient taking differently: Take 50 mg by mouth nightly  6/22/20   Hortencia Warner MD   allopurinol (ZYLOPRIM) 100 MG tablet Take 1 tablet by mouth once daily  Patient taking differently: nightly  6/21/20   Hortencia Warner MD   oxybutynin (DITROPAN-XL) 10 MG extended release tablet Take 1 tablet by mouth once daily  Patient taking differently: nightly  [birth year]   DAVID Valdez   EUTHYROX 88 MCG tablet Take 1 tablet by mouth once daily 6/4/20   Hortencia Warner MD   raloxifene (EVISTA) 60 MG tablet Take 1 tablet by mouth once daily 3/29/20   Hortencia Warner MD   furosemide (LASIX) 20 MG tablet Take 1 tablet by mouth every other day Indications: M-W-F  Patient taking differently: Take 20 mg by mouth three times a week Indications: M-W-F Mon, Wed, Fri 05/38/03   DAVID Valdez   vitamin D (ERGOCALCIFEROL) 47666 units CAPS capsule TAKE ONE CAPSULE BY MOUTH ONCE A WEEK  Patient taking differently: once a week On tuesday 9/27/19   Hortencia Warner MD   gabapentin (NEURONTIN) 100 MG capsule Indications: Takes 2 in AM and 1 QHS TAKE 1 CAPSULE BY MOUTH THREE TIMES DAILY  Patient taking differently: Take 100 mg by mouth 2 times daily. 8/20/19 12/11/19  Esthela Rivera MD   risedronate (ACTONEL) 35 MG tablet Take 1 tablet by mouth every 7 days  Patient taking differently: Take 35 mg by mouth once a week On Thursday 4/25/19   Esthela Rivera MD   fexofenadine [facility] ALLERGY) 180 MG tablet Take 180 mg by mouth daily    Historical Provider, MD        allopurinol  100 mg Oral Nightly    aspirin EC  81 mg Oral Nightly    [Held by provider] atenolol  50 mg Oral Nightly    levothyroxine  88 mcg Oral Daily    cetirizine  10 mg Oral Daily    gabapentin  100 mg Oral BID    lactobacillus  1 capsule Oral Daily    oxybutynin  10 mg Oral Nightly    ipratropium-albuterol  1 ampule Inhalation Q4H WA    sodium chloride flush  10 mL Intravenous 2 times per day    enoxaparin  40 mg Subcutaneous Daily       TELEMETRY: 2-1 atrioventricular block    Physical Exam:      Physical Exam      General:  Awake, alert, NAD  Skin:  Warm and dry  Neck:  no jvd , no carotid bruits  Chest:  Clear to auscultation, no wheezing or rales  Cardiovascular:  RRR [id number] no murmurs, clicks, gallups, or rubs  Abdomen:  Soft nontender, nondistended, bowel sounds present  Extremities:  Edema: none      Lab Data:  CBC:   Recent Labs     07/23/20  0317 07/24/20  0219 07/25/20  0344   WBC 7.0 8.0 6.8   HGB 11.5* 11.8* 10.7*   HCT 35.4* 37.3 33.8*   MCV 96.5 97.9 98.8    169 147     BMP:   Recent Labs     07/23/20  0317 07/24/20  0219 07/25/20  0344    142 143   K 4.4 4.2 4.0    107 107   CO2 20* 23 23   BUN 33* 26* 23   CREATININE 1.6* 1.5* 1.5*     LIVER PROFILE:   Recent Labs     07/22/20  1705   AST 31   ALT 19   BILITOT 0.5   ALKPHOS 40     PT/INR: No results for input(s): PROTIME, INR in the last 72 hours. APTT: No results for input(s): APTT in the last 72 hours. BNP:  No results for input(s): BNP in the last 72 hours.   CK, CKMB, Troponin: @LABRCNT (CKTOTAL:3, CKMB:3, TROPONINI:3)@    IMAGING:  Xr Chest Portable    Result Date: 7/22/2020  XR CHEST PORTABLE 7/22/2020 1:23 PM History: Chest pain. Portable chest x-ray compared with 5/17/2019. Magnified heart size. Moderate thoracic spurring. Chronic interstitial lung disease with no focal infiltrate, pneumothorax, or heart failure. 1. Chronic interstitial lung disease. 2. Hypoaeration. Signed by Dr Nika Dillard on 7/22/2020 1:24 PM        Assessment:  1. Complaints of worsening exertional dyspnea can walk no longer than 100 feet  2. Complaints of intermittent squeezing chest discomfort possible angina  3. 2-1 atrioventricular block on telemetry monitoring with heart rate mid 30s  4. Chronic kidney disease  5. Obesity  6. Hypertension  7. CT chest 6/22/2018 chronic changes left lower lobe probably fibrotic changes no other processes identified. 8. Holter monitor 7/1/2020 through 7/6/2020 sinus rhythm average heart rate 54 ranging from 34-75 bradycardia during sleep frequent PACs mostly isolated rare runs rare PVC second-degree type I heart block and first-degree heart block noted no symptoms reported  9. Echocardiogram 7/1/2020 normal left ventricular systolic function ejection fraction 55 to 60%    Plan:  1.  Continue current treatment and monitor    Jenny Fischer MD 7/25/2020 4:05 PM

## 2020-07-25 NOTE — PROGRESS NOTES
levothyroxine (SYNTHROID) tablet 88 mcg  88 mcg Oral Daily [facility], DO   88 mcg at 07/25/20 0900    cetirizine (ZYRTEC) tablet 10 mg  10 mg Oral Daily [facility], DO   10 mg at 07/25/20 1001    gabapentin (NEURONTIN) capsule 100 mg  100 mg Oral BID [facility], DO   100 mg at 07/25/20 1001    lactobacillus (CULTURELLE) capsule 1 capsule  1 capsule Oral Daily [facility], DO   1 capsule at 07/25/20 1002    oxybutynin (DITROPAN-XL) extended release tablet 10 mg  10 mg Oral Nightly Taylor Arias, DO   10 mg at 07/24/20 2138    ipratropium-albuterol (DUONEB) nebulizer solution 1 ampule  1 ampule Inhalation Q4H [place] [facility], DO   1 ampule at 07/25/20 1005    sodium chloride flush 0.9 % injection 10 mL  10 mL Intravenous 2 times per day [facility], DO   10 mL at 07/25/20 1001    sodium chloride flush 0.9 % injection 10 mL  10 mL Intravenous PRN [facility], DO        acetaminophen (TYLENOL) tablet 650 mg  650 mg Oral Q6H PRN [facility], DO        Or    acetaminophen (TYLENOL) suppository 650 mg  650 mg Rectal Q6H PRN [facility], DO        polyethylene glycol (GLYCOLAX) packet 17 g  17 g Oral Daily PRN [facility], DO        promethazine (PHENERGAN) tablet 12.5 mg  12.5 mg Oral Q6H PRN [facility], DO        Or    ondansetron [facility]) injection 4 mg  4 mg Intravenous Q6H PRN [facility], DO        enoxaparin (LOVENOX) injection 40 mg  40 mg Subcutaneous Daily [facility], DO   40 mg at 07/25/20 1001    nitroGLYCERIN (NITROSTAT) SL tablet 0.4 mg  0.4 mg Sublingual Q5 Min PRN [facility], DO            Labs:     Recent Labs     07/23/20  0317 07/24/20  0219 07/25/20  0344   WBC 7.0 8.0 6.8   RBC 3.67* 3.81* 3.42*   HGB 11.5* 11.8* 10.7*   HCT 35.4* 37.3 33.8*   MCV 96.5 97.9 98.8   MCH 31.3* 31.0 31.3*   MCHC 32.5* 31.6* 31.7*    169 147     Recent Labs     07/23/20  0317 07/24/20  0219 07/25/20  0344    142 143   K 4.4 4.2 4.0   ANIONGAP 15 12 13    107 107   CO2 20* 23 23   BUN 33* 26* 23   CREATININE 1.6* 1.5* 1.5*   GLUCOSE 111* 121* 130*   CALCIUM 9.8 9.9 9.9     No results for input(s): MG, PHOS in the last 72 hours. Recent Labs     07/22/20  1705   AST 31   ALT 19   BILITOT 0.5   ALKPHOS 40     ABGs:No results for input(s): PH, PO2, PCO2, HCO3, BE, O2SAT in the last 72 hours. Troponin T:   Recent Labs     07/22/20  1705 07/22/20  1940   TROPONINI <0.01 <0.01     INR:   Recent Labs     07/22/20  1254   INR 1.05     Lactic Acid: No results for input(s): LACTA in the last 72 hours. Objective:   Vitals: BP (!) 148/64 Comment: reported to day shift rn  Pulse 51   Temp 97.5 °F (36.4 °C) (Temporal)   Resp 16   Ht 5' 1\" (1.549 m)   Wt 214 lb 8 oz (97.3 kg)   LMP  (LMP Unknown)   SpO2 92%   BMI 40.53 kg/m²   24HR INTAKE/OUTPUT:      Intake/Output Summary (Last 24 hours) at 7/25/2020 1154  Last data filed at 7/25/2020 1001  Gross per 24 hour   Intake 520 ml   Output 900 ml   Net -380 ml     General appearance: alert and cooperative with exam  HEENT: atraumatic, eyes with clear conjunctiva and normal lids, pupils and irises normal, external ears and nose are normal, lips normal  Neck without masses, lympadenopathy, bruit, thyroid normal  Lungs: no increased work of breathing, diminished breath sounds bilaterally  Heart: bradycardic but regular, no murmur  Abdomen: soft, non-tender; bowel sounds normal; no masses,  no organomegaly and obese  Extremities: extremities normal, atraumatic, no cyanosis or edema  Neurologic: no focal neurologic deficits, normal sensation, alert and oriented, affect and mood appropriate  Skin: no rashes, nodules    Assessment and Plan:   Principal Problem:    Bradycardia  Active Problems:    Stage 2 moderate COPD by GOLD classification ([address]    Acquired hypothyroidism    Morbid obesity with BMI of 40.0-44.9, adult (HCC)    Chronic kidney disease, stage III (moderate) ([facility])    Chest pain  Resolved Problems:    * No resolved hospital problems.  *      Atenolol held, bradycardia improved but still present. Cardiology wants to continue observing prior to making a decision on pacemaker implantation. DSE 7-23 showing no ischemia.     Advance Directive: Full Code    DVT prophylaxis: enoxaparin    Discharge planning: OSCAR Olsen DO  [place] Hospitalist

## 2020-07-26 NOTE — PROGRESS NOTES
Cardiology Daily Note Chhaya Johnson MD      Patient:  Jeanette Ordonez  [id number]    Patient Active Problem List    Diagnosis Date Noted    Bradycardia 07/22/2020     Priority: Low    Chest pain 07/22/2020     Priority: Low    Adrenal gland cancer, unspecified laterality ([facility] 06/22/2020     Priority: Low    Malignant neoplasm of upper-inner quadrant of right female breast ([facility] 02/06/2020     Priority: Low    Chronic kidney disease, stage III (moderate) ([facility] 09/03/2019     Priority: Low    Morbid obesity with BMI of 40.0-44.9, adult ([facility] 09/11/2018     Priority: Low    Stage 2 moderate COPD by GOLD classification ([facility] 07/11/2018     Priority: Low    Acquired hypothyroidism 07/11/2018     Priority: [address] acquired pneumonia of left lower lobe of lung ([facility] 06/01/2018     Priority: Low       Admit Date:  7/22/2020    Admission Problem List: Present on Admission:   Bradycardia   Chest pain   Morbid obesity with BMI of 40.0-44.9, adult ([facility]   Chronic kidney disease, stage III (moderate) (HCC)   Stage 2 moderate COPD by GOLD classification ([facility]   Acquired hypothyroidism      Cardiac Specific Data:  Specialty Problems        Cardiology Problems    * (Principal) Bradycardia        Chest pain              Subjective:  Ms. Bradford Lopez seen today remains 2-1 atrioventricular block heart rate in the 40s no other new complaints or issues reported. Objective:   BP (!) 152/60 Comment: reported to day shift rn  Pulse (!) 43 Comment: reported to nurse  Temp 96.6 °F (35.9 °C) (Temporal)   Resp 18   Ht 5' 1\" (1.549 m)   Wt 215 lb 2 oz (97.6 kg)   LMP  (LMP Unknown)   SpO2 94%   BMI 40.65 kg/m²       Intake/Output Summary (Last 24 hours) at 7/26/2020 1321  Last data filed at 7/26/2020 1027  Gross per 24 hour   Intake 600 ml   Output 1500 ml   Net -900 ml       Prior to Admission medications    Medication Sig Start Date End Date Taking?  Authorizing Provider   tiotropium (SPIRIVA RESPIMAT) 2.5 MCG/ACT AERS inhaler Inhale 2 puffs into the lungs daily   Yes Historical Provider, MD   albuterol sulfate HFA (VENTOLIN HFA) 108 (90 Base) MCG/ACT inhaler Inhale 2 puffs into the lungs every 6 hours as needed for Wheezing   Yes Historical Provider, MD   aspirin EC 81 MG EC tablet Take 81 mg by mouth nightly   Yes Historical Provider, MD   lactobacillus (CULTURELLE) capsule Take 1 capsule by mouth daily   Yes Historical Provider, MD   Cyanocobalamin (VITAMIN B-12) 5000 MCG TBDP Take 1 tablet by mouth nightly   Yes Historical Provider, MD   Compression Stockings MISC by Does not apply route Dx: Venous insufficiency, below knee medium compression bilateral 6/24/20   Vicente Ramon MD   atenolol (TENORMIN) 50 MG tablet Take 1 tablet by mouth daily  Patient taking differently: Take 50 mg by mouth nightly  6/22/20   Vicente Ramon MD   allopurinol (ZYLOPRIM) 100 MG tablet Take 1 tablet by mouth once daily  Patient taking differently: nightly  6/21/20   Vicente Ramon MD   oxybutynin (DITROPAN-XL) 10 MG extended release tablet Take 1 tablet by mouth once daily  Patient taking differently: nightly  1/61/93   DAVID Klein   EUTHYROX 88 MCG tablet Take 1 tablet by mouth once daily 6/4/20   Vicente Ramon MD   raloxifene (EVISTA) 60 MG tablet Take 1 tablet by mouth once daily 3/29/20   Vicente Ramon MD   furosemide (LASIX) 20 MG tablet Take 1 tablet by mouth every other day Indications: M-W-F  Patient taking differently: Take 20 mg by mouth three times a week Indications: M-W-F Mon, Wed, Fri 21/09/70   DAVID Klein   vitamin D (ERGOCALCIFEROL) 85263 units CAPS capsule TAKE ONE CAPSULE BY MOUTH ONCE A WEEK  Patient taking differently: once a week On tuesday 9/27/19   Vicente Ramon MD   gabapentin (NEURONTIN) 100 MG capsule Indications: Takes 2 in AM and 1 QHS TAKE 1 CAPSULE BY MOUTH THREE TIMES DAILY  Patient taking differently: Take 100 mg by mouth 2 times daily.   8/20/19 12/11/19  Vicente Ramon MD risedronate (ACTONEL) 35 MG tablet Take 1 tablet by mouth every 7 days  Patient taking differently: Take 35 mg by mouth once a week On Thursday 4/25/19   Radha Barnes MD   fexofenadine TY [facility] ALLERGY) 180 MG tablet Take 180 mg by mouth daily    Historical Provider, MD        allopurinol  100 mg Oral Nightly    aspirin EC  81 mg Oral Nightly    [Held by provider] atenolol  50 mg Oral Nightly    levothyroxine  88 mcg Oral Daily    cetirizine  10 mg Oral Daily    gabapentin  100 mg Oral BID    lactobacillus  1 capsule Oral Daily    oxybutynin  10 mg Oral Nightly    ipratropium-albuterol  1 ampule Inhalation Q4H WA    sodium chloride flush  10 mL Intravenous 2 times per day    enoxaparin  40 mg Subcutaneous Daily       TELEMETRY: Bradycardic rhythm 2-1 atrioventricular block sinus rhythm mechanism    Physical Exam:      Physical Exam      General:  Awake, alert, NAD  Skin:  Warm and dry  Neck:  no jvd , no carotid bruits  Chest:  Clear to auscultation, no wheezing or rales  Cardiovascular:  RRR [id number] no murmurs, clicks, gallups, or rubs  Abdomen:  Soft nontender, nondistended, bowel sounds present  Extremities:  Edema: none    Lab Data:  CBC:   Recent Labs     07/24/20  0219 07/25/20  0344 07/26/20  0254   WBC 8.0 6.8 7.5   HGB 11.8* 10.7* 11.2*   HCT 37.3 33.8* 34.7*   MCV 97.9 98.8 95.1    147 156     BMP:   Recent Labs     07/24/20  0219 07/25/20  0344 07/26/20  0254    143 142   K 4.2 4.0 4.4    107 107   CO2 23 23 22   BUN 26* 23 22   CREATININE 1.5* 1.5* 1.5*     LIVER PROFILE: No results for input(s): AST, ALT, LIPASE, BILIDIR, BILITOT, ALKPHOS in the last 72 hours. Invalid input(s): AMYLASE,  ALB  PT/INR: No results for input(s): PROTIME, INR in the last 72 hours. APTT: No results for input(s): APTT in the last 72 hours. BNP:  No results for input(s): BNP in the last 72 hours.   CK, CKMB, Troponin: @LABRCNT (CKTOTAL:3, CKMB:3, TROPONINI:3)@    IMAGING:  Xr Chest Portable    Result Date: 7/22/2020  XR CHEST PORTABLE 7/22/2020 1:23 PM History: Chest pain. Portable chest x-ray compared with 5/17/2019. Magnified heart size. Moderate thoracic spurring. Chronic interstitial lung disease with no focal infiltrate, pneumothorax, or heart failure. 1. Chronic interstitial lung disease. 2. Hypoaeration. Signed by Dr Levin Aid on 7/22/2020 1:24 PM        Assessment:  1. Complaints of worsening exertional dyspnea can walk no longer than 100 feet  2. Complaints of intermittent squeezing chest discomfort possible angina  3. 2-1 atrioventricular block on telemetry monitoring with heart rate mid 30s  4. Chronic kidney disease  5. Obesity  6. Hypertension  7. CT chest 6/22/2018 chronic changes left lower lobe probably fibrotic changes no other processes identified. 8. Holter monitor 7/1/2020 through 7/6/2020 sinus rhythm average heart rate 54 ranging from 34-75 bradycardia during sleep frequent PACs mostly isolated rare runs rare PVC second-degree type I heart block and first-degree heart block noted no symptoms reported  9. Echocardiogram 7/1/2020 normal left ventricular systolic function ejection fraction 55 to 60%    Plan:  1. At this point with persistent 2-1 atrioventricular block and bradycardia despite being off atenolol for 5 days I would recommend proceeding with permanent dual-chamber pacemaker implantation advised indications alternatives benefits and risk patient and  agreeable. Plan tomorrow morning 0900 approximately.   I have discussed with the patient regarding indications for the proposed procedure ICD/ Pacemaker implantation along with possible alternatives benefits and risks including but not limited to risks of death, myocardial infarction, stroke, contrast induced nephropathy which in some cases may lead to acute kidney failure requiring dialysis, allergic reactions, infections which may require treatment with antibiotics or removal of the device, bleeding requiring blood transfusion,  cardiac arrhthymias, respiratory failure which may require placing the patient on respiratory support such as a ventilator or breathing machine,risk of complications which may require vascular surgery,risks of collapsing the lung with development of a pneumothorax which may require placement of a chest tube, and risks of lead dislodgement which may require follow up surgery and repositioning of the leads. In addition there are long term risks including infection, and device or component failure which may require removal and/or replacement of various components. The patient is advised the device battery will eventually become depleted and require replacement at some point. The patient is awake and alert and understands the issues involved and indicates willingness to proceed as ordered. The patient is  a reasonable candidate for moderate conscious sedation. ASA score:  ASA 3 - Patient with moderate systemic disease with functional limitations    Mallampati: I (soft palate, uvula, fauces, tonsillar pillars visible)    Preferred vascular access site will be: left subclavian vein.           Saran Molina MD 7/26/2020 1:21 PM

## 2020-07-26 NOTE — PROGRESS NOTES
Hospitalist Progress Note  7/26/2020 1:54 PM  Subjective:   Admit Date: 7/22/2020  PCP: Aristeo Farley MD    Chief Complaint: dyspnea on exertion    Subjective: Patient is without any complaints. Hear rate continues to be in low 40s. Cardiology has decided to implant pacemaker tomorrow. History is otherwise unchanged. [facility]:   7-22: Sent to ED from nephrology clinic due to HR 28. Recent echocardiogram and Holter monitor. Some chest pressure and dyspnea on exertion. Admitted to PCU with cardiology consult. Atenolol held. 7-23: Cardiology consult, dobutamine stress echo ordered. Possibility of pacemaker. HR 48 this morning off atenolol. 7-24: Awaiting cardiology decision on pacemaker. Reportedly wants to wait over the weekend and observe while off atenolol. Remains bradycardic with rate on telemetry down to 36 this morning. Feels better. DSE yesterday failed to reach target heart rate but no ischemia. 7-25: Remains bradycardic. Cardiology waiting to see if HR increases. 7-26: HR unchanged. Cardiology will implant pacemaker tomorrow. ROS: 14 point review of systems is negative except as specifically addressed above. DIET CARDIAC;     Intake/Output Summary (Last 24 hours) at 7/26/2020 1354  Last data filed at 7/26/2020 1200  Gross per 24 hour   Intake 600 ml   Output 1700 ml   Net -1100 ml     Medications:   DOBUTamine       Current Facility-Administered Medications   Medication Dose Route Frequency Provider Last Rate Last Dose    DOBUTamine (DOBUTREX) 500 mg in dextrose 5 % 250 mL infusion  10 mcg/kg/min Intravenous Continuous Tiffanie Burnett MD        perflutren lipid microspheres (DEFINITY) injection 1.65 mg  1.5 mL Intravenous ONCE PRN Tiffanie Burnett MD        allopurinol (ZYLOPRIM) tablet 100 mg  100 mg Oral Nightly Santiago Arias, DO   100 mg at 07/25/20 2142    [Held by provider] atenolol (TENORMIN) tablet 50 mg  50 mg Oral Nightly Juan Manuel Breaux,         levothyroxine (SYNTHROID) tablet 88 mcg  88 mcg Oral Daily [facility], DO   88 mcg at 07/26/20 1003    cetirizine (ZYRTEC) tablet 10 mg  10 mg Oral Daily [facility], DO   10 mg at 07/26/20 1003    gabapentin (NEURONTIN) capsule 100 mg  100 mg Oral BID [facility], DO   100 mg at 07/26/20 1003    lactobacillus (CULTURELLE) capsule 1 capsule  1 capsule Oral Daily [facility], DO   1 capsule at 07/26/20 1003    oxybutynin (DITROPAN-XL) extended release tablet 10 mg  10 mg Oral Nightly Hecker Arias, DO   10 mg at 07/25/20 2143    ipratropium-albuterol (DUONEB) nebulizer solution 1 ampule  1 ampule Inhalation Q4H WA [facility], DO   1 ampule at 07/26/20 1012    sodium chloride flush 0.9 % injection 10 mL  10 mL Intravenous 2 times per day [facility], DO   10 mL at 07/26/20 1003    sodium chloride flush 0.9 % injection 10 mL  10 mL Intravenous PRN [facility], DO        acetaminophen (TYLENOL) tablet 650 mg  650 mg Oral Q6H PRN [facility], DO        Or    acetaminophen (TYLENOL) suppository 650 mg  650 mg Rectal Q6H PRN [facility], DO        polyethylene glycol (GLYCOLAX) packet 17 g  17 g Oral Daily PRN [facility], DO        promethazine (PHENERGAN) tablet 12.5 mg  12.5 mg Oral Q6H PRN [facility], DO        Or    ondansetron [facility]) injection 4 mg  4 mg Intravenous Q6H PRN [facility], DO        enoxaparin (LOVENOX) injection 40 mg  40 mg Subcutaneous Daily [facility], DO   40 mg at 07/26/20 1003    nitroGLYCERIN (NITROSTAT) SL tablet 0.4 mg  0.4 mg Sublingual Q5 Min PRN [facility], DO            Labs:     Recent Labs     07/24/20  0219 07/25/20  0344 07/26/20  0254   WBC 8.0 6.8 7.5   RBC 3.81* 3.42* 3.65*   HGB 11.8* 10.7* 11.2*   HCT 37.3 33.8* 34.7*   MCV 97.9 98.8 95.1   MCH 31.0 31.3* 30.7   MCHC 31.6* 31.7* 32.3*    147 156     Recent Labs     07/24/20  0219 07/25/20  0344 07/26/20  0254    143 142   K 4.2 4.0 4.4   ANIONGAP 12 13 13    107 107   CO2 23 23 22   BUN 26* 23 22   CREATININE 1.5* 1.5* 1.5*   GLUCOSE 121* 130* 116*   CALCIUM 9.9 9.9 10.1     No results for input(s): MG, PHOS in the last 72 hours. No results for input(s): AST, ALT, ALB, BILITOT, ALKPHOS, ALB in the last 72 hours. ABGs:No results for input(s): PH, PO2, PCO2, HCO3, BE, O2SAT in the last 72 hours. Troponin T:   No results for input(s): TROPONINI in the last 72 hours. INR:   No results for input(s): INR in the last 72 hours. Lactic Acid: No results for input(s): LACTA in the last 72 hours. Objective:   Vitals: BP (!) 164/72 Comment: reported to day shift rn   Pulse 51   Temp 98.1 °F (36.7 °C) (Temporal)   Resp 16   Ht 5' 1\" (1.549 m)   Wt 215 lb 2 oz (97.6 kg)   LMP  (LMP Unknown)   SpO2 94%   BMI 40.65 kg/m²   24HR INTAKE/OUTPUT:      Intake/Output Summary (Last 24 hours) at 7/26/2020 1354  Last data filed at 7/26/2020 1200  Gross per 24 hour   Intake 600 ml   Output 1700 ml   Net -1100 ml     General appearance: alert and cooperative with exam  HEENT: atraumatic, eyes with clear conjunctiva and normal lids, pupils and irises normal, external ears and nose are normal, lips normal  Neck without masses, lympadenopathy, bruit, thyroid normal  Lungs: no increased work of breathing, diminished breath sounds bilaterally  Heart: bradycardic but regular, no murmur  Abdomen: soft, non-tender; bowel sounds normal; no masses,  no organomegaly and obese  Extremities: extremities normal, atraumatic, no cyanosis or edema  Neurologic: no focal neurologic deficits, normal sensation, alert and oriented, affect and mood appropriate  Skin: no rashes, nodules    Assessment and Plan:   Principal Problem:    Bradycardia  Active Problems:    Stage 2 moderate COPD by GOLD classification ([address]    Acquired hypothyroidism    Morbid obesity with BMI of 40.0-44.9, adult (HCC)    Chronic kidney disease, stage III (moderate) ([facility]    Chest pain  Resolved Problems:    * No resolved hospital problems. *      Atenolol held, bradycardia persistent.  Cardiology will implant pacemaker 7-27. DSE 7-23 showing no ischemia.     Advance Directive: Full Code    DVT prophylaxis: enoxaparin    Discharge planning: TBD Toribio Sacks, DO Rounding Hospitalist

## 2020-07-27 NOTE — PROGRESS NOTES
Preliminary note dual-chamber pacemaker implanted left subclavian vein tolerated well under ultrasound guidance.

## 2020-07-27 NOTE — PROGRESS NOTES
Hospitalist Progress Note  7/27/2020 11:24 AM  Subjective:   Admit Date: 7/22/2020  PCP: Brennon Zayas MD    Chief Complaint: dyspnea on exertion    Subjective: Patient without complaints this morning. Heart rate up into the low 50s while talking with her  this morning, but in the 40s all day yesterday per review of telemetry. History is otherwise unchanged. [facility]:   7-22: Sent to ED from nephrology clinic due to HR 28. Recent echocardiogram and Holter monitor. Some chest pressure and dyspnea on exertion. Admitted to PCU with cardiology consult. Atenolol held. 7-23: Cardiology consult, dobutamine stress echo ordered. Possibility of pacemaker. HR 48 this morning off atenolol. 7-24: Awaiting cardiology decision on pacemaker. Reportedly wants to wait over the weekend and observe while off atenolol. Remains bradycardic with rate on telemetry down to 36 this morning. Feels better. DSE yesterday failed to reach target heart rate but no ischemia. 7-25: Remains bradycardic. Cardiology waiting to see if HR increases. 7-26: HR unchanged. Cardiology will implant pacemaker tomorrow. 7-27: Pacemaker implanted today. ROS: 14 point review of systems is negative except as specifically addressed above.     Diet NPO Effective Now    Intake/Output Summary (Last 24 hours) at 7/27/2020 1124  Last data filed at 7/26/2020 1414  Gross per 24 hour   Intake 240 ml   Output 500 ml   Net -260 ml     Medications:   sodium chloride 125 mL/hr at 07/27/20 6399    DOBUTamine       Current Facility-Administered Medications   Medication Dose Route Frequency Provider Last Rate Last Dose    0.9 % sodium chloride infusion   Intravenous Continuous Kari Hoyt  mL/hr at 07/27/20 1231      sodium chloride flush 0.9 % injection 10 mL  10 mL Intravenous 2 times per day Kari Hoyt MD        sodium chloride flush 0.9 % injection 10 mL  10 mL Intravenous PRN MD Michelle Penaloza Labs:     Recent Labs     07/25/20 0344 07/26/20 0254 07/27/20 0322   WBC 6.8 7.5 8.0   RBC 3.42* 3.65* 3.62*   HGB 10.7* 11.2* 11.2*   HCT 33.8* 34.7* 36.0*   MCV 98.8 95.1 99.4*   MCH 31.3* 30.7 30.9   MCHC 31.7* 32.3* 31.1*    156 166     Recent Labs     07/25/20 0344 07/26/20 0254 07/27/20 0322    142 140   K 4.0 4.4 4.3   ANIONGAP 13 13 14    107 106   CO2 23 22 20*   BUN 23 22 26*   CREATININE 1.5* 1.5* 1.6*   GLUCOSE 130* 116* 109   CALCIUM 9.9 10.1 10.6*     No results for input(s): MG, PHOS in the last 72 hours. Recent Labs     07/27/20 0322   AST 33*   ALT 19   BILITOT 0.4   ALKPHOS 36     ABGs:No results for input(s): PH, PO2, PCO2, HCO3, BE, O2SAT in the last 72 hours. Troponin T:   No results for input(s): TROPONINI in the last 72 hours. INR:   Recent Labs     07/27/20 0322   INR 1.05     Lactic Acid: No results for input(s): LACTA in the last 72 hours.     Objective:   Vitals: /62   Pulse (!) 47   Temp 98.4 °F (36.9 °C) (Temporal)   Resp 20   Ht 5' 1\" (1.549 m)   Wt 214 lb 8 oz (97.3 kg)   LMP  (LMP Unknown)   SpO2 96%   BMI 40.53 kg/m²   24HR INTAKE/OUTPUT:      Intake/Output Summary (Last 24 hours) at 7/27/2020 1124  Last data filed at 7/26/2020 1414  Gross per 24 hour   Intake 240 ml   Output 500 ml   Net -260 ml     General appearance: alert and cooperative with exam  HEENT: atraumatic, eyes with clear conjunctiva and normal lids, pupils and irises normal, external ears and nose are normal, lips normal  Neck without masses, lympadenopathy, bruit, thyroid normal  Lungs: no increased work of breathing, diminished breath sounds bilaterally  Heart: bradycardic but regular, no murmur  Abdomen: soft, non-tender; bowel sounds normal; no masses,  no organomegaly and obese  Extremities: extremities normal, atraumatic, no cyanosis or edema  Neurologic: no focal neurologic deficits, normal sensation, alert and oriented, affect and mood appropriate  Skin: no rashes, nodules    Assessment and Plan:   Principal Problem:    Bradycardia  Active Problems:    Stage 2 moderate COPD by GOLD classification ([facility]    Acquired hypothyroidism    Morbid obesity with BMI of 40.0-44.9, adult (HCC)    Chronic kidney disease, stage III (moderate) ([facility])    Chest pain  Resolved Problems:    * No resolved hospital problems. *      POD #0 pacemaker implantation. Atenolol held on admission. Management per cardiology now. DSE 7-23 showing no ischemia.     Advance Directive: Full Code    DVT prophylaxis: enoxaparin    Discharge planning: to home tomorrow      DO Alexandre Cui Hospitalist

## 2020-07-27 NOTE — PROCEDURES
Betzy Marroquin MD    Physician    Cardiology    Procedures    Signed    Date of Service:  7/27/2020 11:00 AM                Signed              Show:Clear all  [x]Manual[x]Template[]Copied    Added by:  [x]Jung Shi MD    []Leoncio for details  Cardiac Dual Chamber Insert - [id number]  Placement Operative Report     Jeremias Rodrigez  [id number]  7/27/2020     Surgeon: Pallavi Card     Anesthesia: Intravenous sedation and local anesthetic     Procedure(s):   1. Dual Chamber Insert - [id number] Flouroscopy  2. Monitoring of conscious sedation  3.              Indications:  1. Second degree heart block-2:1  2.         Bradycardia     Procedure Details  The risks, benefits, complications, treatment options, and expected outcomes were discussed with the patient. The patient and/or family concurred with the proposed plan, giving informed consent. Patient was prepped and draped in the usual strict sterile fashion. After the antibiotic was completely infused, 30 cc 2% xylocaine was infiltrated into the left Infraclavicular area. Venous access obtained utilizing a micropuncture needle under ultrasonographic guidance and the micropuncture wire was advanced followed by the dilator. The wire was then exchanged for a standard 0.0.35 wire and then the dilator was removed. Next, an incision was made adjacent to the wire entry site. Utilizing sharp and blunt dissection a pocket was then created down to the prepectoralis fascia. The guidewire was identified, freed from the surrounding subcutaneous tissue, and delivered into the operative field. Next an 7french sheath and dilator was advanced over the wire then the dilator was removed. Another similar wire was advanced into the sheath then the sheath was removed leaving two wires in place in the subclavian vein. Next, the 7french sheath and dilators were advanced over their respective wires into the subclavian vein.  The dilator and wires were then removed. Next the atrial and ventricular leads were inserted into their respective sheaths   And advanced into the right atrium and ventricle where the leads were positioned under fluoroscopic guidance. The sheaths were peeled away and removed. Appropriate sensing and thresholds were obtained. No diaphragmatic pacing occurred at 10 V and 1.5 ms. The active fixation mechanisms were extended. Next, the leads were then sutured utilizing 2-0 ethibond sutures. Lead measurements were then rechecked.     After having assured adequate hemostasis the leads were connected to the pulse generator and the pulse generator and leads were placed in the pocket. The pocket was copiously irrigated utilizing antibiotic solution. The pacemaker and leads system were visualized under fluoroscopy. Appropriate redundancy/slack in the leads were noted. The pins of the leads were beyond the set screws. Hemostasis was reverified. The pocket was then closed using 2-0 Vicryl for the deep layer and 3-0 Vicryl for the middle layer. Surgical staples were then applied to the skin and sterile dressing was applied. At the end of the procedure instrument, needle, and sponge counts were correct. An arm immobilizer was applied at this point.     An independent trained observer administered medications under my direction. The patient was continuously monitored with respect to level of consciousness, and vital signs/physiologic status throughout the case.   For further details regarding specific medications and doses please refer to Cath Lab procedural notes.     Anesthesia start DTYQ:0621  Anesthesia stop time:1052        Pacemaker Data:      Atrial lead   Medtronic  Model   [id number]   serial #   [id number]  Volt    1.25 V    PW    0.4 ms    Current   NA  ma       Impedance:   646   ohms        Slew rate:   NA   V/sec  P wave:   1.8 mv     Right Ventricular lead    Medtronic  Model   [id number]   serial #   [id number]  Volt    0.75    V PW    0.4 ms     Current   NA   ma    I  Impedance:   799   ohms       Slew rate:   NA   V/sec  R wave:   6.5 mv     Generator  Medtronic  Model   W1 DR 01  Serial   RNB [id number]        Estimated Blood Loss:  Minimal         Complications:  None; patient tolerated the procedure well.            Disposition: Admitted to progressive care unit           Condition: stable        Tigist Latif MD 7/27/2020 11:00 AM

## 2020-07-28 PROBLEM — R00.1 BRADYCARDIA: Status: RESOLVED | Noted: 2020-07-22 | Resolved: 2020-01-01

## 2020-07-28 NOTE — DISCHARGE SUMMARY
Discharge Summary    Jose Juan Mojica  :    MRN:  [id number]    Admit date:  2020  Discharge date:      Admitting Physician:  Agnes Castro DO    Advance Directive: Full Code    Consults: none    Primary Care Physician:  Claudia Bird MD    Discharge Diagnoses:  Principal Problem:    Bradycardia  Active Problems:    Stage 2 moderate COPD by GOLD classification ([facility] [address]    Acquired hypothyroidism    Morbid obesity with BMI of 40.0-44.9, adult ([facility] [address]    Chronic kidney disease, stage III (moderate) ([facility] [address]    Chest pain  Resolved Problems:    * No resolved hospital problems. *      Cardiology Specific Data:  Specialty Problems        Cardiology Problems    * (Principal) Bradycardia        Chest pain              Significant Diagnostic Studies:   Xr Chest Portable    Result Date: 2020  XR CHEST PORTABLE 2020 1:23 PM History: Chest pain. Portable chest x-ray compared with 2019. Magnified heart size. Moderate thoracic spurring. Chronic interstitial lung disease with no focal infiltrate, pneumothorax, or heart failure. 1. Chronic interstitial lung disease. 2. Hypoaeration. Signed by Dr Diana Vogel on 2020 1:24 PM      Pertinent Labs:   CBC:   Recent Labs     20  0254 20  0322 20  0244   WBC 7.5 8.0 8.5   HGB 11.2* 11.2* 11.2*    166 144     BMP:    Recent Labs     20  0254 20  0322 20  0244    140 141   K 4.4 4.3 4.3    106 109   CO2 22 20* 19*   BUN 22 26* 21   CREATININE 1.5* 1.6* 1.3*   GLUCOSE 116* 109 118*     INR:   Recent Labs     20  0322   INR 1.05     Lipids: No results for input(s): CHOL, HDL in the last 72 hours. Invalid input(s): LDLCALCU  ABGs:No results for input(s): PHART, ZFY9ART, PO2ART, DGH8NKE, BEART, HGBAE, S5MZZVEK, CARBOXHGBART, 02THERAPY in the last 72 hours. HgBA1c:  No results for input(s): LABA1C in the last 72 hours.     Procedures: Dual-chamber pacemaker implantation 2020, echocardiogram    Hospital Course: Admitted [phone] 2/20/2017 emergency department from nephrology clinic when she presented for follow-up and routine labs noted to have bradycardia with rate of 28. After assessment found to have 2-1 atrioventricular block was on a atenolol at the time. Also complains of some dyspnea with exertion for some time now vague chest discomfort. Following admission echocardiogram showed normal left ventricular systolic function dobutamine stress echo indeterminate due to inadequate heart rate achieved. We held her a atenolol also noting that her creatinine was 1.5 we observed her for several days to let the effects of the atenolol wear off but her heart rate never increased satisfactorily she remained with rates in the 40s 2-1 atrioventricular block. Subsequently dual-chamber pacemaker was recommended and performed on 7/27/2020 left infraclavicular approach under ultrasound guidance tolerated well good results. Today she is healing appropriately. No hematoma device check found to be functioning appropriately. Postprocedural chest x-ray no evidence of pneumothorax or other complications. I personally gave her wound care instructions. Follow-up in the wound care clinic in 8 to 10 days.       Physical Exam:    Vital Signs: BP (!) 151/83   Pulse 91   Temp 97.6 °F (36.4 °C) (Temporal)   Resp 16   Ht 5' 1\" (1.549 m)   Wt 217 lb 8 oz (98.7 kg)   LMP  (LMP Unknown)   SpO2 94%   BMI 41.10 kg/m²     Physical Exam      Discharge Medications:       Abimael Alex   Home Medication Instructions [id number]    Printed on:07/28/20 1045   Medication Information                      albuterol sulfate HFA (VENTOLIN HFA) 108 (90 Base) MCG/ACT inhaler  Inhale 2 puffs into the lungs every 6 hours as needed for Wheezing             allopurinol (ZYLOPRIM) 100 MG tablet  Take 1 tablet by mouth once daily             aspirin EC 81 MG EC tablet  Take 81 mg by mouth nightly Compression Stockings MISC  by Does not apply route Dx: Venous insufficiency, below knee medium compression bilateral             Cyanocobalamin (VITAMIN B-12) 5000 MCG TBDP  Take 1 tablet by mouth nightly             EUTHYROX 88 MCG tablet  Take 1 tablet by mouth once daily             fexofenadine (ALLEGRA ALLERGY) 180 MG tablet  Take 180 mg by mouth daily             furosemide (LASIX) 20 MG tablet  Take 1 tablet by mouth every other day Indications: M-W-F             gabapentin (NEURONTIN) 100 MG capsule  Indications: Takes 2 in AM and 1 QHS TAKE 1 CAPSULE BY MOUTH THREE TIMES DAILY             lactobacillus (CULTURELLE) capsule  Take 1 capsule by mouth daily             oxybutynin (DITROPAN-XL) 10 MG extended release tablet  Take 1 tablet by mouth once daily             raloxifene (EVISTA) 60 MG tablet  Take 1 tablet by mouth once daily             risedronate (ACTONEL) 35 MG tablet  Take 1 tablet by mouth every 7 days             tiotropium (SPIRIVA RESPIMAT) 2.5 MCG/ACT AERS inhaler  Inhale 2 puffs into the lungs daily             vitamin D (ERGOCALCIFEROL) 84594 units CAPS capsule  TAKE ONE CAPSULE BY MOUTH ONCE A WEEK                 Discharge Instructions:   Yvonne Najjar, MD  [address] [place] [address] [id number]  [facility] [id number] [facility] MD Yasemin  [address] [place] [address]  [address] [facility] MD Tomás  [address] [place] [phone]    In 10 days          Take medications as directed. Resume activity as tolerated. Diet: DIET CARDIAC;      Disposition: Patient is medically stable and will be discharged *    Abby Jones MD, 7/28/2020 9:35 AM

## 2020-07-28 NOTE — DISCHARGE INSTR - ACTIVITY
Resume activity as tolerated. Wound Care: Clean incision site once per day. Can use chlorhexidine/betadine/hydrogen peroxide. Keep site covered with 4x4 and clear dressing until appointment at wound care in 8-10 days.

## 2020-07-28 NOTE — PLAN OF CARE
Problem: Falls - Risk of:  Goal: Will remain free from falls  Outcome: Ongoing  Goal: Absence of physical injury  Outcome: Ongoing     Problem: SAFETY  Goal: Free from accidental physical injury  Outcome: Ongoing  Goal: Free from intentional harm  Outcome: Ongoing     Problem: DAILY CARE  Goal: Daily care needs are met  Outcome: Ongoing     Problem: PAIN  Goal: Patient's pain/discomfort is manageable  Outcome: Ongoing     Problem: SKIN INTEGRITY  Goal: Skin integrity is maintained or improved  Outcome: Ongoing     Problem: KNOWLEDGE DEFICIT  Goal: Patient/S.O. demonstrates understanding of disease process, treatment plan, medications, and discharge instructions.   Outcome: Ongoing     Problem: DISCHARGE BARRIERS  Goal: Patient's continuum of care needs are met  Outcome: Ongoing

## 2020-07-28 NOTE — PROGRESS NOTES
Discharge instructions, prescriptions, and follow up appointments reviewed with patient who verbalized understanding. Pacemaker Instruction sheet handed to patient. Patient's  was taught how to clean and change dressing. All questions answered. Patient stable and agreeable to discharge. Patient to discharge home via private vehicle.

## 2020-07-28 NOTE — DISCHARGE SUMMARY
Josefa Erickson  :  [birth year]  MRN:  [id number]    Admit date:  2020  Discharge date:      Admitting Physician:  Stacey Tee DO    Advance Directive: Full Code    Consults: cardiology    Primary Care Physician:  Abram Palmer MD    Discharge Diagnoses:  Principal Problem:    Bradycardia  Active Problems:    Stage 2 moderate COPD by GOLD classification ([facility] [address]    Acquired hypothyroidism    Morbid obesity with BMI of 40.0-44.9, adult ([address]    Chronic kidney disease, stage III (moderate) ([address]    Chest pain  Resolved Problems:    * No resolved hospital problems. *      Significant Diagnostic Studies:   Xr Chest Portable    Result Date: 2020  XR CHEST PORTABLE 2020 1:23 PM History: Chest pain. Portable chest x-ray compared with 2019. Magnified heart size. Moderate thoracic spurring. Chronic interstitial lung disease with no focal infiltrate, pneumothorax, or heart failure. 1. Chronic interstitial lung disease. 2. Hypoaeration. Signed by Dr Feroz Ruiz on 2020 1:24 PM      Pertinent Labs:   CBC:   Recent Labs     204 20  0322 20  0244   WBC 7.5 8.0 8.5   HGB 11.2* 11.2* 11.2*    166 144     BMP:    Recent Labs     204 20  0322 20  0244    140 141   K 4.4 4.3 4.3    106 109   CO2 22 20* 19*   BUN 22 26* 21   CREATININE 1.5* 1.6* 1.3*   GLUCOSE 116* 109 118*     INR:   Recent Labs     20  0322   INR 1.05     ABGs:No results for input(s): PH, PO2, PCO2, HCO3, BE, O2SAT in the last 72 hours. Lactic Acid:No results for input(s): LACTA in the last 72 hours. Procedures: The patient had dual chamber pacemaker implanted on 2020. Hospital Course:   : Sent to ED from nephrology clinic due to HR 28. Recent echocardiogram and Holter monitor. Some chest pressure and dyspnea on exertion. Admitted to PCU with cardiology consult. Atenolol held. : Cardiology consult, dobutamine stress echo ordered.  Possibility of pacemaker. HR 48 this morning off atenolol. 7-24: Awaiting cardiology decision on pacemaker. Reportedly wants to wait over the weekend and observe while off atenolol. Remains bradycardic with rate on telemetry down to 36 this morning. Feels better. DSE yesterday failed to reach target heart rate but no ischemia. 7-25: Remains bradycardic. Cardiology waiting to see if HR increases. 7-26: HR unchanged. Cardiology will implant pacemaker tomorrow. 7-27: Pacemaker implanted today. 7-28: Patient encouraged to ambulate this morning, tolerated well. She was cleared for discharge by cardiology and discharged to home.     Physical Exam:  Vitals: BP (!) 148/82   Pulse 96   Temp 97.4 °F (36.3 °C) (Temporal)   Resp 18   Ht 5' 1\" (1.549 m)   Wt 217 lb 8 oz (98.7 kg)   LMP  (LMP Unknown)   SpO2 95%   BMI 41.10 kg/m²   24HR INTAKE/OUTPUT:      Intake/Output Summary (Last 24 hours) at 7/28/2020 1230  Last data filed at 7/28/2020 1207  Gross per 24 hour   Intake 1221 ml   Output 700 ml   Net 521 ml     General appearance: alert and cooperative with exam  HEENT: atraumatic, eyes with clear conjunctiva and normal lids, pupils and irises normal, external ears and nose are normal, lips normal. Neck without masses, lympadenopathy, bruit, thyroid normal  Lungs: no increased work of breathing, diminished breath sounds bilaterally  Heart: regular rate and rhythm, S1, S2 normal, no murmur, click, rub or gallop  Abdomen: soft, non-tender; bowel sounds normal; no masses,  no organomegaly and obese  Extremities: edema trace bilaterally, modified Sasha's negative  Neurologic: no focal neurologic deficits, normal sensation, alert and oriented, affect and mood appropriate  Skin: no jaundice, rashes, or nodules    Discharge Medications:       Mortimer Plan   Home Medication Instructions [place]:[id number]    Printed on:07/28/20 1230   Medication Information                      albuterol sulfate HFA (VENTOLIN HFA) 108 (90 Base)

## 2020-07-29 PROBLEM — I26.99 PULMONARY EMBOLISM (HCC): Status: ACTIVE | Noted: 2020-01-01

## 2020-07-29 NOTE — PROGRESS NOTES
Called radiology to check on CTA report status. Dr Balwinder Colvin reports that he read it hours ago, but has been unable to give the report. Dr Balwinder Colvin reports a PE in the Left lower lobe. Will call Dr Deborah Chaparro to notify him.  Electronically signed by Royal Johnathon RN on 7/28/2020 at 8:49 PM

## 2020-07-29 NOTE — PROGRESS NOTES
Spoke with Dr Alexa Leyva regarding pt's CTA. Telephone order received for pt to start Eliquis 10mg BID x 7 days then 5 mg BID. He stated to keep pt overnight. Will continue to monitor.  Electronically signed by Iker Dowell RN on 7/28/2020 at 9:10 PM

## 2020-07-29 NOTE — DISCHARGE SUMMARY
Jeanette Ordonez  :  [birth year]  MRN:  [id number]    Admit date:  2020  Discharge date:      Admitting Physician:  Dominick Bynum DO    Advance Directive: Full Code    Consults: cardiology    Primary Care Physician:  Carol Richmond MD    Discharge Diagnoses:  Principal Problem (Resolved):    Bradycardia  Active Problems:    Stage 2 moderate COPD by GOLD classification ([facility] [address]    Acquired hypothyroidism    Morbid obesity with BMI of 40.0-44.9, adult ([facility] [address]    Chronic kidney disease, stage III (moderate) ([facility]    Chest pain    Pulmonary embolism ([facility] [address]      Significant Diagnostic Studies:   Xr Chest Portable    Result Date: 2020  XR CHEST PORTABLE 2020 1:23 PM History: Chest pain. Portable chest x-ray compared with 2019. Magnified heart size. Moderate thoracic spurring. Chronic interstitial lung disease with no focal infiltrate, pneumothorax, or heart failure. 1. Chronic interstitial lung disease. 2. Hypoaeration. Signed by Dr Simeon Atkinson on 2020 1:24 PM      Pertinent Labs:   CBC:   Recent Labs     20  0159   WBC 8.0 8.5 9.8   HGB 11.2* 11.2* 11.5*    144 137     BMP:    Recent Labs     20  0159    141 139   K 4.3 4.3 4.5    109 106   CO2 20* 19* 20*   BUN 26* 21 18   CREATININE 1.6* 1.3* 1.1*   GLUCOSE 109 118* 120*     INR:   Recent Labs     20   INR 1.05     ABGs:No results for input(s): PH, PO2, PCO2, HCO3, BE, O2SAT in the last 72 hours. Lactic Acid:No results for input(s): LACTA in the last 72 hours. Procedures: The patient had dual chamber pacemaker implanted on 2020. Hospital Course:   : Sent to ED from nephrology clinic due to HR 28. Recent echocardiogram and Holter monitor. Some chest pressure and dyspnea on exertion. Admitted to PCU with cardiology consult. Atenolol held. : Cardiology consult, dobutamine stress echo ordered. Possibility of pacemaker.  HR Home Medication Instructions [id number]    Printed on:07/29/20 9679   Medication Information                      albuterol sulfate HFA (VENTOLIN HFA) 108 (90 Base) MCG/ACT inhaler  Inhale 2 puffs into the lungs every 6 hours as needed for Wheezing             allopurinol (ZYLOPRIM) 100 MG tablet  Take 1 tablet by mouth once daily             apixaban (ELIQUIS DVT/PE STARTER PACK) 5 MG TABS tablet  Take 10 mg (2 tablets) orally twice daily for 7 days, then take 5 mg (1 tablet) orally twice daily thereafter. aspirin EC 81 MG EC tablet  Take 81 mg by mouth nightly             Compression Stockings MISC  by Does not apply route Dx: Venous insufficiency, below knee medium compression bilateral             Cyanocobalamin (VITAMIN B-12) 5000 MCG TBDP  Take 1 tablet by mouth nightly             EUTHYROX 88 MCG tablet  Take 1 tablet by mouth once daily             fexofenadine (ALLEGRA ALLERGY) 180 MG tablet  Take 180 mg by mouth daily             furosemide (LASIX) 20 MG tablet  Take 1 tablet by mouth every other day Indications: M-W-F             gabapentin (NEURONTIN) 100 MG capsule  Indications: Takes 2 in AM and 1 QHS TAKE 1 CAPSULE BY MOUTH THREE TIMES DAILY             lactobacillus (CULTURELLE) capsule  Take 1 capsule by mouth daily             oxybutynin (DITROPAN-XL) 10 MG extended release tablet  Take 1 tablet by mouth once daily             raloxifene (EVISTA) 60 MG tablet  Take 1 tablet by mouth once daily             risedronate (ACTONEL) 35 MG tablet  Take 1 tablet by mouth every 7 days             tiotropium (SPIRIVA RESPIMAT) 2.5 MCG/ACT AERS inhaler  Inhale 2 puffs into the lungs daily             vitamin D (ERGOCALCIFEROL) 34016 units CAPS capsule  TAKE ONE CAPSULE BY MOUTH ONCE A WEEK                 Discharge Instructions: Follow up with Sherlyn Meckel, MD in 7 days. Take medications as directed. Resume activity as tolerated. Diet: DIET CARDIAC;      Disposition: Patient is medically stable and will be discharged Home. Time spent on discharge less than 30 minutes.     Signed:  Jeet Tenorio DO

## 2020-07-29 NOTE — PROGRESS NOTES
Cardiology Daily Note Melvin Brock MD      Patient:  Leon Aguilera  [id number]    Patient Active Problem List    Diagnosis Date Noted    [facility] ([facility] 07/29/2020     Priority: Low    Chest pain 07/22/2020     Priority: Low    Adrenal gland cancer, unspecified laterality ([address] 06/22/2020     Priority: Low    Malignant neoplasm of upper-inner quadrant of right female breast ([address] 02/06/2020     Priority: Low    Chronic kidney disease, stage III (moderate) ([address] 09/03/2019     Priority: Low    Morbid obesity with BMI of 40.0-44.9, adult ([address] 09/11/2018     Priority: Low    Stage 2 moderate COPD by GOLD classification ([facility] 07/11/2018     Priority: Low    Acquired hypothyroidism 07/11/2018     Priority: [address] acquired pneumonia of left lower lobe of lung ([address] 06/01/2018     Priority: Low       Admit Date:  7/22/2020    Admission Problem List: Present on Admission:   (Resolved) Bradycardia   Chest pain   Morbid obesity with BMI of 40.0-44.9, adult ([facility]   Chronic kidney disease, stage III (moderate) (HCC)   Stage 2 moderate COPD by GOLD classification ([facility]   Acquired hypothyroidism      Cardiac Specific Data:  Specialty Problems        Cardiology Problems    Chest pain        Pulmonary embolism [facility])              Subjective:  Ms. Radha Qiu seen today she was going to be discharged yesterday we obtained a d-dimer which was elevated 16.5 subsequently obtained a CTA pulmonary which showed pulmonary embolism to the left lower lobe started on Eliquis today doing well no complaints. Wound inspected no hematoma. Plans are for discharge today on anticoagulation. Follow-up in the pacemaker clinic in 8 to 10 days.     Objective:   /65   Pulse 73   Temp 96.9 °F (36.1 °C) (Temporal)   Resp 16   Ht 5' 1\" (1.549 m)   Wt 215 lb 3.2 oz (97.6 kg)   LMP  (LMP Unknown)   SpO2 95%   BMI 40.66 kg/m²       Intake/Output Summary (Last 24 hours) at 7/29/2020 1018  Last data filed at 7/29/2020 0208  Gross per 24 hour   Intake 1200 ml   Output 1500 ml   Net -300 ml       Prior to Admission medications    Medication Sig Start Date End Date Taking? Authorizing Provider   apixaban (ELIQUIS DVT/PE STARTER PACK) 5 MG TABS tablet Take 10 mg (2 tablets) orally twice daily for 7 days, then take 5 mg (1 tablet) orally twice daily thereafter. 7/29/20  Yes Sandra La Verkin, DO   tiotropium (SPIRIVA RESPIMAT) 2.5 MCG/ACT AERS inhaler Inhale 2 puffs into the lungs daily   Yes Historical Provider, MD   albuterol sulfate HFA (VENTOLIN HFA) 108 (90 Base) MCG/ACT inhaler Inhale 2 puffs into the lungs every 6 hours as needed for Wheezing   Yes Historical Provider, MD   aspirin EC 81 MG EC tablet Take 81 mg by mouth nightly   Yes Historical Provider, MD   lactobacillus (CULTURELLE) capsule Take 1 capsule by mouth daily   Yes Historical Provider, MD   Cyanocobalamin (VITAMIN B-12) 5000 MCG TBDP Take 1 tablet by mouth nightly   Yes Historical Provider, MD   Compression Stockings MISC by Does not apply route Dx:   Venous insufficiency, below knee medium compression bilateral 6/24/20   Rama Swenson MD   allopurinol (ZYLOPRIM) 100 MG tablet Take 1 tablet by mouth once daily  Patient taking differently: nightly  6/21/20   Rama Swenson MD   oxybutynin (DITROPAN-XL) 10 MG extended release tablet Take 1 tablet by mouth once daily  Patient taking differently: nightly  0/46/06   DAVID Garcia   EUTHYROX 88 MCG tablet Take 1 tablet by mouth once daily 6/4/20   Rama Swenson MD   raloxifene (EVISTA) 60 MG tablet Take 1 tablet by mouth once daily 3/29/20   Rama Swenson MD   furosemide (LASIX) 20 MG tablet Take 1 tablet by mouth every other day Indications: M-W-F  Patient taking differently: Take 20 mg by mouth three times a week Indications: M-W-F Mon, Wed, Fri 44/68/73   DAVID Garcia   vitamin D (ERGOCALCIFEROL) 80539 units CAPS capsule TAKE ONE CAPSULE BY MOUTH ONCE A WEEK  Patient taking differently: once a week On tuesday 9/27/19   Marianne Carpio MD   gabapentin (NEURONTIN) 100 MG capsule Indications: Takes 2 in AM and 1 QHS TAKE 1 CAPSULE BY MOUTH THREE TIMES DAILY  Patient taking differently: Take 100 mg by mouth 2 times daily.   8/20/19 12/11/19  Marianne Carpio MD   risedronate (ACTONEL) 35 MG tablet Take 1 tablet by mouth every 7 days  Patient taking differently: Take 35 mg by mouth once a week On Thursday 4/25/19   Marianne Carpio MD   fexofenadine TY [facility] ALLERGY) 180 MG tablet Take 180 mg by mouth daily    Historical Provider, MD        apixaban  10 mg Oral BID    Followed by   Nichole Fritz ON 8/4/2020] apixaban  5 mg Oral BID    sodium chloride flush  10 mL Intravenous 2 times per day    mupirocin   Nasal Once    sodium chloride flush  10 mL Intravenous 2 times per day    allopurinol  100 mg Oral Nightly    atenolol  50 mg Oral Nightly    levothyroxine  88 mcg Oral Daily    cetirizine  10 mg Oral Daily    gabapentin  100 mg Oral BID    lactobacillus  1 capsule Oral Daily    oxybutynin  10 mg Oral Nightly    ipratropium-albuterol  1 ampule Inhalation Q4H WA       TELEMETRY: Sinus     Physical Exam:      Physical Exam      General:  Awake, alert, NAD  Skin:  Warm and dry  Neck:  no jvd , no carotid bruits  Chest:  Clear to auscultation, no wheezing or rales left infraclavicular pacemaker pocket healing appropriately no hematoma  Cardiovascular:  RRR X5U9 no murmurs, clicks, gallups, or rubs  Abdomen:  Soft nontender, nondistended, bowel sounds present  Extremities:  Edema: none      Lab Data:  CBC:   Recent Labs     07/27/20  0322 07/28/20  0244 07/29/20  0159   WBC 8.0 8.5 9.8   HGB 11.2* 11.2* 11.5*   HCT 36.0* 35.0* 35.5*   MCV 99.4* 97.5 95.4    144 137     BMP:   Recent Labs     07/27/20  0322 07/28/20  0244 07/29/20  0159    141 139   K 4.3 4.3 4.5    109 106   CO2 20* 19* 20*   BUN 26* 21 18   CREATININE 1.6* 1.3* 1.1*     LIVER PROFILE:   Recent Labs     07/27/20  0322   AST 33* ALT 19   BILITOT 0.4   ALKPHOS 36     PT/INR:   Recent Labs     07/27/20  0322   PROTIME 13.6   INR 1.05     APTT: No results for input(s): APTT in the last 72 hours. BNP:  No results for input(s): BNP in the last 72 hours. CK, CKMB, Troponin: @LABRCNT (CKTOTAL:3, CKMB:3, TROPONINI:3)@    IMAGING:  Vl Extremity Venous Left    Result Date: 7/27/2020  Vascular Upper Extremities Veins Procedure  Demographics   Patient Name     Lehman Koyanagi Age                   75   Patient Number   [id number]          Gender                Female   Visit Number     [id number]       Interpreting          Rajan Stanford MD                                   Physician   Date of Birth    1945      Referring Physician   Jimy Amin MD   Accession Number [id number]      [address] [facility]  Procedure Type of Study:   Veins:Upper Extremities Veins, UE VENOUS UNILATERAL/FLU. Indications for Study:Venous access and Pacemaker. Risk Factors   - The patient's last creatinine was 1.6 mg/dl. Allergies   - No known allergies. Impression   Fairly small appearing, but patent left subclavian vein. Signature   ----------------------------------------------------------------  Electronically signed by Rajan Stanford MD(Interpreting  physician) on 07/27/2020 01:22 PM  ----------------------------------------------------------------      Xr Chest Portable    Result Date: 7/27/2020  XR CHEST PORTABLE 7/27/2020 10:45 AM HISTORY: Pacemaker COMPARISON: Chest exam dated 7/22/2020. FINDINGS: Left chest wall dual chamber pacemaker. Leads appear in good position. No pneumothorax. No lung consolidation. Heart size is stable. The pulmonary vasculature are nondilated. No acute bony abnormality. 1. New left chest wall dual chamber pacemaker. No pneumothorax. Signed by Dr Marely Gonzales on 7/27/2020 12:15 PM    Xr Chest Portable    Result Date: 7/22/2020  XR CHEST PORTABLE 7/22/2020 1:23 PM History: Chest pain.  Portable chest x-ray compared with 5/17/2019. Magnified heart size. Moderate thoracic spurring. Chronic interstitial lung disease with no focal infiltrate, pneumothorax, or heart failure. 1. Chronic interstitial lung disease. 2. Hypoaeration. Signed by Dr Jeniffer Nunez on 7/22/2020 1:24 PM    Cta Pulmonary W Contrast    Result Date: 7/28/2020  EXAMINATION:  CTA PULMONARY W CONTRAST  7/28/2020 6:23 PM HISTORY: Elevated d-dimer of 16.5. Rule out pulmonary embolus. COMPARISON: 6/22/2018. DLP: 562 mGy-cm. Automated exposure control was utilized. TECHNIQUE: Spiral CT angiography was performed of the chest with contrast. Sagittal, coronal and 3-D images were reconstructed. MEDIASTINUM/VASCULAR: There has been recent implantation of the pacemaker device on the left. There is air in the soft tissues. There is some stranding of the fat in the left breast and around the pacemaker device. There is some thickening of the pectoralis musculature on the left suggesting muscular hematoma. There is atheromatous calcification of the thoracic aorta. There is mild coronary artery calcification. There is a small amount of pulmonary embolus in left lower lobe pulmonary artery branches. There are no central pulmonary emboli. LUNGS: There is mild dependent atelectasis. There is mild bronchiectasis and mild bronchial wall thickening. There is no dense consolidation or pleural effusion. BONES/SOFT TISSUES/: There are degenerative changes of the visualized spine and shoulders. There has been prior cervical fusion. UPPER ABDOMEN: Prior cholecystectomy. No other acute findings in the upper abdomen. 1. There is a small amount of pulmonary embolus in left lower lobe pulmonary artery branches. No central pulmonary emboli are appreciated. 2. Mild bronchiectasis and bronchial wall thickening. Mild dependent atelectasis. 3. Atheromatous calcification of the thoracic aorta and coronary arteries. Mild cardiomegaly. There is left ventricular predominance.  4. Recent pacemaker implantation on the left with air in the soft tissues and edema within the soft tissues. There is thickening of the pectoralis musculature suggesting intramuscular hematoma. Results were discussed with a nurse on the patient's floor, Teto Rae. Findings were discussed at 8:47 PM. She will get results to the referring physician. Signed by Dr Angelica Crooks on 7/28/2020 8:48 PM        Assessment:  1. Complaints of worsening exertional dyspnea can walk no longer than 100 feet  2. Complaints of intermittent squeezing chest discomfort possible angina  3. 2-1 atrioventricular block on telemetry monitoring with heart rate mid 30s  4. Chronic kidney disease  5. Obesity  6. Hypertension  7. CT chest 6/22/2018 chronic changes left lower lobe probably fibrotic changes no other processes identified. 8. Holter monitor 7/1/2020 through 7/6/2020 sinus rhythm average heart rate 54 ranging from 34-75 bradycardia during sleep frequent PACs mostly isolated rare runs rare PVC second-degree type I heart block and first-degree heart block noted no symptoms reported  9. Echocardiogram 7/1/2020 normal left ventricular systolic function ejection fraction 55 to 60%  10. Dual-chamber pacemaker implant 7/27/2020. 11. Elevated d-dimer 16.5 on 7/28/2020  12. CTA pulmonary 7/28/2020 pulmonary embolism left lower lobe started on Eliquis       Plan:  1. Agree with plans for discharge continue  Eliquis as per standard protocols we will follow-up in the wound care clinic in 8 to 10 days.       Melvin Brock MD 7/29/2020 10:18 AM

## 2020-07-30 NOTE — CARE COORDINATION
Bam 45 Transitions Initial Follow Up Call    Call within 2 business days of discharge: Yes    Patient: Arpit Patel Patient : [id number]   MRN: [id number]  Reason for Admission:   Discharge Date: 20 RARS: Readmission Risk Score: 18      Last Discharge [facility]       Complaint Diagnosis Description Type Department Provider    20 Bradycardia 2nd degree atrioventricular block . .. ED to Hosp-Admission (Discharged) (ADMITTED) [facility] PROG Teddie Epley, DO; Leela Marie MD; ... Spoke with: N/A    Facility: [place] [id number]    Non-face-to-face services provided:  Reviewed encounter information for continuity of care prior to follow up phone call - chart notes, consults, progress notes, test results, med list, appointments, AVS, other information. Care Transitions 24 Hour Call    Care Transitions Interventions         Follow Up ; Attempted to make contact with Kevin Quevedo for CTC initial follow up call post discharge from the hospital without success. Unable to leave a message regarding intent of call and call back information. Will try again my next business day.      Future Appointments   Date Time Provider Rashaad Nunez   [id number]  8:52 PM Estee OfficerDAVID [facility]   2020 10:00 AM SCHEDULE, [facility] CARDIAC DEVICE [facility]   2020 10:00 AM DAVID Lopez [facility] [place]   2020  2:45 PM Fidelia Sierra MD [facility] [place]   2020  9:45 AM Markell Sheppard MD [address] Sean Vela RN

## 2020-07-31 NOTE — CARE COORDINATION
Bam 45 Transitions Initial Follow Up Call    Call within 2 business days of discharge: Yes    Patient: Rony Steve Patient : [birth year]   MRN: [id number]  Reason for Admission:   Discharge Date: 20 RARS: Readmission Risk Score: 18      Last Discharge [facility]       Complaint Diagnosis Description Type Department Provider    20 Bradycardia 2nd degree atrioventricular block . .. ED to Hosp-Admission (Discharged) (ADMITTED) CLIFFORD Jansen DO; Jarrod Pelaez MD; ... Spoke with: N/A  Facility: [place] [id number]    Non-face-to-face services provided:  Reviewed encounter information for continuity of care prior to follow up phone call - chart notes, consults, progress notes, test results, med list, appointments, AVS, other information. Care Transitions 24 Hour Call    Care Transitions Interventions         Follow Up : Attempt #2 to make contact with Aiyana Khalil for an initial follow up call post discharge from the hospital without success. Unable to leave a message regarding intent of call and call back information. Will try again my next business day.      Future Appointments   Date Time Provider Rashaad Nunez   [id number]  9:34 PM [facility]   2020 10:00 AM SCHEDULE, [facility] CARDIAC [facility]   2020 10:00 AM DAVID Steward [facility] [place]   2020  2:45 PM Bryce Guadalupe MD [facility] [place]   2020  9:45 AM Jamin Bruce MD [address] Aldo Plaza RN

## 2020-07-31 NOTE — CONSULTS
Pacemaker/AICD educational packet and cover letter sent to the patient's mailing address on record. Information included; incision care, affected arm ROM and weight bearing limitations, present and future precautionary measures, sign & symptom awareness with reasons to call the cardiologist, keeping of appointments, carrying identification card and transmitter operation. Patient instructed to contact cardiologist's office with questions or concerns.

## 2020-08-03 NOTE — CARE COORDINATION
Bam 45 Transitions Initial Follow Up Call    Call within 2 business days of discharge: No    Patient: Delvis Sanders Patient :    MRN: [id number]    Discharge Date: 20 RARS: Readmission Risk Score: 18      [facility] Discharge [facility]       Complaint Diagnosis Description Type Department Provider    20 Bradycardia 2nd degree atrioventricular block . .. ED to Hosp-Admission (Discharged) (ADMITTED) [facility] PR[facility] Larose Fleischer, DO; Sarath Rosario MD; ... Spoke with: N/A    Facility: [address] [place] [address]    Non-face-to-face services provided:   Reviewed encounter information for continuity of care prior to follow up Care Transitions phone call - chart notes, consults, progress notes, test results, med list, appointments, AVS, other information. Care Transitions 24 Hour Call    Care Transitions Interventions         Follow Up: Attempted to make contact with patient for a routine follow up call without success. Unable to leave a message regarding intent of call and call back information due to call going to an unidentifiable answering machine. As this repeated attempt to make contact was unsuccessful, will disengage at this time.              Future Appointments   Date Time Provider Rashaad Nunez   [id number]  9:72 PM DAVID Corey [facility]   2020 10:00 AM SCHEDULE, [facility] CARDIAC DEVICE [facility]io [facility]   2020 10:00 AM DAVID León [facility]   2020  2:45 PM Indiana Flood MD [facility]   2020  9:45 AM Suresh Her MD [address] [facility]       Lisa Crespo RN

## 2020-08-04 NOTE — TELEPHONE ENCOUNTER
Spoke with patient and advised per Lidya Marvin she can come tomorrow at 10.  She voiced understanding

## 2020-08-04 NOTE — TELEPHONE ENCOUNTER
Patient called wanting to know what she needs to do about her staples. She had pacemaker inserted on  7/22/20 and this is day 13 of staples. Her device check appt isnt until 8/7/20. Please call patient and advise.

## 2020-08-05 PROBLEM — T82.9XXA PACEMAKER COMPLICATIONS, INITIAL ENCOUNTER: Status: ACTIVE | Noted: 2020-01-01

## 2020-08-05 NOTE — PROGRESS NOTES
PHARMACY NOTE  Tamea Primrose was ordered Evista. Per the [address], this medication is non-formulary and not stocked by pharmacy. It has been discontinued. The medication can be reordered at discharge.      Electronically signed by Virginie Jones [facility] on 8/5/2020 at 12:56 PM

## 2020-08-05 NOTE — H&P
[facility]  History and Physical    Patient:  Tamea Primrose  MRN: [id number]    CHIEF COMPLAINT:  No chief complaint on file. History Obtained From: Patient    PCP: Brennon Zayas MD    HISTORY OF PRESENT ILLNESS:   75 y.o. female who presents with seen today in the pacemaker clinic ventricular lead had noted to be not functioning could not capture even at maximal output sent for chest x-ray now admitted presumed RV lead dislodgment note on anticoagulation since CTA pulmonary 7/28/2020 showed evidence of pulmonary embolism. Dyspnea stable denies chest pain no other complaints. REVIEW OF SYSTEMS:  Review of Systems   Constitutional: Negative. Negative for chills, fever and unexpected weight change. HENT: Negative. Eyes: Negative. Respiratory: Negative. Negative for shortness of breath. Cardiovascular: Negative. Negative for chest pain. Gastrointestinal: Negative. Negative for diarrhea, nausea and vomiting. Endocrine: Negative. Genitourinary: Negative. Musculoskeletal: Negative. Skin: Negative. Neurological: Negative. All other systems reviewed and are negative.       Cardiac Specific Data:   Specialty Problems        Cardiology Problems    Chest pain        Pulmonary embolism [facility])              Past Medical History:      Diagnosis Date    Acquired hypothyroidism 7/11/2018    Chronic kidney disease, stage III (moderate) ([address] 9/3/2019    Mom and sister + breast CA Lumpectomy - remission    Community acquired pneumonia of left lower lobe of lung ([address] 6/1/2018    Malignant neoplasm of upper-inner quadrant of right female breast ([address] 2/6/2020    Stage 2 moderate COPD by GOLD classification ([address] 7/11/2018       Past Surgical History:      Procedure Laterality Date    ADRENALECTOMY      APPENDECTOMY      BACK SURGERY      BREAST LUMPECTOMY      CARPAL TUNNEL RELEASE      CARPAL TUNNEL RELEASE  1990    CATARACT REMOVAL      CORONARY ANGIOPLASTY      GALLBLADDER SURGERY      PACEMAKER INSERTION  07/22/2020    PARTIAL HYSTERECTOMY      TONSILLECTOMY  1997    TOTAL KNEE ARTHROPLASTY         Medications Prior to Admission:    Prior to Admission medications    Medication Sig Start Date End Date Taking? Authorizing Provider   oxybutynin (DITROPAN-XL) 10 MG extended release tablet Take 1 tablet by mouth once daily 0/27/93   Parkern Ramona, APRN   apiban [facility] DVT/PE STARTER PACK) 5 MG TABS tablet Take 10 mg (2 tablets) orally twice daily for 7 days, then take 5 mg (1 tablet) orally twice daily thereafter. 7/29/20   [facility],    atenolol (TENORMIN) 50 MG tablet Take 1 tablet by mouth nightly 7/29/20   Noemi Chung MD   tiotropium (SPIRIVA RESPIMAT) 2.5 MCG/ACT AERS inhaler Inhale 2 puffs into the lungs daily    Historical Provider, MD   albuterol sulfate HFA (VENTOLIN HFA) 108 (90 Base) MCG/ACT inhaler Inhale 2 puffs into the lungs every 6 hours as needed for Wheezing    Historical Provider, MD   aspirin EC 81 MG EC tablet Take 81 mg by mouth nightly    Historical Provider, MD   lactobacillus (CULTURELLE) capsule Take 1 capsule by mouth daily    Historical Provider, MD   Cyanocobalamin (VITAMIN B-12) 5000 MCG TBDP Take 1 tablet by mouth nightly    Historical Provider, MD   Compression Stockings MISC by Does not apply route Dx:   Venous insufficiency, below knee medium compression bilateral 6/24/20   Karo Johnson MD   allopurinol (ZYLOPRIM) 100 MG tablet Take 1 tablet by mouth once daily  Patient taking differently: nightly  6/21/20   Karo Johnson MD   EUTHYROX 88 MCG tablet Take 1 tablet by mouth once daily 6/4/20   Karo Johnson MD   raloxifene (EVISTA) 60 MG tablet Take 1 tablet by mouth once daily 3/29/20   Karo Johnson MD   furosemide (LASIX) 20 MG tablet Take 1 tablet by mouth every other day Indications: M-W-F  Patient taking differently: Take 20 mg by mouth three times a week Indications: M-W-F Mon, Wed, Fri 21/31/47   Michael Monet DAVID Werner   vitamin D (ERGOCALCIFEROL) 59629 units CAPS capsule TAKE ONE CAPSULE BY MOUTH ONCE A WEEK  Patient taking differently: once a week On tuesday 9/27/19   Camden Castillo MD   gabapentin (NEURONTIN) 100 MG capsule Indications: Takes 2 in AM and 1 QHS TAKE 1 CAPSULE BY MOUTH THREE TIMES DAILY  Patient taking differently: Take 100 mg by mouth 2 times daily. 8/20/19 12/11/19  Camden Castillo MD   risedronate (ACTONEL) 35 MG tablet Take 1 tablet by mouth every 7 days  Patient taking differently: Take 35 mg by mouth once a week On Thursday 4/25/19   Camden Castillo MD   fexofenadine TY [facility] ALLERGY) 180 MG tablet Take 180 mg by mouth daily    Historical Provider, MD       Allergies:  Patient has no known allergies.     Past Social History:  Social History     Socioeconomic History    Marital status:      Spouse name: Not on file    Number of children: Not on file    Years of education: Not on file    Highest education level: Not on file   Occupational History    Not on file   Social Needs    Financial resource strain: Not on file    Food insecurity     Worry: Not on file     Inability: Not on file    Transportation needs     Medical: Not on file     Non-medical: Not on file   Tobacco Use    Smoking status: Never Smoker    Smokeless tobacco: Never Used   Substance and Sexual Activity    Alcohol use: No    Drug use: No    Sexual activity: Not on file   Lifestyle    Physical activity     Days per week: Not on file     Minutes per session: Not on file    Stress: Not on file   Relationships    Social connections     Talks on phone: Not on file     Gets together: Not on file     Attends Congregational service: Not on file     Active member of club or organization: Not on file     Attends meetings of clubs or organizations: Not on file     Relationship status: Not on file    Intimate partner violence     Fear of current or ex partner: Not on file     Emotionally abused: Not on file     Physically tenderness, left CVA tenderness, guarding or rebound. Hernia: No hernia is present. Musculoskeletal:         General: No swelling, tenderness, deformity or signs of injury. Right lower leg: No edema. Left lower leg: No edema. Lymphadenopathy:      Cervical: No cervical adenopathy. Skin:     General: Skin is warm and dry. Neurological:      General: No focal deficit present. Mental Status: She is alert and oriented to person, place, and time. Mental status is at baseline. Psychiatric:         Mood and Affect: Mood normal.         Behavior: Behavior normal.         Thought Content: Thought content normal.         Judgment: Judgment normal.         LAB DATA:  CBC:   Recent Labs     08/05/20  1112   WBC 8.8   HGB 10.8*        BMP:    Recent Labs     08/05/20  1112      K 4.8      CO2 20*   BUN 36*   CREATININE 1.7*   GLUCOSE 114*     Hepatic: No results for input(s): AST, ALT, ALB, BILITOT, ALKPHOS in the last 72 hours. CK, CKMB, Troponin: @LABRCNT (CKTOTAL:3, CKMB:3, TROPONINI:3)@  Pro-BNP: No results for input(s): BNP in the last 72 hours. Lipids: No results for input(s): CHOL, HDL in the last 72 hours. Invalid input(s): LDL  ABGs: No results for input(s): PHART, KNH1JVY, PO2ART, NPA3HMQ, BEART, HGBAE, B4LQJYUP, CARBOXHGBART, 02THERAPY in the last 72 hours. INR: No results for input(s): INR in the last 72 hours. A1c:Invalid input(s):  HEMOGLOBIN A1C  URINALYSIS:   -----------------------------------------------------------------  IMAGING:    Vl Extremity Venous Left    Result Date: 7/27/2020  Vascular Upper Extremities Veins Procedure  Demographics   Patient Name     Cecil Frankel Age                   75   Patient Number   [id number]          Gender                Female   Visit Number     [id number]       Interpreting          Miguel Caruso MD                                   Physician   Date of Birth    1945      Referring Physician   Vicente Willingham MD Accession Number [id number]      [address] [facility]  Procedure Type of Study:   Veins:Upper Extremities Veins, UE VENOUS UNILATERAL/FLU. Indications for Study:Venous access and Pacemaker. Risk Factors   - The patient's last creatinine was 1.6 mg/dl. Allergies   - No known allergies. Impression   Fairly small appearing, but patent left subclavian vein. Signature   ----------------------------------------------------------------  Electronically signed by Luana Ackerman MD(Interpreting  physician) on 07/27/2020 01:22 PM  ----------------------------------------------------------------      Xr Chest Portable    Result Date: 8/5/2020  XR CHEST PORTABLE 8/5/2020 10:08 AM HISTORY:   Pacemaker malfunction  Single view. COMPARISONS:  7/27/2020 5/7/2019 chest radiography FINDINGS: The left-sided permanent cardiac pacemaker is identified. The atrioventricular leads appear well-positioned and unchanged. The electrodes are intact without discontinuity. The heart is generous. The pulmonary circulation appropriate, without heart failure The lungs are clear without acute infiltrates. Changes from anterior cervical fusion identified with plating. Bone anchor screws noted in the right humeral head compatible with rotator cuff surgery. Radiographically, the chest is unchanged. 1. Generous heart size. 2. No acute cardiopulmonary process. Signed by Dr Mike Mcdaniel on 8/5/2020 11:32 AM    Xr Chest Portable    Result Date: 7/27/2020  XR CHEST PORTABLE 7/27/2020 10:45 AM HISTORY: Pacemaker COMPARISON: Chest exam dated 7/22/2020. FINDINGS: Left chest wall dual chamber pacemaker. Leads appear in good position. No pneumothorax. No lung consolidation. Heart size is stable. The pulmonary vasculature are nondilated. No acute bony abnormality. 1. New left chest wall dual chamber pacemaker. No pneumothorax.  Signed by Dr Chiara Lopez on 7/27/2020 12:15 PM    Xr Chest Portable    Result Date: 7/22/2020  XR CHEST PORTABLE 7/22/2020 1:23 PM History: Chest pain. Portable chest x-ray compared with 5/17/2019. Magnified heart size. Moderate thoracic spurring. Chronic interstitial lung disease with no focal infiltrate, pneumothorax, or heart failure. 1. Chronic interstitial lung disease. 2. Hypoaeration. Signed by Dr Jeniffer Nunez on 7/22/2020 1:24 PM    Cta Pulmonary W Contrast    Result Date: 7/28/2020  EXAMINATION:  CTA PULMONARY W CONTRAST  7/28/2020 6:23 PM HISTORY: Elevated d-dimer of 16.5. Rule out pulmonary embolus. COMPARISON: 6/22/2018. DLP: 562 mGy-cm. Automated exposure control was utilized. TECHNIQUE: Spiral CT angiography was performed of the chest with contrast. Sagittal, coronal and 3-D images were reconstructed. MEDIASTINUM/VASCULAR: There has been recent implantation of the pacemaker device on the left. There is air in the soft tissues. There is some stranding of the fat in the left breast and around the pacemaker device. There is some thickening of the pectoralis musculature on the left suggesting muscular hematoma. There is atheromatous calcification of the thoracic aorta. There is mild coronary artery calcification. There is a small amount of pulmonary embolus in left lower lobe pulmonary artery branches. There are no central pulmonary emboli. LUNGS: There is mild dependent atelectasis. There is mild bronchiectasis and mild bronchial wall thickening. There is no dense consolidation or pleural effusion. BONES/SOFT TISSUES/: There are degenerative changes of the visualized spine and shoulders. There has been prior cervical fusion. UPPER ABDOMEN: Prior cholecystectomy. No other acute findings in the upper abdomen. 1. There is a small amount of pulmonary embolus in left lower lobe pulmonary artery branches. No central pulmonary emboli are appreciated. 2. Mild bronchiectasis and bronchial wall thickening. Mild dependent atelectasis.  3. Atheromatous calcification of the thoracic aorta and coronary arteries. Mild cardiomegaly. There is left ventricular predominance. 4. Recent pacemaker implantation on the left with air in the soft tissues and edema within the soft tissues. There is thickening of the pectoralis musculature suggesting intramuscular hematoma. Results were discussed with a nurse on the patient's floor, Chapo Sloan. Findings were discussed at 8:47 PM. She will get results to the referring physician. Signed by Dr Ramírez Garcia on 7/28/2020 8:48 PM        Assessment:    1. Nonfunctioning ventricular lead? Dislodgment  2. Recent pacemaker insertion dual-chamber pacemaker 7/27/2020  3. Complaints of dyspnea  4. CTA pulmonary 7/28/2020 revealed evidence of pulmonary embolism left lower lobe started on anticoagulation  5. Recently elevated d-dimer 16.50 on 7/28/2020  6. 2-1 atrioventricular block noted on telemetry monitoring with heart rate mid 30s  7. Chronic kidney disease  8. Obesity  9. Hypertension  10. CT chest 6/22/2018 chronic changes left lower lobe  Probably fibrotic changes no other processes identified  Holter 7/1/2020 through 7/6/2020 sinus rhythm average heart rate 54 varying from 34-75 bradycardia during sleep frequent PACs mostly isolated rare runs of PVCs and second-degree type I heart block first-degree heart block noted no symptoms  Echocardiogram 7/1/2020 normal left ventricular systolic function EF 55 to 60%  Recommendations:    1. Hold Eliquis  2. Start intravenous heparin 12 hours after above  3.  We will arrange to proceed with lead revision Friday morning  8/7/2020    Soila Cardoso MD 8/5/2020 @ [facility]@

## 2020-08-05 NOTE — PROGRESS NOTES
Leon Aguilera arrived to room # [id number]. Presented with: Bradycardia  Mental Status: Patient is oriented, alert, coherent, logical, thought processes intact and able to concentrate and follow conversation. There were no vitals filed for this visit. Patient safety contract and falls prevention contract reviewed with patient Yes. Oriented Patient and Family to room. Call light within reach. Yes.   Needs, issues or concerns expressed at this time: no.      Electronically signed by Ayad Cheng RN on 8/5/2020 at 11:45 AM

## 2020-08-05 NOTE — PROGRESS NOTES
PHARMACY NOTE  Loulou Barboza was ordered Evista. Per the [address], this medication is non-formulary and not stocked by pharmacy. It has been discontinued. The medication can be reordered at discharge.      Electronically signed by Hali Landaverde [facility] on 8/5/2020 at 12:56 PM

## 2020-08-05 NOTE — PROGRESS NOTES
4 Eyes Skin Assessment    [facility] is being assessed upon: Admission    I agree that I, Susan Galvan, along with Suraj Boles RN (either 2 RN's or 1 LPN and 1 RN) have performed a thorough Head to Toe Skin Assessment on the patient. ALL assessment sites listed below have been assessed. Areas assessed by both nurses:     [x]   Head, Face, and Ears   [x]   Shoulders, Back, and Chest  [x]   Arms, Elbows, and Hands   [x]   Coccyx, Sacrum, and Ischium  [x]   Legs, Feet, and Heels    Does the Patient have Skin Breakdown?  No    Sandro Prevention initiated: No  Wound Care Orders initiated: No    [facility] nurse consulted for Pressure Injury (Stage 3,4, Unstageable, DTI, NWPT, and Complex wounds) and New or Established Ostomies: No        Primary Nurse eSignature: Susan Galvan RN on 8/5/2020 at 11:44 AM      Co-Signer eSignature: Electronically signed by Gabriela Thibodeaux RN on 8/5/20 at 12:24 PM CDT

## 2020-08-05 NOTE — PROGRESS NOTES
Pt heart rate consistently staying 31-33bpm. Dr Unruly Walker aware. No new orders received at this time.    Electronically signed by Camilo Agarwal RN on 8/5/2020 at 1:56 PM

## 2020-08-05 NOTE — PROGRESS NOTES
Patient here for a wound check. Gauze dressing to site dry and intact with tegaderm dressing. Initial interrogation shows no RV capture. Connected to 3 lead. Heart rate 35, 2:1AV block. Patient reports no energy since Sunday. She stated she can barely go. Dr. Lakhwinder Hauser notified by RAFAEL Brock MA. Orders received to get stat chest xray and have patient return to office. Called Dr. Lakhwinder Hauser to let him know I put a 3 lead on patient and her heart rate is 35 in 2:1 Av block. He stated he does want chest x ray but we can go ahead and direct admit patient. Merlin Mount [employer] rep arrived and confirmed RV lead is not functioning. Called transport and I walked with patient to PCU bed 730. Charge nurse and patients nurse in room. She was connected to telemetry and 12 lead EKG done to confirm rhythm. They are aware stat chest x ray has been ordered.

## 2020-08-05 NOTE — PROGRESS NOTES
PHARMACY NOTE  Marisela Rivera was ordered Actonel. Per the [address], this medication is non-formulary and not stocked by pharmacy. It has been discontinued. The medication can be reordered at discharge.      Electronically signed by Chanell Saenz, [address] [place] [address] on 8/5/2020 at 12:59 PM

## 2020-08-06 NOTE — PROGRESS NOTES
take 5 mg (1 tablet) orally twice daily thereafter. 7/29/20   Vena Justin Arias, DO   atenolol (TENORMIN) 50 MG tablet Take 1 tablet by mouth nightly 7/29/20   Jean Mario MD   tiotropium (SPIRIVA RESPIMAT) 2.5 MCG/ACT AERS inhaler Inhale 2 puffs into the lungs daily    Historical Provider, MD   albuterol sulfate HFA (VENTOLIN HFA) 108 (90 Base) MCG/ACT inhaler Inhale 2 puffs into the lungs every 6 hours as needed for Wheezing    Historical Provider, MD   aspirin EC 81 MG EC tablet Take 81 mg by mouth nightly    Historical Provider, MD   lactobacillus (CULTURELLE) capsule Take 1 capsule by mouth daily    Historical Provider, MD   Cyanocobalamin (VITAMIN B-12) 5000 MCG TBDP Take 1 tablet by mouth nightly    Historical Provider, MD   Compression Stockings MISC by Does not apply route Dx: Venous insufficiency, below knee medium compression bilateral 6/24/20   Vicente Ramon MD   allopurinol (ZYLOPRIM) 100 MG tablet Take 1 tablet by mouth once daily  Patient taking differently: nightly  6/21/20   Vicente Ramon MD   EUTHYROX 88 MCG tablet Take 1 tablet by mouth once daily 6/4/20   Vicente Ramon MD   raloxifene (EVISTA) 60 MG tablet Take 1 tablet by mouth once daily 3/29/20   Vicente Ramon MD   furosemide (LASIX) 20 MG tablet Take 1 tablet by mouth every other day Indications: M-W-F  Patient taking differently: Take 20 mg by mouth three times a week Indications: M-W-F Mon, Wed, Fri 89/36/15   DAVID Klein   vitamin D (ERGOCALCIFEROL) 66060 units CAPS capsule TAKE ONE CAPSULE BY MOUTH ONCE A WEEK  Patient taking differently: once a week On tuesday 9/27/19   Vicente Ramon MD   gabapentin (NEURONTIN) 100 MG capsule Indications: Takes 2 in AM and 1 QHS TAKE 1 CAPSULE BY MOUTH THREE TIMES DAILY  Patient taking differently: Take 100 mg by mouth 2 times daily.   8/20/19 12/11/19  Vicente Ramon MD   risedronate (ACTONEL) 35 MG tablet Take 1 tablet by mouth every 7 days  Patient taking differently: Take 35 mg by mouth once a week On Thursday 4/25/19   Yvonne Najjar, MD   fexofenadine TY [facility] ALLERGY) 180 MG tablet Take 180 mg by mouth daily    Historical Provider, MD        sodium chloride flush  10 mL Intravenous 2 times per day    aspirin  81 mg Oral Daily    allopurinol  100 mg Oral Nightly    atenolol  50 mg Oral Nightly    Vitamin B-12  5,000 mcg Oral Nightly    cetirizine  10 mg Oral Daily    furosemide  20 mg Oral Once per day on Mon Wed Fri    gabapentin  100 mg Oral BID    lactobacillus  1 capsule Oral Daily    oxybutynin  10 mg Oral Nightly    levothyroxine  88 mcg Oral Daily    ipratropium  0.5 mg Nebulization 4x daily       TELEMETRY: Sinus     Physical Exam:      Physical Exam      General:  Awake, alert, NAD  Skin:  Warm and dry  Neck:  no jvd , no carotid bruits  Chest:  Clear to auscultation, no wheezing or rales  Cardiovascular:  RRR E7A2 no murmurs, clicks, gallups, or rubs  Abdomen:  Soft nontender, nondistended, bowel sounds present  Extremities:  Edema: none      Lab Data:  CBC:   Recent Labs     08/05/20  1112   WBC 8.8   HGB 10.8*   HCT 34.8*   .8*        BMP:   Recent Labs     08/05/20  1112      K 4.8      CO2 20*   BUN 36*   CREATININE 1.7*     LIVER PROFILE: No results for input(s): AST, ALT, LIPASE, BILIDIR, BILITOT, ALKPHOS in the last 72 hours. Invalid input(s): AMYLASE,  ALB  PT/INR: No results for input(s): PROTIME, INR in the last 72 hours. APTT:   Recent Labs     08/05/20 2017 08/06/20  0200 08/06/20  0754   APTT 50.3* 58.3* 71.0*     BNP:  No results for input(s): BNP in the last 72 hours.   CK, CKMB, Troponin: @LABRCNT (CKTOTAL:3, CKMB:3, TROPONINI:3)@    IMAGING:  Vl Extremity Venous Left    Result Date: 7/27/2020  Vascular Upper Extremities Veins Procedure  Demographics   Patient Name     Jason Welsh Age                   75   Patient Number   [id number]          Gender                Female   Visit Number     [id number]       Interpreting bronchiectasis and bronchial wall thickening. Mild dependent atelectasis. 3. Atheromatous calcification of the thoracic aorta and coronary arteries. Mild cardiomegaly. There is left ventricular predominance. 4. Recent pacemaker implantation on the left with air in the soft tissues and edema within the soft tissues. There is thickening of the pectoralis musculature suggesting intramuscular hematoma. Results were discussed with a nurse on the patient's floor, Bro Rosenbaum. Findings were discussed at 8:47 PM. She will get results to the referring physician. Signed by Dr Estanislado Boxer on 7/28/2020 8:48 PM        Assessment:  1. Nonfunctioning ventricular lead? Dislodgment  2. Recent pacemaker insertion dual-chamber pacemaker 7/27/2020  3. Complaints of dyspnea  4. CTA pulmonary 7/28/2020 revealed evidence of pulmonary embolism left lower lobe started on anticoagulation  5. Recently elevated d-dimer 16.50 on 7/28/2020  6. 2-1 atrioventricular block noted on telemetry monitoring with heart rate mid 30s  7. Chronic kidney disease  8. Obesity  9. Hypertension  10. CT chest 6/22/2018 chronic changes left lower lobe probably fibrotic changes no other processes identifiedHolter 7/1/2020 through 7/6/2020 sinus rhythm average heart rate 54 varying from 34-75 bradycardia during sleep frequent PACs mostly isolated rare runs of PVCs and second-degree type I heart block first-degree heart block noted no symptoms  11. Echocardiogram 7/1/2020 normal left ventricular systolic function EF 55 to 60%    Plan:  1. Plan proceed with lead revision tomorrow morning patient agreeable advised indications alternatives benefits and risk.   I have discussed with the patient regarding indications for the proposed procedure lead revision along with possible alternatives benefits and risks including but not limited to risks of death, myocardial infarction, stroke, contrast induced nephropathy which in some cases may lead to acute kidney failure requiring dialysis, allergic reactions, infections which may require treatment with antibiotics or removal of the device, bleeding requiring blood transfusion,  cardiac arrhthymias, respiratory failure which may require placing the patient on respiratory support such as a ventilator or breathing machine,risk of complications which may require vascular surgery,risks of collapsing the lung with development of a pneumothorax which may require placement of a chest tube, and risks of lead dislodgement which may require follow up surgery and repositioning of the leads. In addition there are long term risks including infection, and device or component failure which may require removal and/or replacement of various components. The patient is advised the device battery will eventually become depleted and require replacement at some point. The patient is awake and alert and understands the issues involved and indicates willingness to proceed as ordered. The patient is  a reasonable candidate for moderate conscious sedation. ASA score:  ASA 3 - Patient with moderate systemic disease with functional limitations    Mallampati: I (soft palate, uvula, fauces, tonsillar pillars visible)    Preferred vascular access site will be: left subclavian vein.         Glenys Goldberg, MD 8/6/2020 10:23 AM

## 2020-08-06 NOTE — CARE COORDINATION
Pt reports no energy since Sunday 8/2 and was direct admit from [address] [facility] [employer] office visit f/u on 8/6 d/t low HR in 31-33 BPM. Sw will continue to follow and assist with further dc needs as identified.

## 2020-08-06 NOTE — PLAN OF CARE
Problem: Falls - Risk of:  Goal: Will remain free from falls  Description: Will remain free from falls  8/6/2020 0827 by Yesica Dave RN  Outcome: Ongoing     Problem: Falls - Risk of:  Goal: Absence of physical injury  Description: Absence of physical injury  8/6/2020 0827 by Yesica Dave RN  Outcome: Ongoing     Problem: Cardiac:  Goal: Ability to maintain an adequate cardiac output will improve  Description: Ability to maintain an adequate cardiac output will improve  8/6/2020 0827 by Yesica Dave RN  Outcome: Ongoing     Problem: Cardiac:  Goal: Hemodynamic stability will improve  Description: Hemodynamic stability will improve  8/6/2020 0827 by Yesica Dave RN  Outcome: Ongoing     Problem: Fluid Volume:  Goal: Ability to achieve and maintain adequate urine output will improve  Description: Ability to achieve and maintain adequate urine output will improve  8/6/2020 0827 by Yesica Dave RN  Outcome: Ongoing     Problem: Respiratory:  Goal: Respiratory status will improve  Description: Respiratory status will improve  8/6/2020 0827 by Yesica Dave RN  Outcome: Ongoing

## 2020-08-07 NOTE — PROGRESS NOTES
Cardiac catheterization preliminary note previous pacemaker pocket opened up small hematoma present which was evacuated wound cultured pacemaker removed from the leads each lead sequentially tested found to be functioning appropriately with good impedance good sensing and good capture threshold. Leads were reinserted into the pulse generator and set screws tightened with good function thereafter. Hypothesis is that there may have been very slight micro-dislodgment or non-electrical union with the setscrew on the ventricular lead. Pocket subsequently closed did well discussed with family.

## 2020-08-07 NOTE — PROGRESS NOTES
Pharmacy Renal Adjustment    Toño English is a 75 y.o. female. Pharmacy has renally adjusted medications per protocol. Recent Labs     08/05/20  1112 08/07/20  0222   BUN 36* 26*       Recent Labs     08/05/20  1112 08/07/20  0222   CREATININE 1.7* 1.6*       Estimated Creatinine Clearance: 32 mL/min (A) (based on SCr of 1.6 mg/dL (H)). Height:   Ht Readings from Last 1 Encounters:   08/07/20 5' 1\" (1.549 m)     Weight:  Wt Readings from Last 1 Encounters:   08/06/20 213 lb 12.8 oz (97 kg)       Plan: Adjust the following medications based on renal function:           Ancef to 1gram IV q12hr X2 doses.     Electronically signed by Anselmo Alaniz, [address] [place] [address] on 8/7/2020 at 1:47 PM

## 2020-08-08 NOTE — DISCHARGE SUMMARY
Letter sent hours.    Procedures: Reexploration of pocket and adjustment of lead system 8/7/2020    Hospital Course: Admitted directly to my service 8/5/2020. Recent dual-chamber pacemaker insertion satisfactory upon discharge found to have elevated d-dimer and abnormal CTA with pulmonary embolism noted started on anticoagulation. She is seen in the pacemaker clinic on day of admission found to have nonfunctioning right ventricular lead heart rate in the 30s. She was admitted for further assessment and treatment. Chest x-ray did not show any apparent evidence of dislodgment. Nevertheless even with maximum output her device would not capture. Because she had been on Eliquis in a taken a dose that day we elected to stop her Eliquis later that evening placed on intravenous heparin which was continued until approximately 6 hours prior to the scheduled procedure which was performed on 8/7/2020. The wound was opened and reexplored. Pictures of the pacemaker indicated the leads were in proper position and the setscrews were also in proper position as far as we could tell. We directly tested the leads at the time of expiration and they were found to be functioning appropriately with good capture threshold good sensing and normal impedance. By removing the leads from the header and reinserting them the device was noted to be functioning appropriately. We hypothesized that there had perhaps been micro-dislodgment or non-electrical capture of the RV lead within the header even though it appeared to be in place based on the x-ray. Subsequent to this the pacemaker pocket was closed up. She was observed overnight today doing well no hematoma. No other complaints. We discussed wound care precautions. She can start back on her Eliquis today. Follow-up in the pacemaker clinic for wound inspection and removal of staples a week from this coming Monday. At this time discharged in stable condition.     Physical Exam:    Vital Signs: /66   Pulse 65   Temp 98.2 °F (36.8 °C) (Temporal)   Resp 18   Ht 5' 1\" (1.549 m)   Wt 224 lb 4 oz (101.7 kg)   LMP  (LMP Unknown)   SpO2 98%   BMI 42.37 kg/m²     Physical Exam      Discharge Medications:       Baruch Kanner   Home Medication Instructions [id number]    Printed on:08/08/20 1242   Medication Information                      albuterol sulfate HFA (VENTOLIN HFA) 108 (90 Base) MCG/ACT inhaler  Inhale 2 puffs into the lungs every 6 hours as needed for Wheezing             allopurinol (ZYLOPRIM) 100 MG tablet  Take 1 tablet by mouth once daily             apixaban (ELIQUIS DVT/PE STARTER PACK) 5 MG TABS tablet  Take 10 mg (2 tablets) orally twice daily for 7 days, then take 5 mg (1 tablet) orally twice daily thereafter. aspirin EC 81 MG EC tablet  Take 81 mg by mouth nightly             atenolol (TENORMIN) 50 MG tablet  Take 1 tablet by mouth nightly             Compression Stockings MISC  by Does not apply route Dx:   Venous insufficiency, below knee medium compression bilateral             Cyanocobalamin (VITAMIN B-12) 5000 MCG TBDP  Take 1 tablet by mouth nightly             EUTHYROX 88 MCG tablet  Take 1 tablet by mouth once daily             fexofenadine (ALLEGRA ALLERGY) 180 MG tablet  Take 180 mg by mouth daily             furosemide (LASIX) 20 MG tablet  Take 1 tablet by mouth every other day Indications: M-W-F             gabapentin (NEURONTIN) 100 MG capsule  Indications: Takes 2 in AM and 1 QHS TAKE 1 CAPSULE BY MOUTH THREE TIMES DAILY             lactobacillus (CULTURELLE) capsule  Take 1 capsule by mouth daily             oxybutynin (DITROPAN-XL) 10 MG extended release tablet  Take 1 tablet by mouth once daily             raloxifene (EVISTA) 60 MG tablet  Take 1 tablet by mouth once daily             risedronate (ACTONEL) 35 MG tablet  Take 1 tablet by mouth every 7 days             tiotropium (SPIRIVA RESPIMAT) 2.5 MCG/ACT AERS inhaler  Inhale 2 puffs into the lungs daily             vitamin D (ERGOCALCIFEROL) 27068 units CAPS capsule  TAKE ONE CAPSULE BY MOUTH ONCE A WEEK                 Discharge Instructions:   Rama Swenson MD  [address] [facility]  [address], [address]  [phone]    On 9/14/2020  2:45 pm Your appointment on 8/26/2020 has been cancelled because it is no longer needed. [facility] [phone] [phone]  On 8/18/2020  1:30 pm Wound Check         Take medications as directed. Resume activity as tolerated.     Diet: DIET CARDIAC; No Caffeine     Disposition: Patient is medically stable and will be discharged *    Avery Lockett MD, 8/8/2020 12:41 PM

## 2020-08-08 NOTE — PLAN OF CARE
Problem: Falls - Risk of:  Goal: Will remain free from falls  Description: Will remain free from falls  Outcome: Ongoing  Goal: Absence of physical injury  Description: Absence of physical injury  Outcome: Ongoing     Problem: Cardiac:  Goal: Ability to maintain an adequate cardiac output will improve  Description: Ability to maintain an adequate cardiac output will improve  Outcome: Ongoing  Goal: Hemodynamic stability will improve  Description: Hemodynamic stability will improve  Outcome: Ongoing     Problem: Fluid Volume:  Goal: Ability to achieve and maintain adequate urine output will improve  Description: Ability to achieve and maintain adequate urine output will improve  Outcome: Ongoing     Problem: Respiratory:  Goal: Respiratory status will improve  Description: Respiratory status will improve  Outcome: Ongoing

## 2020-08-11 NOTE — CARE COORDINATION
[facility] Transitions Initial Follow Up Call    Call within 2 business days of discharge: Yes    Patient: Leon Aguilera Patient : [id number]   MRN: [id number]  Reason for Admission:   Discharge Date: 20 RARS: Readmission Risk Score: 22      Last Discharge [facility]       Complaint Diagnosis Description Type Department Provider    20   Admission (Discharged) Ilya Ruth MD           Spoke with: N/A    Facility: [place] [address]    Non-face-to-face services provided:  Reviewed encounter information for continuity of care prior to follow up phone call - chart notes, consults, progress notes, test results, med list, appointments, AVS, other information. Care Transitions 24 Hour Call    Care Transitions Interventions         Follow Up : Attempt #2 to make contact with patient for initial CTC follow up call after discharge with no answer. Unable to leave message or CTN call back number Will try again next business day.   Future Appointments   Date Time Provider Rashaad Nunez   2020 11:00 AM DAVID Huynh [facility]   2020 10:00 AM DAVID Alfredo [facility]   2020  2:45 PM Lachelle Barr MD [facility]   2020  9:45 AM Joanna Walters MD [address] [facility] Crissy Black RN

## 2020-08-12 NOTE — CARE COORDINATION
[facility] Transitions Initial Follow Up Call    Call within 2 business days of discharge: No    Patient: Leon Aguilera Patient :    MRN: [id number]  Reason for Admission:   Discharge Date: 20 RARS: Readmission Risk Score: 22      Last Discharge [facility]       Complaint Diagnosis Description Type Department Provider    20   Admission (Discharged) Ilya Ruth MD           Spoke with: N/A    Facility: [place] [address]    Non-face-to-face services provided:  Reviewed encounter information for continuity of care prior to follow up phone call - chart notes, consults, progress notes, test results, med list, appointments, AVS, other information. Care Transitions 24 Hour Call    Care Transitions Interventions         Follow Up : Attempt #3 to make contact with Roselyn Mora for an initial follow up call post discharge from the hospital without success. Unable to leave a message regarding intent of call and call back information. Will discharge from CTN services at this time due to inability to make contact.      Future Appointments   Date Time Provider Rashaad Nunez   2020 11:00 AM DAVID Huynh [facility]   2020 10:00 AM DAVID Alfredo [facility]   2020  2:45 PM Lachelle Barr MD [facility]   2020  9:45 AM Joanna Walters MD [address] [facility] Crissy Black RN

## 2020-08-17 NOTE — PROGRESS NOTES
Patient here for a wound check visit status post lead revision implant. Gauze dressing removed. Staples in place. Staples removed. See wound photos  Incision dry and intact. No redness, swelling, or drainage noted  Edges well approximated  Instructed patient to wash area with antibacterial soap and keep it clean and dry. Reviewed discharged instructions and questions answered. Reviewed [facility] home monitor and patient verbalized understanding  Follow up as scheduled    Notified Dr. Treviño's MA of scab area. She called MD and he ordered to apply steri strips and wound recheck in one week. Asked if she could get it wet or should she keep it dry. Abad Shcultz MA will find out and call patient to let her know.

## 2020-08-18 NOTE — PROGRESS NOTES
Chief Complaint   Patient presents with    Follow-Up from Hospital     pacemaker put in [address] [place] [address]. 5th by Dr. Mary Eastman -- one of the leads came off and had to do     Other     PE left lung        Post-Discharge Transitional Care Management Services or Hospital Follow Up      Anaya Coreas   YOB: 1945    Date of Office Visit:  8/18/2020  Date of Hospital Admission: 8/5/20  Date of Hospital Discharge: 8/8/20  Readmission Risk Score(high >=14%.  Medium >=10%):Readmission Risk Score: 22      Care management risk score Rising risk (score 2-5) and Complex Care (Scores >=6): 7     Non face to face  following discharge, date last encounter closed (first attempt may have been earlier): *No documented post hospital discharge outreach found in the last 14 days *No documented post hospital discharge outreach found in the last 14 days    Call initiated 2 business days of discharge: *No response recorded in the last 14 days     Patient Active Problem List   Diagnosis    Community acquired pneumonia of left lower lobe of lung ([address]    Stage 2 moderate COPD by GOLD classification ([address]    Acquired hypothyroidism    Morbid obesity with BMI of 40.0-44.9, adult ([address]    Chronic kidney disease, stage III (moderate) ([address]    Malignant neoplasm of upper-inner quadrant of right female breast ([address]    Adrenal gland cancer, unspecified laterality ([address]    Chest pain    Pulmonary embolism ([address]    Pacemaker complications, initial encounter    Chronic anticoagulation    Second degree type I atrioventricular block    Dyspnea       No Known Allergies    Medications listed as ordered at the time of discharge from [facility]   Home Medication Instructions TEO:    Printed on:08/27/20 0542   Medication Information                      albuterol sulfate HFA (VENTOLIN HFA) 108 (90 Base) MCG/ACT inhaler  Inhale 2 puffs into the lungs every 6 hours as needed for Wheezing             allopurinol (ZYLOPRIM) 100 MG tablet  Take 1 tablet by mouth once daily             apixaban (ELIQUIS DVT/PE STARTER PACK) 5 MG TABS tablet  Take 10 mg (2 tablets) orally twice daily for 7 days, then take 5 mg (1 tablet) orally twice daily thereafter. aspirin EC 81 MG EC tablet  Take 81 mg by mouth nightly             atenolol (TENORMIN) 50 MG tablet  Take 1 tablet by mouth nightly             Compression Stockings MISC  by Does not apply route Dx:   Venous insufficiency, below knee medium compression bilateral             Cyanocobalamin (VITAMIN B-12) 5000 MCG TBDP  Take 1 tablet by mouth nightly             EUTHYROX 88 MCG tablet  Take 1 tablet by mouth once daily             fexofenadine (ALLEGRA ALLERGY) 180 MG tablet  Take 180 mg by mouth daily             furosemide (LASIX) 20 MG tablet  Take 1 tablet by mouth three times a week Indications: M-W-F Mon, Wed, Fri             gabapentin (NEURONTIN) 100 MG capsule  Indications: Takes 2 in AM and 1 QHS TAKE 1 CAPSULE BY MOUTH THREE TIMES DAILY             lactobacillus (CULTURELLE) capsule  Take 1 capsule by mouth daily             oxybutynin (DITROPAN-XL) 10 MG extended release tablet  Take 1 tablet by mouth once daily             raloxifene (EVISTA) 60 MG tablet  Take 1 tablet by mouth once daily             risedronate (ACTONEL) 35 MG tablet  Take 1 tablet by mouth every 7 days             tiotropium (SPIRIVA RESPIMAT) 2.5 MCG/ACT AERS inhaler  Inhale 2 puffs into the lungs daily             vitamin D (ERGOCALCIFEROL) 60671 units CAPS capsule  TAKE ONE CAPSULE BY MOUTH ONCE A WEEK                   Medications marked \"taking\" at this time  Outpatient Medications Marked as Taking for the 8/18/20 encounter (Office Visit) with DAVID Rowe   Medication Sig Dispense Refill    furosemide (LASIX) 20 MG tablet Take 1 tablet by mouth three times a week Indications: M-W-F Mon, Wed, Fri 30 tablet 1    oxybutynin (DITROPAN-XL) 10 MG extended release tablet Take 1 tablet by mouth once daily 30 tablet 0    apixaban (ELIQUIS DVT/PE STARTER PACK) 5 MG TABS tablet Take 10 mg (2 tablets) orally twice daily for 7 days, then take 5 mg (1 tablet) orally twice daily thereafter. 74 tablet 0    atenolol (TENORMIN) 50 MG tablet Take 1 tablet by mouth nightly 30 tablet 3    tiotropium (SPIRIVA RESPIMAT) 2.5 MCG/ACT AERS inhaler Inhale 2 puffs into the lungs daily      albuterol sulfate HFA (VENTOLIN HFA) 108 (90 Base) MCG/ACT inhaler Inhale 2 puffs into the lungs every 6 hours as needed for Wheezing      aspirin EC 81 MG EC tablet Take 81 mg by mouth nightly      lactobacillus (CULTURELLE) capsule Take 1 capsule by mouth daily      Cyanocobalamin (VITAMIN B-12) 5000 MCG TBDP Take 1 tablet by mouth nightly      Compression Stockings MISC by Does not apply route Dx: Venous insufficiency, below knee medium compression bilateral 1 each 0    allopurinol (ZYLOPRIM) 100 MG tablet Take 1 tablet by mouth once daily (Patient taking differently: nightly ) 90 tablet 0    EUTHYROX 88 MCG tablet Take 1 tablet by mouth once daily 90 tablet 0    raloxifene (EVISTA) 60 MG tablet Take 1 tablet by mouth once daily 90 tablet 3    vitamin D (ERGOCALCIFEROL) 32449 units CAPS capsule TAKE ONE CAPSULE BY MOUTH ONCE A WEEK (Patient taking differently: once a week On tuesday) 12 capsule 3    gabapentin (NEURONTIN) 100 MG capsule Indications: Takes 2 in AM and 1 QHS TAKE 1 CAPSULE BY MOUTH THREE TIMES DAILY (Patient taking differently: Take 100 mg by mouth 2 times daily.  ) 180 capsule 5    risedronate (ACTONEL) 35 MG tablet Take 1 tablet by mouth every 7 days (Patient taking differently: Take 35 mg by mouth once a week On Thursday) 12 tablet 3    fexofenadine (ALLEGRA ALLERGY) 180 MG tablet Take 180 mg by mouth daily          Medications patient taking as of now reconciled against medications ordered at time of hospital discharge: Yes    Chief Complaint   Patient presents with   [address] from [facility] pacemaker put in aug. 5th by Dr. Ronald Wright -- one of the leads came off and had to do     Other     PE left lung       HPI    [facility] presents today for HFU. Ms. Caron Hernandez was admitted to [facility] from 8/5 to 8/8 for pacemaker revision. She was also found to have a pulmonary embolism in her left lung. She was started on Eliquis. Pacemaker was placed on 8/5. She states she has been for a wound check at cardiology. She has no signs of infection in this area. She is taking medications as prescribed. She denies any fevers. She does feel fatigued and short of breath with exertion. She does not have any increased symptoms, however. She is scheduled to follow-up with cardiology for device check next week. She is concerned about a hard and tender area in her right lower quadrant. She did receive Lovenox in the hospital.    Inpatient course: Discharge summary reviewed- see chart. Interval history/Current status: Fair    Review of Systems   Constitutional: Positive for fatigue. Negative for appetite change and fever. HENT: Negative for congestion, sinus pressure and sinus pain. Eyes: Negative for pain and visual disturbance. Respiratory: Positive for shortness of breath. Negative for cough and wheezing. Cardiovascular: Negative for chest pain and leg swelling. Gastrointestinal: Negative for abdominal pain, diarrhea, nausea and vomiting. Endocrine: Negative for cold intolerance and heat intolerance. Genitourinary: Negative for dysuria, frequency and urgency. Musculoskeletal: Negative for arthralgias and back pain. Skin: Positive for wound (surgical). Negative for rash. Neurological: Negative for dizziness, weakness and headaches. Hematological: Negative for adenopathy. Psychiatric/Behavioral: Negative for dysphoric mood and sleep disturbance. The patient is not nervous/anxious.         Vitals:    08/18/20 1010   BP: 132/68   Pulse: 70   Resp: 20   Temp: 98.7 °F (37.1 °C)   TempSrc: Temporal   SpO2: 98%   Weight: 208 lb (94.3 kg)   Height: 5' 1\" (1.549 m)     Body mass index is 39.3 kg/m². Wt Readings from Last 3 Encounters:   08/18/20 208 lb (94.3 kg)   08/08/20 224 lb 4 oz (101.7 kg)   07/29/20 215 lb 3.2 oz (97.6 kg)     BP Readings from Last 3 Encounters:   08/18/20 132/68   08/08/20 134/66   07/29/20 139/65       Physical Exam  Vitals signs and nursing note reviewed. Constitutional:       General: She is not in acute distress. Appearance: She is well-developed. She is obese. She is not ill-appearing. HENT:      Head: Normocephalic and atraumatic. Right Ear: External ear normal.      Left Ear: External ear normal.      Nose: Nose normal.      Mouth/Throat:      Dentition: Normal dentition. Eyes:      General:         Right eye: No discharge. Left eye: No discharge. Conjunctiva/sclera: Conjunctivae normal.      Pupils: Pupils are equal, round, and reactive to light. Neck:      Musculoskeletal: Normal range of motion and neck supple. Cardiovascular:      Rate and Rhythm: Normal rate and regular rhythm. Pulses: Normal pulses. Heart sounds: Normal heart sounds. No murmur. Pulmonary:      Effort: Pulmonary effort is normal. No respiratory distress. Breath sounds: Normal breath sounds. No stridor. No wheezing, rhonchi or rales. Abdominal:      General: Bowel sounds are normal. There is no distension. Palpations: Abdomen is soft. Tenderness: There is no abdominal tenderness. Musculoskeletal:      Lumbar back: She exhibits normal range of motion. Right lower leg: No edema. Left lower leg: No edema. Lymphadenopathy:      Cervical: No cervical adenopathy. Skin:     General: Skin is warm and dry. Capillary Refill: Capillary refill takes less than 2 seconds. Findings: No rash. Neurological:      General: No focal deficit present.       Mental Status: She is alert and oriented to person, place, and time. Mental status is at baseline. Psychiatric:         Mood and Affect: Mood normal.         Behavior: Behavior normal.             Assessment/Plan:    US of RLQ if mass does not resolve. Eliquis x 6 months    PM incision healing. Refill Lasix. F/u with specialists as scheduled. VS    1. Hospital discharge follow-up    - [place] DISCHARGE MEDS RECONCILED W/ CURRENT OUTPATIENT MED LIST    2. Lower extremity edema  - furosemide (LASIX) 20 MG tablet; Take 1 tablet by mouth three times a week Indications: M-W-F Mon, Wed, Fri  Dispense: 30 tablet; Refill: 1    3. S/P cardiac pacemaker procedure    4. Single subsegmental pulmonary embolism without acute cor pulmonale          Medical Decision Making: high complexity    Return if symptoms worsen or fail to improve, for As scheduled.

## 2020-08-21 NOTE — PROCEDURES
Cardiac exploration with adjustment right ventricular lead Operative Report    Leon Aguilera  [id number]  8/21/2020    Surgeon: Melvin Brock    Anesthesia: Intravenous sedation and local anesthetic    Procedure(s):   1. Adjustment of right ventricular lead  2. Monitoring of conscious sedation      Indications:  1. Nonfunctioning right ventricular lead with no capture      Procedure Details  The risks, benefits, complications, treatment options, and expected outcomes were discussed with the patient. The patient and/or family concurred with the proposed plan, giving informed consent. Patient was prepped and draped in the usual strict sterile fashion. After the antibiotic was completely infused, 30 cc 2% xylocaine was infiltrated into the left Infraclavicular area. The previous incision was incised and subsequently utilizing sharp and blunt dissection the pacemaker generator was identified freed from the surrounding subcutaneous tissues and delivered into the operative field where it was disconnected from the previous leads. Radiography of the pacing system prior to any adjustment indicated the leads appeared to be unchanged in position at the time of completion of the previous procedure from 7/27/2020. In particular the pins of the leads appear to be appropriately advanced beyond the set screws in the pulse generator. We tested the leads and found them to be functioning appropriately with good sensing and pacing capture thresholds. Impedances were normal as well. Subsequently the leads were reinserted into the pulse generator and set screws retightened. After having assured adequate hemostasis the pulse generator and leads were placed in the pocket. The pocket was copiously irrigated utilizing antibiotic solution. The pacemaker and leads system were visualized under fluoroscopy. Appropriate redundancy/slack in the leads were noted. The pins of the leads were beyond the set screws. Hemostasis was reverified. The pocket was then closed using 2-0 Vicryl for the deep layer and 3-0 Vicryl for the middle layer. Surgical staples were then applied to the skin and sterile dressing was applied. At the end of the procedure instrument, needle, and sponge counts were correct. An arm immobilizer was applied at this point. An independent trained observer administered medications under my direction. The patient was continuously monitored with respect to level of consciousness, and vital signs/physiologic status throughout the case. For further details regarding specific medications and doses please refer to Cath Lab procedural notes. Anesthesia start time:1125  Anesthesia stop time:1211      Pacemaker Data:     Atrial lead   Medtronic  Model   5154-56   serial #   [id number]  Volt    0.75 V    PW    0.4 ms    Current   N/A   ma       Impedance:   399   ohms        Slew rate:   N/A   V/sec  P wave:   1.8 mv    Right Ventricular lead    Medtronic  Model   2668-09   serial #   [id number]  Volt    0.5    V     PW    0.4 ms     Current   N/A   ma    I  Impedance:   399   ohms       Slew rate:   N/A   V/sec  R wave:   4.5 mv    Generator  [id number]  Model   [id number]        Estimated Blood Loss:  Minimal         Complications:  None; patient tolerated the procedure well.            Disposition: Admitted to progressive care unit           Condition: stable      Jenny Fischer MD 8/21/2020 2:22 PM

## 2020-08-24 PROBLEM — I44.1 SECOND DEGREE TYPE I ATRIOVENTRICULAR BLOCK: Status: ACTIVE | Noted: 2020-01-01

## 2020-08-24 PROBLEM — Z79.01 CHRONIC ANTICOAGULATION: Status: ACTIVE | Noted: 2020-01-01

## 2020-08-24 PROBLEM — R06.00 DYSPNEA: Status: ACTIVE | Noted: 2020-01-01

## 2020-08-24 NOTE — PROGRESS NOTES
[facility] Progress Note                            Date:  8/24/2020  Patient: Damon Hayward  Age:  75 y.o., 1945      Reason for evaluation:         SUBJECTIVE:    Seen today recent dual-chamber pacemaker and subsequently had to revise the ventricular lead several days later but she has been doing well since then. Back on her blood thinner denies any bleeding denies chest pain denies dyspnea no new complaints. Review of Systems   Constitutional: Negative. Negative for chills, fever and unexpected weight change. HENT: Negative. Eyes: Negative. Respiratory: Negative. Negative for shortness of breath. Cardiovascular: Negative. Negative for chest pain. Gastrointestinal: Negative. Negative for diarrhea, nausea and vomiting. Endocrine: Negative. Genitourinary: Negative. Musculoskeletal: Negative. Skin: Negative. Neurological: Negative. All other systems reviewed and are negative. OBJECTIVE:     LMP  (LMP Unknown)     Labs:   CBC: No results for input(s): WBC, HGB, HCT, PLT in the last 72 hours. BMP:No results for input(s): NA, K, CO2, BUN, CREATININE, LABGLOM, GLUCOSE in the last 72 hours. BNP: No results for input(s): BNP in the last 72 hours. PT/INR: No results for input(s): PROTIME, INR in the last 72 hours. APTT:No results for input(s): APTT in the last 72 hours. CARDIAC ENZYMES:No results for input(s): CKTOTAL, CKMB, CKMBINDEX, TROPONINI in the last 72 hours. FASTING LIPID PANEL:  Lab Results   Component Value Date    HDL 38 01/03/2020    LDLCALC 79 01/03/2020    TRIG 400 01/03/2020     LIVER PROFILE:No results for input(s): AST, ALT, LABALBU in the last 72 hours.         Past Medical History:   Diagnosis Date    Acquired hypothyroidism 7/11/2018    Chronic kidney disease, stage III (moderate) ([address] 9/3/2019    Mom and sister + breast CA Lumpectomy - remission    Community acquired pneumonia of left lower lobe of lung ([facility] [address] 6/1/2018    MG tablet Take 1 tablet by mouth once daily 90 tablet 3    vitamin D (ERGOCALCIFEROL) 04776 units CAPS capsule TAKE ONE CAPSULE BY MOUTH ONCE A WEEK (Patient taking differently: once a week On tuesday) 12 capsule 3    gabapentin (NEURONTIN) 100 MG capsule Indications: Takes 2 in AM and 1 QHS TAKE 1 CAPSULE BY MOUTH THREE TIMES DAILY (Patient taking differently: Take 100 mg by mouth 2 times daily. ) 180 capsule 5    risedronate (ACTONEL) 35 MG tablet Take 1 tablet by mouth every 7 days (Patient taking differently: Take 35 mg by mouth once a week On Thursday) 12 tablet 3    fexofenadine (ALLEGRA ALLERGY) 180 MG tablet Take 180 mg by mouth daily       No current facility-administered medications for this visit.       Social History     Socioeconomic History    Marital status:      Spouse name: Not on file    Number of children: Not on file    Years of education: Not on file    Highest education level: Not on file   Occupational History    Not on file   Social Needs    Financial resource strain: Not on file    Food insecurity     Worry: Not on file     Inability: Not on file    Transportation needs     Medical: Not on file     Non-medical: Not on file   Tobacco Use    Smoking status: Never Smoker    Smokeless tobacco: Never Used   Substance and Sexual Activity    Alcohol use: No    Drug use: No    Sexual activity: Not on file   Lifestyle    Physical activity     Days per week: Not on file     Minutes per session: Not on file    Stress: Not on file   Relationships    Social connections     Talks on phone: Not on file     Gets together: Not on file     Attends [employer] service: Not on file     Active member of club or organization: Not on file     Attends meetings of clubs or organizations: Not on file     Relationship status: Not on file    Intimate partner violence     Fear of current or ex partner: Not on file     Emotionally abused: Not on file     Physically abused: Not on file     Forced sexual activity: Not on file   Other Topics Concern    Not on file   Social History Narrative     61 years    She has 1 son and 1 daughter    Worked at [employer] and also [employer] not presently working    Education high school    [address] [place] [address]    She does drive an automobile    Remains moderately active    Denies alcohol or tobacco consumption or substance usage       Physical Examination:  LMP  (LMP Unknown)   Physical Exam  Vitals signs reviewed. Constitutional:       Appearance: She is well-developed. Neck:      Vascular: No carotid bruit or JVD. Cardiovascular:      Rate and Rhythm: Normal rate and regular rhythm. Heart sounds: Normal heart sounds. No murmur. No friction rub. No gallop. Pulmonary:      Effort: Pulmonary effort is normal. No respiratory distress. Breath sounds: Normal breath sounds. No wheezing or rales. Abdominal:      General: There is no distension. Tenderness: There is no abdominal tenderness. Lymphadenopathy:      Cervical: No cervical adenopathy. Skin:     General: Skin is warm and dry. ASSESSMENT:     Diagnosis Orders   1. Visit for wound check     2. Pacemaker     3. Morbid obesity with BMI of 40.0-44.9, adult ([facility] [address]     4. Chronic anticoagulation     5. Second degree type I atrioventricular block     6. Dyspnea, unspecified type         PLAN:  No orders of the defined types were placed in this encounter. No orders of the defined types were placed in this encounter. 1. Continue present medications  2. Recommend follow-up assessment in 1 month    Return in about 4 weeks (around 9/21/2020) for return to Dr. Kemar Castillo only. Kasia Dang MD 8/24/2020 1:07 PM CDT    [id number] [facility]      Thisdictation was generated by voice recognition computer software. Although all attempts are made to edit the dictation for accuracy, there may be errors in the transcription that are not intended.

## 2020-08-31 NOTE — PROGRESS NOTES
Patent reports scab on medial edge has flatten. She did report some drainage on the gauze dressing. She did not change it today because she was coming in. Gauze dressing dry today. Removed steri strips. MD to assess site and advice.

## 2020-08-31 NOTE — PROGRESS NOTES
[facility] Progress Note                            Date:  8/31/2020  Patient: Zahida Sykes  Age:  67 y.o., 1945      Reason for evaluation:         SUBJECTIVE:    Seen today postop wound check  Eschar across the incision but intact no hematoma no erythema. No other complaints. Some dyspnea with ambulation but no different than before. Review of Systems   Constitutional: Negative. Negative for chills, fever and unexpected weight change. HENT: Negative. Eyes: Negative. Respiratory: Negative. Negative for shortness of breath. Cardiovascular: Negative. Negative for chest pain. Gastrointestinal: Negative. Negative for diarrhea, nausea and vomiting. Endocrine: Negative. Genitourinary: Negative. Musculoskeletal: Negative. Skin: Negative. Neurological: Negative. All other systems reviewed and are negative. OBJECTIVE:     LMP  (LMP Unknown)     Labs:   CBC: No results for input(s): WBC, HGB, HCT, PLT in the last 72 hours. BMP:No results for input(s): NA, K, CO2, BUN, CREATININE, LABGLOM, GLUCOSE in the last 72 hours. BNP: No results for input(s): BNP in the last 72 hours. PT/INR: No results for input(s): PROTIME, INR in the last 72 hours. APTT:No results for input(s): APTT in the last 72 hours. CARDIAC ENZYMES:No results for input(s): CKTOTAL, CKMB, CKMBINDEX, TROPONINI in the last 72 hours. FASTING LIPID PANEL:  Lab Results   Component Value Date    HDL 38 01/03/2020    LDLCALC 79 01/03/2020    TRIG 400 01/03/2020     LIVER PROFILE:No results for input(s): AST, ALT, LABALBU in the last 72 hours.         Past Medical History:   Diagnosis Date    Acquired hypothyroidism 7/11/2018    Chronic kidney disease, stage III (moderate) ([address] 9/3/2019    Mom and sister + breast CA Lumpectomy - remission    Community acquired pneumonia of left lower lobe of lung ([address] 6/1/2018    Malignant neoplasm of upper-inner quadrant of right female breast ([facility] [address] 2/6/2020    Stage 2 moderate COPD by GOLD classification ([address] 7/11/2018     Past Surgical History:   Procedure Laterality Date    ADRENALECTOMY      APPENDECTOMY      BACK SURGERY      BREAST LUMPECTOMY      CARPAL TUNNEL RELEASE      CARPAL TUNNEL RELEASE  1990    CATARACT REMOVAL      CORONARY ANGIOPLASTY      GALLBLADDER SURGERY      PACEMAKER INSERTION  07/22/2020    PARTIAL HYSTERECTOMY      TONSILLECTOMY  1997    TOTAL KNEE ARTHROPLASTY       No family history on file. No Known Allergies  Current Outpatient Medications   Medication Sig Dispense Refill    apixaban (ELIQUIS) 5 MG TABS tablet Take 1 tablet by mouth 2 times daily 60 tablet 3    oxybutynin (DITROPAN-XL) 10 MG extended release tablet TAKE 1 TABLET BY MOUTH ONCE DAILY 30 tablet 0    furosemide (LASIX) 20 MG tablet Take 1 tablet by mouth three times a week Indications: M-W-F Mon, Wed, Fri 30 tablet 1    atenolol (TENORMIN) 50 MG tablet Take 1 tablet by mouth nightly 30 tablet 3    tiotropium (SPIRIVA RESPIMAT) 2.5 MCG/ACT AERS inhaler Inhale 2 puffs into the lungs daily      albuterol sulfate HFA (VENTOLIN HFA) 108 (90 Base) MCG/ACT inhaler Inhale 2 puffs into the lungs every 6 hours as needed for Wheezing      aspirin EC 81 MG EC tablet Take 81 mg by mouth nightly      lactobacillus (CULTURELLE) capsule Take 1 capsule by mouth daily      Cyanocobalamin (VITAMIN B-12) 5000 MCG TBDP Take 1 tablet by mouth nightly      Compression Stockings MISC by Does not apply route Dx:   Venous insufficiency, below knee medium compression bilateral 1 each 0    allopurinol (ZYLOPRIM) 100 MG tablet Take 1 tablet by mouth once daily (Patient taking differently: nightly ) 90 tablet 0    EUTHYROX 88 MCG tablet Take 1 tablet by mouth once daily 90 tablet 0    raloxifene (EVISTA) 60 MG tablet Take 1 tablet by mouth once daily 90 tablet 3    vitamin D (ERGOCALCIFEROL) 24001 units CAPS capsule TAKE ONE CAPSULE BY MOUTH ONCE A WEEK (Patient taking differently: once a week On tuesday) 12 capsule 3    gabapentin (NEURONTIN) 100 MG capsule Indications: Takes 2 in AM and 1 QHS TAKE 1 CAPSULE BY MOUTH THREE TIMES DAILY (Patient taking differently: Take 100 mg by mouth 2 times daily. ) 180 capsule 5    risedronate (ACTONEL) 35 MG tablet Take 1 tablet by mouth every 7 days (Patient taking differently: Take 35 mg by mouth once a week On Thursday) 12 tablet 3    fexofenadine (ALLEGRA ALLERGY) 180 MG tablet Take 180 mg by mouth daily       No current facility-administered medications for this visit.       Social History     Socioeconomic History    Marital status:      Spouse name: Not on file    Number of children: Not on file    Years of education: Not on file    Highest education level: Not on file   Occupational History    Not on file   Social Needs    Financial resource strain: Not on file    Food insecurity     Worry: Not on file     Inability: Not on file    Transportation needs     Medical: Not on file     Non-medical: Not on file   Tobacco Use    Smoking status: Never Smoker    Smokeless tobacco: Never Used   Substance and Sexual Activity    Alcohol use: No    Drug use: No    Sexual activity: Not on file   Lifestyle    Physical activity     Days per week: Not on file     Minutes per session: Not on file    Stress: Not on file   Relationships    Social connections     Talks on phone: Not on file     Gets together: Not on file     Attends [employer]ism service: Not on file     Active member of club or organization: Not on file     Attends meetings of clubs or organizations: Not on file     Relationship status: Not on file    Intimate partner violence     Fear of current or ex partner: Not on file     Emotionally abused: Not on file     Physically abused: Not on file     Forced sexual activity: Not on file   Other Topics Concern    Not on file   Social History Narrative     61 years    She has 1 son and 1 daughter    Worked at [employer] and also [employer] not presently working    Education high school    [address] [employer]    She does drive an automobile    Remains moderately active    Denies alcohol or tobacco consumption or substance usage       Physical Examination:  LMP  (LMP Unknown)   Physical Exam  Vitals signs reviewed. Constitutional:       Appearance: She is well-developed. Neck:      Vascular: No carotid bruit or JVD. Cardiovascular:      Rate and Rhythm: Normal rate and regular rhythm. Heart sounds: Normal heart sounds. No murmur. No friction rub. No gallop. Pulmonary:      Effort: Pulmonary effort is normal. No respiratory distress. Breath sounds: Normal breath sounds. No wheezing or rales. Abdominal:      General: There is no distension. Tenderness: There is no abdominal tenderness. Lymphadenopathy:      Cervical: No cervical adenopathy. Skin:     General: Skin is warm and dry. ASSESSMENT:     Diagnosis Orders   1. Pacemaker     2. Chronic anticoagulation     3. Morbid obesity with BMI of 40.0-44.9, adult ([address]     4. Second degree type I atrioventricular block     5. Dyspnea, unspecified type     6. History of pulmonary embolism         PLAN:  No orders of the defined types were placed in this encounter. No orders of the defined types were placed in this encounter. 1. Continue present medications  2. Suggest wet-to-dry dressings twice daily for the next few weeks  3. Recommend follow-up assessment with me in 2 weeks      Return in about 2 weeks (around 9/14/2020). Saran Molina MD 8/31/2020 1:34 PM CDT    [facility]      Thisdictation was generated by voice recognition computer software. Although all attempts are made to edit the dictation for accuracy, there may be errors in the transcription that are not intended.

## 2020-09-14 NOTE — PROGRESS NOTES
[facility] Progress Note                            Date:  9/14/2020  Patient: Tamea Primrose  Age:  67 y.o., 1945      Reason for evaluation:         SUBJECTIVE:    Returns today for wound assessment having received wet-to-dry dressings the past few weeks the eschar about the medial aspect of the incision appears much more moist no erythema suture line intact no hematoma no other issues reported. Overall feels well. Wanted to know about going to the dentist in a couple weeks I thought that would be fine. Review of Systems      OBJECTIVE:     BP (!) 146/88   Pulse 64   Ht 5' 1\" (1.549 m)   Wt 214 lb (97.1 kg)   LMP  (LMP Unknown)   BMI 40.43 kg/m²     Labs:   CBC: No results for input(s): WBC, HGB, HCT, PLT in the last 72 hours. BMP:No results for input(s): NA, K, CO2, BUN, CREATININE, LABGLOM, GLUCOSE in the last 72 hours. BNP: No results for input(s): BNP in the last 72 hours. PT/INR: No results for input(s): PROTIME, INR in the last 72 hours. APTT:No results for input(s): APTT in the last 72 hours. CARDIAC ENZYMES:No results for input(s): CKTOTAL, CKMB, CKMBINDEX, TROPONINI in the last 72 hours. FASTING LIPID PANEL:  Lab Results   Component Value Date    HDL 38 01/03/2020    LDLCALC 79 01/03/2020    TRIG 400 01/03/2020     LIVER PROFILE:No results for input(s): AST, ALT, LABALBU in the last 72 hours.         Past Medical History:   Diagnosis Date    Acquired hypothyroidism 7/11/2018    Chronic kidney disease, stage III (moderate) ([address] 9/3/2019    Mom and sister + breast CA Lumpectomy - remission    Community acquired pneumonia of left lower lobe of lung ([address] 6/1/2018    Malignant neoplasm of upper-inner quadrant of right female breast ([address] 2/6/2020    Stage 2 moderate COPD by GOLD classification ([address] 7/11/2018     Past Surgical History:   Procedure Laterality Date    ADRENALECTOMY      APPENDECTOMY      BACK SURGERY      BREAST LUMPECTOMY      CARPAL TUNNEL RELEASE      CARPAL TUNNEL RELEASE  1990    CATARACT REMOVAL      CORONARY ANGIOPLASTY      GALLBLADDER SURGERY      PACEMAKER INSERTION  07/22/2020    PARTIAL HYSTERECTOMY      TONSILLECTOMY  1997    TOTAL KNEE ARTHROPLASTY       No family history on file. No Known Allergies  Current Outpatient Medications   Medication Sig Dispense Refill    apixaban (ELIQUIS) 5 MG TABS tablet Take 1 tablet by mouth 2 times daily 60 tablet 3    oxybutynin (DITROPAN-XL) 10 MG extended release tablet TAKE 1 TABLET BY MOUTH ONCE DAILY 30 tablet 0    furosemide (LASIX) 20 MG tablet Take 1 tablet by mouth three times a week Indications: M-W-F Mon, Wed, Fri 30 tablet 1    atenolol (TENORMIN) 50 MG tablet Take 1 tablet by mouth nightly 30 tablet 3    tiotropium (SPIRIVA RESPIMAT) 2.5 MCG/ACT AERS inhaler Inhale 2 puffs into the lungs daily      albuterol sulfate HFA (VENTOLIN HFA) 108 (90 Base) MCG/ACT inhaler Inhale 2 puffs into the lungs every 6 hours as needed for Wheezing      aspirin EC 81 MG EC tablet Take 81 mg by mouth nightly      lactobacillus (CULTURELLE) capsule Take 1 capsule by mouth daily      Cyanocobalamin (VITAMIN B-12) 5000 MCG TBDP Take 1 tablet by mouth nightly      Compression Stockings MISC by Does not apply route Dx:   Venous insufficiency, below knee medium compression bilateral 1 each 0    allopurinol (ZYLOPRIM) 100 MG tablet Take 1 tablet by mouth once daily (Patient taking differently: nightly ) 90 tablet 0    EUTHYROX 88 MCG tablet Take 1 tablet by mouth once daily 90 tablet 0    raloxifene (EVISTA) 60 MG tablet Take 1 tablet by mouth once daily 90 tablet 3    vitamin D (ERGOCALCIFEROL) 78603 units CAPS capsule TAKE ONE CAPSULE BY MOUTH ONCE A WEEK (Patient taking differently: once a week On tuesday) 12 capsule 3    risedronate (ACTONEL) 35 MG tablet Take 1 tablet by mouth every 7 days (Patient taking differently: Take 35 mg by mouth once a week On Thursday) 12 tablet 3    fexofenadine

## 2020-09-14 NOTE — PROGRESS NOTES
Pacemaker interrogated  Presenting rhythm:  AS , AP 4.7%,  99.9%  Battey voltage 11.9 years  Lead status:  Lead impedance within range and stable  Sensing:  P waves 3.1 mV,  R waves paced  Thresholds:  Atrial 0.5V @ 0.4ms, ventricular 0.5@ 0.4ms  Observations:  none  Reprogramming for sensitivity and threshold testing  [facility]    Patient has been doing wet to dry dressings to wound incision.

## 2020-09-23 NOTE — PROGRESS NOTES
Medicare Annual Wellness Visit  Name: Tori Nelson Date: 10/12/2020   MRN: [id number] Sex: Female   Age: 67 y.o. Ethnicity: Non-/Non    : 1945 Race: Rah Medina is here for Medicare AWV and Neck Pain    Screenings for behavioral, psychosocial and functional/safety risks, and cognitive dysfunction are all negative except as indicated below. These results, as well as other patient data from the [address] form, are documented in Flowsheets linked to this Encounter. No Known Allergies      Prior to Visit Medications    Medication Sig Taking? Authorizing Provider   gabapentin (NEURONTIN) 100 MG capsule Indications: Takes 2 in AM and 1 QHS TAKE 1 CAPSULE BY MOUTH THREE TIMES DAILY Yes Emili Kan MD   raloxifene (EVISTA) 60 MG tablet Take 1 tablet by mouth once daily Yes Emili Kan MD   hydrocortisone (PROCTOSOL HC) 2.5 % CREA rectal cream Apply twice daily as needed Yes Emili Kan MD   vitamin D (ERGOCALCIFEROL) 1.25 MG (98291 UT) CAPS capsule Take 1 capsule by mouth once a week Yes Emili Kan MD   furosemide (LASIX) 20 MG tablet Take 1 tablet by mouth three times a week Indications: -W- Mon, Wed, Fri Yes DVAID Cano   atenolol (TENORMIN) 50 MG tablet Take 1 tablet by mouth nightly Yes Maria M Kenyon MD   tiotropium (SPIRIVA RESPIMAT) 2.5 MCG/ACT AERS inhaler Inhale 2 puffs into the lungs daily Yes Historical Provider, MD   albuterol sulfate HFA (VENTOLIN HFA) 108 (90 Base) MCG/ACT inhaler Inhale 2 puffs into the lungs every 6 hours as needed for Wheezing Yes Historical Provider, MD   aspirin EC 81 MG EC tablet Take 81 mg by mouth nightly Yes Historical Provider, MD   lactobacillus (CULTURELLE) capsule Take 1 capsule by mouth daily Yes Historical Provider, MD   Cyanocobalamin (VITAMIN B-12) 5000 MCG TBDP Take 1 tablet by mouth nightly Yes Historical Provider, MD   Compression Stockings MISC by Does not apply route Dx:   Venous insufficiency, below knee medium compression bilateral Yes Jasmine Portillo MD   fexofenadine TY [facility] ALLERGY) 180 MG tablet Take 180 mg by mouth daily Yes Historical Provider, MD   oxybutynin (DITROPAN-XL) 10 MG extended release tablet TAKE 1 TABLET BY MOUTH ONCE DAILY  Jasmine Portillo MD   allopurinol (ZYLOPRIM) 100 MG tablet Take 1 tablet by mouth once daily  Jasmine Portillo MD   EUTHYROX 75 MCG tablet Take 1 tablet by mouth Daily  Jasmine Portillo MD         Past Medical History:   Diagnosis Date    Acquired hypothyroidism 7/11/2018    Chronic kidney disease, stage III (moderate) ([address] 9/3/2019    Mom and sister + breast CA Lumpectomy - remission    Community acquired pneumonia of left lower lobe of lung ([address] 6/1/2018    Malignant neoplasm of upper-inner quadrant of right female breast ([address] 2/6/2020    Stage 2 moderate COPD by GOLD classification ([facility] [facility] 7/11/2018       Past Surgical History:   Procedure Laterality Date    ADRENALECTOMY      APPENDECTOMY      BACK SURGERY      BREAST LUMPECTOMY      CARPAL TUNNEL RELEASE      CARPAL TUNNEL RELEASE  1990    CATARACT REMOVAL      CORONARY ANGIOPLASTY      GALLBLADDER SURGERY      PACEMAKER INSERTION  07/22/2020    PARTIAL HYSTERECTOMY      TONSILLECTOMY  1997    TOTAL KNEE ARTHROPLASTY         No family history on file. CareTeam (Including outside providers/suppliers regularly involved in providing care):   Patient Care Team:  Jasmine Portillo MD as PCP - General (Family Medicine)  Jasmine Portillo MD as PCP - [facility] Empaneled Provider  DAVID Irving as Nurse Practitioner (Nephrology)    Wt Readings from Last 3 Encounters:   09/28/20 218 lb (98.9 kg)   09/23/20 215 lb (97.5 kg)   09/14/20 214 lb (97.1 kg)     Vitals:    09/23/20 0946   BP: 126/70   Site: Right Upper Arm   Pulse: 68   Temp: 98.2 °F (36.8 °C)   SpO2: 97%   Weight: 215 lb (97.5 kg)   Height: 5' 1\" (1.549 m)     Body mass index is 40.62 kg/m².     Based upon direct observation of the patient, evaluation of cognition reveals recent and remote memory intact. Patient's complete Health Risk Assessment and screening values have been reviewed and are found in Flowsheets. The following problems were reviewed today and where indicated follow up appointments were made and/or referrals ordered. Positive Risk Factor Screenings with Interventions:       General Health and ACP:  General  In general, how would you say your health is?: Good  In the past 7 days, have you experienced any of the following?  New or Increased Pain, New or Increased Fatigue, Loneliness, Social Isolation, Stress or Anger?: None of These  Do you get the social and emotional support that you need?: Yes  Do you have a Living Will?: (!) No  Advance Directives     Power of [facility] Will ACP-Advance Directive ACP-Power of     Not on File Not on File Filed [address] [place] Risk Interventions:  · No Living Will: provided the state-specific advance directive document to the patient    Health Habits/Nutrition:  Health Habits/Nutrition  Do you exercise for at least 20 minutes 2-3 times per week?: (!) No  Have you lost any weight without trying in the past 3 months?: No  Do you eat fewer than 2 meals per day?: No  Have you seen a dentist within the past year?: Yes  Body mass index: 40.62  Health Habits/Nutrition Interventions:  · Inadequate physical activity:  educational materials provided to promote increased physical activity    Safety:  Safety  Do you have working smoke detectors?: Yes  Have all throw rugs been removed or fastened?: (!) No  Do you have non-slip mats or surfaces in all bathtubs/showers?: Yes  Are your doorways, halls and stairs free of clutter?: Yes  Do you always fasten your seatbelt when you are in a car?: Yes  Safety Interventions:  · Home safety tips provided    Personalized Preventive Plan   Current Health Maintenance Status  Immunization History   Administered Date(s) Administered    Influenza Virus Vaccine 11/01/2019    Influenza, High Dose (Fluzone 65 yrs and older) 09/11/2018, 11/01/2019    Influenza, Quadv, IM, (6 mo and older Fluzone, Flulaval, Fluarix and 3 yrs and older Afluria) 12/06/2017    Influenza, Quadv, adjuvanted, 65 yrs +, IM, PF (Fluad) 09/23/2020    Pneumococcal Conjugate 13-valent (Abreuds22) 05/17/2018    Pneumococcal Polysaccharide ([id number]) 11/07/2016    Tdap (Boostrix, Adacel) 08/01/2010        Health Maintenance   Topic Date Due    Shingles Vaccine (1 of 2) 08/30/1994    Annual Wellness Visit (AWV)  05/29/2019    DTaP/Tdap/Td vaccine (2 - Td) 08/01/2020    Potassium monitoring  08/07/2021    Creatinine monitoring  08/07/2021    TSH testing  09/23/2021    DEXA (modify frequency per FRAX score)  Completed    Flu vaccine  Completed    Pneumococcal 65+ years Vaccine  Completed    Hepatitis A vaccine  Aged Out    Hepatitis B vaccine  Aged Out    Hib vaccine  Aged Out    Meningococcal (ACWY) vaccine  Aged Out     Recommendations for Unveil Due: see orders and patient instructions/AVS.  . Recommended screening schedule for the next 5-10 years is provided to the patient in written form: see Patient Instructions/AVS.    Nino Parham was seen today for medicare awv and neck pain. Diagnoses and all orders for this visit:    Routine general medical examination at a health care facility    Neuropathy  -     gabapentin (NEURONTIN) 100 MG capsule; Indications: Takes 2 in AM and 1 QHS TAKE 1 CAPSULE BY MOUTH THREE TIMES DAILY    Morbid obesity with BMI of 40.0-44.9, adult (HCC)  -     raloxifene (EVISTA) 60 MG tablet; Take 1 tablet by mouth once daily    Osteopenia of multiple sites  -     DEXA BONE DENSITY 2 SITES; Future  -     TSH without Reflex; Future    Controlled type 2 diabetes mellitus without complication, without long-term current use of insulin (HCC)  -     TSH without Reflex;  Future  -     Hemoglobin A1C; Future    Acute bacterial sinusitis    Other orders  -     INFLUENZA, QUADV, ADJUVANTED, 65 YRS =, IM, PF, PREFILL SYR, 0.5ML (FLUAD)  -     hydrocortisone (PROCTOSOL HC) 2.5 % CREA rectal cream; Apply twice daily as needed  -     amoxicillin-clavulanate (AUGMENTIN) 875-125 MG per tablet;  Take 1 tablet by mouth 2 times daily for 10 days

## 2020-09-23 NOTE — PROGRESS NOTES
Vernon Carrington is a 67 y.o. female who presents today for   Chief Complaint   Patient presents with    Medicare AWV    Neck Pain       HPI  Patient is here for AWV. SHE HAS A H/O NECK  Pain and L ear w/ apin that radiates to gumline. No fever or chills. Shortness of breath . Patient is also complaining of recurrent hemorrhoids. Notes that she has tried and failed over-the-counter Preparation H. She does have medications that cause constipation and she does complain of this. No change in PMH, family, social, or surgical history unless mentioned above. Review of Systems   Constitutional: Positive for fatigue. Negative for chills and fever. HENT: Positive for congestion, rhinorrhea, sinus pressure and sore throat. Respiratory: Negative for cough, chest tightness, shortness of breath and wheezing. Cardiovascular: Negative for chest pain, palpitations and leg swelling. Gastrointestinal: Negative for abdominal pain, constipation, diarrhea, nausea and vomiting. Genitourinary: Negative for difficulty urinating, dysuria and frequency.        Past Medical History:   Diagnosis Date    Acquired hypothyroidism 7/11/2018    Chronic kidney disease, stage III (moderate) ([facility] 9/3/2019    Mom and sister + breast CA Lumpectomy - remission    Community acquired pneumonia of left lower lobe of lung ([facility] 6/1/2018    Malignant neoplasm of upper-inner quadrant of right female breast ([facility] 2/6/2020    Stage 2 moderate COPD by GOLD classification ([facility] 7/11/2018       Current Outpatient Medications   Medication Sig Dispense Refill    gabapentin (NEURONTIN) 100 MG capsule Indications: Takes 2 in AM and 1 QHS TAKE 1 CAPSULE BY MOUTH THREE TIMES DAILY 180 capsule 5    raloxifene (EVISTA) 60 MG tablet Take 1 tablet by mouth once daily 90 tablet 3    hydrocortisone (PROCTOSOL HC) 2.5 % CREA rectal cream Apply twice daily as needed 28.4 g 2    vitamin D (ERGOCALCIFEROL) 1.25 MG (18684 UT) CAPS capsule Take 1 lb (97.5 kg)   LMP  (LMP Unknown)   SpO2 97%   BMI 40.62 kg/m²     Physical Exam  Vitals signs and nursing note reviewed. Constitutional:       General: She is not in acute distress. Appearance: She is well-developed. She is not diaphoretic. HENT:      Head: Normocephalic and atraumatic. Right Ear: Hearing, tympanic membrane, ear canal and external ear normal.      Left Ear: Hearing, tympanic membrane, ear canal and external ear normal.      Nose: Mucosal edema and rhinorrhea present. Mouth/Throat:      Pharynx: Uvula midline. Posterior oropharyngeal erythema present. No oropharyngeal exudate. Tonsils: No tonsillar abscesses. Cardiovascular:      Rate and Rhythm: Normal rate and regular rhythm. Heart sounds: Normal heart sounds. No murmur. Pulmonary:      Effort: Pulmonary effort is normal. No respiratory distress. Breath sounds: Normal breath sounds. No wheezing or rales. Abdominal:      General: Bowel sounds are normal. There is no distension. Palpations: Abdomen is soft. Tenderness: There is no abdominal tenderness. Musculoskeletal:      Comments: No pretibial edema b/l. Skin:     General: Skin is warm and dry. Neurological:      Mental Status: She is alert and oriented to person, place, and time. Assessment:    ICD-10-CM    1. Routine general medical examination at a health care facility  Z00.00    2. Neuropathy  G62.9 gabapentin (NEURONTIN) 100 MG capsule   3. Morbid obesity with BMI of 40.0-44.9, adult ([facility])  E66.01 raloxifene (EVISTA) 60 MG tablet    Z68.41    4. Osteopenia of multiple sites  M85.89 DEXA BONE DENSITY 2 SITES     TSH without Reflex   5. Controlled type 2 diabetes mellitus without complication, without long-term current use of insulin ([facility])  E11.9 TSH without Reflex     Hemoglobin A1C   6. Acute bacterial sinusitis  J01.90     B96.89        Plan:   Fiber supplementation recommended.   This is the primary cause of recurrent hemorrhoids being constipation and the oxybutynin may worsen this as well. We will continue with her loxapine for history of osteoporosis. Patient otherwise doing well at this time. Acute treatment for sinusitis  Orders Placed This Encounter   Procedures    DEXA BONE DENSITY 2 SITES     Standing Status:   Future     Number of Occurrences:   1     Standing Expiration Date:   9/23/2021     Order Specific Question:   Reason for exam:     Answer:   osteopenia history    INFLUENZA, QUADV, ADJUVANTED, 72 YRS =, IM, PF, PREFILL SYR, 0.5ML (FLUAD)    TSH without Reflex     Standing Status:   Future     Standing Expiration Date:   9/23/2021    Hemoglobin A1C     Standing Status:   Future     Standing Expiration Date:   9/23/2021     Orders Placed This Encounter   Medications    gabapentin (NEURONTIN) 100 MG capsule     Sig: Indications: Takes 2 in AM and 1 QHS TAKE 1 CAPSULE BY MOUTH THREE TIMES DAILY     Dispense:  180 capsule     Refill:  5    raloxifene (EVISTA) 60 MG tablet     Sig: Take 1 tablet by mouth once daily     Dispense:  90 tablet     Refill:  3    hydrocortisone (PROCTOSOL HC) 2.5 % CREA rectal cream     Sig: Apply twice daily as needed     Dispense:  28.4 g     Refill:  2    amoxicillin-clavulanate (AUGMENTIN) 875-125 MG per tablet     Sig: Take 1 tablet by mouth 2 times daily for 10 days     Dispense:  20 tablet     Refill:  0     Medications Discontinued During This Encounter   Medication Reason    gabapentin (NEURONTIN) 100 MG capsule REORDER    raloxifene (EVISTA) 60 MG tablet REORDER    risedronate (ACTONEL) 35 MG tablet Therapy completed     Patient Instructions   Start taking over the counter generic psyllium husk fiber twice daily with meals. Do this until you get soft stools. Continue daily. If you have too loose of stools, cut down to once daily with a meal.  If you remain constipated increase to 3 times daily.       Personalized Preventive Plan for Moni Ness 9/23/2020  Medicare offers a range of preventive health benefits. Some of the tests and screenings are paid in full while other may be subject to a deductible, co-insurance, and/or copay. Some of these benefits include a comprehensive review of your medical history including lifestyle, illnesses that may run in your family, and various assessments and screenings as appropriate. After reviewing your medical record and screening and assessments performed today your provider may have ordered immunizations, labs, imaging, and/or referrals for you. A list of these orders (if applicable) as well as your Preventive Care list are included within your After Visit Summary for your review. Other Preventive Recommendations:    · A preventive eye exam performed by an eye specialist is recommended every 1-2 years to screen for glaucoma; cataracts, macular degeneration, and other eye disorders. · A preventive dental visit is recommended every 6 months. · Try to get at least 150 minutes of exercise per week or 10,000 steps per day on a pedometer . · Order or download the FREE \"Exercise & Physical Activity: Your Everyday Guide\" from The [employer]. Call 8-628.371.5725 or search [employer] online. · You need 2575-6869 mg of calcium and 9386-7175 IU of vitamin D per day. It is possible to meet your calcium requirement with diet alone, but a vitamin D supplement is usually necessary to meet this goal.  · When exposed to the sun, use a sunscreen that protects against both UVA and UVB radiation with an SPF of 30 or greater. Reapply every 2 to 3 hours or after sweating, drying off with a towel, or swimming. · Always wear a seat belt when traveling in a car. Always wear a helmet when riding a bicycle or motorcycle. Patient given educational handouts and has had all questions answered. Patient voices understanding and agrees to plans along with risks and benefits of plan.  Patient isinstructed to continue prior meds, diet, and exercise plans unless instructed otherwise. Patient agrees to follow up as instructed and sooner if needed. Patient agrees to go to ER if condition becomes emergent. Notesmay be completed with dictation device and spelling errors may occur. Materials may be copied and pasted from a notepad outside of EMR, all of which, I, Dr. Ann Luna MD, take sole intellectual ownership of and have approved adding to my note. Return in about 4 months (around 1/23/2021) for OV (M-Wam) DM and pacemaker.

## 2020-09-23 NOTE — PATIENT INSTRUCTIONS
Start taking over the counter generic psyllium husk fiber twice daily with meals. Do this until you get soft stools. Continue daily. If you have too loose of stools, cut down to once daily with a meal.  If you remain constipated increase to 3 times daily. Personalized Preventive Plan for Lorraine Marie - 9/23/2020  Medicare offers a range of preventive health benefits. Some of the tests and screenings are paid in full while other may be subject to a deductible, co-insurance, and/or copay. Some of these benefits include a comprehensive review of your medical history including lifestyle, illnesses that may run in your family, and various assessments and screenings as appropriate. After reviewing your medical record and screening and assessments performed today your provider may have ordered immunizations, labs, imaging, and/or referrals for you. A list of these orders (if applicable) as well as your Preventive Care list are included within your After Visit Summary for your review. Other Preventive Recommendations:    · A preventive eye exam performed by an eye specialist is recommended every 1-2 years to screen for glaucoma; cataracts, macular degeneration, and other eye disorders. · A preventive dental visit is recommended every 6 months. · Try to get at least 150 minutes of exercise per week or 10,000 steps per day on a pedometer . · Order or download the FREE \"Exercise & Physical Activity: Your Everyday Guide\" from The [employer]. Call 6-266.211.4696 or search The [employer] online. · You need 9512-8537 mg of calcium and 8502-3476 IU of vitamin D per day. It is possible to meet your calcium requirement with diet alone, but a vitamin D supplement is usually necessary to meet this goal.  · When exposed to the sun, use a sunscreen that protects against both UVA and UVB radiation with an SPF of 30 or greater.  Reapply every 2 to 3 hours or after sweating, drying off with a towel, or swimming. · Always wear a seat belt when traveling in a car. Always wear a helmet when riding a bicycle or motorcycle.

## 2020-09-28 NOTE — PROGRESS NOTES
[facility]                            Date:  9/28/2020  Patient: Erin Bronson  Age:  67 y.o., 1945      Reason for evaluation:         SUBJECTIVE:    Returns today for wound check and reassessment. Overall feels well. Denies dyspnea or chest pain. Her wound is completely healed at this point. No other issues reported. Vital signs stable. Review of Systems   Constitutional: Negative. Negative for chills, fever and unexpected weight change. HENT: Negative. Eyes: Negative. Respiratory: Negative. Negative for shortness of breath. Cardiovascular: Negative. Negative for chest pain. Gastrointestinal: Negative. Negative for diarrhea, nausea and vomiting. Endocrine: Negative. Genitourinary: Negative. Musculoskeletal: Negative. Skin: Negative. Neurological: Negative. OBJECTIVE:     /68   Pulse 68   Ht 5' 1\" (1.549 m)   Wt 218 lb (98.9 kg)   LMP  (LMP Unknown)   BMI 41.19 kg/m²     Labs:   CBC: No results for input(s): WBC, HGB, HCT, PLT in the last 72 hours. BMP:No results for input(s): NA, K, CO2, BUN, CREATININE, LABGLOM, GLUCOSE in the last 72 hours. BNP: No results for input(s): BNP in the last 72 hours. PT/INR: No results for input(s): PROTIME, INR in the last 72 hours. APTT:No results for input(s): APTT in the last 72 hours. CARDIAC ENZYMES:No results for input(s): CKTOTAL, CKMB, CKMBINDEX, TROPONINI in the last 72 hours. FASTING LIPID PANEL:  Lab Results   Component Value Date    HDL 38 01/03/2020    LDLCALC 79 01/03/2020    TRIG 400 01/03/2020     LIVER PROFILE:No results for input(s): AST, ALT, LABALBU in the last 72 hours.         Past Medical History:   Diagnosis Date    Acquired hypothyroidism 7/11/2018    Chronic kidney disease, stage III (moderate) ([address] 9/3/2019    Mom and sister + breast CA Lumpectomy - remission    Community acquired pneumonia of left lower lobe of lung ([address] 6/1/2018    Malignant neoplasm of upper-inner quadrant of right female breast ([facility] 2/6/2020    Stage 2 moderate COPD by GOLD classification ([facility] 7/11/2018     Past Surgical History:   Procedure Laterality Date    ADRENALECTOMY      APPENDECTOMY      BACK SURGERY      BREAST LUMPECTOMY      CARPAL TUNNEL RELEASE      CARPAL TUNNEL RELEASE  1990    CATARACT REMOVAL      CORONARY ANGIOPLASTY      GALLBLADDER SURGERY      PACEMAKER INSERTION  07/22/2020    PARTIAL HYSTERECTOMY      TONSILLECTOMY  1997    TOTAL KNEE ARTHROPLASTY       History reviewed. No pertinent family history.   No Known Allergies  Current Outpatient Medications   Medication Sig Dispense Refill    EUTHYROX 75 MCG tablet Take 1 tablet by mouth Daily 90 tablet 1    gabapentin (NEURONTIN) 100 MG capsule Indications: Takes 2 in AM and 1 QHS TAKE 1 CAPSULE BY MOUTH THREE TIMES DAILY 180 capsule 5    raloxifene (EVISTA) 60 MG tablet Take 1 tablet by mouth once daily 90 tablet 3    hydrocortisone (PROCTOSOL HC) 2.5 % CREA rectal cream Apply twice daily as needed 28.4 g 2    amoxicillin-clavulanate (AUGMENTIN) 875-125 MG per tablet Take 1 tablet by mouth 2 times daily for 10 days 20 tablet 0    vitamin D (ERGOCALCIFEROL) 1.25 MG (34281 UT) CAPS capsule Take 1 capsule by mouth once a week 12 capsule 0    apixaban (ELIQUIS) 5 MG TABS tablet Take 1 tablet by mouth 2 times daily 60 tablet 3    oxybutynin (DITROPAN-XL) 10 MG extended release tablet TAKE 1 TABLET BY MOUTH ONCE DAILY 30 tablet 0    furosemide (LASIX) 20 MG tablet Take 1 tablet by mouth three times a week Indications: M-W-F Mon, Wed, Fri 30 tablet 1    atenolol (TENORMIN) 50 MG tablet Take 1 tablet by mouth nightly 30 tablet 3    tiotropium (SPIRIVA RESPIMAT) 2.5 MCG/ACT AERS inhaler Inhale 2 puffs into the lungs daily      albuterol sulfate HFA (VENTOLIN HFA) 108 (90 Base) MCG/ACT inhaler Inhale 2 puffs into the lungs every 6 hours as needed for Wheezing      aspirin EC 81 MG EC tablet Take daughter    Worked at [employer] and also [employer] not presently working    Education high school    [address] [place] [address]    She does drive an automobile    Remains moderately active    Denies alcohol or tobacco consumption or substance usage       Physical Examination:  /68   Pulse 68   Ht 5' 1\" (1.549 m)   Wt 218 lb (98.9 kg)   LMP  (LMP Unknown)   BMI 41.19 kg/m²   Physical Exam  Constitutional:       Appearance: She is well-developed. Neck:      Vascular: No carotid bruit or JVD. Cardiovascular:      Rate and Rhythm: Normal rate and regular rhythm. Heart sounds: Normal heart sounds. No murmur. No friction rub. No gallop. Pulmonary:      Effort: Pulmonary effort is normal. No respiratory distress. Breath sounds: Normal breath sounds. No wheezing or rales. Abdominal:      General: There is no distension. Tenderness: There is no abdominal tenderness. Lymphadenopathy:      Cervical: No cervical adenopathy. Skin:     General: Skin is warm and dry. ASSESSMENT:     Diagnosis Orders   1. Second degree type I atrioventricular block     2. Pacemaker     3. Chronic anticoagulation     4. Morbid obesity with BMI of 40.0-44.9, adult ([address]     5. Dyspnea, unspecified type     6. Visit for wound check         PLAN:  No orders of the defined types were placed in this encounter. No orders of the defined types were placed in this encounter. 1. Continue present medications  2. Assessment in 3 months    Return in about 3 months (around 12/28/2020) for return to Dr. Keely Vasquez only. Kimber Jon MD 9/28/2020 1:11 PM CDT    [id number] [facility]      Thisdictation was generated by voice recognition computer software. Although all attempts are made to edit the dictation for accuracy, there may be errors in the transcription that are not intended.

## 2020-10-01 NOTE — TELEPHONE ENCOUNTER
Fransico Carranazbarton called to request a refill on her medication.       Last office visit : 9/23/2020   Next office visit : 1/27/2021     Requested Prescriptions     Signed Prescriptions Disp Refills    allopurinol (ZYLOPRIM) 100 MG tablet 90 tablet 1     Sig: Take 1 tablet by mouth once daily     Authorizing Provider: Harjinder Olsen     Ordering User: Reji Bender MA

## 2020-10-06 NOTE — TELEPHONE ENCOUNTER
Laquetta Fergusson called to request a refill on her medication.       Last office visit : 9/23/2020   Next office visit : 1/27/2021     Requested Prescriptions     Signed Prescriptions Disp Refills    oxybutynin (DITROPAN-XL) 10 MG extended release tablet 30 tablet 0     Sig: TAKE 1 TABLET BY MOUTH ONCE DAILY     Authorizing Provider: Nichole Crook     Ordering User: Raza Pacheco MA

## 2020-10-16 NOTE — TELEPHONE ENCOUNTER
----- Message from Luisa Ramierz MD sent at 10/16/2020  9:39 AM CDT -----  Stay on the raloxifene. It is holding bone strenght somewhat steady.

## 2020-11-03 PROBLEM — C18.9 MALIGNANT NEOPLASM OF COLON (HCC): Status: ACTIVE | Noted: 2018-08-02

## 2020-11-03 NOTE — PROGRESS NOTES
[address] [facility]  [address] [place] [address]  [address] [address]  Dept: [phone]  Dept Fax: [phone]  Loc: [phone]    Jeanette Fischer is a 67 y.o. female who presents today for her medical conditions/complaints as noted below. Jeanette Fischer is c/o of Pacemaker Problem (she states for the last couple of weeks she has had some knots appear above where the pacemaker is placed; she had the pacemaker placed in July) and Otalgia (she has been having some fluid in her right ear lately with drainage that is effecting the right lymph node)        HPI:     HPI   Chief Complaint   Patient presents with    Pacemaker Problem     she states for the last couple of weeks she has had some knots appear above where the pacemaker is placed; she had the pacemaker placed in July    Otalgia     she has been having some fluid in her right ear lately with drainage that is effecting the right lymph node     Patient presents today for evaluation of sinus drainage, ear pain, and throat pain. She states she has been using flonase and allergy meds with little relief. She complains of lymphadenopathy and aural fullness as well. She denies fever, shortness of breath or cough. She uses flonase regularly. She also states that she is concerned with pacemaker \"having 2 knots in it\". She states this was placed in July and leads were loose and they had to fix it. She states it does continue to cause her mild discomfort but no swelling or drainage from site.        Past Medical History:   Diagnosis Date    Acquired hypothyroidism 7/11/2018    Chronic kidney disease, stage III (moderate) 9/3/2019    Mom and sister + breast CA Lumpectomy - remission    Community acquired pneumonia of left lower lobe of lung 6/1/2018    Malignant neoplasm of upper-inner quadrant of right female breast ([address] 2/6/2020    Stage 2 moderate COPD by GOLD classification ([address] 7/11/2018      Past Surgical History: Procedure Laterality Date    ADRENALECTOMY      APPENDECTOMY      BACK SURGERY      BREAST LUMPECTOMY      CARPAL TUNNEL RELEASE      CARPAL TUNNEL RELEASE  1990    CATARACT REMOVAL      CORONARY ANGIOPLASTY      GALLBLADDER SURGERY      PACEMAKER INSERTION  07/22/2020    PARTIAL HYSTERECTOMY      TONSILLECTOMY  1997    TOTAL KNEE ARTHROPLASTY         Vitals 11/3/2020 9/28/2020 9/23/2020 9/14/2020 8/18/2020 6/3/8161   SYSTOLIC 147 486 142 [phone]   DIASTOLIC 74 68 70 88 68 66   Site - - Right Upper Arm - - -   Pulse 69 68 68 64 70 65   Temp 98.9 - 98.2 - 98.7 98.2   Resp 18 - - - 20 18   SpO2 98 - 97 - 98 98   Weight 217 lb 218 lb 215 lb 214 lb 208 lb -   Height 5' 0\" 5' 1\" 5' 1\" 5' 1\" 5' 1\" -   Body mass index 42.38 kg/m2 41.19 kg/m2 40.62 kg/m2 40.43 kg/m2 39.3 kg/m2 -   Pain Level - - - - - -   Some recent data might be hidden       No family history on file. Social History     Tobacco Use    Smoking status: Never Smoker    Smokeless tobacco: Never Used   Substance Use Topics    Alcohol use: No      Current Outpatient Medications   Medication Sig Dispense Refill    apixaban (ELIQUIS) 5 MG TABS tablet Take 5 mg by mouth 2 times daily      cefdinir (OMNICEF) 300 MG capsule Take 1 capsule by mouth 2 times daily 20 capsule 0    methylPREDNISolone (MEDROL DOSEPACK) 4 MG tablet Take by mouth.  1 kit 0    oxybutynin (DITROPAN-XL) 10 MG extended release tablet Take 1 tablet by mouth once daily 30 tablet 3    furosemide (LASIX) 20 MG tablet TAKE 1 TABLET BY MOUTH ON MONDAY, WEDNESDAY AND FRIDAY 36 tablet 0    allopurinol (ZYLOPRIM) 100 MG tablet Take 1 tablet by mouth once daily 90 tablet 1    EUTHYROX 75 MCG tablet Take 1 tablet by mouth Daily 90 tablet 1    gabapentin (NEURONTIN) 100 MG capsule Indications: Takes 2 in AM and 1 QHS TAKE 1 CAPSULE BY MOUTH THREE TIMES DAILY 180 capsule 5    raloxifene (EVISTA) 60 MG tablet Take 1 tablet by mouth once daily 90 tablet 3    hydrocortisone (PROCTOSOL HC) 2.5 % CREA rectal cream Apply twice daily as needed 28.4 g 2    vitamin D (ERGOCALCIFEROL) 1.25 MG (03350 UT) CAPS capsule Take 1 capsule by mouth once a week 12 capsule 0    atenolol (TENORMIN) 50 MG tablet Take 1 tablet by mouth nightly 30 tablet 3    tiotropium (SPIRIVA RESPIMAT) 2.5 MCG/ACT AERS inhaler Inhale 2 puffs into the lungs daily      albuterol sulfate HFA (VENTOLIN HFA) 108 (90 Base) MCG/ACT inhaler Inhale 2 puffs into the lungs every 6 hours as needed for Wheezing      aspirin EC 81 MG EC tablet Take 81 mg by mouth nightly      lactobacillus (CULTURELLE) capsule Take 1 capsule by mouth daily      Cyanocobalamin (VITAMIN B-12) 5000 MCG TBDP Take 1 tablet by mouth nightly      Compression Stockings MISC by Does not apply route Dx: Venous insufficiency, below knee medium compression bilateral 1 each 0    fexofenadine (ALLEGRA ALLERGY) 180 MG tablet Take 180 mg by mouth daily       No current facility-administered medications for this visit. No Known Allergies    Health Maintenance   Topic Date Due    Shingles Vaccine (1 of 2) 08/30/1994    DTaP/Tdap/Td vaccine (2 - Td) 08/01/2020    Potassium monitoring  08/07/2021    Creatinine monitoring  08/07/2021    TSH testing  09/23/2021    Annual Wellness Visit (AWV)  09/24/2021    DEXA (modify frequency per FRAX score)  Completed    Flu vaccine  Completed    Pneumococcal 65+ years Vaccine  Completed    Hepatitis A vaccine  Aged Out    Hepatitis B vaccine  Aged Out    Hib vaccine  Aged Out    Meningococcal (ACWY) vaccine  Aged Out       Subjective:      Review of Systems   Constitutional: Negative for chills and fever. HENT: Positive for congestion, ear pain, postnasal drip and sinus pressure. Negative for hearing loss, rhinorrhea and voice change. Eyes: Negative for photophobia and visual disturbance. Respiratory: Negative for cough and shortness of breath.     Cardiovascular: Negative for chest pain and palpitations. Gastrointestinal: Negative for nausea and vomiting. Endocrine: Negative. Negative for cold intolerance and heat intolerance. Genitourinary: Negative for difficulty urinating and flank pain. Musculoskeletal: Negative for back pain and neck pain. Skin: Negative for color change and rash. Allergic/Immunologic: Negative for environmental allergies and food allergies. Neurological: Negative for dizziness, speech difficulty and headaches. Hematological: Does not bruise/bleed easily. Psychiatric/Behavioral: Negative for sleep disturbance and suicidal ideas. Objective:     Physical Exam  Vitals signs and nursing note reviewed. Constitutional:       Appearance: She is well-developed. HENT:      Head: Atraumatic. Right Ear: External ear normal. A middle ear effusion is present. Left Ear: External ear normal. A middle ear effusion is present. Nose: Nose normal.   Eyes:      Conjunctiva/sclera: Conjunctivae normal.      Pupils: Pupils are equal, round, and reactive to light. Neck:      Musculoskeletal: Normal range of motion and neck supple. Cardiovascular:      Rate and Rhythm: Normal rate and regular rhythm. Heart sounds: Normal heart sounds, S1 normal and S2 normal.   Pulmonary:      Effort: Pulmonary effort is normal.      Breath sounds: Normal breath sounds. Abdominal:      General: Bowel sounds are normal.      Palpations: Abdomen is soft. Musculoskeletal: Normal range of motion. Lymphadenopathy:      Head:      Left side of head: Submandibular adenopathy present. Skin:     General: Skin is warm and dry. Neurological:      Mental Status: She is alert and oriented to person, place, and time.    Psychiatric:         Behavior: Behavior normal.       /74   Pulse 69   Temp 98.9 °F (37.2 °C) (Temporal)   Resp 18   Ht 5' (1.524 m)   Wt 217 lb (98.4 kg)   LMP  (LMP Unknown)   SpO2 98%   BMI 42.38 kg/m²     Assessment:       Diagnosis Orders 1. Acute non-recurrent sinusitis, unspecified location     2. Pacemaker           Plan:     1. Begin antibiotic and medrol dose pack as directed. She has a loading dose of antibiotic to take for dental procedure tomorrow and she can begin cefdinir after that. 2. Follow-up with cardiology in regards to pacemaker. It seems to be more like probably scar tissue but will notify us if worsening. She has appointment at the end of this month. Patient given educational materials -see patient instructions. Discussed use, benefit, and side effects of prescribed medications. All patient questions answered. Pt voiced understanding. Reviewed health maintenance. Instructed to continue currentmedications, diet and exercise. Patient agreed with treatment plan. Follow up as directed. MEDICATIONS:  Orders Placed This Encounter   Medications    cefdinir (OMNICEF) 300 MG capsule     Sig: Take 1 capsule by mouth 2 times daily     Dispense:  20 capsule     Refill:  0    methylPREDNISolone (MEDROL DOSEPACK) 4 MG tablet     Sig: Take by mouth. Dispense:  1 kit     Refill:  0         ORDERS:  No orders of the defined types were placed in this encounter. Follow-up:  Return if symptoms worsen or fail to improve. PATIENT INSTRUCTIONS:  Patient Instructions   We are committed to providing you with the best care possible. In order to help us achieve these goals please remember to bring all medications, herbal products, and over the counter supplements with you to each visit. If your provider has ordered testing for you, please be sure to follow up with our office if you have not received results within 7 days after the testing took place. *If you receive a survey after visiting one of our offices, please take time to share your experience concerning your physician office visit. These surveys are confidential and no health information about you is shared.   We are eager to improve for you and we are counting on your feedback to help make that happen. Electronically signed by DAVID Pedroza on 11/3/2020 at 1:59 PM    EMR Dragon/transcription disclaimer:  Much of thisencounter note is electronic transcription/translation of spoken language to printed texts. The electronic translation of spoken language may be erroneous, or at times, nonsensical words or phrases may be inadvertentlytranscribed.   Although I have reviewed the note for such errors, some may still exist.

## 2021-01-01 ENCOUNTER — HOSPITAL ENCOUNTER (OUTPATIENT)
Dept: WOMENS IMAGING | Age: 76
Discharge: HOME | End: 2021-05-12
Payer: MEDICARE

## 2021-01-01 ENCOUNTER — OFFICE VISIT (OUTPATIENT)
Dept: ENT CLINIC | Age: 76
End: 2021-01-01
Payer: MEDICARE

## 2021-01-01 ENCOUNTER — OFFICE VISIT (OUTPATIENT)
Dept: PRIMARY CARE CLINIC | Age: 76
End: 2021-01-01
Payer: MEDICARE

## 2021-01-01 ENCOUNTER — OFFICE VISIT (OUTPATIENT)
Dept: CARDIOLOGY CLINIC | Age: 76
End: 2021-01-01
Payer: MEDICARE

## 2021-01-01 ENCOUNTER — HOSPITAL ENCOUNTER (INPATIENT)
Age: 76
LOS: 7 days | Discharge: HOSPICE | DRG: 808 | End: 2021-07-22
Attending: PEDIATRICS | Admitting: INTERNAL MEDICINE
Payer: MEDICARE

## 2021-01-01 ENCOUNTER — APPOINTMENT (OUTPATIENT)
Dept: GENERAL RADIOLOGY | Age: 76
DRG: 808 | End: 2021-01-01
Payer: MEDICARE

## 2021-01-01 ENCOUNTER — APPOINTMENT (OUTPATIENT)
Dept: ULTRASOUND IMAGING | Age: 76
DRG: 808 | End: 2021-01-01
Payer: MEDICARE

## 2021-01-01 ENCOUNTER — TELEPHONE (OUTPATIENT)
Dept: INTERNAL MEDICINE CLINIC | Age: 76
End: 2021-01-01

## 2021-01-01 ENCOUNTER — OFFICE VISIT (OUTPATIENT)
Age: 76
End: 2021-01-01

## 2021-01-01 ENCOUNTER — APPOINTMENT (OUTPATIENT)
Dept: CT IMAGING | Age: 76
DRG: 808 | End: 2021-01-01
Payer: MEDICARE

## 2021-01-01 ENCOUNTER — APPOINTMENT (OUTPATIENT)
Dept: NUCLEAR MEDICINE | Age: 76
DRG: 808 | End: 2021-01-01
Payer: MEDICARE

## 2021-01-01 ENCOUNTER — HOSPITAL ENCOUNTER (OUTPATIENT)
Dept: WOMENS IMAGING | Age: 76
Discharge: HOME | End: 2021-05-20
Payer: MEDICARE

## 2021-01-01 ENCOUNTER — HOSPITAL ENCOUNTER (OUTPATIENT)
Dept: WOMENS IMAGING | Age: 76
Discharge: HOME | End: 2021-05-11
Payer: MEDICARE

## 2021-01-01 ENCOUNTER — TELEPHONE (OUTPATIENT)
Dept: ADMINISTRATIVE | Age: 76
End: 2021-01-01

## 2021-01-01 ENCOUNTER — TELEPHONE (OUTPATIENT)
Dept: CARDIOLOGY CLINIC | Age: 76
End: 2021-01-01

## 2021-01-01 VITALS
BODY MASS INDEX: 41.16 KG/M2 | SYSTOLIC BLOOD PRESSURE: 136 MMHG | HEIGHT: 61 IN | HEART RATE: 78 BPM | WEIGHT: 218 LBS | DIASTOLIC BLOOD PRESSURE: 78 MMHG

## 2021-01-01 VITALS — TEMPERATURE: 97.2 F | HEART RATE: 73 BPM | OXYGEN SATURATION: 95 %

## 2021-01-01 VITALS
HEART RATE: 97 BPM | SYSTOLIC BLOOD PRESSURE: 132 MMHG | BODY MASS INDEX: 40.4 KG/M2 | OXYGEN SATURATION: 98 % | TEMPERATURE: 99 F | WEIGHT: 214 LBS | HEIGHT: 61 IN | DIASTOLIC BLOOD PRESSURE: 86 MMHG

## 2021-01-01 VITALS
SYSTOLIC BLOOD PRESSURE: 118 MMHG | BODY MASS INDEX: 41.35 KG/M2 | WEIGHT: 219 LBS | HEART RATE: 64 BPM | DIASTOLIC BLOOD PRESSURE: 84 MMHG | HEIGHT: 61 IN

## 2021-01-01 VITALS
HEART RATE: 124 BPM | WEIGHT: 218.06 LBS | HEIGHT: 61 IN | OXYGEN SATURATION: 92 % | DIASTOLIC BLOOD PRESSURE: 60 MMHG | SYSTOLIC BLOOD PRESSURE: 118 MMHG | RESPIRATION RATE: 22 BRPM | BODY MASS INDEX: 41.17 KG/M2 | TEMPERATURE: 97 F

## 2021-01-01 VITALS
DIASTOLIC BLOOD PRESSURE: 74 MMHG | HEIGHT: 61 IN | TEMPERATURE: 98.6 F | OXYGEN SATURATION: 98 % | HEART RATE: 74 BPM | WEIGHT: 220.8 LBS | BODY MASS INDEX: 41.69 KG/M2 | SYSTOLIC BLOOD PRESSURE: 118 MMHG

## 2021-01-01 VITALS — HEIGHT: 61 IN | WEIGHT: 222 LBS | BODY MASS INDEX: 41.91 KG/M2

## 2021-01-01 DIAGNOSIS — G62.9 NEUROPATHY: Primary | ICD-10-CM

## 2021-01-01 DIAGNOSIS — I20.8 OTHER FORMS OF ANGINA PECTORIS (HCC): ICD-10-CM

## 2021-01-01 DIAGNOSIS — I44.1 SECOND DEGREE TYPE I ATRIOVENTRICULAR BLOCK: ICD-10-CM

## 2021-01-01 DIAGNOSIS — I20.8 OTHER FORMS OF ANGINA PECTORIS: ICD-10-CM

## 2021-01-01 DIAGNOSIS — R06.02 SHORTNESS OF BREATH: ICD-10-CM

## 2021-01-01 DIAGNOSIS — E66.01 MORBID OBESITY WITH BMI OF 40.0-44.9, ADULT (HCC): ICD-10-CM

## 2021-01-01 DIAGNOSIS — N18.30 STAGE 3 CHRONIC KIDNEY DISEASE, UNSPECIFIED WHETHER STAGE 3A OR 3B CKD (HCC): ICD-10-CM

## 2021-01-01 DIAGNOSIS — D61.818 PANCYTOPENIA (HCC): Primary | ICD-10-CM

## 2021-01-01 DIAGNOSIS — Z95.0 PACEMAKER: Primary | ICD-10-CM

## 2021-01-01 DIAGNOSIS — J02.0 STREP PHARYNGITIS: Primary | ICD-10-CM

## 2021-01-01 DIAGNOSIS — R92.8 ABNORMAL MAMMOGRAM: ICD-10-CM

## 2021-01-01 DIAGNOSIS — H92.02 CHRONIC LEFT EAR PAIN: ICD-10-CM

## 2021-01-01 DIAGNOSIS — E11.9 CONTROLLED TYPE 2 DIABETES MELLITUS WITHOUT COMPLICATION, WITHOUT LONG-TERM CURRENT USE OF INSULIN (HCC): ICD-10-CM

## 2021-01-01 DIAGNOSIS — N63.10 BREAST MASS, RIGHT: ICD-10-CM

## 2021-01-01 DIAGNOSIS — R22.0 MASS OF LEFT SUBMANDIBULAR REGION: Primary | ICD-10-CM

## 2021-01-01 DIAGNOSIS — I26.93 SINGLE SUBSEGMENTAL PULMONARY EMBOLISM WITHOUT ACUTE COR PULMONALE (HCC): ICD-10-CM

## 2021-01-01 DIAGNOSIS — Z95.0 PACEMAKER: ICD-10-CM

## 2021-01-01 DIAGNOSIS — I10 HYPERTENSION, ESSENTIAL, BENIGN: ICD-10-CM

## 2021-01-01 DIAGNOSIS — J44.9 STAGE 2 MODERATE COPD BY GOLD CLASSIFICATION (HCC): ICD-10-CM

## 2021-01-01 DIAGNOSIS — E03.9 ACQUIRED HYPOTHYROIDISM: ICD-10-CM

## 2021-01-01 DIAGNOSIS — G89.29 CHRONIC LEFT EAR PAIN: ICD-10-CM

## 2021-01-01 DIAGNOSIS — R06.09 DYSPNEA ON EXERTION: ICD-10-CM

## 2021-01-01 DIAGNOSIS — Z11.59 SCREENING FOR VIRAL DISEASE: Primary | ICD-10-CM

## 2021-01-01 DIAGNOSIS — G62.9 NEUROPATHY: ICD-10-CM

## 2021-01-01 DIAGNOSIS — E66.01 MORBID OBESITY WITH BMI OF 40.0-44.9, ADULT (HCC): Primary | ICD-10-CM

## 2021-01-01 DIAGNOSIS — R07.9 CHEST PAIN, UNSPECIFIED TYPE: ICD-10-CM

## 2021-01-01 DIAGNOSIS — Z79.01 CHRONIC ANTICOAGULATION: Primary | ICD-10-CM

## 2021-01-01 DIAGNOSIS — Z79.01 CHRONIC ANTICOAGULATION: ICD-10-CM

## 2021-01-01 DIAGNOSIS — D64.9 SYMPTOMATIC ANEMIA: ICD-10-CM

## 2021-01-01 DIAGNOSIS — Z12.31 ENCOUNTER FOR SCREENING MAMMOGRAM FOR MALIGNANT NEOPLASM OF BREAST: Primary | ICD-10-CM

## 2021-01-01 DIAGNOSIS — C74.90: ICD-10-CM

## 2021-01-01 DIAGNOSIS — N18.30 CHRONIC KIDNEY DISEASE, STAGE III (MODERATE) (HCC): ICD-10-CM

## 2021-01-01 DIAGNOSIS — Z12.31 ENCOUNTER FOR SCREENING MAMMOGRAM FOR MALIGNANT NEOPLASM OF BREAST: ICD-10-CM

## 2021-01-01 DIAGNOSIS — N64.89 BREAST ASYMMETRY: ICD-10-CM

## 2021-01-01 LAB
ABO/RH: NORMAL
ABO/RH: NORMAL
ALBUMIN SERPL-MCNC: 2.6 G/DL (ref 3.5–5.2)
ALBUMIN SERPL-MCNC: 2.7 G/DL (ref 3.5–5.2)
ALBUMIN SERPL-MCNC: 3.17 G/DL (ref 3.75–5.01)
ALBUMIN SERPL-MCNC: 3.8 G/DL (ref 3.5–5.2)
ALBUMIN SERPL-MCNC: 4.1 G/DL (ref 3.5–5.2)
ALBUMIN SERPL-MCNC: 4.2 G/DL (ref 3.5–5.2)
ALP BLD-CCNC: 45 U/L (ref 35–104)
ALP BLD-CCNC: 53 U/L (ref 35–104)
ALP BLD-CCNC: 56 U/L (ref 35–104)
ALP BLD-CCNC: 58 U/L (ref 35–104)
ALP BLD-CCNC: 62 U/L (ref 35–104)
ALPHA-1-GLOBULIN: 0.33 G/DL (ref 0.19–0.46)
ALPHA-2-GLOBULIN: 0.81 G/DL (ref 0.48–1.05)
ALT SERPL-CCNC: 13 U/L (ref 5–33)
ALT SERPL-CCNC: 16 U/L (ref 5–33)
ALT SERPL-CCNC: 19 U/L (ref 5–33)
ALT SERPL-CCNC: 22 U/L (ref 5–33)
ALT SERPL-CCNC: 23 U/L (ref 5–33)
ANA IGG, ELISA: DETECTED
ANA PATTERN: ABNORMAL
ANA TITER: ABNORMAL
ANAPLASMA PHAGNOCYTOPHILUM BY PCR: NOT DETECTED
ANGIOTENSIN CONVERTING ENZYME: 33 U/L (ref 9–67)
ANION GAP SERPL CALCULATED.3IONS-SCNC: 11 MMOL/L (ref 7–19)
ANION GAP SERPL CALCULATED.3IONS-SCNC: 12 MMOL/L (ref 7–19)
ANION GAP SERPL CALCULATED.3IONS-SCNC: 14 MMOL/L (ref 7–19)
ANION GAP SERPL CALCULATED.3IONS-SCNC: 16 MMOL/L (ref 7–19)
ANION GAP SERPL CALCULATED.3IONS-SCNC: 17 MMOL/L (ref 7–19)
ANION GAP SERPL CALCULATED.3IONS-SCNC: 9 MMOL/L (ref 7–19)
ANISOCYTOSIS: ABNORMAL
ANTI JO-1 IGG: 1 AU/ML (ref 0–40)
ANTIBODY IDENTIFICATION: NORMAL
ANTIBODY SCREEN: NORMAL
ANTIBODY SCREEN: NORMAL
ANTINUCLEAR AB INTERPRETIVE COMMENT: ABNORMAL
ANTINUCLEAR ANTIBODY, HEP-2, IGG: DETECTED
APTT: 25.2 SEC (ref 26–36.2)
AST SERPL-CCNC: 17 U/L (ref 5–32)
AST SERPL-CCNC: 25 U/L (ref 5–32)
AST SERPL-CCNC: 28 U/L (ref 5–32)
AST SERPL-CCNC: 39 U/L (ref 5–32)
AST SERPL-CCNC: 47 U/L (ref 5–32)
ATYPICAL LYMPHOCYTE RELATIVE PERCENT: 1 % (ref 0–8)
ATYPICAL LYMPHOCYTE RELATIVE PERCENT: 2 % (ref 0–8)
ATYPICAL LYMPHOCYTE RELATIVE PERCENT: 3 % (ref 0–8)
BACTERIA: NEGATIVE /HPF
BANDED NEUTROPHILS RELATIVE PERCENT: 16 % (ref 0–5)
BANDED NEUTROPHILS RELATIVE PERCENT: 18 % (ref 0–5)
BANDED NEUTROPHILS RELATIVE PERCENT: 9 % (ref 0–5)
BASOPHILS ABSOLUTE: 0 K/UL (ref 0–0.2)
BASOPHILS RELATIVE PERCENT: 0 % (ref 0–1)
BASOPHILS RELATIVE PERCENT: 0.3 % (ref 0–1)
BETA GLOBULIN: 0.74 G/DL (ref 0.48–1.1)
BILIRUB SERPL-MCNC: 0.3 MG/DL (ref 0.2–1.2)
BILIRUB SERPL-MCNC: 0.4 MG/DL (ref 0.2–1.2)
BILIRUB SERPL-MCNC: 0.6 MG/DL (ref 0.2–1.2)
BILIRUB SERPL-MCNC: 0.7 MG/DL (ref 0.2–1.2)
BILIRUB SERPL-MCNC: 1 MG/DL (ref 0.2–1.2)
BILIRUBIN DIRECT: 0.5 MG/DL (ref 0–0.3)
BILIRUBIN URINE: NEGATIVE
BILIRUBIN, INDIRECT: 0.5 MG/DL (ref 0.1–1)
BLOOD BANK DISPENSE STATUS: NORMAL
BLOOD BANK PRODUCT CODE: NORMAL
BLOOD CULTURE, ROUTINE: NORMAL
BLOOD, URINE: ABNORMAL
BPU ID: NORMAL
BUN BLDV-MCNC: 14 MG/DL (ref 8–23)
BUN BLDV-MCNC: 14 MG/DL (ref 8–23)
BUN BLDV-MCNC: 16 MG/DL (ref 8–23)
BUN BLDV-MCNC: 16 MG/DL (ref 8–23)
BUN BLDV-MCNC: 21 MG/DL (ref 8–23)
BUN BLDV-MCNC: 23 MG/DL (ref 8–23)
BUN BLDV-MCNC: 23 MG/DL (ref 8–23)
BUN BLDV-MCNC: 33 MG/DL (ref 8–23)
BUN BLDV-MCNC: 50 MG/DL (ref 8–23)
BUN BLDV-MCNC: 74 MG/DL (ref 8–23)
BUN BLDV-MCNC: 75 MG/DL (ref 8–23)
BUN BLDV-MCNC: 75 MG/DL (ref 8–23)
C (BIG) ANTIGEN: NORMAL
CA 125: 12 U/ML (ref 0–35)
CA 19-9: 6 U/ML (ref 0–35)
CA 27.29: 14.9 U/ML (ref 0–40)
CALCIUM SERPL-MCNC: 10 MG/DL (ref 8.8–10.2)
CALCIUM SERPL-MCNC: 10 MG/DL (ref 8.8–10.2)
CALCIUM SERPL-MCNC: 10.3 MG/DL (ref 8.8–10.2)
CALCIUM SERPL-MCNC: 10.5 MG/DL (ref 8.8–10.2)
CALCIUM SERPL-MCNC: 10.5 MG/DL (ref 8.8–10.2)
CALCIUM SERPL-MCNC: 10.6 MG/DL (ref 8.8–10.2)
CALCIUM SERPL-MCNC: 10.7 MG/DL (ref 8.8–10.2)
CALCIUM SERPL-MCNC: 9 MG/DL (ref 8.8–10.2)
CALCIUM SERPL-MCNC: 9.3 MG/DL (ref 8.8–10.2)
CALCIUM SERPL-MCNC: 9.5 MG/DL (ref 8.8–10.2)
CALCIUM SERPL-MCNC: 9.9 MG/DL (ref 8.8–10.2)
CALCIUM SERPL-MCNC: 9.9 MG/DL (ref 8.8–10.2)
CEA: 1.5 NG/ML (ref 0–4.7)
CHLORIDE BLD-SCNC: 102 MMOL/L (ref 98–111)
CHLORIDE BLD-SCNC: 104 MMOL/L (ref 98–111)
CHLORIDE BLD-SCNC: 104 MMOL/L (ref 98–111)
CHLORIDE BLD-SCNC: 105 MMOL/L (ref 98–111)
CHLORIDE BLD-SCNC: 105 MMOL/L (ref 98–111)
CHLORIDE BLD-SCNC: 106 MMOL/L (ref 98–111)
CHLORIDE BLD-SCNC: 106 MMOL/L (ref 98–111)
CHLORIDE BLD-SCNC: 107 MMOL/L (ref 98–111)
CHOLESTEROL, TOTAL: 147 MG/DL (ref 160–199)
CLARITY: CLEAR
CO2: 16 MMOL/L (ref 22–29)
CO2: 17 MMOL/L (ref 22–29)
CO2: 17 MMOL/L (ref 22–29)
CO2: 18 MMOL/L (ref 22–29)
CO2: 19 MMOL/L (ref 22–29)
CO2: 21 MMOL/L (ref 22–29)
CO2: 22 MMOL/L (ref 22–29)
CO2: 23 MMOL/L (ref 22–29)
CO2: 23 MMOL/L (ref 22–29)
CO2: 24 MMOL/L (ref 22–29)
COLOR: YELLOW
COPPER: 146.4 UG/DL (ref 80–155)
CREAT SERPL-MCNC: 1.2 MG/DL (ref 0.5–0.9)
CREAT SERPL-MCNC: 1.2 MG/DL (ref 0.5–0.9)
CREAT SERPL-MCNC: 1.4 MG/DL (ref 0.5–0.9)
CREAT SERPL-MCNC: 1.5 MG/DL (ref 0.5–0.9)
CREAT SERPL-MCNC: 1.5 MG/DL (ref 0.5–0.9)
CREAT SERPL-MCNC: 1.6 MG/DL (ref 0.5–0.9)
CREAT SERPL-MCNC: 2.6 MG/DL (ref 0.5–0.9)
CREAT SERPL-MCNC: 2.8 MG/DL (ref 0.5–0.9)
CREAT SERPL-MCNC: 3.1 MG/DL (ref 0.5–0.9)
CREAT SERPL-MCNC: 3.5 MG/DL (ref 0.5–0.9)
CRYSTALS, UA: ABNORMAL /HPF
CULTURE, BLOOD 2: NORMAL
CULTURE, BLOOD 2: NORMAL
D DIMER: 0.32 UG/ML FEU (ref 0–0.48)
D DIMER: 9.74 UG/ML FEU (ref 0–0.48)
DESCRIPTION BLOOD BANK: NORMAL
DOUBLE STRANDED DNA AB, IGG: 19 IU (ref 0–24)
EHRLICHIA CHAFFEENSIS BY PCR: NOT DETECTED
EHRLICHIA EWINGIL/CANIS BY PCR: NOT DETECTED
EHRLICHIA MURIS-LIKE BY PCR: NOT DETECTED
EKG P AXIS: NORMAL DEGREES
EKG P AXIS: NORMAL DEGREES
EKG P-R INTERVAL: NORMAL MS
EKG P-R INTERVAL: NORMAL MS
EKG Q-T INTERVAL: 372 MS
EKG Q-T INTERVAL: 402 MS
EKG QRS DURATION: 168 MS
EKG QRS DURATION: 88 MS
EKG QTC CALCULATION (BAZETT): 412 MS
EKG QTC CALCULATION (BAZETT): 461 MS
EKG T AXIS: -34 DEGREES
EKG T AXIS: 96 DEGREES
ENA TO RNP ANTIBODY: NORMAL
ENA TO SMITH (SM) ANTIBODY: 0 AU/ML (ref 0–40)
ENA TO SSB (LA) ANTIBODY: 0 AU/ML (ref 0–40)
EOSINOPHILS ABSOLUTE: 0 K/UL (ref 0–0.6)
EOSINOPHILS ABSOLUTE: 0.08 K/UL (ref 0–0.6)
EOSINOPHILS ABSOLUTE: 0.09 K/UL (ref 0–0.6)
EOSINOPHILS ABSOLUTE: 0.16 K/UL (ref 0–0.6)
EOSINOPHILS RELATIVE PERCENT: 0 % (ref 0–5)
EOSINOPHILS RELATIVE PERCENT: 0.4 % (ref 0–5)
EOSINOPHILS RELATIVE PERCENT: 0.6 % (ref 0–5)
EOSINOPHILS RELATIVE PERCENT: 0.7 % (ref 0–5)
EOSINOPHILS RELATIVE PERCENT: 0.8 % (ref 0–5)
EOSINOPHILS RELATIVE PERCENT: 0.9 % (ref 0–5)
EOSINOPHILS RELATIVE PERCENT: 2 % (ref 0–5)
EOSINOPHILS RELATIVE PERCENT: 3 % (ref 0–5)
EOSINOPHILS RELATIVE PERCENT: 4 % (ref 0–5)
EPITHELIAL CELLS, UA: 1 /HPF (ref 0–5)
EPITHELIAL CELLS, UA: 1 /HPF (ref 0–5)
EPITHELIAL CELLS, UA: 2 /HPF (ref 0–5)
FERRITIN: 156.2 NG/ML (ref 13–150)
FOLATE: 7.5 NG/ML (ref 4.8–37.3)
FREE KAPPA LIGHT CHAINS: 3.05 MG/DL (ref 0.37–1.94)
FREE KAPPA/LAMBDA RATIO: 1.63 (ref 0.26–1.65)
FREE LAMBDA LIGHT CHAINS: 1.87 MG/DL (ref 0.57–2.63)
GAMMA GLOBULIN: 0.84 G/DL (ref 0.62–1.51)
GFR AFRICAN AMERICAN: 15
GFR AFRICAN AMERICAN: 18
GFR AFRICAN AMERICAN: 20
GFR AFRICAN AMERICAN: 22
GFR AFRICAN AMERICAN: 38
GFR AFRICAN AMERICAN: 41
GFR AFRICAN AMERICAN: 41
GFR AFRICAN AMERICAN: 44
GFR AFRICAN AMERICAN: 53
GFR AFRICAN AMERICAN: 53
GFR NON-AFRICAN AMERICAN: 13
GFR NON-AFRICAN AMERICAN: 15
GFR NON-AFRICAN AMERICAN: 16
GFR NON-AFRICAN AMERICAN: 18
GFR NON-AFRICAN AMERICAN: 31
GFR NON-AFRICAN AMERICAN: 34
GFR NON-AFRICAN AMERICAN: 34
GFR NON-AFRICAN AMERICAN: 36
GFR NON-AFRICAN AMERICAN: 44
GFR NON-AFRICAN AMERICAN: 44
GLUCOSE BLD-MCNC: 100 MG/DL (ref 74–109)
GLUCOSE BLD-MCNC: 103 MG/DL (ref 70–99)
GLUCOSE BLD-MCNC: 103 MG/DL (ref 70–99)
GLUCOSE BLD-MCNC: 104 MG/DL (ref 70–99)
GLUCOSE BLD-MCNC: 116 MG/DL (ref 70–99)
GLUCOSE BLD-MCNC: 119 MG/DL (ref 74–109)
GLUCOSE BLD-MCNC: 120 MG/DL (ref 74–109)
GLUCOSE BLD-MCNC: 126 MG/DL (ref 74–109)
GLUCOSE BLD-MCNC: 129 MG/DL (ref 74–109)
GLUCOSE BLD-MCNC: 134 MG/DL (ref 70–99)
GLUCOSE BLD-MCNC: 137 MG/DL (ref 70–99)
GLUCOSE BLD-MCNC: 139 MG/DL (ref 74–109)
GLUCOSE BLD-MCNC: 142 MG/DL (ref 70–99)
GLUCOSE BLD-MCNC: 148 MG/DL (ref 70–99)
GLUCOSE BLD-MCNC: 153 MG/DL (ref 70–99)
GLUCOSE BLD-MCNC: 154 MG/DL (ref 70–99)
GLUCOSE BLD-MCNC: 159 MG/DL (ref 70–99)
GLUCOSE BLD-MCNC: 161 MG/DL (ref 70–99)
GLUCOSE BLD-MCNC: 162 MG/DL (ref 70–99)
GLUCOSE BLD-MCNC: 162 MG/DL (ref 74–109)
GLUCOSE BLD-MCNC: 164 MG/DL (ref 70–99)
GLUCOSE BLD-MCNC: 168 MG/DL (ref 70–99)
GLUCOSE BLD-MCNC: 170 MG/DL (ref 74–109)
GLUCOSE BLD-MCNC: 172 MG/DL (ref 70–99)
GLUCOSE BLD-MCNC: 173 MG/DL (ref 70–99)
GLUCOSE BLD-MCNC: 178 MG/DL (ref 70–99)
GLUCOSE BLD-MCNC: 181 MG/DL (ref 70–99)
GLUCOSE BLD-MCNC: 185 MG/DL (ref 70–99)
GLUCOSE BLD-MCNC: 191 MG/DL (ref 70–99)
GLUCOSE BLD-MCNC: 194 MG/DL (ref 70–99)
GLUCOSE BLD-MCNC: 195 MG/DL (ref 74–109)
GLUCOSE BLD-MCNC: 203 MG/DL (ref 70–99)
GLUCOSE BLD-MCNC: 214 MG/DL (ref 70–99)
GLUCOSE BLD-MCNC: 229 MG/DL (ref 70–99)
GLUCOSE BLD-MCNC: 79 MG/DL (ref 74–109)
GLUCOSE BLD-MCNC: 94 MG/DL (ref 70–99)
GLUCOSE BLD-MCNC: 97 MG/DL (ref 74–109)
GLUCOSE BLD-MCNC: 99 MG/DL (ref 74–109)
GLUCOSE URINE: NEGATIVE MG/DL
HAPTOGLOBIN: 161 MG/DL (ref 30–200)
HAV IGM SER IA-ACNC: NORMAL
HBA1C MFR BLD: 6.3 % (ref 4–6)
HCT VFR BLD CALC: 20.5 % (ref 37–47)
HCT VFR BLD CALC: 21.3 % (ref 37–47)
HCT VFR BLD CALC: 21.4 % (ref 37–47)
HCT VFR BLD CALC: 22.7 % (ref 37–47)
HCT VFR BLD CALC: 23.2 % (ref 37–47)
HCT VFR BLD CALC: 23.3 % (ref 37–47)
HCT VFR BLD CALC: 23.3 % (ref 37–47)
HCT VFR BLD CALC: 23.6 % (ref 37–47)
HCT VFR BLD CALC: 24.1 % (ref 37–47)
HCT VFR BLD CALC: 25.3 % (ref 37–47)
HCT VFR BLD CALC: 26.3 % (ref 37–47)
HCT VFR BLD CALC: 36.2 % (ref 37–47)
HDLC SERPL-MCNC: 36 MG/DL (ref 65–121)
HEMOGLOBIN: 11.1 G/DL (ref 12–16)
HEMOGLOBIN: 6.8 G/DL (ref 12–16)
HEMOGLOBIN: 7 G/DL (ref 12–16)
HEMOGLOBIN: 7.5 G/DL (ref 12–16)
HEMOGLOBIN: 7.5 G/DL (ref 12–16)
HEMOGLOBIN: 7.6 G/DL (ref 12–16)
HEMOGLOBIN: 7.6 G/DL (ref 12–16)
HEMOGLOBIN: 7.7 G/DL (ref 12–16)
HEMOGLOBIN: 7.8 G/DL (ref 12–16)
HEMOGLOBIN: 8.2 G/DL (ref 12–16)
HEMOGLOBIN: 8.6 G/DL (ref 12–16)
HEPATITIS B CORE IGM ANTIBODY: NORMAL
HEPATITIS B SURFACE ANTIGEN INTERPRETATION: NORMAL
HEPATITIS C ANTIBODY INTERPRETATION: NORMAL
HIV-1 P24 AG: NORMAL
HYALINE CASTS: 0 /HPF (ref 0–8)
HYALINE CASTS: 1 /HPF (ref 0–8)
HYALINE CASTS: 3 /HPF (ref 0–8)
IGA: 135 MG/DL (ref 70–400)
IGG: 889 MG/DL (ref 700–1600)
IGM: 66 MG/DL (ref 40–230)
IMMATURE GRANULOCYTES #: 0.1 K/UL
IMMATURE GRANULOCYTES #: 0.2 K/UL
IMMATURE GRANULOCYTES #: 0.3 K/UL
IMMATURE GRANULOCYTES #: 0.3 K/UL
IMMATURE GRANULOCYTES #: 0.5 K/UL
IMMATURE GRANULOCYTES #: 1.2 K/UL
IMMATURE GRANULOCYTES #: 1.3 K/UL
IMMUNOFIXATION REFLEX: ABNORMAL
INR BLD: 1.18 (ref 0.88–1.18)
IRON SATURATION: 53 % (ref 14–50)
IRON: 161 UG/DL (ref 37–145)
KETONES, URINE: ABNORMAL MG/DL
KETONES, URINE: NEGATIVE MG/DL
KETONES, URINE: NEGATIVE MG/DL
L. PNEUMOPHILA SEROGP 1 UR AG: NORMAL
LDL CHOLESTEROL CALCULATED: 53 MG/DL
LEUKOCYTE ESTERASE, URINE: ABNORMAL
LEUKOCYTE ESTERASE, URINE: NEGATIVE
LEUKOCYTE ESTERASE, URINE: NEGATIVE
LYMPHOCYTES ABSOLUTE: 1.1 K/UL (ref 1.1–4.5)
LYMPHOCYTES ABSOLUTE: 1.2 K/UL (ref 1.1–4.5)
LYMPHOCYTES ABSOLUTE: 1.5 K/UL (ref 1.1–4.5)
LYMPHOCYTES ABSOLUTE: 1.6 K/UL (ref 1.1–4.5)
LYMPHOCYTES ABSOLUTE: 1.8 K/UL (ref 1.1–4.5)
LYMPHOCYTES ABSOLUTE: 1.8 K/UL (ref 1.1–4.5)
LYMPHOCYTES ABSOLUTE: 1.9 K/UL (ref 1.1–4.5)
LYMPHOCYTES ABSOLUTE: 2.2 K/UL (ref 1.1–4.5)
LYMPHOCYTES ABSOLUTE: 2.6 K/UL (ref 1.1–4.5)
LYMPHOCYTES RELATIVE PERCENT: 25 % (ref 20–40)
LYMPHOCYTES RELATIVE PERCENT: 29 % (ref 20–40)
LYMPHOCYTES RELATIVE PERCENT: 34 % (ref 20–40)
LYMPHOCYTES RELATIVE PERCENT: 42.6 % (ref 20–40)
LYMPHOCYTES RELATIVE PERCENT: 52 % (ref 20–40)
LYMPHOCYTES RELATIVE PERCENT: 56.6 % (ref 20–40)
LYMPHOCYTES RELATIVE PERCENT: 57.2 % (ref 20–40)
LYMPHOCYTES RELATIVE PERCENT: 61.7 % (ref 20–40)
LYMPHOCYTES RELATIVE PERCENT: 68 % (ref 20–40)
MACROCYTES: ABNORMAL
MAGNESIUM: 1.7 MG/DL (ref 1.6–2.4)
MCH RBC QN AUTO: 27.5 PG (ref 27–31)
MCH RBC QN AUTO: 34.1 PG (ref 27–31)
MCH RBC QN AUTO: 34.5 PG (ref 27–31)
MCH RBC QN AUTO: 34.6 PG (ref 27–31)
MCH RBC QN AUTO: 34.7 PG (ref 27–31)
MCH RBC QN AUTO: 35.1 PG (ref 27–31)
MCH RBC QN AUTO: 35.2 PG (ref 27–31)
MCHC RBC AUTO-ENTMCNC: 30.7 G/DL (ref 33–37)
MCHC RBC AUTO-ENTMCNC: 31.5 G/DL (ref 33–37)
MCHC RBC AUTO-ENTMCNC: 32.2 G/DL (ref 33–37)
MCHC RBC AUTO-ENTMCNC: 32.4 G/DL (ref 33–37)
MCHC RBC AUTO-ENTMCNC: 32.6 G/DL (ref 33–37)
MCHC RBC AUTO-ENTMCNC: 32.7 G/DL (ref 33–37)
MCHC RBC AUTO-ENTMCNC: 33 G/DL (ref 33–37)
MCHC RBC AUTO-ENTMCNC: 33.1 G/DL (ref 33–37)
MCHC RBC AUTO-ENTMCNC: 33.2 G/DL (ref 33–37)
MCHC RBC AUTO-ENTMCNC: 33.2 G/DL (ref 33–37)
MCV RBC AUTO: 103.1 FL (ref 81–99)
MCV RBC AUTO: 104 FL (ref 81–99)
MCV RBC AUTO: 105.1 FL (ref 81–99)
MCV RBC AUTO: 105.7 FL (ref 81–99)
MCV RBC AUTO: 105.9 FL (ref 81–99)
MCV RBC AUTO: 106.3 FL (ref 81–99)
MCV RBC AUTO: 107.4 FL (ref 81–99)
MCV RBC AUTO: 107.5 FL (ref 81–99)
MCV RBC AUTO: 109.5 FL (ref 81–99)
MCV RBC AUTO: 89.6 FL (ref 81–99)
METAMYELOCYTES RELATIVE PERCENT: 1 %
METAMYELOCYTES RELATIVE PERCENT: 2 %
METAMYELOCYTES RELATIVE PERCENT: 3 %
METHYLMALONIC ACID: 0.28 UMOL/L (ref 0–0.4)
MONOCYTES ABSOLUTE: 0 K/UL (ref 0–0.9)
MONOCYTES ABSOLUTE: 0.5 K/UL (ref 0–0.9)
MONOCYTES ABSOLUTE: 0.6 K/UL (ref 0–0.9)
MONOCYTES ABSOLUTE: 0.7 K/UL (ref 0–0.9)
MONOCYTES ABSOLUTE: 0.7 K/UL (ref 0–0.9)
MONOCYTES ABSOLUTE: 0.8 K/UL (ref 0–0.9)
MONOCYTES ABSOLUTE: 0.8 K/UL (ref 0–0.9)
MONOCYTES ABSOLUTE: 1 K/UL (ref 0–0.9)
MONOCYTES ABSOLUTE: 1.1 K/UL (ref 0–0.9)
MONOCYTES RELATIVE PERCENT: 1 % (ref 0–10)
MONOCYTES RELATIVE PERCENT: 13 % (ref 0–10)
MONOCYTES RELATIVE PERCENT: 16 % (ref 0–10)
MONOCYTES RELATIVE PERCENT: 17 % (ref 0–10)
MONOCYTES RELATIVE PERCENT: 22.2 % (ref 0–10)
MONOCYTES RELATIVE PERCENT: 22.4 % (ref 0–10)
MONOCYTES RELATIVE PERCENT: 23.1 % (ref 0–10)
MONOCYTES RELATIVE PERCENT: 23.1 % (ref 0–10)
MONOCYTES RELATIVE PERCENT: 24.5 % (ref 0–10)
MONONUCLEAR UNIDENTIFIED CELLS: 10 %
MONONUCLEAR UNIDENTIFIED CELLS: 20 %
MONONUCLEAR UNIDENTIFIED CELLS: 9 %
MYCOPLASMA PNEUMONIAE IGG: 0.2 U/L
MYCOPLASMA PNEUMONIAE IGM: 0.1 U/L
MYELOCYTE PERCENT: 4 %
MYELOCYTE PERCENT: 5 %
MYELOCYTE PERCENT: 6 %
NEUTROPHILS ABSOLUTE: 0.4 K/UL (ref 1.5–7.5)
NEUTROPHILS ABSOLUTE: 0.6 K/UL (ref 1.5–7.5)
NEUTROPHILS ABSOLUTE: 0.7 K/UL (ref 1.5–7.5)
NEUTROPHILS ABSOLUTE: 0.7 K/UL (ref 1.5–7.5)
NEUTROPHILS ABSOLUTE: 1 K/UL (ref 1.5–7.5)
NEUTROPHILS ABSOLUTE: 1.1 K/UL (ref 1.5–7.5)
NEUTROPHILS ABSOLUTE: 1.5 K/UL (ref 1.5–7.5)
NEUTROPHILS ABSOLUTE: 1.5 K/UL (ref 1.5–7.5)
NEUTROPHILS ABSOLUTE: 1.7 K/UL (ref 1.5–7.5)
NEUTROPHILS RELATIVE PERCENT: 12 % (ref 50–65)
NEUTROPHILS RELATIVE PERCENT: 12.8 % (ref 50–65)
NEUTROPHILS RELATIVE PERCENT: 16.6 % (ref 50–65)
NEUTROPHILS RELATIVE PERCENT: 16.7 % (ref 50–65)
NEUTROPHILS RELATIVE PERCENT: 19 % (ref 50–65)
NEUTROPHILS RELATIVE PERCENT: 21 % (ref 50–65)
NEUTROPHILS RELATIVE PERCENT: 21.3 % (ref 50–65)
NEUTROPHILS RELATIVE PERCENT: 27 % (ref 50–65)
NEUTROPHILS RELATIVE PERCENT: 27.1 % (ref 50–65)
NITRITE, URINE: NEGATIVE
OVALOCYTES: ABNORMAL
PAPPENHEIMER BODIES: ABNORMAL
PAPPENHEIMER BODIES: ABNORMAL
PARATHYROID HORMONE INTACT: 89 PG/ML (ref 15–65)
PDW BLD-RTO: 17.4 % (ref 11.5–14.5)
PDW BLD-RTO: 17.9 % (ref 11.5–14.5)
PDW BLD-RTO: 18 % (ref 11.5–14.5)
PDW BLD-RTO: 18.3 % (ref 11.5–14.5)
PDW BLD-RTO: 18.9 % (ref 11.5–14.5)
PDW BLD-RTO: 19 % (ref 11.5–14.5)
PDW BLD-RTO: 19.1 % (ref 11.5–14.5)
PERFORMED ON: ABNORMAL
PERFORMED ON: NORMAL
PH UA: 5 (ref 5–8)
PH UA: 6 (ref 5–8)
PH UA: 6.5 (ref 5–8)
PLATELET # BLD: 215 K/UL (ref 130–400)
PLATELET # BLD: 22 K/UL (ref 130–400)
PLATELET # BLD: 31 K/UL (ref 130–400)
PLATELET # BLD: 34 K/UL (ref 130–400)
PLATELET # BLD: 37 K/UL (ref 130–400)
PLATELET # BLD: 39 K/UL (ref 130–400)
PLATELET # BLD: 44 K/UL (ref 130–400)
PLATELET # BLD: 47 K/UL (ref 130–400)
PLATELET SLIDE REVIEW: ABNORMAL
PMV BLD AUTO: 10 FL (ref 9.4–12.3)
PMV BLD AUTO: 10.4 FL (ref 9.4–12.3)
PMV BLD AUTO: 10.5 FL (ref 9.4–12.3)
PMV BLD AUTO: 11.1 FL (ref 9.4–12.3)
PMV BLD AUTO: 11.5 FL (ref 9.4–12.3)
PMV BLD AUTO: 12.2 FL (ref 9.4–12.3)
PMV BLD AUTO: 12.3 FL (ref 9.4–12.3)
PMV BLD AUTO: 12.8 FL (ref 9.4–12.3)
PMV BLD AUTO: 13 FL (ref 9.4–12.3)
PMV BLD AUTO: 9.5 FL (ref 9.4–12.3)
POLYCHROMASIA: ABNORMAL
POTASSIUM REFLEX MAGNESIUM: 4.1 MMOL/L (ref 3.5–5)
POTASSIUM REFLEX MAGNESIUM: 4.5 MMOL/L (ref 3.5–5)
POTASSIUM REFLEX MAGNESIUM: 4.5 MMOL/L (ref 3.5–5)
POTASSIUM REFLEX MAGNESIUM: 4.6 MMOL/L (ref 3.5–5)
POTASSIUM REFLEX MAGNESIUM: 4.6 MMOL/L (ref 3.5–5)
POTASSIUM REFLEX MAGNESIUM: 4.8 MMOL/L (ref 3.5–5)
POTASSIUM REFLEX MAGNESIUM: 5.6 MMOL/L (ref 3.5–5)
POTASSIUM REFLEX MAGNESIUM: 5.7 MMOL/L (ref 3.5–5)
POTASSIUM REFLEX MAGNESIUM: 6.3 MMOL/L (ref 3.5–5)
POTASSIUM SERPL-SCNC: 4 MMOL/L (ref 3.5–5)
POTASSIUM SERPL-SCNC: 4.2 MMOL/L (ref 3.5–5)
POTASSIUM SERPL-SCNC: 4.6 MMOL/L (ref 3.5–5)
POTASSIUM SERPL-SCNC: 5.7 MMOL/L (ref 3.5–5)
PRO-BNP: 3252 PG/ML (ref 0–1800)
PRO-BNP: 9019 PG/ML (ref 0–1800)
PROTEIN UA: 30 MG/DL
PROTEIN UA: NEGATIVE MG/DL
PROTEIN UA: NEGATIVE MG/DL
PROTHROMBIN TIME: 15.2 SEC (ref 12–14.6)
RAPID HIV 1&2: NORMAL
RBC # BLD: 1.94 M/UL (ref 4.2–5.4)
RBC # BLD: 1.99 M/UL (ref 4.2–5.4)
RBC # BLD: 2.16 M/UL (ref 4.2–5.4)
RBC # BLD: 2.17 M/UL (ref 4.2–5.4)
RBC # BLD: 2.2 M/UL (ref 4.2–5.4)
RBC # BLD: 2.2 M/UL (ref 4.2–5.4)
RBC # BLD: 2.23 M/UL (ref 4.2–5.4)
RBC # BLD: 2.29 M/UL (ref 4.2–5.4)
RBC # BLD: 2.38 M/UL (ref 4.2–5.4)
RBC # BLD: 4.04 M/UL (ref 4.2–5.4)
RBC UA: 1 /HPF (ref 0–4)
RBC UA: 1 /HPF (ref 0–4)
RBC UA: 4 /HPF (ref 0–4)
RETICULOCYTE ABSOLUTE COUNT: 0.05 M/UL (ref 0.03–0.12)
RETICULOCYTE COUNT PCT: 2.5 % (ref 0.5–1.5)
ROCKY MOUNTAIN SPOTTED FEVER AB IGM,: NORMAL
ROCKY MOUNTAIN SPOTTED FEVER ANTIBODY IGG: NORMAL
SARS-COV-2, NAAT: NOT DETECTED
SARS-COV-2, PCR: NOT DETECTED
SCHISTOCYTES: ABNORMAL
SCLERODERMA (SCL-70) AB: 0 AU/ML (ref 0–40)
SODIUM BLD-SCNC: 134 MMOL/L (ref 136–145)
SODIUM BLD-SCNC: 135 MMOL/L (ref 136–145)
SODIUM BLD-SCNC: 135 MMOL/L (ref 136–145)
SODIUM BLD-SCNC: 139 MMOL/L (ref 136–145)
SODIUM BLD-SCNC: 140 MMOL/L (ref 136–145)
SODIUM BLD-SCNC: 141 MMOL/L (ref 136–145)
SPE/IFE INTERPRETATION: ABNORMAL
SPECIFIC GRAVITY UA: 1.01 (ref 1–1.03)
SSA 52 (RO) (ENA) AB, IGG: 1 AU/ML (ref 0–40)
SSA 60 (RO) (ENA) AB, IGG: 0 AU/ML (ref 0–40)
STREP PNEUMONIAE ANTIGEN, URINE: NORMAL
T4 FREE: 1.53 NG/DL (ref 0.93–1.7)
TOTAL CK: 402 U/L (ref 26–192)
TOTAL IRON BINDING CAPACITY: 305 UG/DL (ref 250–400)
TOTAL PROTEIN: 5 G/DL (ref 6.6–8.7)
TOTAL PROTEIN: 5.7 G/DL (ref 6.6–8.7)
TOTAL PROTEIN: 5.9 G/DL (ref 6.3–8.2)
TOTAL PROTEIN: 6.3 G/DL (ref 6.6–8.7)
TOTAL PROTEIN: 6.9 G/DL (ref 6.6–8.7)
TOTAL PROTEIN: 7.1 G/DL (ref 6.6–8.7)
TRIGL SERPL-MCNC: 289 MG/DL (ref 0–149)
TROPONIN: 0.06 NG/ML (ref 0–0.03)
TROPONIN: 0.06 NG/ML (ref 0–0.03)
TROPONIN: 0.08 NG/ML (ref 0–0.03)
TROPONIN: <0.01 NG/ML (ref 0–0.03)
TSH REFLEX FT4: <0.01 UIU/ML (ref 0.35–5.5)
TSH SERPL DL<=0.05 MIU/L-ACNC: 1.32 UIU/ML (ref 0.27–4.2)
URIC ACID, SERUM: 5.8 MG/DL (ref 2.4–5.7)
UROBILINOGEN, URINE: 0.2 E.U./DL
VANCOMYCIN TROUGH: 8.5 UG/ML (ref 10–20)
VITAMIN B-12: 1684 PG/ML (ref 211–946)
VITAMIN D 25-HYDROXY: 47.7 NG/ML
WBC # BLD: 2.6 K/UL (ref 4.8–10.8)
WBC # BLD: 3 K/UL (ref 4.8–10.8)
WBC # BLD: 3.3 K/UL (ref 4.8–10.8)
WBC # BLD: 3.6 K/UL (ref 4.8–10.8)
WBC # BLD: 3.9 K/UL (ref 4.8–10.8)
WBC # BLD: 4 K/UL (ref 4.8–10.8)
WBC # BLD: 4.2 K/UL (ref 4.8–10.8)
WBC # BLD: 4.3 K/UL (ref 4.8–10.8)
WBC # BLD: 4.9 K/UL (ref 4.8–10.8)
WBC # BLD: 8.5 K/UL (ref 4.8–10.8)
WBC UA: 0 /HPF (ref 0–5)
WBC UA: 1 /HPF (ref 0–5)
WBC UA: 12 /HPF (ref 0–5)
ZINC: 69.4 UG/DL (ref 60–120)

## 2021-01-01 PROCEDURE — 87040 BLOOD CULTURE FOR BACTERIA: CPT

## 2021-01-01 PROCEDURE — 1123F ACP DISCUSS/DSCN MKR DOCD: CPT | Performed by: FAMILY MEDICINE

## 2021-01-01 PROCEDURE — 36415 COLL VENOUS BLD VENIPUNCTURE: CPT

## 2021-01-01 PROCEDURE — 2580000003 HC RX 258: Performed by: INTERNAL MEDICINE

## 2021-01-01 PROCEDURE — 82947 ASSAY GLUCOSE BLOOD QUANT: CPT

## 2021-01-01 PROCEDURE — 85025 COMPLETE CBC W/AUTO DIFF WBC: CPT

## 2021-01-01 PROCEDURE — 76700 US EXAM ABDOM COMPLETE: CPT

## 2021-01-01 PROCEDURE — 2580000003 HC RX 258: Performed by: HOSPITALIST

## 2021-01-01 PROCEDURE — 6360000002 HC RX W HCPCS: Performed by: HOSPITALIST

## 2021-01-01 PROCEDURE — 82378 CARCINOEMBRYONIC ANTIGEN: CPT

## 2021-01-01 PROCEDURE — 1036F TOBACCO NON-USER: CPT | Performed by: FAMILY MEDICINE

## 2021-01-01 PROCEDURE — 99223 1ST HOSP IP/OBS HIGH 75: CPT | Performed by: INTERNAL MEDICINE

## 2021-01-01 PROCEDURE — 2500000003 HC RX 250 WO HCPCS: Performed by: INTERNAL MEDICINE

## 2021-01-01 PROCEDURE — 6360000002 HC RX W HCPCS: Performed by: INTERNAL MEDICINE

## 2021-01-01 PROCEDURE — 1123F ACP DISCUSS/DSCN MKR DOCD: CPT | Performed by: PHYSICIAN ASSISTANT

## 2021-01-01 PROCEDURE — 82784 ASSAY IGA/IGD/IGG/IGM EACH: CPT

## 2021-01-01 PROCEDURE — 93005 ELECTROCARDIOGRAM TRACING: CPT | Performed by: PHYSICIAN ASSISTANT

## 2021-01-01 PROCEDURE — 84132 ASSAY OF SERUM POTASSIUM: CPT

## 2021-01-01 PROCEDURE — 86038 ANTINUCLEAR ANTIBODIES: CPT

## 2021-01-01 PROCEDURE — 80053 COMPREHEN METABOLIC PANEL: CPT

## 2021-01-01 PROCEDURE — G8926 SPIRO NO PERF OR DOC: HCPCS | Performed by: FAMILY MEDICINE

## 2021-01-01 PROCEDURE — 99232 SBSQ HOSP IP/OBS MODERATE 35: CPT | Performed by: INTERNAL MEDICINE

## 2021-01-01 PROCEDURE — 82728 ASSAY OF FERRITIN: CPT

## 2021-01-01 PROCEDURE — 94640 AIRWAY INHALATION TREATMENT: CPT

## 2021-01-01 PROCEDURE — 80076 HEPATIC FUNCTION PANEL: CPT

## 2021-01-01 PROCEDURE — 80074 ACUTE HEPATITIS PANEL: CPT

## 2021-01-01 PROCEDURE — G8399 PT W/DXA RESULTS DOCUMENT: HCPCS | Performed by: INTERNAL MEDICINE

## 2021-01-01 PROCEDURE — 6370000000 HC RX 637 (ALT 250 FOR IP): Performed by: INTERNAL MEDICINE

## 2021-01-01 PROCEDURE — 05HC33Z INSERTION OF INFUSION DEVICE INTO LEFT BASILIC VEIN, PERCUTANEOUS APPROACH: ICD-10-PCS | Performed by: FAMILY MEDICINE

## 2021-01-01 PROCEDURE — 6370000000 HC RX 637 (ALT 250 FOR IP): Performed by: FAMILY MEDICINE

## 2021-01-01 PROCEDURE — G8484 FLU IMMUNIZE NO ADMIN: HCPCS | Performed by: PHYSICIAN ASSISTANT

## 2021-01-01 PROCEDURE — 84165 PROTEIN E-PHORESIS SERUM: CPT

## 2021-01-01 PROCEDURE — 82607 VITAMIN B-12: CPT

## 2021-01-01 PROCEDURE — 6370000000 HC RX 637 (ALT 250 FOR IP): Performed by: HOSPITALIST

## 2021-01-01 PROCEDURE — 99214 OFFICE O/P EST MOD 30 MIN: CPT | Performed by: INTERNAL MEDICINE

## 2021-01-01 PROCEDURE — 86301 IMMUNOASSAY TUMOR CA 19-9: CPT

## 2021-01-01 PROCEDURE — 07DR3ZX EXTRACTION OF ILIAC BONE MARROW, PERCUTANEOUS APPROACH, DIAGNOSTIC: ICD-10-PCS | Performed by: FAMILY MEDICINE

## 2021-01-01 PROCEDURE — 85730 THROMBOPLASTIN TIME PARTIAL: CPT

## 2021-01-01 PROCEDURE — 80048 BASIC METABOLIC PNL TOTAL CA: CPT

## 2021-01-01 PROCEDURE — 88313 SPECIAL STAINS GROUP 2: CPT

## 2021-01-01 PROCEDURE — 87449 NOS EACH ORGANISM AG IA: CPT

## 2021-01-01 PROCEDURE — 87635 SARS-COV-2 COVID-19 AMP PRB: CPT

## 2021-01-01 PROCEDURE — 6360000002 HC RX W HCPCS: Performed by: RADIOLOGY

## 2021-01-01 PROCEDURE — 85018 HEMOGLOBIN: CPT

## 2021-01-01 PROCEDURE — 86922 COMPATIBILITY TEST ANTIGLOB: CPT

## 2021-01-01 PROCEDURE — 1090F PRES/ABSN URINE INCON ASSESS: CPT | Performed by: PHYSICIAN ASSISTANT

## 2021-01-01 PROCEDURE — A9540 TC99M MAA: HCPCS | Performed by: NURSE PRACTITIONER

## 2021-01-01 PROCEDURE — 99203 OFFICE O/P NEW LOW 30 MIN: CPT | Performed by: PHYSICIAN ASSISTANT

## 2021-01-01 PROCEDURE — 6370000000 HC RX 637 (ALT 250 FOR IP): Performed by: NURSE PRACTITIONER

## 2021-01-01 PROCEDURE — 78580 LUNG PERFUSION IMAGING: CPT

## 2021-01-01 PROCEDURE — 1036F TOBACCO NON-USER: CPT | Performed by: INTERNAL MEDICINE

## 2021-01-01 PROCEDURE — 84630 ASSAY OF ZINC: CPT

## 2021-01-01 PROCEDURE — P9073 PLATELETS PHERESIS PATH REDU: HCPCS

## 2021-01-01 PROCEDURE — 81001 URINALYSIS AUTO W/SCOPE: CPT

## 2021-01-01 PROCEDURE — 86304 IMMUNOASSAY TUMOR CA 125: CPT

## 2021-01-01 PROCEDURE — 86235 NUCLEAR ANTIGEN ANTIBODY: CPT

## 2021-01-01 PROCEDURE — 99284 EMERGENCY DEPT VISIT MOD MDM: CPT

## 2021-01-01 PROCEDURE — 93296 REM INTERROG EVL PM/IDS: CPT | Performed by: CLINICAL NURSE SPECIALIST

## 2021-01-01 PROCEDURE — 83010 ASSAY OF HAPTOGLOBIN QUANT: CPT

## 2021-01-01 PROCEDURE — 4040F PNEUMOC VAC/ADMIN/RCVD: CPT | Performed by: FAMILY MEDICINE

## 2021-01-01 PROCEDURE — 86901 BLOOD TYPING SEROLOGIC RH(D): CPT

## 2021-01-01 PROCEDURE — P9016 RBC LEUKOCYTES REDUCED: HCPCS

## 2021-01-01 PROCEDURE — G8399 PT W/DXA RESULTS DOCUMENT: HCPCS | Performed by: FAMILY MEDICINE

## 2021-01-01 PROCEDURE — 1210000000 HC MED SURG R&B

## 2021-01-01 PROCEDURE — G8417 CALC BMI ABV UP PARAM F/U: HCPCS | Performed by: INTERNAL MEDICINE

## 2021-01-01 PROCEDURE — 1123F ACP DISCUSS/DSCN MKR DOCD: CPT | Performed by: INTERNAL MEDICINE

## 2021-01-01 PROCEDURE — 85379 FIBRIN DEGRADATION QUANT: CPT

## 2021-01-01 PROCEDURE — 51798 US URINE CAPACITY MEASURE: CPT

## 2021-01-01 PROCEDURE — 76642 ULTRASOUND BREAST LIMITED: CPT

## 2021-01-01 PROCEDURE — G8427 DOCREV CUR MEDS BY ELIG CLIN: HCPCS | Performed by: INTERNAL MEDICINE

## 2021-01-01 PROCEDURE — 3430000000 HC RX DIAGNOSTIC RADIOPHARMACEUTICAL: Performed by: NURSE PRACTITIONER

## 2021-01-01 PROCEDURE — 70450 CT HEAD/BRAIN W/O DYE: CPT

## 2021-01-01 PROCEDURE — 99213 OFFICE O/P EST LOW 20 MIN: CPT | Performed by: FAMILY MEDICINE

## 2021-01-01 PROCEDURE — 99233 SBSQ HOSP IP/OBS HIGH 50: CPT | Performed by: PHYSICIAN ASSISTANT

## 2021-01-01 PROCEDURE — 86300 IMMUNOASSAY TUMOR CA 15-3: CPT

## 2021-01-01 PROCEDURE — 87798 DETECT AGENT NOS DNA AMP: CPT

## 2021-01-01 PROCEDURE — 85014 HEMATOCRIT: CPT

## 2021-01-01 PROCEDURE — 86905 BLOOD TYPING RBC ANTIGENS: CPT

## 2021-01-01 PROCEDURE — 1090F PRES/ABSN URINE INCON ASSESS: CPT | Performed by: FAMILY MEDICINE

## 2021-01-01 PROCEDURE — 85045 AUTOMATED RETICULOCYTE COUNT: CPT

## 2021-01-01 PROCEDURE — 86870 RBC ANTIBODY IDENTIFICATION: CPT

## 2021-01-01 PROCEDURE — 77065 DX MAMMO INCL CAD UNI: CPT

## 2021-01-01 PROCEDURE — 83540 ASSAY OF IRON: CPT

## 2021-01-01 PROCEDURE — 36430 TRANSFUSION BLD/BLD COMPNT: CPT

## 2021-01-01 PROCEDURE — 83036 HEMOGLOBIN GLYCOSYLATED A1C: CPT

## 2021-01-01 PROCEDURE — 84550 ASSAY OF BLOOD/URIC ACID: CPT

## 2021-01-01 PROCEDURE — 86255 FLUORESCENT ANTIBODY SCREEN: CPT

## 2021-01-01 PROCEDURE — 84443 ASSAY THYROID STIM HORMONE: CPT

## 2021-01-01 PROCEDURE — 6360000002 HC RX W HCPCS: Performed by: PHYSICIAN ASSISTANT

## 2021-01-01 PROCEDURE — 84484 ASSAY OF TROPONIN QUANT: CPT

## 2021-01-01 PROCEDURE — 6360000002 HC RX W HCPCS: Performed by: FAMILY MEDICINE

## 2021-01-01 PROCEDURE — 93005 ELECTROCARDIOGRAM TRACING: CPT | Performed by: FAMILY MEDICINE

## 2021-01-01 PROCEDURE — 86738 MYCOPLASMA ANTIBODY: CPT

## 2021-01-01 PROCEDURE — 82525 ASSAY OF COPPER: CPT

## 2021-01-01 PROCEDURE — G8417 CALC BMI ABV UP PARAM F/U: HCPCS | Performed by: PHYSICIAN ASSISTANT

## 2021-01-01 PROCEDURE — 83550 IRON BINDING TEST: CPT

## 2021-01-01 PROCEDURE — 82550 ASSAY OF CK (CPK): CPT

## 2021-01-01 PROCEDURE — G8484 FLU IMMUNIZE NO ADMIN: HCPCS | Performed by: FAMILY MEDICINE

## 2021-01-01 PROCEDURE — 80202 ASSAY OF VANCOMYCIN: CPT

## 2021-01-01 PROCEDURE — 2700000000 HC OXYGEN THERAPY PER DAY

## 2021-01-01 PROCEDURE — 82746 ASSAY OF FOLIC ACID SERUM: CPT

## 2021-01-01 PROCEDURE — 83735 ASSAY OF MAGNESIUM: CPT

## 2021-01-01 PROCEDURE — 51702 INSERT TEMP BLADDER CATH: CPT

## 2021-01-01 PROCEDURE — 83883 ASSAY NEPHELOMETRY NOT SPEC: CPT

## 2021-01-01 PROCEDURE — 83970 ASSAY OF PARATHORMONE: CPT

## 2021-01-01 PROCEDURE — 86900 BLOOD TYPING SEROLOGIC ABO: CPT

## 2021-01-01 PROCEDURE — 82542 COL CHROMOTOGRAPHY QUAL/QUAN: CPT

## 2021-01-01 PROCEDURE — G8417 CALC BMI ABV UP PARAM F/U: HCPCS | Performed by: FAMILY MEDICINE

## 2021-01-01 PROCEDURE — 86225 DNA ANTIBODY NATIVE: CPT

## 2021-01-01 PROCEDURE — 77067 SCR MAMMO BI INCL CAD: CPT

## 2021-01-01 PROCEDURE — 86850 RBC ANTIBODY SCREEN: CPT

## 2021-01-01 PROCEDURE — 4040F PNEUMOC VAC/ADMIN/RCVD: CPT | Performed by: INTERNAL MEDICINE

## 2021-01-01 PROCEDURE — 99999 PR OFFICE/OUTPT VISIT,PROCEDURE ONLY: CPT | Performed by: NURSE PRACTITIONER

## 2021-01-01 PROCEDURE — G8427 DOCREV CUR MEDS BY ELIG CLIN: HCPCS | Performed by: FAMILY MEDICINE

## 2021-01-01 PROCEDURE — 99213 OFFICE O/P EST LOW 20 MIN: CPT | Performed by: INTERNAL MEDICINE

## 2021-01-01 PROCEDURE — 86160 COMPLEMENT ANTIGEN: CPT

## 2021-01-01 PROCEDURE — 4040F PNEUMOC VAC/ADMIN/RCVD: CPT | Performed by: PHYSICIAN ASSISTANT

## 2021-01-01 PROCEDURE — 83880 ASSAY OF NATRIURETIC PEPTIDE: CPT

## 2021-01-01 PROCEDURE — 1090F PRES/ABSN URINE INCON ASSESS: CPT | Performed by: INTERNAL MEDICINE

## 2021-01-01 PROCEDURE — G8427 DOCREV CUR MEDS BY ELIG CLIN: HCPCS | Performed by: PHYSICIAN ASSISTANT

## 2021-01-01 PROCEDURE — 87806 HIV AG W/HIV1&2 ANTB W/OPTIC: CPT

## 2021-01-01 PROCEDURE — G8399 PT W/DXA RESULTS DOCUMENT: HCPCS | Performed by: PHYSICIAN ASSISTANT

## 2021-01-01 PROCEDURE — 99214 OFFICE O/P EST MOD 30 MIN: CPT | Performed by: FAMILY MEDICINE

## 2021-01-01 PROCEDURE — 93280 PM DEVICE PROGR EVAL DUAL: CPT | Performed by: INTERNAL MEDICINE

## 2021-01-01 PROCEDURE — 99223 1ST HOSP IP/OBS HIGH 75: CPT | Performed by: PHYSICIAN ASSISTANT

## 2021-01-01 PROCEDURE — 84439 ASSAY OF FREE THYROXINE: CPT

## 2021-01-01 PROCEDURE — 99233 SBSQ HOSP IP/OBS HIGH 50: CPT | Performed by: INTERNAL MEDICINE

## 2021-01-01 PROCEDURE — 76770 US EXAM ABDO BACK WALL COMP: CPT

## 2021-01-01 PROCEDURE — 82306 VITAMIN D 25 HYDROXY: CPT

## 2021-01-01 PROCEDURE — 99231 SBSQ HOSP IP/OBS SF/LOW 25: CPT | Performed by: INTERNAL MEDICINE

## 2021-01-01 PROCEDURE — 2709999900 CT BIOPSY BONE MARROW

## 2021-01-01 PROCEDURE — 88311 DECALCIFY TISSUE: CPT

## 2021-01-01 PROCEDURE — 86757 RICKETTSIA ANTIBODY: CPT

## 2021-01-01 PROCEDURE — 1036F TOBACCO NON-USER: CPT | Performed by: PHYSICIAN ASSISTANT

## 2021-01-01 PROCEDURE — 86039 ANTINUCLEAR ANTIBODIES (ANA): CPT

## 2021-01-01 PROCEDURE — 3023F SPIROM DOC REV: CPT | Performed by: FAMILY MEDICINE

## 2021-01-01 PROCEDURE — 71100 X-RAY EXAM RIBS UNI 2 VIEWS: CPT

## 2021-01-01 PROCEDURE — 88305 TISSUE EXAM BY PATHOLOGIST: CPT

## 2021-01-01 PROCEDURE — 93294 REM INTERROG EVL PM/LDLS PM: CPT | Performed by: CLINICAL NURSE SPECIALIST

## 2021-01-01 PROCEDURE — 71045 X-RAY EXAM CHEST 1 VIEW: CPT

## 2021-01-01 PROCEDURE — 84155 ASSAY OF PROTEIN SERUM: CPT

## 2021-01-01 PROCEDURE — 93010 ELECTROCARDIOGRAM REPORT: CPT | Performed by: INTERNAL MEDICINE

## 2021-01-01 PROCEDURE — 85610 PROTHROMBIN TIME: CPT

## 2021-01-01 PROCEDURE — 83921 ORGANIC ACID SINGLE QUANT: CPT

## 2021-01-01 PROCEDURE — 19000 PUNCTURE ASPIR CYST BREAST: CPT

## 2021-01-01 PROCEDURE — G8484 FLU IMMUNIZE NO ADMIN: HCPCS | Performed by: INTERNAL MEDICINE

## 2021-01-01 RX ORDER — ALBUTEROL SULFATE 2.5 MG/3ML
2.5 SOLUTION RESPIRATORY (INHALATION)
Status: DISCONTINUED | OUTPATIENT
Start: 2021-01-01 | End: 2021-01-01 | Stop reason: HOSPADM

## 2021-01-01 RX ORDER — IBUPROFEN 400 MG/1
800 TABLET ORAL EVERY 6 HOURS PRN
Status: CANCELLED | OUTPATIENT
Start: 2021-01-01

## 2021-01-01 RX ORDER — SODIUM BICARBONATE 325 MG/1
325 TABLET ORAL DAILY
COMMUNITY

## 2021-01-01 RX ORDER — CYANOCOBALAMIN (VITAMIN B-12) 5000 MCG
1 TABLET,DISINTEGRATING ORAL NIGHTLY
Status: CANCELLED | OUTPATIENT
Start: 2021-01-01

## 2021-01-01 RX ORDER — AMOXICILLIN AND CLAVULANATE POTASSIUM 875; 125 MG/1; MG/1
1 TABLET, FILM COATED ORAL 2 TIMES DAILY
Qty: 20 TABLET | Refills: 0 | Status: SHIPPED | OUTPATIENT
Start: 2021-01-01 | End: 2021-01-01

## 2021-01-01 RX ORDER — VITAMIN B COMPLEX
2000 TABLET ORAL DAILY
Status: DISCONTINUED | OUTPATIENT
Start: 2021-01-01 | End: 2021-01-01 | Stop reason: HOSPADM

## 2021-01-01 RX ORDER — RALOXIFENE HYDROCHLORIDE 60 MG/1
60 TABLET, FILM COATED ORAL DAILY
Status: DISCONTINUED | OUTPATIENT
Start: 2021-01-01 | End: 2021-01-01 | Stop reason: CLARIF

## 2021-01-01 RX ORDER — CALCIUM CARBONATE 200(500)MG
500 TABLET,CHEWABLE ORAL 3 TIMES DAILY PRN
Status: DISCONTINUED | OUTPATIENT
Start: 2021-01-01 | End: 2021-01-01 | Stop reason: HOSPADM

## 2021-01-01 RX ORDER — GUAIFENESIN 600 MG/1
600 TABLET, EXTENDED RELEASE ORAL 2 TIMES DAILY
Status: CANCELLED | OUTPATIENT
Start: 2021-01-01

## 2021-01-01 RX ORDER — IBUPROFEN 400 MG/1
400 TABLET ORAL EVERY 6 HOURS PRN
Status: DISCONTINUED | OUTPATIENT
Start: 2021-01-01 | End: 2021-01-01

## 2021-01-01 RX ORDER — SODIUM CHLORIDE 9 MG/ML
INJECTION, SOLUTION INTRAVENOUS CONTINUOUS
Status: CANCELLED | OUTPATIENT
Start: 2021-01-01

## 2021-01-01 RX ORDER — RISEDRONATE SODIUM 35 MG/1
35 TABLET, FILM COATED ORAL
Status: ON HOLD | COMMUNITY
End: 2021-01-01

## 2021-01-01 RX ORDER — ALLOPURINOL 100 MG/1
TABLET ORAL
Qty: 90 TABLET | Refills: 0 | Status: SHIPPED | OUTPATIENT
Start: 2021-01-01 | End: 2021-01-01

## 2021-01-01 RX ORDER — MECOBALAMIN 5000 MCG
5 TABLET,DISINTEGRATING ORAL NIGHTLY PRN
Status: DISCONTINUED | OUTPATIENT
Start: 2021-01-01 | End: 2021-01-01 | Stop reason: HOSPADM

## 2021-01-01 RX ORDER — DEXTROSE MONOHYDRATE 25 G/50ML
12.5 INJECTION, SOLUTION INTRAVENOUS PRN
Status: DISCONTINUED | OUTPATIENT
Start: 2021-01-01 | End: 2021-01-01 | Stop reason: HOSPADM

## 2021-01-01 RX ORDER — DEXTROSE MONOHYDRATE 25 G/50ML
25 INJECTION, SOLUTION INTRAVENOUS ONCE
Status: DISCONTINUED | OUTPATIENT
Start: 2021-01-01 | End: 2021-01-01 | Stop reason: HOSPADM

## 2021-01-01 RX ORDER — ACETAMINOPHEN 650 MG/1
650 SUPPOSITORY RECTAL EVERY 6 HOURS PRN
Status: CANCELLED | OUTPATIENT
Start: 2021-01-01

## 2021-01-01 RX ORDER — POLYETHYLENE GLYCOL 3350 17 G/17G
17 POWDER, FOR SOLUTION ORAL DAILY PRN
Status: DISCONTINUED | OUTPATIENT
Start: 2021-01-01 | End: 2021-01-01

## 2021-01-01 RX ORDER — ALBUTEROL SULFATE 2.5 MG/3ML
2.5 SOLUTION RESPIRATORY (INHALATION)
Status: CANCELLED | OUTPATIENT
Start: 2021-01-01

## 2021-01-01 RX ORDER — LACTOBACILLUS RHAMNOSUS GG 10B CELL
1 CAPSULE ORAL
Status: CANCELLED | OUTPATIENT
Start: 2021-01-01

## 2021-01-01 RX ORDER — ACETAMINOPHEN 650 MG/1
650 SUPPOSITORY RECTAL EVERY 6 HOURS PRN
Status: DISCONTINUED | OUTPATIENT
Start: 2021-01-01 | End: 2021-01-01

## 2021-01-01 RX ORDER — NALOXONE HYDROCHLORIDE 0.4 MG/ML
0.4 INJECTION, SOLUTION INTRAMUSCULAR; INTRAVENOUS; SUBCUTANEOUS PRN
Status: CANCELLED | OUTPATIENT
Start: 2021-01-01

## 2021-01-01 RX ORDER — FENTANYL 12 UG/H
1 PATCH TRANSDERMAL
Status: CANCELLED | OUTPATIENT
Start: 2021-07-25

## 2021-01-01 RX ORDER — VITAMIN B COMPLEX
2000 TABLET ORAL DAILY
Status: CANCELLED | OUTPATIENT
Start: 2021-01-01

## 2021-01-01 RX ORDER — LORAZEPAM 2 MG/ML
1 CONCENTRATE ORAL
Status: DISCONTINUED | OUTPATIENT
Start: 2021-01-01 | End: 2021-01-01 | Stop reason: HOSPADM

## 2021-01-01 RX ORDER — ACETAMINOPHEN 325 MG/1
650 TABLET ORAL EVERY 6 HOURS PRN
Status: DISCONTINUED | OUTPATIENT
Start: 2021-01-01 | End: 2021-01-01

## 2021-01-01 RX ORDER — LEVOTHYROXINE SODIUM 0.07 MG/1
75 TABLET ORAL DAILY
Status: DISCONTINUED | OUTPATIENT
Start: 2021-01-01 | End: 2021-01-01

## 2021-01-01 RX ORDER — NICOTINE POLACRILEX 4 MG
15 LOZENGE BUCCAL PRN
Status: CANCELLED | OUTPATIENT
Start: 2021-01-01

## 2021-01-01 RX ORDER — SODIUM BICARBONATE 650 MG/1
650 TABLET ORAL 2 TIMES DAILY
Status: DISCONTINUED | OUTPATIENT
Start: 2021-01-01 | End: 2021-01-01 | Stop reason: HOSPADM

## 2021-01-01 RX ORDER — ACETAMINOPHEN 650 MG/1
650 SUPPOSITORY RECTAL EVERY 6 HOURS PRN
Status: DISCONTINUED | OUTPATIENT
Start: 2021-01-01 | End: 2021-01-01 | Stop reason: HOSPADM

## 2021-01-01 RX ORDER — PREDNISONE 10 MG/1
30 TABLET ORAL DAILY
Qty: 15 TABLET | Refills: 0 | Status: SHIPPED | OUTPATIENT
Start: 2021-01-01 | End: 2021-01-01

## 2021-01-01 RX ORDER — DEXTROSE MONOHYDRATE 50 MG/ML
100 INJECTION, SOLUTION INTRAVENOUS PRN
Status: DISCONTINUED | OUTPATIENT
Start: 2021-01-01 | End: 2021-01-01 | Stop reason: HOSPADM

## 2021-01-01 RX ORDER — DOCUSATE SODIUM 100 MG/1
100 CAPSULE, LIQUID FILLED ORAL DAILY
Status: DISCONTINUED | OUTPATIENT
Start: 2021-01-01 | End: 2021-01-01 | Stop reason: HOSPADM

## 2021-01-01 RX ORDER — ALLOPURINOL 100 MG/1
100 TABLET ORAL DAILY
Status: DISCONTINUED | OUTPATIENT
Start: 2021-01-01 | End: 2021-01-01 | Stop reason: HOSPADM

## 2021-01-01 RX ORDER — MIDAZOLAM HYDROCHLORIDE 1 MG/ML
INJECTION INTRAMUSCULAR; INTRAVENOUS
Status: COMPLETED | OUTPATIENT
Start: 2021-01-01 | End: 2021-01-01

## 2021-01-01 RX ORDER — SODIUM CHLORIDE 9 MG/ML
INJECTION, SOLUTION INTRAVENOUS PRN
Status: DISCONTINUED | OUTPATIENT
Start: 2021-01-01 | End: 2021-01-01 | Stop reason: HOSPADM

## 2021-01-01 RX ORDER — SODIUM CHLORIDE 9 MG/ML
INJECTION, SOLUTION INTRAVENOUS PRN
Status: CANCELLED | OUTPATIENT
Start: 2021-01-01

## 2021-01-01 RX ORDER — GABAPENTIN 100 MG/1
200 CAPSULE ORAL 2 TIMES DAILY
Qty: 120 CAPSULE | Refills: 5 | Status: SHIPPED | OUTPATIENT
Start: 2021-01-01 | End: 2021-07-26

## 2021-01-01 RX ORDER — MAGNESIUM HYDROXIDE/ALUMINUM HYDROXICE/SIMETHICONE 120; 1200; 1200 MG/30ML; MG/30ML; MG/30ML
30 SUSPENSION ORAL EVERY 6 HOURS PRN
Status: DISCONTINUED | OUTPATIENT
Start: 2021-01-01 | End: 2021-01-01 | Stop reason: HOSPADM

## 2021-01-01 RX ORDER — SODIUM CHLORIDE 0.9 % (FLUSH) 0.9 %
5-40 SYRINGE (ML) INJECTION EVERY 12 HOURS SCHEDULED
Status: CANCELLED | OUTPATIENT
Start: 2021-01-01

## 2021-01-01 RX ORDER — APIXABAN 5 MG/1
TABLET, FILM COATED ORAL
Qty: 60 TABLET | Refills: 5 | Status: SHIPPED | OUTPATIENT
Start: 2021-01-01

## 2021-01-01 RX ORDER — MAGNESIUM HYDROXIDE/ALUMINUM HYDROXICE/SIMETHICONE 120; 1200; 1200 MG/30ML; MG/30ML; MG/30ML
30 SUSPENSION ORAL EVERY 6 HOURS PRN
Status: CANCELLED | OUTPATIENT
Start: 2021-01-01

## 2021-01-01 RX ORDER — CALCIUM CARBONATE 200(500)MG
500 TABLET,CHEWABLE ORAL 3 TIMES DAILY PRN
Status: CANCELLED | OUTPATIENT
Start: 2021-01-01

## 2021-01-01 RX ORDER — MORPHINE SULFATE 4 MG/ML
1 INJECTION, SOLUTION INTRAMUSCULAR; INTRAVENOUS
Status: DISCONTINUED | OUTPATIENT
Start: 2021-01-01 | End: 2021-01-01

## 2021-01-01 RX ORDER — DOCUSATE SODIUM 100 MG/1
100 CAPSULE, LIQUID FILLED ORAL DAILY
Status: CANCELLED | OUTPATIENT
Start: 2021-01-01

## 2021-01-01 RX ORDER — MORPHINE SULFATE 4 MG/ML
1 INJECTION, SOLUTION INTRAMUSCULAR; INTRAVENOUS ONCE
Status: COMPLETED | OUTPATIENT
Start: 2021-01-01 | End: 2021-01-01

## 2021-01-01 RX ORDER — 0.9 % SODIUM CHLORIDE 0.9 %
1000 INTRAVENOUS SOLUTION INTRAVENOUS ONCE
Status: COMPLETED | OUTPATIENT
Start: 2021-01-01 | End: 2021-01-01

## 2021-01-01 RX ORDER — SODIUM CHLORIDE 9 MG/ML
INJECTION, SOLUTION INTRAVENOUS CONTINUOUS
Status: DISCONTINUED | OUTPATIENT
Start: 2021-01-01 | End: 2021-01-01

## 2021-01-01 RX ORDER — MORPHINE SULFATE 4 MG/ML
2 INJECTION, SOLUTION INTRAMUSCULAR; INTRAVENOUS
Status: DISCONTINUED | OUTPATIENT
Start: 2021-01-01 | End: 2021-01-01 | Stop reason: HOSPADM

## 2021-01-01 RX ORDER — SODIUM CHLORIDE 0.9 % (FLUSH) 0.9 %
5-40 SYRINGE (ML) INJECTION PRN
Status: DISCONTINUED | OUTPATIENT
Start: 2021-01-01 | End: 2021-01-01 | Stop reason: HOSPADM

## 2021-01-01 RX ORDER — GUAIFENESIN 600 MG/1
600 TABLET, EXTENDED RELEASE ORAL 2 TIMES DAILY
Status: DISCONTINUED | OUTPATIENT
Start: 2021-01-01 | End: 2021-01-01 | Stop reason: HOSPADM

## 2021-01-01 RX ORDER — MORPHINE SULFATE 4 MG/ML
2 INJECTION, SOLUTION INTRAMUSCULAR; INTRAVENOUS
Status: CANCELLED | OUTPATIENT
Start: 2021-01-01

## 2021-01-01 RX ORDER — SODIUM BICARBONATE 325 MG/1
325 TABLET ORAL 4 TIMES DAILY
Status: DISCONTINUED | OUTPATIENT
Start: 2021-01-01 | End: 2021-01-01

## 2021-01-01 RX ORDER — NICOTINE POLACRILEX 4 MG
15 LOZENGE BUCCAL PRN
Status: DISCONTINUED | OUTPATIENT
Start: 2021-01-01 | End: 2021-01-01 | Stop reason: HOSPADM

## 2021-01-01 RX ORDER — SODIUM CHLORIDE 0.9 % (FLUSH) 0.9 %
5-40 SYRINGE (ML) INJECTION EVERY 12 HOURS SCHEDULED
Status: DISCONTINUED | OUTPATIENT
Start: 2021-01-01 | End: 2021-01-01 | Stop reason: HOSPADM

## 2021-01-01 RX ORDER — HYDROMORPHONE HYDROCHLORIDE 1 MG/ML
0.5 INJECTION, SOLUTION INTRAMUSCULAR; INTRAVENOUS; SUBCUTANEOUS EVERY 4 HOURS PRN
Status: CANCELLED | OUTPATIENT
Start: 2021-01-01

## 2021-01-01 RX ORDER — SODIUM CHLORIDE 9 MG/ML
25 INJECTION, SOLUTION INTRAVENOUS PRN
Status: DISCONTINUED | OUTPATIENT
Start: 2021-01-01 | End: 2021-01-01 | Stop reason: HOSPADM

## 2021-01-01 RX ORDER — OXYBUTYNIN CHLORIDE 10 MG/1
TABLET, EXTENDED RELEASE ORAL
Qty: 30 TABLET | Refills: 0 | Status: SHIPPED | OUTPATIENT
Start: 2021-01-01 | End: 2021-01-01

## 2021-01-01 RX ORDER — POLYETHYLENE GLYCOL 3350 17 G/17G
17 POWDER, FOR SOLUTION ORAL DAILY
Status: DISCONTINUED | OUTPATIENT
Start: 2021-01-01 | End: 2021-01-01 | Stop reason: HOSPADM

## 2021-01-01 RX ORDER — ACETAMINOPHEN 500 MG
1000 TABLET ORAL EVERY 6 HOURS PRN
Status: DISCONTINUED | OUTPATIENT
Start: 2021-01-01 | End: 2021-01-01 | Stop reason: HOSPADM

## 2021-01-01 RX ORDER — ACETAMINOPHEN 500 MG
1000 TABLET ORAL EVERY 6 HOURS PRN
Status: CANCELLED | OUTPATIENT
Start: 2021-01-01

## 2021-01-01 RX ORDER — HYDROMORPHONE HYDROCHLORIDE 1 MG/ML
0.5 INJECTION, SOLUTION INTRAMUSCULAR; INTRAVENOUS; SUBCUTANEOUS EVERY 4 HOURS PRN
Status: DISCONTINUED | OUTPATIENT
Start: 2021-01-01 | End: 2021-01-01 | Stop reason: HOSPADM

## 2021-01-01 RX ORDER — MORPHINE SULFATE 4 MG/ML
4 INJECTION, SOLUTION INTRAMUSCULAR; INTRAVENOUS
Status: CANCELLED | OUTPATIENT
Start: 2021-01-01

## 2021-01-01 RX ORDER — MELOXICAM 7.5 MG/1
7.5 TABLET ORAL DAILY
COMMUNITY

## 2021-01-01 RX ORDER — GABAPENTIN 100 MG/1
200 CAPSULE ORAL 2 TIMES DAILY
Status: CANCELLED | OUTPATIENT
Start: 2021-01-01

## 2021-01-01 RX ORDER — CALCIUM GLUCONATE 20 MG/ML
1000 INJECTION, SOLUTION INTRAVENOUS ONCE
Status: DISCONTINUED | OUTPATIENT
Start: 2021-01-01 | End: 2021-01-01

## 2021-01-01 RX ORDER — MORPHINE SULFATE 4 MG/ML
1 INJECTION, SOLUTION INTRAMUSCULAR; INTRAVENOUS EVERY 4 HOURS PRN
Status: DISCONTINUED | OUTPATIENT
Start: 2021-01-01 | End: 2021-01-01

## 2021-01-01 RX ORDER — FENTANYL 12 UG/H
1 PATCH TRANSDERMAL
Status: DISCONTINUED | OUTPATIENT
Start: 2021-01-01 | End: 2021-01-01 | Stop reason: HOSPADM

## 2021-01-01 RX ORDER — FENTANYL CITRATE 50 UG/ML
INJECTION, SOLUTION INTRAMUSCULAR; INTRAVENOUS
Status: COMPLETED | OUTPATIENT
Start: 2021-01-01 | End: 2021-01-01

## 2021-01-01 RX ORDER — DEXTROSE MONOHYDRATE 25 G/50ML
12.5 INJECTION, SOLUTION INTRAVENOUS PRN
Status: CANCELLED | OUTPATIENT
Start: 2021-01-01

## 2021-01-01 RX ORDER — IBUPROFEN 400 MG/1
800 TABLET ORAL EVERY 6 HOURS PRN
Status: DISCONTINUED | OUTPATIENT
Start: 2021-01-01 | End: 2021-01-01 | Stop reason: HOSPADM

## 2021-01-01 RX ORDER — NALOXONE HYDROCHLORIDE 0.4 MG/ML
0.4 INJECTION, SOLUTION INTRAMUSCULAR; INTRAVENOUS; SUBCUTANEOUS PRN
Status: DISCONTINUED | OUTPATIENT
Start: 2021-01-01 | End: 2021-01-01 | Stop reason: HOSPADM

## 2021-01-01 RX ORDER — POLYETHYLENE GLYCOL 3350 17 G/17G
17 POWDER, FOR SOLUTION ORAL DAILY
Status: CANCELLED | OUTPATIENT
Start: 2021-01-01

## 2021-01-01 RX ORDER — FENTANYL CITRATE 50 UG/ML
INJECTION, SOLUTION INTRAMUSCULAR; INTRAVENOUS
Status: DISPENSED
Start: 2021-01-01 | End: 2021-01-01

## 2021-01-01 RX ORDER — MECOBALAMIN 5000 MCG
5 TABLET,DISINTEGRATING ORAL NIGHTLY PRN
Status: CANCELLED | OUTPATIENT
Start: 2021-01-01

## 2021-01-01 RX ORDER — OXYBUTYNIN CHLORIDE 5 MG/1
10 TABLET, EXTENDED RELEASE ORAL NIGHTLY
Status: DISCONTINUED | OUTPATIENT
Start: 2021-01-01 | End: 2021-01-01

## 2021-01-01 RX ORDER — MIDAZOLAM HYDROCHLORIDE 1 MG/ML
INJECTION INTRAMUSCULAR; INTRAVENOUS
Status: DISPENSED
Start: 2021-01-01 | End: 2021-01-01

## 2021-01-01 RX ORDER — ALLOPURINOL 100 MG/1
100 TABLET ORAL DAILY
Status: CANCELLED | OUTPATIENT
Start: 2021-01-01

## 2021-01-01 RX ORDER — MORPHINE SULFATE 4 MG/ML
4 INJECTION, SOLUTION INTRAMUSCULAR; INTRAVENOUS
Status: DISCONTINUED | OUTPATIENT
Start: 2021-01-01 | End: 2021-01-01 | Stop reason: HOSPADM

## 2021-01-01 RX ORDER — ATENOLOL 50 MG/1
50 TABLET ORAL NIGHTLY
Qty: 90 TABLET | Refills: 3 | Status: SHIPPED | OUTPATIENT
Start: 2021-01-01

## 2021-01-01 RX ORDER — NICOTINE POLACRILEX 4 MG
15 LOZENGE BUCCAL PRN
Status: DISCONTINUED | OUTPATIENT
Start: 2021-01-01 | End: 2021-01-01 | Stop reason: SDUPTHER

## 2021-01-01 RX ORDER — LORAZEPAM 2 MG/ML
1 CONCENTRATE ORAL
Status: CANCELLED | OUTPATIENT
Start: 2021-01-01

## 2021-01-01 RX ORDER — DEXTROSE MONOHYDRATE 50 MG/ML
100 INJECTION, SOLUTION INTRAVENOUS PRN
Status: CANCELLED | OUTPATIENT
Start: 2021-01-01

## 2021-01-01 RX ORDER — CYANOCOBALAMIN (VITAMIN B-12) 5000 MCG
1 TABLET,DISINTEGRATING ORAL NIGHTLY
Status: DISCONTINUED | OUTPATIENT
Start: 2021-01-01 | End: 2021-01-01 | Stop reason: HOSPADM

## 2021-01-01 RX ORDER — SODIUM BICARBONATE 650 MG/1
650 TABLET ORAL 2 TIMES DAILY
Status: CANCELLED | OUTPATIENT
Start: 2021-01-01

## 2021-01-01 RX ORDER — DEXTROSE MONOHYDRATE 50 MG/ML
100 INJECTION, SOLUTION INTRAVENOUS PRN
Status: DISCONTINUED | OUTPATIENT
Start: 2021-01-01 | End: 2021-01-01 | Stop reason: SDUPTHER

## 2021-01-01 RX ORDER — SODIUM CHLORIDE 9 MG/ML
25 INJECTION, SOLUTION INTRAVENOUS PRN
Status: CANCELLED | OUTPATIENT
Start: 2021-01-01

## 2021-01-01 RX ORDER — LACTOBACILLUS RHAMNOSUS GG 10B CELL
1 CAPSULE ORAL
Status: DISCONTINUED | OUTPATIENT
Start: 2021-01-01 | End: 2021-01-01 | Stop reason: HOSPADM

## 2021-01-01 RX ORDER — GABAPENTIN 100 MG/1
200 CAPSULE ORAL 2 TIMES DAILY
Status: DISCONTINUED | OUTPATIENT
Start: 2021-01-01 | End: 2021-01-01 | Stop reason: HOSPADM

## 2021-01-01 RX ORDER — DEXTROSE MONOHYDRATE 25 G/50ML
12.5 INJECTION, SOLUTION INTRAVENOUS PRN
Status: DISCONTINUED | OUTPATIENT
Start: 2021-01-01 | End: 2021-01-01 | Stop reason: SDUPTHER

## 2021-01-01 RX ORDER — SODIUM CHLORIDE 0.9 % (FLUSH) 0.9 %
5-40 SYRINGE (ML) INJECTION PRN
Status: CANCELLED | OUTPATIENT
Start: 2021-01-01

## 2021-01-01 RX ORDER — LORAZEPAM 2 MG/ML
1 INJECTION INTRAMUSCULAR ONCE
Status: DISCONTINUED | OUTPATIENT
Start: 2021-01-01 | End: 2021-01-01 | Stop reason: HOSPADM

## 2021-01-01 RX ADMIN — ACETAMINOPHEN 1000 MG: 500 TABLET ORAL at 22:17

## 2021-01-01 RX ADMIN — Medication 2000 UNITS: at 08:13

## 2021-01-01 RX ADMIN — IPRATROPIUM BROMIDE 0.5 MG: 0.5 SOLUTION RESPIRATORY (INHALATION) at 20:30

## 2021-01-01 RX ADMIN — ACETAMINOPHEN 650 MG: 325 TABLET ORAL at 16:06

## 2021-01-01 RX ADMIN — ACETAMINOPHEN 650 MG: 325 TABLET ORAL at 17:07

## 2021-01-01 RX ADMIN — Medication 5 MG: at 20:22

## 2021-01-01 RX ADMIN — GABAPENTIN 200 MG: 100 CAPSULE ORAL at 20:35

## 2021-01-01 RX ADMIN — IPRATROPIUM BROMIDE 0.5 MG: 0.5 SOLUTION RESPIRATORY (INHALATION) at 22:08

## 2021-01-01 RX ADMIN — INSULIN LISPRO 1 UNITS: 100 INJECTION, SOLUTION INTRAVENOUS; SUBCUTANEOUS at 09:29

## 2021-01-01 RX ADMIN — DOXYCYCLINE 100 MG: 100 INJECTION, POWDER, LYOPHILIZED, FOR SOLUTION INTRAVENOUS at 17:39

## 2021-01-01 RX ADMIN — DOXYCYCLINE 100 MG: 100 INJECTION, POWDER, LYOPHILIZED, FOR SOLUTION INTRAVENOUS at 15:06

## 2021-01-01 RX ADMIN — MEROPENEM 500 MG: 500 INJECTION, POWDER, FOR SOLUTION INTRAVENOUS at 15:06

## 2021-01-01 RX ADMIN — Medication 1000 MG: at 06:36

## 2021-01-01 RX ADMIN — SODIUM BICARBONATE 325 MG: 325 TABLET ORAL at 08:32

## 2021-01-01 RX ADMIN — GABAPENTIN 200 MG: 100 CAPSULE ORAL at 20:22

## 2021-01-01 RX ADMIN — GABAPENTIN 200 MG: 100 CAPSULE ORAL at 21:09

## 2021-01-01 RX ADMIN — Medication 1 CAPSULE: at 08:09

## 2021-01-01 RX ADMIN — GUAIFENESIN 600 MG: 600 TABLET, EXTENDED RELEASE ORAL at 10:28

## 2021-01-01 RX ADMIN — IPRATROPIUM BROMIDE 0.5 MG: 0.5 SOLUTION RESPIRATORY (INHALATION) at 06:35

## 2021-01-01 RX ADMIN — SODIUM BICARBONATE 325 MG: 325 TABLET ORAL at 18:12

## 2021-01-01 RX ADMIN — SODIUM CHLORIDE, PRESERVATIVE FREE 10 ML: 5 INJECTION INTRAVENOUS at 22:46

## 2021-01-01 RX ADMIN — Medication 1 CAPSULE: at 07:44

## 2021-01-01 RX ADMIN — SODIUM BICARBONATE 325 MG: 325 TABLET ORAL at 20:35

## 2021-01-01 RX ADMIN — INSULIN LISPRO 1 UNITS: 100 INJECTION, SOLUTION INTRAVENOUS; SUBCUTANEOUS at 18:39

## 2021-01-01 RX ADMIN — IPRATROPIUM BROMIDE 0.5 MG: 0.5 SOLUTION RESPIRATORY (INHALATION) at 19:49

## 2021-01-01 RX ADMIN — DOXYCYCLINE 100 MG: 100 INJECTION, POWDER, LYOPHILIZED, FOR SOLUTION INTRAVENOUS at 02:27

## 2021-01-01 RX ADMIN — SODIUM CHLORIDE: 9 INJECTION, SOLUTION INTRAVENOUS at 15:07

## 2021-01-01 RX ADMIN — IBUPROFEN 800 MG: 400 TABLET, FILM COATED ORAL at 20:21

## 2021-01-01 RX ADMIN — IPRATROPIUM BROMIDE 0.5 MG: 0.5 SOLUTION RESPIRATORY (INHALATION) at 06:28

## 2021-01-01 RX ADMIN — ALLOPURINOL 100 MG: 100 TABLET ORAL at 22:45

## 2021-01-01 RX ADMIN — SODIUM CHLORIDE, PRESERVATIVE FREE 2000 MG: 5 INJECTION INTRAVENOUS at 07:43

## 2021-01-01 RX ADMIN — DOXYCYCLINE 100 MG: 100 INJECTION, POWDER, LYOPHILIZED, FOR SOLUTION INTRAVENOUS at 03:46

## 2021-01-01 RX ADMIN — IPRATROPIUM BROMIDE 0.5 MG: 0.5 SOLUTION RESPIRATORY (INHALATION) at 11:20

## 2021-01-01 RX ADMIN — MORPHINE SULFATE 2 MG: 4 INJECTION, SOLUTION INTRAMUSCULAR; INTRAVENOUS at 11:22

## 2021-01-01 RX ADMIN — MIDAZOLAM 1 MG: 1 INJECTION INTRAMUSCULAR; INTRAVENOUS at 12:27

## 2021-01-01 RX ADMIN — IPRATROPIUM BROMIDE 0.5 MG: 0.5 SOLUTION RESPIRATORY (INHALATION) at 15:47

## 2021-01-01 RX ADMIN — Medication 1 MG: at 11:53

## 2021-01-01 RX ADMIN — Medication 1 MG: at 17:06

## 2021-01-01 RX ADMIN — IPRATROPIUM BROMIDE 0.5 MG: 0.5 SOLUTION RESPIRATORY (INHALATION) at 11:19

## 2021-01-01 RX ADMIN — INSULIN LISPRO 2 UNITS: 100 INJECTION, SOLUTION INTRAVENOUS; SUBCUTANEOUS at 12:42

## 2021-01-01 RX ADMIN — GABAPENTIN 200 MG: 100 CAPSULE ORAL at 08:32

## 2021-01-01 RX ADMIN — IPRATROPIUM BROMIDE 0.5 MG: 0.5 SOLUTION RESPIRATORY (INHALATION) at 19:46

## 2021-01-01 RX ADMIN — OXYBUTYNIN CHLORIDE 10 MG: 5 TABLET, EXTENDED RELEASE ORAL at 20:27

## 2021-01-01 RX ADMIN — ANTACID TABLETS 500 MG: 500 TABLET, CHEWABLE ORAL at 21:43

## 2021-01-01 RX ADMIN — SODIUM BICARBONATE 325 MG: 325 TABLET ORAL at 20:27

## 2021-01-01 RX ADMIN — MEROPENEM 500 MG: 500 INJECTION, POWDER, FOR SOLUTION INTRAVENOUS at 17:38

## 2021-01-01 RX ADMIN — IPRATROPIUM BROMIDE 0.5 MG: 0.5 SOLUTION RESPIRATORY (INHALATION) at 11:00

## 2021-01-01 RX ADMIN — IPRATROPIUM BROMIDE 0.5 MG: 0.5 SOLUTION RESPIRATORY (INHALATION) at 10:10

## 2021-01-01 RX ADMIN — SODIUM CHLORIDE, PRESERVATIVE FREE 10 ML: 5 INJECTION INTRAVENOUS at 09:00

## 2021-01-01 RX ADMIN — SODIUM BICARBONATE 325 MG: 325 TABLET ORAL at 07:43

## 2021-01-01 RX ADMIN — SODIUM BICARBONATE 650 MG: 650 TABLET ORAL at 20:22

## 2021-01-01 RX ADMIN — VANCOMYCIN HYDROCHLORIDE 1000 MG: 10 INJECTION, POWDER, LYOPHILIZED, FOR SOLUTION INTRAVENOUS at 00:15

## 2021-01-01 RX ADMIN — IPRATROPIUM BROMIDE 0.5 MG: 0.5 SOLUTION RESPIRATORY (INHALATION) at 14:53

## 2021-01-01 RX ADMIN — IPRATROPIUM BROMIDE 0.5 MG: 0.5 SOLUTION RESPIRATORY (INHALATION) at 10:47

## 2021-01-01 RX ADMIN — SODIUM CHLORIDE: 9 INJECTION, SOLUTION INTRAVENOUS at 18:40

## 2021-01-01 RX ADMIN — SODIUM CHLORIDE, PRESERVATIVE FREE 2000 MG: 5 INJECTION INTRAVENOUS at 21:09

## 2021-01-01 RX ADMIN — IPRATROPIUM BROMIDE 0.5 MG: 0.5 SOLUTION RESPIRATORY (INHALATION) at 07:24

## 2021-01-01 RX ADMIN — DOXYCYCLINE 100 MG: 100 INJECTION, POWDER, LYOPHILIZED, FOR SOLUTION INTRAVENOUS at 04:27

## 2021-01-01 RX ADMIN — SODIUM BICARBONATE 325 MG: 325 TABLET ORAL at 08:08

## 2021-01-01 RX ADMIN — SODIUM BICARBONATE 325 MG: 325 TABLET ORAL at 10:28

## 2021-01-01 RX ADMIN — ACETAMINOPHEN 1000 MG: 500 TABLET ORAL at 22:43

## 2021-01-01 RX ADMIN — Medication 2000 UNITS: at 07:43

## 2021-01-01 RX ADMIN — Medication 5 MILLICURIE: at 17:44

## 2021-01-01 RX ADMIN — IPRATROPIUM BROMIDE 0.5 MG: 0.5 SOLUTION RESPIRATORY (INHALATION) at 14:40

## 2021-01-01 RX ADMIN — Medication 2000 UNITS: at 10:28

## 2021-01-01 RX ADMIN — ALLOPURINOL 100 MG: 100 TABLET ORAL at 07:43

## 2021-01-01 RX ADMIN — IPRATROPIUM BROMIDE 0.5 MG: 0.5 SOLUTION RESPIRATORY (INHALATION) at 07:06

## 2021-01-01 RX ADMIN — MEROPENEM 500 MG: 500 INJECTION, POWDER, FOR SOLUTION INTRAVENOUS at 05:07

## 2021-01-01 RX ADMIN — IPRATROPIUM BROMIDE 0.5 MG: 0.5 SOLUTION RESPIRATORY (INHALATION) at 19:08

## 2021-01-01 RX ADMIN — IBUPROFEN 400 MG: 400 TABLET, FILM COATED ORAL at 21:09

## 2021-01-01 RX ADMIN — SODIUM CHLORIDE: 9 INJECTION, SOLUTION INTRAVENOUS at 21:09

## 2021-01-01 RX ADMIN — SODIUM BICARBONATE 325 MG: 325 TABLET ORAL at 18:39

## 2021-01-01 RX ADMIN — Medication 1 MG: at 13:19

## 2021-01-01 RX ADMIN — ALLOPURINOL 100 MG: 100 TABLET ORAL at 08:09

## 2021-01-01 RX ADMIN — SODIUM CHLORIDE, PRESERVATIVE FREE 10 ML: 5 INJECTION INTRAVENOUS at 08:34

## 2021-01-01 RX ADMIN — Medication 1 MG: at 21:06

## 2021-01-01 RX ADMIN — OXYBUTYNIN CHLORIDE 10 MG: 5 TABLET, EXTENDED RELEASE ORAL at 22:45

## 2021-01-01 RX ADMIN — FENTANYL CITRATE 25 MCG: 50 INJECTION, SOLUTION INTRAMUSCULAR; INTRAVENOUS at 12:27

## 2021-01-01 RX ADMIN — SODIUM CHLORIDE, PRESERVATIVE FREE 10 ML: 5 INJECTION INTRAVENOUS at 20:35

## 2021-01-01 RX ADMIN — ALLOPURINOL 100 MG: 100 TABLET ORAL at 10:28

## 2021-01-01 RX ADMIN — IPRATROPIUM BROMIDE 0.5 MG: 0.5 SOLUTION RESPIRATORY (INHALATION) at 15:18

## 2021-01-01 RX ADMIN — GUAIFENESIN 600 MG: 600 TABLET, EXTENDED RELEASE ORAL at 20:35

## 2021-01-01 RX ADMIN — SODIUM BICARBONATE 650 MG: 650 TABLET ORAL at 20:35

## 2021-01-01 RX ADMIN — GABAPENTIN 200 MG: 100 CAPSULE ORAL at 10:28

## 2021-01-01 RX ADMIN — ANTACID TABLETS 500 MG: 500 TABLET, CHEWABLE ORAL at 03:50

## 2021-01-01 RX ADMIN — Medication 5 MG: at 20:35

## 2021-01-01 RX ADMIN — OXYBUTYNIN CHLORIDE 10 MG: 5 TABLET, EXTENDED RELEASE ORAL at 20:35

## 2021-01-01 RX ADMIN — MORPHINE SULFATE 1 MG: 4 INJECTION, SOLUTION INTRAMUSCULAR; INTRAVENOUS at 00:21

## 2021-01-01 RX ADMIN — INSULIN LISPRO 2 UNITS: 100 INJECTION, SOLUTION INTRAVENOUS; SUBCUTANEOUS at 18:12

## 2021-01-01 RX ADMIN — ACETAMINOPHEN 650 MG: 325 TABLET ORAL at 08:00

## 2021-01-01 RX ADMIN — IPRATROPIUM BROMIDE 0.5 MG: 0.5 SOLUTION RESPIRATORY (INHALATION) at 10:49

## 2021-01-01 RX ADMIN — IPRATROPIUM BROMIDE 0.5 MG: 0.5 SOLUTION RESPIRATORY (INHALATION) at 14:26

## 2021-01-01 RX ADMIN — IPRATROPIUM BROMIDE 0.5 MG: 0.5 SOLUTION RESPIRATORY (INHALATION) at 10:15

## 2021-01-01 RX ADMIN — SODIUM CHLORIDE, PRESERVATIVE FREE 2000 MG: 5 INJECTION INTRAVENOUS at 20:34

## 2021-01-01 RX ADMIN — SODIUM BICARBONATE 325 MG: 325 TABLET ORAL at 11:59

## 2021-01-01 RX ADMIN — Medication 1 CAPSULE: at 08:32

## 2021-01-01 RX ADMIN — SODIUM CHLORIDE: 9 INJECTION, SOLUTION INTRAVENOUS at 17:39

## 2021-01-01 RX ADMIN — GUAIFENESIN 600 MG: 600 TABLET, EXTENDED RELEASE ORAL at 20:22

## 2021-01-01 RX ADMIN — DOXYCYCLINE 100 MG: 100 INJECTION, POWDER, LYOPHILIZED, FOR SOLUTION INTRAVENOUS at 14:23

## 2021-01-01 RX ADMIN — MEROPENEM 500 MG: 500 INJECTION, POWDER, FOR SOLUTION INTRAVENOUS at 02:27

## 2021-01-01 RX ADMIN — INSULIN LISPRO 1 UNITS: 100 INJECTION, SOLUTION INTRAVENOUS; SUBCUTANEOUS at 11:59

## 2021-01-01 RX ADMIN — GABAPENTIN 200 MG: 100 CAPSULE ORAL at 22:45

## 2021-01-01 RX ADMIN — SODIUM CHLORIDE, PRESERVATIVE FREE 10 ML: 5 INJECTION INTRAVENOUS at 20:26

## 2021-01-01 RX ADMIN — SODIUM CHLORIDE, PRESERVATIVE FREE 2000 MG: 5 INJECTION INTRAVENOUS at 08:07

## 2021-01-01 RX ADMIN — MEROPENEM 500 MG: 500 INJECTION, POWDER, FOR SOLUTION INTRAVENOUS at 15:23

## 2021-01-01 RX ADMIN — IBUPROFEN 800 MG: 400 TABLET, FILM COATED ORAL at 18:36

## 2021-01-01 RX ADMIN — SODIUM CHLORIDE, PRESERVATIVE FREE 2000 MG: 5 INJECTION INTRAVENOUS at 10:30

## 2021-01-01 RX ADMIN — SODIUM BICARBONATE 325 MG: 325 TABLET ORAL at 21:10

## 2021-01-01 RX ADMIN — ALLOPURINOL 100 MG: 100 TABLET ORAL at 08:32

## 2021-01-01 RX ADMIN — GABAPENTIN 200 MG: 100 CAPSULE ORAL at 08:08

## 2021-01-01 RX ADMIN — HYDROMORPHONE HYDROCHLORIDE 0.5 MG: 1 INJECTION, SOLUTION INTRAMUSCULAR; INTRAVENOUS; SUBCUTANEOUS at 22:46

## 2021-01-01 RX ADMIN — SODIUM BICARBONATE 325 MG: 325 TABLET ORAL at 13:19

## 2021-01-01 RX ADMIN — GUAIFENESIN 600 MG: 600 TABLET, EXTENDED RELEASE ORAL at 21:17

## 2021-01-01 RX ADMIN — Medication 2000 UNITS: at 08:33

## 2021-01-01 RX ADMIN — GABAPENTIN 200 MG: 100 CAPSULE ORAL at 20:27

## 2021-01-01 RX ADMIN — SODIUM CHLORIDE: 9 INJECTION, SOLUTION INTRAVENOUS at 05:18

## 2021-01-01 RX ADMIN — SODIUM BICARBONATE 325 MG: 325 TABLET ORAL at 17:06

## 2021-01-01 RX ADMIN — MORPHINE SULFATE 1 MG: 4 INJECTION, SOLUTION INTRAMUSCULAR; INTRAVENOUS at 20:34

## 2021-01-01 RX ADMIN — IPRATROPIUM BROMIDE 0.5 MG: 0.5 SOLUTION RESPIRATORY (INHALATION) at 19:27

## 2021-01-01 RX ADMIN — HYDROMORPHONE HYDROCHLORIDE 0.5 MG: 1 INJECTION, SOLUTION INTRAMUSCULAR; INTRAVENOUS; SUBCUTANEOUS at 04:48

## 2021-01-01 RX ADMIN — GABAPENTIN 200 MG: 100 CAPSULE ORAL at 07:43

## 2021-01-01 RX ADMIN — SODIUM CHLORIDE 1000 ML: 9 INJECTION, SOLUTION INTRAVENOUS at 22:58

## 2021-01-01 ASSESSMENT — PAIN SCALES - GENERAL
PAINLEVEL_OUTOF10: 7
PAINLEVEL_OUTOF10: 3
PAINLEVEL_OUTOF10: 10
PAINLEVEL_OUTOF10: 10
PAINLEVEL_OUTOF10: 9
PAINLEVEL_OUTOF10: 10
PAINLEVEL_OUTOF10: 10
PAINLEVEL_OUTOF10: 7
PAINLEVEL_OUTOF10: 10
PAINLEVEL_OUTOF10: 5
PAINLEVEL_OUTOF10: 6
PAINLEVEL_OUTOF10: 8
PAINLEVEL_OUTOF10: 2
PAINLEVEL_OUTOF10: 2
PAINLEVEL_OUTOF10: 8
PAINLEVEL_OUTOF10: 6

## 2021-01-01 ASSESSMENT — ENCOUNTER SYMPTOMS
SORE THROAT: 0
COUGH: 0
COUGH: 0
CHEST TIGHTNESS: 0
SHORTNESS OF BREATH: 1
ABDOMINAL DISTENTION: 0
DIARRHEA: 0
VOMITING: 0
DIARRHEA: 0
COUGH: 0
SHORTNESS OF BREATH: 0
SHORTNESS OF BREATH: 0
TROUBLE SWALLOWING: 0
COUGH: 0
NAUSEA: 0
BACK PAIN: 1
CONSTIPATION: 0
PHOTOPHOBIA: 0
WHEEZING: 0
SINUS PRESSURE: 0
SHORTNESS OF BREATH: 0
WHEEZING: 0
VOMITING: 0
DIARRHEA: 0
WHEEZING: 0
NAUSEA: 0
EYES NEGATIVE: 1
CONSTIPATION: 0
WHEEZING: 0
SINUS PAIN: 0
ABDOMINAL PAIN: 0
VOMITING: 0
COUGH: 0
ABDOMINAL PAIN: 0
CONSTIPATION: 0
NAUSEA: 0
VOMITING: 0
PHOTOPHOBIA: 0
WHEEZING: 0
RESPIRATORY NEGATIVE: 1
SHORTNESS OF BREATH: 1
TROUBLE SWALLOWING: 0
BACK PAIN: 0
ABDOMINAL PAIN: 0
WHEEZING: 0
EYE PAIN: 0
DIARRHEA: 0
CONSTIPATION: 0
TROUBLE SWALLOWING: 0
SHORTNESS OF BREATH: 1
GASTROINTESTINAL NEGATIVE: 1
SHORTNESS OF BREATH: 1
SORE THROAT: 0
SINUS PAIN: 0
ABDOMINAL PAIN: 0
SORE THROAT: 0
SINUS PAIN: 0
ABDOMINAL PAIN: 0
ABDOMINAL DISTENTION: 0
CONSTIPATION: 0
ABDOMINAL PAIN: 0
CONSTIPATION: 0
SHORTNESS OF BREATH: 0
NAUSEA: 0
SORE THROAT: 0
VOMITING: 0
NAUSEA: 0
CONSTIPATION: 0
COUGH: 0
SINUS PAIN: 0
TROUBLE SWALLOWING: 0
VOMITING: 0
CONSTIPATION: 0
SINUS PAIN: 0
TROUBLE SWALLOWING: 0
SINUS PAIN: 0
SHORTNESS OF BREATH: 1
SINUS PAIN: 0
CONSTIPATION: 0
FACIAL SWELLING: 0
VOMITING: 0
DIARRHEA: 0
STRIDOR: 0
CHEST TIGHTNESS: 0
EYE DISCHARGE: 0
ABDOMINAL DISTENTION: 0
STRIDOR: 0
BLOOD IN STOOL: 0
GASTROINTESTINAL NEGATIVE: 1
RHINORRHEA: 0
NAUSEA: 0
BACK PAIN: 1
VOICE CHANGE: 0
RHINORRHEA: 0
COLOR CHANGE: 0
WHEEZING: 0
BLOOD IN STOOL: 0
TROUBLE SWALLOWING: 0
SHORTNESS OF BREATH: 1
ABDOMINAL DISTENTION: 0
NAUSEA: 0
WHEEZING: 0
NAUSEA: 0
SORE THROAT: 0
DIARRHEA: 0
VOMITING: 0
STRIDOR: 0
DIARRHEA: 0
DIARRHEA: 0
SHORTNESS OF BREATH: 1
ABDOMINAL PAIN: 0
EYE DISCHARGE: 0
SORE THROAT: 1
ABDOMINAL DISTENTION: 0
BLOOD IN STOOL: 0
WHEEZING: 0
RESPIRATORY NEGATIVE: 1
VOMITING: 0
ABDOMINAL PAIN: 0
NAUSEA: 0
TROUBLE SWALLOWING: 0
BLOOD IN STOOL: 0
VOMITING: 0
DIARRHEA: 0
VOMITING: 0
COUGH: 0
TROUBLE SWALLOWING: 0
SORE THROAT: 0
SHORTNESS OF BREATH: 0
PHOTOPHOBIA: 0
ABDOMINAL DISTENTION: 0
ABDOMINAL DISTENTION: 0
COUGH: 0
ABDOMINAL PAIN: 0
COUGH: 0
NAUSEA: 0
DIARRHEA: 0
NAUSEA: 0
EYES NEGATIVE: 1
SORE THROAT: 0
SORE THROAT: 0
DIARRHEA: 0
SINUS PAIN: 0

## 2021-01-01 ASSESSMENT — PAIN DESCRIPTION - DESCRIPTORS
DESCRIPTORS: ACHING
DESCRIPTORS: ACHING

## 2021-01-01 ASSESSMENT — PAIN DESCRIPTION - LOCATION
LOCATION: GENERALIZED
LOCATION: BACK;NECK;SHOULDER

## 2021-01-01 ASSESSMENT — PATIENT HEALTH QUESTIONNAIRE - PHQ9
SUM OF ALL RESPONSES TO PHQ QUESTIONS 1-9: 0
SUM OF ALL RESPONSES TO PHQ9 QUESTIONS 1 & 2: 0
2. FEELING DOWN, DEPRESSED OR HOPELESS: 0

## 2021-01-01 ASSESSMENT — PAIN DESCRIPTION - PAIN TYPE: TYPE: ACUTE PAIN

## 2021-01-21 NOTE — PROGRESS NOTES
[facility] Progress Note                            Date:  1/21/2021  Patient: Burgess Zendejas  Age:  67 y.o., 1945      Reason for evaluation:         SUBJECTIVE:    Returns today follow-up assessment. Pacemaker device function appropriate. Denies chest pain denies any dyspnea denies palpitations denies bleeding issues. Wants to know about when she might be able to come off anticoagulation. Started on anticoagulation 7/28/2020 after identification small amount of pulmonary embolism left lower lobe. No prior history. Blood pressure 118/84 heart 64. Review of Systems   Constitutional: Negative. Negative for chills, fever and unexpected weight change. HENT: Negative. Eyes: Negative. Respiratory: Negative. Negative for shortness of breath. Cardiovascular: Negative. Negative for chest pain. Gastrointestinal: Negative. Negative for diarrhea, nausea and vomiting. Endocrine: Negative. Genitourinary: Negative. Musculoskeletal: Negative. Skin: Negative. Neurological: Negative. All other systems reviewed and are negative. OBJECTIVE:     /84   Pulse 64   Ht 5' 1\" (1.549 m)   Wt 219 lb (99.3 kg)   LMP  (LMP Unknown)   BMI 41.38 kg/m²     Labs:   CBC: No results for input(s): WBC, HGB, HCT, PLT in the last 72 hours. BMP:No results for input(s): NA, K, CO2, BUN, CREATININE, LABGLOM, GLUCOSE in the last 72 hours. BNP: No results for input(s): BNP in the last 72 hours. PT/INR: No results for input(s): PROTIME, INR in the last 72 hours. APTT:No results for input(s): APTT in the last 72 hours. CARDIAC ENZYMES:No results for input(s): CKTOTAL, CKMB, CKMBINDEX, TROPONINI in the last 72 hours. FASTING LIPID PANEL:  Lab Results   Component Value Date    HDL 38 01/03/2020    LDLCALC 79 01/03/2020    TRIG 400 01/03/2020     LIVER PROFILE:No results for input(s): AST, ALT, LABALBU in the last 72 hours.         Past Medical History:   Diagnosis Date  Acquired hypothyroidism 7/11/2018    Chronic kidney disease, stage III (moderate) 9/3/2019    Mom and sister + breast CA Lumpectomy - remission    Community acquired pneumonia of left lower lobe of lung 6/1/2018    Malignant neoplasm of upper-inner quadrant of right female breast ([facility] 2/6/2020    Stage 2 moderate COPD by GOLD classification ([facility] 7/11/2018     Past Surgical History:   Procedure Laterality Date    ADRENALECTOMY      APPENDECTOMY      BACK SURGERY      BREAST LUMPECTOMY      CARPAL TUNNEL RELEASE      CARPAL TUNNEL RELEASE  1990    CATARACT REMOVAL      CORONARY ANGIOPLASTY      GALLBLADDER SURGERY      PACEMAKER INSERTION  07/22/2020    PARTIAL HYSTERECTOMY      TONSILLECTOMY  1997    TOTAL KNEE ARTHROPLASTY       History reviewed. No pertinent family history.   No Known Allergies  Current Outpatient Medications   Medication Sig Dispense Refill    apixaban (ELIQUIS) 5 MG TABS tablet Take 1 tablet by mouth 2 times daily 60 tablet 5    vitamin D (ERGOCALCIFEROL) 1.25 MG (79759 UT) CAPS capsule Take 1 capsule by mouth once a week 12 capsule 0    oxybutynin (DITROPAN-XL) 10 MG extended release tablet Take 1 tablet by mouth once daily 30 tablet 3    furosemide (LASIX) 20 MG tablet TAKE 1 TABLET BY MOUTH ON MONDAY, WEDNESDAY AND FRIDAY 36 tablet 0    allopurinol (ZYLOPRIM) 100 MG tablet Take 1 tablet by mouth once daily 90 tablet 1    EUTHYROX 75 MCG tablet Take 1 tablet by mouth Daily 90 tablet 1    gabapentin (NEURONTIN) 100 MG capsule Indications: Takes 2 in AM and 1 QHS TAKE 1 CAPSULE BY MOUTH THREE TIMES DAILY 180 capsule 5    raloxifene (EVISTA) 60 MG tablet Take 1 tablet by mouth once daily 90 tablet 3    hydrocortisone (PROCTOSOL HC) 2.5 % CREA rectal cream Apply twice daily as needed 28.4 g 2    atenolol (TENORMIN) 50 MG tablet Take 1 tablet by mouth nightly 30 tablet 3    tiotropium (SPIRIVA RESPIMAT) 2.5 MCG/ACT AERS inhaler Inhale 2 puffs into the lungs daily  albuterol sulfate HFA (VENTOLIN HFA) 108 (90 Base) MCG/ACT inhaler Inhale 2 puffs into the lungs every 6 hours as needed for Wheezing      aspirin EC 81 MG EC tablet Take 81 mg by mouth nightly      lactobacillus (CULTURELLE) capsule Take 1 capsule by mouth daily      Cyanocobalamin (VITAMIN B-12) 5000 MCG TBDP Take 1 tablet by mouth nightly      Compression Stockings MISC by Does not apply route Dx: Venous insufficiency, below knee medium compression bilateral 1 each 0    fexofenadine (ALLEGRA ALLERGY) 180 MG tablet Take 180 mg by mouth daily      cefdinir (OMNICEF) 300 MG capsule Take 1 capsule by mouth 2 times daily 20 capsule 0    methylPREDNISolone (MEDROL DOSEPACK) 4 MG tablet Take by mouth. 1 kit 0     No current facility-administered medications for this visit.       Social History     Socioeconomic History    Marital status:      Spouse name: Not on file    Number of children: Not on file    Years of education: Not on file    Highest education level: Not on file   Occupational History    Not on file   Social Needs    Financial resource strain: Not on file    Food insecurity     Worry: Not on file     Inability: Not on file    Transportation needs     Medical: Not on file     Non-medical: Not on file   Tobacco Use    Smoking status: Never Smoker    Smokeless tobacco: Never Used   Substance and Sexual Activity    Alcohol use: No    Drug use: No    Sexual activity: Not on file   Lifestyle    Physical activity     Days per week: Not on file     Minutes per session: Not on file    Stress: Not on file   Relationships    Social connections     Talks on phone: Not on file     Gets together: Not on file     Attends Jewish service: Not on file     Active member of club or organization: Not on file     Attends meetings of clubs or organizations: Not on file     Relationship status: Not on file    Intimate partner violence     Fear of current or ex partner: Not on file No orders of the defined types were placed in this encounter. No orders of the defined types were placed in this encounter. 1. Continue present medications  2. Recommend D-dimer if negative will consider withdrawal of anticoagulation  3. Recommend follow-up assessment in 6 months    Return in about 3 months (around 4/21/2021) for return to Dr. Alex Wells only. Josh Krause MD 1/21/2021 2:30 PM [facility]      Thisdictation was generated by voice recognition computer software. Although all attempts are made to edit the dictation for accuracy, there may be errors in the transcription that are not intended.

## 2021-01-27 PROBLEM — E11.9 CONTROLLED TYPE 2 DIABETES MELLITUS WITHOUT COMPLICATION, WITHOUT LONG-TERM CURRENT USE OF INSULIN (HCC): Status: ACTIVE | Noted: 2021-01-01

## 2021-01-27 NOTE — PROGRESS NOTES
Remy Guzman is a 67 y.o. female who presents today for   Chief Complaint   Patient presents with    Follow-up     DM, pacemaker    Numbness     toes are numb on both feet       HPI  Patient is here for f/u pacemaker placement. She is doing well from that. She is doing well based on sx. She had a recent ddimer for f/u for PE from last summer and she was negative d dimer. She is signed up for the covid vaccine at [facility]'s waitlist.  She is having some issues w/ toe numbness. She is wondering aobut the shingles shot. Notes that her toes and feet feel numb b/l, no injury or color changes. Has her DM doing well she believes. Does have a h/o neoplasm of breast and the adrenal glands but not aware of any changes in regard to this  Having some chronic ear pain on L side. Denies any tinittus or hearing changes. No change in PMH, family, social, or surgical history unless mentioned above. Review of Systems   Constitutional: Negative for chills and fever. HENT: Positive for ear pain. Negative for dental problem, ear discharge and nosebleeds. Respiratory: Negative for cough, chest tightness, shortness of breath and wheezing. Cardiovascular: Negative for chest pain, palpitations and leg swelling. Gastrointestinal: Negative for abdominal pain, constipation, diarrhea, nausea and vomiting. Genitourinary: Negative for difficulty urinating, dysuria and frequency. Musculoskeletal: Positive for gait problem. Negative for arthralgias. Neurological: Positive for numbness (w/ burning and tingling). Negative for facial asymmetry.        Past Medical History:   Diagnosis Date    Acquired hypothyroidism 7/11/2018    Chronic kidney disease, stage III (moderate) 9/3/2019    Mom and sister + breast CA Lumpectomy - remission    Community acquired pneumonia of left lower lobe of lung 6/1/2018    Malignant neoplasm of upper-inner quadrant of right female breast ([address] 2/6/2020  Stage 2 moderate COPD by GOLD classification ([facility] [address] 7/11/2018       Current Outpatient Medications   Medication Sig Dispense Refill    gabapentin (NEURONTIN) 100 MG capsule Take 2 capsules by mouth 2 times daily for 180 days.  Intended supply: 30 days 120 capsule 5    atenolol (TENORMIN) 50 MG tablet Take 1 tablet by mouth nightly 90 tablet 3    apixaban (ELIQUIS) 5 MG TABS tablet Take 1 tablet by mouth 2 times daily 60 tablet 5    vitamin D (ERGOCALCIFEROL) 1.25 MG (90936 UT) CAPS capsule Take 1 capsule by mouth once a week 12 capsule 0    cefdinir (OMNICEF) 300 MG capsule Take 1 capsule by mouth 2 times daily (Patient not taking: Reported on 2/3/2021) 20 capsule 0    oxybutynin (DITROPAN-XL) 10 MG extended release tablet Take 1 tablet by mouth once daily 30 tablet 3    furosemide (LASIX) 20 MG tablet TAKE 1 TABLET BY MOUTH ON MONDAY, WEDNESDAY AND FRIDAY 36 tablet 0    allopurinol (ZYLOPRIM) 100 MG tablet Take 1 tablet by mouth once daily 90 tablet 1    EUTHYROX 75 MCG tablet Take 1 tablet by mouth Daily 90 tablet 1    gabapentin (NEURONTIN) 100 MG capsule Indications: Takes 2 in AM and 1 QHS TAKE 1 CAPSULE BY MOUTH THREE TIMES DAILY (Patient not taking: Reported on 2/3/2021) 180 capsule 5    raloxifene (EVISTA) 60 MG tablet Take 1 tablet by mouth once daily 90 tablet 3    hydrocortisone (PROCTOSOL HC) 2.5 % CREA rectal cream Apply twice daily as needed (Patient not taking: Reported on 2/3/2021) 28.4 g 2    tiotropium (SPIRIVA RESPIMAT) 2.5 MCG/ACT AERS inhaler Inhale 2 puffs into the lungs daily      albuterol sulfate HFA (VENTOLIN HFA) 108 (90 Base) MCG/ACT inhaler Inhale 2 puffs into the lungs every 6 hours as needed for Wheezing      aspirin EC 81 MG EC tablet Take 81 mg by mouth nightly      lactobacillus (CULTURELLE) capsule Take 1 capsule by mouth daily      Cyanocobalamin (VITAMIN B-12) 5000 MCG TBDP Take 1 tablet by mouth nightly  Compression Stockings MISC by Does not apply route Dx: Venous insufficiency, below knee medium compression bilateral (Patient not taking: Reported on 2/3/2021) 1 each 0    fexofenadine (ALLEGRA ALLERGY) 180 MG tablet Take 180 mg by mouth daily      sodium bicarbonate 325 MG tablet Take 325 mg by mouth 4 times daily      meloxicam (MOBIC) 7.5 MG tablet Take 7.5 mg by mouth daily      risedronate (ACTONEL) 35 MG tablet Take 35 mg by mouth every 7 days      methylPREDNISolone (MEDROL DOSEPACK) 4 MG tablet Take by mouth. (Patient not taking: Reported on 1/27/2021) 1 kit 0     No current facility-administered medications for this visit. No Known Allergies    Past Surgical History:   Procedure Laterality Date    ADRENALECTOMY      APPENDECTOMY      BACK SURGERY      BREAST LUMPECTOMY      CARPAL TUNNEL RELEASE      CARPAL TUNNEL RELEASE  1990    CATARACT REMOVAL      CORONARY ANGIOPLASTY      GALLBLADDER SURGERY      PACEMAKER INSERTION  07/22/2020    PARTIAL HYSTERECTOMY      TONSILLECTOMY  1997    TOTAL KNEE ARTHROPLASTY         Social History     Tobacco Use    Smoking status: Never Smoker    Smokeless tobacco: Never Used   Substance Use Topics    Alcohol use: No    Drug use: No       No family history on file. /74 (Site: Left Upper Arm)   Pulse 74   Temp 98.6 °F (37 °C)   Ht 5' 1\" (1.549 m)   Wt 220 lb 12.8 oz (100.2 kg)   LMP  (LMP Unknown)   SpO2 98%   BMI 41.72 kg/m²     Physical Exam  Vitals signs and nursing note reviewed. Constitutional:       General: She is not in acute distress. Appearance: She is well-developed. She is not diaphoretic. HENT:      Head: Normocephalic and atraumatic. Not macrocephalic and not microcephalic. Right Ear: No tenderness. No middle ear effusion. Tympanic membrane is not bulging. Left Ear: No tenderness. No middle ear effusion. Tympanic membrane is not bulging. Nose: No mucosal edema or rhinorrhea. Referral Type:   Eval and Treat     Referral Reason:   Specialty Services Required     Referred to Provider:   Alexander Pabon MD     Requested Specialty:   Otolaryngology     Number of Visits Requested:   1     Orders Placed This Encounter   Medications    gabapentin (NEURONTIN) 100 MG capsule     Sig: Take 2 capsules by mouth 2 times daily for 180 days. Intended supply: 30 days     Dispense:  120 capsule     Refill:  5     There are no discontinued medications. There are no Patient Instructions on file for this visit. Patient given educational handouts and has had all questions answered. Patient voices understanding and agrees to plans along with risks and benefits of plan. Patient isinstructed to continue prior meds, diet, and exercise plans unless instructed otherwise. Patient agrees to follow up as instructed and sooner if needed. Patient agrees to go to ER if condition becomes emergent. Notesmay be completed with dictation device and spelling errors may occur. Materials may be copied and pasted from a notepad outside of EMR, all of which, I, Dr. Teri Lechuga MD, take sole intellectual ownership of and have approved adding to my note. Return in about 6 months (around 7/27/2021) for OV (Nicole).

## 2021-02-03 PROBLEM — R22.0 MASS OF LEFT SUBMANDIBULAR REGION: Status: ACTIVE | Noted: 2021-01-01

## 2021-02-03 NOTE — PROGRESS NOTES
[facility] OTOLARYNGOLOGY/ENT  Ms. Scotty Zurita is a pleasant 79-year-old  female that was referred by Dr. Marcy Younger due to problems with a questionable mass to the left neck and left ear pain. She reports that she notices a mass that will fluctuate producing pain to the left ear. She reports that she is worried that this may be related to a cancer. She denies any changes in her hearing also denies any discharge to the external canals. She also is questioning a palpable mass to the chest wall above the pacemaker and is requesting evaluation. Allergies: Patient has no known allergies. Current Outpatient Medications   Medication Sig Dispense Refill    sodium bicarbonate 325 MG tablet Take 325 mg by mouth 4 times daily      meloxicam (MOBIC) 7.5 MG tablet Take 7.5 mg by mouth daily      risedronate (ACTONEL) 35 MG tablet Take 35 mg by mouth every 7 days      gabapentin (NEURONTIN) 100 MG capsule Take 2 capsules by mouth 2 times daily for 180 days.  Intended supply: 30 days 120 capsule 5    atenolol (TENORMIN) 50 MG tablet Take 1 tablet by mouth nightly 90 tablet 3    apixaban (ELIQUIS) 5 MG TABS tablet Take 1 tablet by mouth 2 times daily 60 tablet 5    vitamin D (ERGOCALCIFEROL) 1.25 MG (12607 UT) CAPS capsule Take 1 capsule by mouth once a week 12 capsule 0    oxybutynin (DITROPAN-XL) 10 MG extended release tablet Take 1 tablet by mouth once daily 30 tablet 3    furosemide (LASIX) 20 MG tablet TAKE 1 TABLET BY MOUTH ON MONDAY, WEDNESDAY AND FRIDAY 36 tablet 0    allopurinol (ZYLOPRIM) 100 MG tablet Take 1 tablet by mouth once daily 90 tablet 1    EUTHYROX 75 MCG tablet Take 1 tablet by mouth Daily 90 tablet 1    raloxifene (EVISTA) 60 MG tablet Take 1 tablet by mouth once daily 90 tablet 3    tiotropium (SPIRIVA RESPIMAT) 2.5 MCG/ACT AERS inhaler Inhale 2 puffs into the lungs daily  albuterol sulfate HFA (VENTOLIN HFA) 108 (90 Base) MCG/ACT inhaler Inhale 2 puffs into the lungs every 6 hours as needed for Wheezing      aspirin EC 81 MG EC tablet Take 81 mg by mouth nightly      Cyanocobalamin (VITAMIN B-12) 5000 MCG TBDP Take 1 tablet by mouth nightly      fexofenadine (ALLEGRA ALLERGY) 180 MG tablet Take 180 mg by mouth daily      cefdinir (OMNICEF) 300 MG capsule Take 1 capsule by mouth 2 times daily (Patient not taking: Reported on 2/3/2021) 20 capsule 0    methylPREDNISolone (MEDROL DOSEPACK) 4 MG tablet Take by mouth. (Patient not taking: Reported on 1/27/2021) 1 kit 0    gabapentin (NEURONTIN) 100 MG capsule Indications: Takes 2 in AM and 1 QHS TAKE 1 CAPSULE BY MOUTH THREE TIMES DAILY (Patient not taking: Reported on 2/3/2021) 180 capsule 5    hydrocortisone (PROCTOSOL HC) 2.5 % CREA rectal cream Apply twice daily as needed (Patient not taking: Reported on 2/3/2021) 28.4 g 2    lactobacillus (CULTURELLE) capsule Take 1 capsule by mouth daily      Compression Stockings MISC by Does not apply route Dx: Venous insufficiency, below knee medium compression bilateral (Patient not taking: Reported on 2/3/2021) 1 each 0     No current facility-administered medications for this visit.         Past Surgical History:   Procedure Laterality Date    ADRENALECTOMY      APPENDECTOMY      BACK SURGERY      BREAST LUMPECTOMY      CARPAL TUNNEL RELEASE      CARPAL TUNNEL RELEASE  1990    CATARACT REMOVAL      CORONARY ANGIOPLASTY      GALLBLADDER SURGERY      PACEMAKER INSERTION  07/22/2020    PARTIAL HYSTERECTOMY      TONSILLECTOMY  1997    TOTAL KNEE ARTHROPLASTY         Past Medical History:   Diagnosis Date    Acquired hypothyroidism 7/11/2018    Chronic kidney disease, stage III (moderate) 9/3/2019    Mom and sister + breast CA Lumpectomy - remission    Community acquired pneumonia of left lower lobe of lung 6/1/2018  Malignant neoplasm of upper-inner quadrant of right female breast ([address] 2/6/2020    Stage 2 moderate COPD by GOLD classification ([address] 7/11/2018       No family history on file. Social History     Tobacco Use    Smoking status: Never Smoker    Smokeless tobacco: Never Used   Substance Use Topics    Alcohol use: No           REVIEW OF SYSTEMS:  all other systems reviewed and are negative  Review of Systems   Constitutional: Negative for chills and fever. HENT: Negative for congestion, dental problem, ear discharge, ear pain, facial swelling, hearing loss, nosebleeds, postnasal drip, rhinorrhea, sinus pressure, sinus pain, sneezing, sore throat, tinnitus, trouble swallowing and voice change. Eyes: Negative for pain and discharge. Comments:     PHYSICAL EXAM:    Ht 5' 1\" (1.549 m)   Wt 222 lb (100.7 kg)   LMP  (LMP Unknown)   BMI 41.95 kg/m²   Body mass index is 41.95 kg/m². General Appearance: well developed  and well nourished  Head/ Face: normocephalic and atraumatic  Vocal Quality: good/ normal  Ears: Right Ear: External: external ears normal Otoscopy Ear Canal: canal clear Otoscopy TM: TM's normal and TM's mobile Left Ear: External: external ears normal Otoscopy Ear Canal: canal clear Otoscopy TM: TM's normal and TM's mobile  Hearing: grossly intact  Nose: nares normal and septum midline  Neck: supple, adenopathy none palpable, mass No and tender No  Thyroid: normal and nodules No   Patient is noted with some mild fullness to the left submandibular region but no true masses were noted. Overall based on her history and location this is suspicious for a possible low-grade parotiditis. She is noted with no evidence of TMJ. Also of note microscopic exam of the left ear demonstrated a normal TM with no abnormalities. Palpation of the mass above her pacemaker on the chest wall demonstrated to be a palpable nonabsorbable suture that is likely a Prolene suture from her pacemaker placement. Nothing is worrisome to palpation over this region. Assessment & Plan:    Problem List Items Addressed This Visit     Mass of left submandibular region - Primary     No evidence of a mass to the left submandibular regionbased on history could be a low-grade parotiditis  Plan: I advised the patient that if this enlarges or significantly changes, she is to swing by the office for reevaluation. At this point I do not see any reason for antibiotic therapy. She is to follow-up on an as needed basis. No orders of the defined types were placed in this encounter. No orders of the defined types were placed in this encounter. Electronically signed by Balwinder Miller PA-C on 2/3/21 at 4:31 PM CST            Please note that this chart was generated using dragon dictation software. Although every effort was made to ensure the accuracy of this automated transcription, some errors in transcription may have occurred.

## 2021-02-03 NOTE — ASSESSMENT & PLAN NOTE
No evidence of a mass to the left submandibular regionbased on history could be a low-grade parotiditis  Plan: I advised the patient that if this enlarges or significantly changes, she is to swing by the office for reevaluation. At this point I do not see any reason for antibiotic therapy. She is to follow-up on an as needed basis.

## 2021-03-09 NOTE — TELEPHONE ENCOUNTER
Patient returned call we spoke about a mammogram vs an US.  I informed her they will accomodate her to get a successful mammogram.    Dk Soni has been ordered and I provided the patient with the number for scheduling #[phone]

## 2021-03-09 NOTE — TELEPHONE ENCOUNTER
Pt said she is due for a JUSTINE but cant lift arms all the way up like it needs. She asked if Dr can order an US for he instead. Please call her to advise.

## 2021-04-26 NOTE — PROGRESS NOTES
[facility]                            Date:  4/26/2021  Patient: Heidi Hall  Age:  67 y.o., 1945      Reason for evaluation:         SUBJECTIVE:    Returns today follow-up assessment. Had a negative submaximal stress test last July 2020. Denies anginal chest pain denies limiting dyspnea. Feels a little bit better now than she did back then she is not really exercising much of encouraged her to be more active. No other issues reported. Blood pressure 136/78 heart 72. Followed for chest pain shortness of breath chronic anticoagulation pacemaker implantation. Also hyperlipidemia. LDL cholesterol 53 on 1/27/2021. Previous history of pulmonary embolism. Review of Systems   Constitutional: Negative. Negative for chills, fever and unexpected weight change. HENT: Negative. Eyes: Negative. Respiratory: Negative. Negative for shortness of breath. Cardiovascular: Negative. Negative for chest pain. Gastrointestinal: Negative. Negative for diarrhea, nausea and vomiting. Endocrine: Negative. Genitourinary: Negative. Musculoskeletal: Negative. Skin: Negative. Neurological: Negative. All other systems reviewed and are negative. OBJECTIVE:     /78   Pulse 72   Ht 5' 1\" (1.549 m)   Wt 218 lb (98.9 kg)   LMP  (LMP Unknown)   BMI 41.19 kg/m²     Labs:   CBC: No results for input(s): WBC, HGB, HCT, PLT in the last 72 hours. BMP:No results for input(s): NA, K, CO2, BUN, CREATININE, LABGLOM, GLUCOSE in the last 72 hours. BNP: No results for input(s): BNP in the last 72 hours. PT/INR: No results for input(s): PROTIME, INR in the last 72 hours. APTT:No results for input(s): APTT in the last 72 hours. CARDIAC ENZYMES:No results for input(s): CKTOTAL, CKMB, CKMBINDEX, TROPONINI in the last 72 hours.   FASTING LIPID PANEL:  Lab Results   Component Value Date    HDL 36 01/27/2021    LDLCALC 53 01/27/2021    TRIG 289 01/27/2021     LIVER PROFILE:No results for input(s): AST, ALT, LABALBU in the last 72 hours. Past Medical History:   Diagnosis Date    Acquired hypothyroidism 7/11/2018    Chronic kidney disease, stage III (moderate) 9/3/2019    Mom and sister + breast CA Lumpectomy - remission    Community acquired pneumonia of left lower lobe of lung 6/1/2018    Malignant neoplasm of upper-inner quadrant of right female breast ([facility] [address] 2/6/2020    Stage 2 moderate COPD by GOLD classification ([facility] [address] 7/11/2018     Past Surgical History:   Procedure Laterality Date    ADRENALECTOMY      APPENDECTOMY      BACK SURGERY      BREAST LUMPECTOMY      CARPAL TUNNEL RELEASE      CARPAL TUNNEL RELEASE  1990    CATARACT REMOVAL      CORONARY ANGIOPLASTY      GALLBLADDER SURGERY      PACEMAKER INSERTION  07/22/2020    PARTIAL HYSTERECTOMY      TONSILLECTOMY  1997    TOTAL KNEE ARTHROPLASTY       No family history on file. No Known Allergies  Current Outpatient Medications   Medication Sig Dispense Refill    oxybutynin (DITROPAN-XL) 10 MG extended release tablet Take 1 tablet by mouth once daily 30 tablet 5    furosemide (LASIX) 20 MG tablet TAKE 1 TABLET BY MOUTH ON MONDAY, WEDNESDAY AND FRIDAY 36 tablet 2    allopurinol (ZYLOPRIM) 100 MG tablet Take 1 tablet by mouth once daily 90 tablet 0    vitamin D (ERGOCALCIFEROL) 1.25 MG (86699 UT) CAPS capsule Take 1 capsule by mouth once a week 12 capsule 3    sodium bicarbonate 325 MG tablet Take 325 mg by mouth 4 times daily      meloxicam (MOBIC) 7.5 MG tablet Take 7.5 mg by mouth daily      risedronate (ACTONEL) 35 MG tablet Take 35 mg by mouth every 7 days      gabapentin (NEURONTIN) 100 MG capsule Take 2 capsules by mouth 2 times daily for 180 days.  Intended supply: 30 days 120 capsule 5    atenolol (TENORMIN) 50 MG tablet Take 1 tablet by mouth nightly 90 tablet 3    apixaban (ELIQUIS) 5 MG TABS tablet Take 1 tablet by mouth 2 times daily 60 tablet 5    EUTHYROX 75 MCG tablet Take 1 tablet by mouth Daily 90 tablet 1    raloxifene (EVISTA) 60 MG tablet Take 1 tablet by mouth once daily 90 tablet 3    tiotropium (SPIRIVA RESPIMAT) 2.5 MCG/ACT AERS inhaler Inhale 2 puffs into the lungs daily      albuterol sulfate HFA (VENTOLIN HFA) 108 (90 Base) MCG/ACT inhaler Inhale 2 puffs into the lungs every 6 hours as needed for Wheezing      aspirin EC 81 MG EC tablet Take 81 mg by mouth nightly      lactobacillus (CULTURELLE) capsule Take 1 capsule by mouth daily      Cyanocobalamin (VITAMIN B-12) 5000 MCG TBDP Take 1 tablet by mouth nightly      fexofenadine (ALLEGRA ALLERGY) 180 MG tablet Take 180 mg by mouth daily       No current facility-administered medications for this visit.       Social History     Socioeconomic History    Marital status:      Spouse name: Not on file    Number of children: Not on file    Years of education: Not on file    Highest education level: Not on file   Occupational History    Not on file   Social Needs    Financial resource strain: Not on file    Food insecurity     Worry: Not on file     Inability: Not on file    Transportation needs     Medical: Not on file     Non-medical: Not on file   Tobacco Use    Smoking status: Never Smoker    Smokeless tobacco: Never Used   Substance and Sexual Activity    Alcohol use: No    Drug use: No    Sexual activity: Not on file   Lifestyle    Physical activity     Days per week: Not on file     Minutes per session: Not on file    Stress: Not on file   Relationships    Social connections     Talks on phone: Not on file     Gets together: Not on file     Attends [employer] service: Not on file     Active member of club or organization: Not on file     Attends meetings of clubs or organizations: Not on file     Relationship status: Not on file    Intimate partner violence     Fear of current or ex partner: Not on file     Emotionally abused: Not on file     Physically abused: Not on file     Forced sexual activity: Not on file   Other Topics Concern    Not on file   Social History Narrative     61 years    She has 1 son and 1 daughter    Worked at [employer] and also [employer] not presently working    Education high school    [address] [place] [address]    She does drive an automobile    Remains moderately active    Denies alcohol or tobacco consumption or substance usage       Physical Examination:  /78   Pulse 72   Ht 5' 1\" (1.549 m)   Wt 218 lb (98.9 kg)   LMP  (LMP Unknown)   BMI 41.19 kg/m²   Physical Exam  Vitals signs reviewed. Constitutional:       Appearance: She is well-developed. Neck:      Vascular: No carotid bruit or JVD. Cardiovascular:      Rate and Rhythm: Normal rate and regular rhythm. Heart sounds: Normal heart sounds. No murmur. No friction rub. No gallop. Pulmonary:      Effort: Pulmonary effort is normal. No respiratory distress. Breath sounds: Normal breath sounds. No wheezing or rales. Abdominal:      General: There is no distension. Tenderness: There is no abdominal tenderness. Lymphadenopathy:      Cervical: No cervical adenopathy. Skin:     General: Skin is warm and dry. ASSESSMENT:     Diagnosis Orders   1. Chronic anticoagulation  EKG 12 lead   2. Dyspnea on exertion     3. Other forms of angina pectoris ([address]     4. Pacemaker         PLAN:  Orders Placed This Encounter   Procedures    EKG 12 lead     No orders of the defined types were placed in this encounter. 1. Continue present medications  2. Recommend follow-up assessment in 6 months    Return in about 6 months (around 10/26/2021) for return to Dr. Yola Savage only. Ezekiel Bernheim, MD 4/26/2021 2:46 PM CDT    [facility]      Thisdictation was generated by voice recognition computer software. Although all attempts are made to edit the dictation for accuracy, there may be errors in the transcription that are not intended.

## 2021-05-12 NOTE — TELEPHONE ENCOUNTER
Called and spoke with patient, advised she will need to contact whoever is doing her procedure and have them send over a request.  She advised she would contact them.

## 2021-05-12 NOTE — TELEPHONE ENCOUNTER
Patient is requesting a call back from the office. Patient is scheduled to have a breast Biopsy on 5/17 and she needs to know if she needs to stop taking the Eliquis. Please return call. Thank you.     [phone]

## 2021-06-30 NOTE — PROGRESS NOTES
Quality 154 Part A: Falls: Risk Assessment (Should Be Reported With Measure 155.): Falls risk assessment completed and documented in the past 12 months. Zachariah Mancilla is a 67 y.o. female who presents today for   Chief Complaint   Patient presents with    Pharyngitis       HPI  Patient is here for sore throat for the last 3 days. No cough headache associated, no respiratory issues either noted with it. Notes some swelling in the throat as well. States it feels like she is swallowing a razor blade. No change in PMH, family, social, or surgical history unless mentioned above. I have reviewed the above chief complaint and HPI details charted by staff and claim ownership of the documentation. Review of Systems   Constitutional: Positive for fatigue and fever. Negative for chills. HENT: Positive for sore throat. Respiratory: Negative for cough, chest tightness, shortness of breath and wheezing. Cardiovascular: Negative for chest pain, palpitations and leg swelling. Gastrointestinal: Negative for abdominal pain, constipation, diarrhea, nausea and vomiting. Genitourinary: Negative for difficulty urinating, dysuria and frequency. Neurological: Positive for headaches.        Past Medical History:   Diagnosis Date    Acquired hypothyroidism 7/11/2018    Chronic kidney disease, stage III (moderate) ([address] 9/3/2019    Mom and sister + breast CA Lumpectomy - remission    Community acquired pneumonia of left lower lobe of lung 6/1/2018    Malignant neoplasm of upper-inner quadrant of right female breast ([address] 2/6/2020    Stage 2 moderate COPD by GOLD classification ([facility] 7/11/2018       Current Outpatient Medications   Medication Sig Dispense Refill    amoxicillin-clavulanate (AUGMENTIN) 875-125 MG per tablet Take 1 tablet by mouth 2 times daily for 10 days 20 tablet 0    predniSONE (DELTASONE) 10 MG tablet Take 3 tablets by mouth daily for 5 days 15 tablet 0    oxybutynin (DITROPAN-XL) 10 MG extended release tablet Take 1 tablet by mouth once daily 30 tablet 5    furosemide (LASIX) 20 MG tablet TAKE 1 TABLET BY MOUTH ON MONDAY, WEDNESDAY AND Detail Level: Detailed ARTHROPLASTY      US BREAST CYST ASPIRATION RIGHT Right 5/20/2021    US BREAST CYST ASPIRATION RIGHT 5/20/2021 [facility]       Social History     Tobacco Use    Smoking status: Never Smoker    Smokeless tobacco: Never Used   Vaping Use    Vaping Use: Never used   Substance Use Topics    Alcohol use: No    Drug use: No       Family History   Problem Relation Age of Onset    Breast Cancer Mother 76    Breast Cancer Sister 59       /86   Pulse 97   Temp 99 °F (37.2 °C) (Temporal)   Ht 5' 1\" (1.549 m)   Wt 214 lb (97.1 kg)   LMP  (LMP Unknown)   SpO2 98%   BMI 40.43 kg/m²     Physical Exam  Vitals and nursing note reviewed. Constitutional:       General: She is not in acute distress. Appearance: She is well-developed. She is ill-appearing. She is not diaphoretic. HENT:      Head: Normocephalic and atraumatic. Mouth/Throat:      Pharynx: Posterior oropharyngeal erythema present. No oropharyngeal exudate or uvula swelling. Tonsils: No tonsillar exudate or tonsillar abscesses. Eyes:      General: No scleral icterus. Cardiovascular:      Rate and Rhythm: Normal rate and regular rhythm. Heart sounds: Normal heart sounds. No murmur heard. Pulmonary:      Effort: Pulmonary effort is normal. No respiratory distress. Breath sounds: Normal breath sounds. No wheezing or rales. Abdominal:      General: Bowel sounds are normal. There is no distension. Palpations: Abdomen is soft. Tenderness: There is no abdominal tenderness. Hernia: No hernia is present. Musculoskeletal:      Right lower leg: No edema. Left lower leg: No edema. Lymphadenopathy:      Head:      Right side of head: Tonsillar adenopathy present. Left side of head: Tonsillar adenopathy present. Cervical: No cervical adenopathy. Upper Body:      Right upper body: No supraclavicular adenopathy. Left upper body: No supraclavicular adenopathy.    Skin:     General: Quality 131: Pain Assessment And Follow-Up: Pain assessment using a standardized tool is documented as negative, no follow-up plan required Quality 110: Preventive Care And Screening: Influenza Immunization: Influenza immunization was not ordered or administered, reason not given Quality 431: Preventive Care And Screening: Unhealthy Alcohol Use - Screening: Patient screened for unhealthy alcohol use using a single question and scores less than 2 times per year Quality 155 (Denominator): Falls Plan Of Care: Plan of Care not Documented, Reason not Otherwise Specified Quality 226: Preventive Care And Screening: Tobacco Use: Screening And Cessation Intervention: Patient screened for tobacco use and is an ex/non-smoker Quality 154 Part B: Falls: Risk Screening (Should Be Reported With Measure 155.): Patient screened for future fall risk; documentation of no falls in the past year or only one fall without injury in the past year Quality 47: Advance Care Plan: Advance Care Planning discussed and documented in the medical record; patient did not wish or was not able to name a surrogate decision maker or provide an advance care plan. Quality 111:Pneumonia Vaccination Status For Older Adults: Pneumococcal Vaccination not Administered or Previously Received, Reason not Otherwise Specified

## 2021-07-15 PROBLEM — J44.9 COPD (CHRONIC OBSTRUCTIVE PULMONARY DISEASE) (HCC): Status: ACTIVE | Noted: 2021-01-01

## 2021-07-15 PROBLEM — D61.818 PANCYTOPENIA (HCC): Status: ACTIVE | Noted: 2021-01-01

## 2021-07-15 PROBLEM — E05.90 HYPERTHYROIDISM: Status: ACTIVE | Noted: 2021-01-01

## 2021-07-15 PROBLEM — D64.9 ANEMIA: Status: ACTIVE | Noted: 2021-01-01

## 2021-07-15 NOTE — ED NOTES
Blood transfusion consent signed at this time per patient.   Witnessed by myself     Blair Rojo RN  07/15/21 0630

## 2021-07-16 PROBLEM — Z51.5 PALLIATIVE CARE PATIENT: Status: ACTIVE | Noted: 2021-01-01

## 2021-07-16 NOTE — PROGRESS NOTES
Informed Dr Alana Fisher that pt received 1 unit prbc w/ post-transfusion hgl 8.6 (BB will not release 2nd unit). MD states that's ok.  Electronically signed by Patricia Norris RN on 7/16/2021 at 2:39 AM

## 2021-07-16 NOTE — ED PROVIDER NOTES
[facility] 4 ONCOLOGY UNIT  eMERGENCY dEPARTMENT eNCOUnter      Pt Name: Sarah Obrien  MRN: [id number]  [place] 1945  Date of evaluation: 7/15/2021  Provider: Juan Espino MD    [address] [place] [address]       Chief Complaint   Patient presents with    Abnormal Lab     low hgb per pcp         HISTORY OF PRESENT ILLNESS   (Location/Symptom, Timing/Onset,Context/Setting, Quality, Duration, Modifying Factors, Severity)  Note limiting factors. Sarah Obrien is a 67 y.o. female who presents to the emergency department with low hemoglobin. Patient states that her doctor notified her of a low hemoglobin of 7.7 and low platelets. She was asked to come to the emergency department for further evaluation. Patient states she has been lightheaded especially when she bends or turns and has been experiencing shortness of breath with exertion. Any exertion \"drinks my strength and I get chest pain. Patient states that she gets chest pain that radiates to her shoulder from her sternum every day times last 2 weeks. She denies current chest pain. Patient denies blood loss including black or bloody stools, hematuria, epistaxis, or vaginal bleeding (hysterectomy). Patient has had lumpectomy for breast cancer, and adrenal mass that was benign, and the polyp in her colon which was removed. She has never had systematic cancer or chemotherapy. Patient has been on Eliquis for 1 year due to a PE that was found when she had her pacer placed. HPI    NursingNotes were reviewed. REVIEW OF SYSTEMS    (2-9 systems for level 4, 10 or more for level 5)     Review of Systems   Constitutional: Positive for fatigue. Negative for chills and fever. HENT: Negative for congestion and rhinorrhea. Eyes: Negative for discharge. Respiratory: Positive for shortness of breath (With exertion. ). Negative for cough. Cardiovascular: Positive for chest pain. Negative for palpitations.    Gastrointestinal: Negative for abdominal pain, nausea and vomiting. Genitourinary: Negative for difficulty urinating and dysuria. Musculoskeletal: Negative for back pain and neck pain. Skin: Positive for pallor. Negative for color change. Neurological: Positive for weakness. Negative for syncope and light-headedness. Psychiatric/Behavioral: Negative for agitation and confusion. All other systems reviewed and are negative.            PAST MEDICALHISTORY     Past Medical History:   Diagnosis Date    Acquired hypothyroidism 7/11/2018    Chronic kidney disease, stage III (moderate) ([address] 9/3/2019    Mom and sister + breast CA Lumpectomy - remission    Community acquired pneumonia of left lower lobe of lung 6/1/2018    Malignant neoplasm of upper-inner quadrant of right female breast ([facility] 2/6/2020    Stage 2 moderate COPD by GOLD classification ([facility] 7/11/2018         SURGICAL HISTORY       Past Surgical History:   Procedure Laterality Date    ADRENALECTOMY      APPENDECTOMY      BACK SURGERY      BREAST BIOPSY Left 1994    benign    BREAST LUMPECTOMY Right 1991    CARPAL TUNNEL RELEASE      CARPAL TUNNEL RELEASE  1990    CATARACT REMOVAL      CORONARY ANGIOPLASTY     [address] [facility]    PACEMAKER INSERTION  07/22/2020    PARTIAL HYSTERECTOMY      TONSILLECTOMY  1997    TOTAL KNEE ARTHROPLASTY      US BREAST CYST ASPIRATION RIGHT Right 5/20/2021    US BREAST CYST ASPIRATION RIGHT 5/20/2021  [place]     Current Discharge Medication List      CONTINUE these medications which have NOT CHANGED    Details   Levothyroxine Sodium (SYNTHROID PO) Take 77 mcg by mouth daily      allopurinol (ZYLOPRIM) 100 MG tablet Take 1 tablet by mouth once daily  Qty: 90 tablet, Refills: 1      ELIQUIS 5 MG TABS tablet Take 1 tablet by mouth twice daily  Qty: 60 tablet, Refills: 5      oxybutynin (DITROPAN-XL) 10 MG extended release tablet Take 1 tablet by mouth once daily  Qty: 30 tablet, Refills: 5 furosemide (LASIX) 20 MG tablet TAKE 1 TABLET BY MOUTH ON MONDAY, WEDNESDAY AND FRIDAY  Qty: 36 tablet, Refills: 2    Associated Diagnoses: Lower extremity edema      vitamin D (ERGOCALCIFEROL) 1.25 MG (84959 UT) CAPS capsule Take 1 capsule by mouth once a week  Qty: 12 capsule, Refills: 3    Associated Diagnoses: Vitamin D deficiency      sodium bicarbonate 325 MG tablet Take 325 mg by mouth daily       gabapentin (NEURONTIN) 100 MG capsule Take 2 capsules by mouth 2 times daily for 180 days. Intended supply: 30 days  Qty: 120 capsule, Refills: 5    Associated Diagnoses: Neuropathy      atenolol (TENORMIN) 50 MG tablet Take 1 tablet by mouth nightly  Qty: 90 tablet, Refills: 3    Associated Diagnoses: Other forms of angina pectoris ([facility] Shortness of breath; Second degree type I atrioventricular block; Morbid obesity with BMI of 40.0-44.9, adult ([facility] Chronic anticoagulation; Stage 3 chronic kidney disease, unspecified whether stage 3a or 3b CKD ([facility])      raloxifene (EVISTA) 60 MG tablet Take 1 tablet by mouth once daily  Qty: 90 tablet, Refills: 3    Associated Diagnoses: Morbid obesity with BMI of 40.0-44.9, adult ([facility])      tiotropium (SPIRIVA RESPIMAT) 2.5 MCG/ACT AERS inhaler Inhale 2 puffs into the lungs daily as needed       albuterol sulfate HFA (VENTOLIN HFA) 108 (90 Base) MCG/ACT inhaler Inhale 2 puffs into the lungs every 6 hours as needed for Wheezing      aspirin EC 81 MG EC tablet Take 81 mg by mouth nightly      lactobacillus (CULTURELLE) capsule Take 1 capsule by mouth daily      Cyanocobalamin (VITAMIN B-12) 5000 MCG TBDP Take 1 tablet by mouth nightly      fexofenadine (ALLEGRA ALLERGY) 180 MG tablet Take 180 mg by mouth daily      meloxicam (MOBIC) 7.5 MG tablet Take 7.5 mg by mouth daily             ALLERGIES     Patient has no known allergies.     FAMILY HISTORY       Family History   Problem Relation Age of Onset    Breast Cancer Mother 76    Breast Cancer Sister 59          SOCIAL HISTORY       Social History     Socioeconomic History    Marital status:      Spouse name: None    Number of children: None    Years of education: None    Highest education level: None   Occupational History    None   Tobacco Use    Smoking status: Never Smoker    Smokeless tobacco: Never Used   Vaping Use    Vaping Use: Never used   Substance and Sexual Activity    Alcohol use: No    Drug use: No    Sexual activity: None   Other Topics Concern    None   Social History Narrative     61 years    She has 1 son and 1 daughter    Worked at [employer] and also [employer] not presently working    Education high school    [address] [place] [address]    She does drive an automobile    Remains moderately active    Denies alcohol or tobacco consumption or substance usage     Social Determinants of Health     Financial Resource Strain:     Difficulty of Paying Living Expenses:    Food Insecurity:     Worried About Running Out of Food in the Last Year:     [address] in the Last Year:    Transportation Needs:     Lack of Transportation (Medical):      Lack of Transportation (Non-Medical):    Physical Activity:     Days of Exercise per Week:     Minutes of Exercise per Session:    Stress:     Feeling of Stress :    Social Connections:     Frequency of Communication with Friends and Family:     Frequency of Social Gatherings with Friends and Family:     Attends Temple Services:     Active Member of Clubs or Organizations:     Attends Club or Organization Meetings:     Marital Status:    Intimate Partner Violence:     Fear of Current or Ex-Partner:     Emotionally Abused:     Physically Abused:     Sexually Abused:        SCREENINGS    Maria T Coma Scale  Eye Opening: Spontaneous  Best Verbal Response: Oriented  Best Motor Response: Obeys commands  [place]vo Coma Scale Score: 15        PHYSICAL EXAM    (up to 7 for level 4, 8 or more for level 5)     ED Triage Vitals   BP Temp Temp Source Pulse RESULTS           No orders to display           LABS:  Labs Reviewed   CBC WITH AUTO DIFFERENTIAL - Abnormal; Notable for the following components:       Result Value    WBC 3.0 (*)     RBC 1.94 (*)     Hemoglobin 6.8 (*)     Hematocrit 20.5 (*)     .7 (*)     MCH 35.1 (*)     RDW 17.4 (*)     Platelets 47 (*)     Neutrophils % 12.8 (*)     Lymphocytes % 61.7 (*)     Monocytes % 23.1 (*)     Neutrophils Absolute 0.4 (*)     All other components within normal limits   COMPREHENSIVE METABOLIC PANEL W/ REFLEX TO MG FOR LOW K - Abnormal; Notable for the following components:    Glucose 119 (*)     CREATININE 1.4 (*)     GFR Non- 36 (*)     GFR  44 (*)     Calcium 10.6 (*)     Total Protein 6.3 (*)     All other components within normal limits   PROTIME-INR - Abnormal; Notable for the following components:    Protime 15.2 (*)     All other components within normal limits   APTT - Abnormal; Notable for the following components:    aPTT 25.2 (*)     All other components within normal limits   URINE RT REFLEX TO CULTURE - Abnormal; Notable for the following components:    Blood, Urine TRACE (*)     All other components within normal limits   TSH WITH REFLEX TO FT4 - Abnormal; Notable for the following components:    TSH Reflex FT4 <0.01 (*)     All other components within normal limits   RETICULOCYTES - Abnormal; Notable for the following components:    Retic Ct Pct 2.50 (*)     Hematocrit 21.3 (*)     All other components within normal limits   FERRITIN - Abnormal; Notable for the following components:    Ferritin 156.2 (*)     All other components within normal limits   IRON AND TIBC - Abnormal; Notable for the following components:    Iron 161 (*)     Iron Saturation 53 (*)     All other components within normal limits   VITAMIN B12 & FOLATE - Abnormal; Notable for the following components:    Vitamin B-12 1684 (*)     All other components within normal limits   HEMOGLOBIN A1C - Abnormal; Notable for the following components:    Hemoglobin A1C 6.3 (*)     All other components within normal limits   HEMOGLOBIN AND HEMATOCRIT, BLOOD - Abnormal; Notable for the following components:    Hemoglobin 8.6 (*)     Hematocrit 26.3 (*)     All other components within normal limits   MICROSCOPIC URINALYSIS - Abnormal; Notable for the following components:    Bacteria, UA NEGATIVE (*)     Crystals, UA NEG (*)     All other components within normal limits   POCT GLUCOSE - Abnormal; Notable for the following components:    POC Glucose 173 (*)     All other components within normal limits   COVID-19, RAPID   C ANTIGEN TYPING   TROPONIN   HAPTOGLOBIN   T4, FREE   METHYLMALONIC ACID, SERUM   BASIC METABOLIC PANEL W/ REFLEX TO MG FOR LOW K   CBC WITH AUTO DIFFERENTIAL   POCT GLUCOSE   POCT GLUCOSE   POCT GLUCOSE   TYPE AND SCREEN   ANTIBODY IDENTIFICATION   PREPARE RBC (CROSSMATCH)   PREPARE RBC (CROSSMATCH)       All other labs were within normal range or not returned as of this dictation. EMERGENCY DEPARTMENT COURSE and DIFFERENTIAL DIAGNOSIS/MDM:   Vitals:    Vitals:    07/15/21 2039 07/15/21 2055 07/15/21 2201 07/15/21 2257   BP:  (!) 147/50 (!) 140/63 (!) 146/65   Pulse:  82 87 92   Resp:  16 16 16   Temp:  97.9 °F (36.6 °C) 98.6 °F (37 °C) 98.8 °F (37.1 °C)   TempSrc:  Temporal Temporal Temporal   SpO2: 94% 94% 96% 97%   Weight:       Height:           MDM  16-year-old female presents with pancytopenia and anemia that is symptomatic. Lab results reviewed. Packed RBCs ordered. Discussed with Dr. Kathryn Benavidez, hospitalist, will admit patient for further evaluation and treatment. Patient verbalizes understanding and agreement with plan of care. CONSULTS:  IP CONSULT TO HEM/ONC  PALLIATIVE CARE EVAL    PROCEDURES:  Unless otherwise noted below, none     Procedures    FINAL IMPRESSION      1. Pancytopenia ([address]    2. Symptomatic anemia    3.  Chest pain, unspecified type          DISPOSITION/PLAN

## 2021-07-16 NOTE — PROGRESS NOTES
Vernon Carrington arrived to room # 421. Presented with: pancytopenia  Mental Status: Patient is oriented, alert, coherent, logical, thought processes intact and able to concentrate and follow conversation. Vitals:    07/15/21 2257   BP: (!) 146/65   Pulse: 92   Resp: 16   Temp: 98.8 °F (37.1 °C)   SpO2: 97%     Patient safety contract and falls prevention contract reviewed with patient No.  Oriented Patient to room. Call light within reach. Yes.   Needs, issues or concerns expressed at this time: no.      Electronically signed by Adelfo Benavidez RN on 7/15/2021 at 11:08 PM

## 2021-07-16 NOTE — PROGRESS NOTES
H/H after 1st unit of PRBC 8.6/26.3. Talked to BB and nurse was informed that 2nd unit PRBC could not be released because hgl > 7. Voalte message sent to Dr Sherley Falcon concerning this matter.  Electronically signed by Breana Maurer RN on 7/16/2021 at 12:34 AM

## 2021-07-16 NOTE — H&P
Laterality Date    ADRENALECTOMY      APPENDECTOMY      BACK SURGERY      BREAST BIOPSY Left 1994    benign    BREAST LUMPECTOMY Right 1991    CARPAL TUNNEL RELEASE      CARPAL TUNNEL RELEASE  1990    CATARACT REMOVAL      CORONARY ANGIOPLASTY      GALLBLADDER SURGERY      HYSTERECTOMY  1979    PACEMAKER INSERTION  07/22/2020    PARTIAL HYSTERECTOMY      TONSILLECTOMY  1997    TOTAL KNEE ARTHROPLASTY       BREAST CYST ASPIRATION RIGHT Right 5/20/2021     BREAST CYST ASPIRATION RIGHT 5/20/2021 [facility] [address] [facility],  [address] Medications:  Prior to Admission medications    Medication Sig Start Date End Date Taking? Authorizing Provider   allopurinol (ZYLOPRIM) 100 MG tablet Take 1 tablet by mouth once daily 7/9/21   Yoly Roberts MD   ELIQUIS 5 MG TABS tablet Take 1 tablet by mouth twice daily 7/7/21   DAVID Mabry   oxybutynin (DITROPAN-XL) 10 MG extended release tablet Take 1 tablet by mouth once daily 4/12/21   Yoly Roberts MD   furosemide (LASIX) 20 MG tablet TAKE 1 TABLET BY MOUTH ON MONDAY, [facility] AND FRIDAY 4/4/21   DAVID Gomez   vitamin D (ERGOCALCIFEROL) 1.25 MG (41421 UT) CAPS capsule Take 1 capsule by mouth once a week 3/1/21   Yoly Roberts MD   sodium bicarbonate 325 MG tablet Take 325 mg by mouth 4 times daily    Historical Provider, MD   meloxicam (MOBIC) 7.5 MG tablet Take 7.5 mg by mouth daily    Historical Provider, MD   risedronate (ACTONEL) 35 MG tablet Take 35 mg by mouth every 7 days    Historical Provider, MD   gabapentin (NEURONTIN) 100 MG capsule Take 2 capsules by mouth 2 times daily for 180 days.  Intended supply: 30 days 1/27/21 7/26/21  Yoly Roberts MD   atenolol (TENORMIN) 50 MG tablet Take 1 tablet by mouth nightly 1/21/21   Mehreen Car MD   EUTHYROX 75 MCG tablet Take 1 tablet by mouth Daily 9/25/20   Yoly Roberts MD   raloxifene (EVISTA) 60 MG tablet Take 1 tablet by mouth once daily 9/23/20   Yoly Roberts MD   tiotropium [facility] RESPIMAT) 2.5 MCG/ACT AERS inhaler Inhale 2 puffs into the lungs daily    Historical Provider, MD   albuterol sulfate HFA (VENTOLIN HFA) 108 (90 Base) MCG/ACT inhaler Inhale 2 puffs into the lungs every 6 hours as needed for Wheezing    Historical Provider, MD   aspirin EC 81 MG EC tablet Take 81 mg by mouth nightly    Historical Provider, MD   lactobacillus (CULTURELLE) capsule Take 1 capsule by mouth daily    Historical Provider, MD   Cyanocobalamin (VITAMIN B-12) 5000 MCG TBDP Take 1 tablet by mouth nightly    Historical Provider, MD   fexofenadine (ALLEGRA ALLERGY) 180 MG tablet Take 180 mg by mouth daily    Historical Provider, MD       Allergies:    Patient has no known allergies. Social History:    The patient currently lives home. Tobacco:   reports that she has never smoked. She has never used smokeless tobacco.  Alcohol:   reports no history of alcohol use. Illicit Drugs: denies    Family History:      Problem Relation Age of Onset    Breast Cancer Mother 76    Breast Cancer Sister 59           Review of Systems   Constitutional: Positive for fatigue. Negative for chills, diaphoresis and fever. HENT: Negative for congestion, ear pain, sinus pain, sore throat and trouble swallowing. Eyes: Negative for visual disturbance. Respiratory: Positive for shortness of breath. Negative for cough and wheezing. Cardiovascular: Negative for chest pain, palpitations and leg swelling. Gastrointestinal: Negative for abdominal distention, abdominal pain, blood in stool, constipation, diarrhea, nausea and vomiting. Endocrine: Negative for cold intolerance and heat intolerance. Genitourinary: Negative for difficulty urinating, flank pain, frequency and urgency. Musculoskeletal: Negative for arthralgias and myalgias. Neurological: Positive for dizziness, weakness and light-headedness. Negative for syncope, numbness and headaches. Hematological: Does not bruise/bleed easily. Psychiatric/Behavioral: Negative for agitation, confusion and dysphoric mood. Physical Examination:  /64   Pulse 88   Temp 98.6 °F (37 °C) (Oral)   Resp 18   Ht 5' 1\" (1.549 m)   Wt 215 lb (97.5 kg)   LMP  (LMP Unknown)   SpO2 94%   BMI 40.62 kg/m²     Physical Exam  Constitutional:       General: She is not in acute distress. Appearance: Normal appearance. She is not toxic-appearing or diaphoretic. HENT:      Head: Normocephalic and atraumatic. Right Ear: External ear normal.      Left Ear: External ear normal.      Nose: Nose normal. No congestion or rhinorrhea. Mouth/Throat:      Mouth: Mucous membranes are moist.      Pharynx: Oropharynx is clear. Eyes:      General: No scleral icterus. Extraocular Movements: Extraocular movements intact. Conjunctiva/sclera: Conjunctivae normal.   Cardiovascular:      Rate and Rhythm: Normal rate and regular rhythm. Pulses: Normal pulses. Heart sounds: Normal heart sounds. No murmur heard. No friction rub. No gallop. Pulmonary:      Effort: Pulmonary effort is normal. No respiratory distress. Breath sounds: Normal breath sounds. No wheezing, rhonchi or rales. Abdominal:      General: Abdomen is flat. Bowel sounds are normal. There is no distension. Palpations: Abdomen is soft. Tenderness: There is no abdominal tenderness. Musculoskeletal:         General: Swelling present. Normal range of motion. Cervical back: Normal range of motion and neck supple. Right lower leg: No edema. Left lower leg: No edema. Comments: Trace swelling to BLE   Skin:     General: Skin is warm and dry. Coloration: Skin is not jaundiced. Findings: No erythema, lesion or rash. Neurological:      General: No focal deficit present. Mental Status: She is alert and oriented to person, place, and time. Mental status is at baseline. Cranial Nerves: No cranial nerve deficit.       Sensory: No sensory deficit. Motor: No weakness. Psychiatric:         Mood and Affect: Mood normal.         Behavior: Behavior normal.         Thought Content: Thought content normal.         Judgment: Judgment normal.          Diagnostic Data:  CBC:  Recent Labs     07/15/21  1527 07/15/21  1912   WBC 3.0*  --    HGB 6.8*  --    HCT 20.5* 21.3*   PLT 47*  --      BMP:  Recent Labs     07/15/21  1527      K 4.1      CO2 24   BUN 16   CREATININE 1.4*   CALCIUM 10.6*     Recent Labs     07/15/21  1527   AST 17   ALT 16   BILITOT 0.6   ALKPHOS 53     Coag Panel:   Recent Labs     07/15/21  1527   INR 1.18   PROTIME 15.2*   APTT 25.2*     Cardiac Enzymes:   Recent Labs     07/15/21  1933   TROPONINI <0.01         RAD:    No results found. Assessment/Plan:  Principal Problem:    Pancytopenia ([facility] [address]  Active Problems:    Chronic kidney disease, stage III (moderate) ([facility])    Controlled type 2 diabetes mellitus without complication, without long-term current use of insulin ([facility])    COPD (chronic obstructive pulmonary disease) ([facility])    Hyperthyroidism    Anemia  Resolved Problems:    * No resolved hospital problems. *       Principal Problem:    Pancytopenia ([facility] [address]-    -Monitor labs closely   -Hematology consultation and recommendations appreciated        Active Problems:    Anemia-   - Hgb 6.8 today   - Currently receiving 1 unit PRBC in ED   - monitor CBC closely,   - Transfuse for hemoglobin less than 7 per protocol   - check Occult blood    Controlled type 2 diabetes mellitus without complication, without long-term current use of insulin ([facility] [address]- hemoglobin A1c, monitor Accu-Cheks, hypoglycemia treatment protocol in place      COPD (chronic obstructive pulmonary disease) ([facility] [address]- noted, supplemental oxygen as needed      Hyperthyroidism- history of hypothyroidism, on Synthroid at home, HOLD Synthroid          DVT Prophylaxis: SCDs    Further Orders per Clinical course/attending.      Signed:  Electronically signed

## 2021-07-16 NOTE — CONSULTS
Palliative Care:    Consulted for support, and ACP. Pleasant 66 y/o lady with several comorbid conditions. She presented to ED after abnormal labs for evaluation of low hgb and low platelets. S/p blood transfusion. Oncology consulted. Met with pt, her  and daughter at the bedside. Explained role of Palliative Care and talked about life at home. Pt is independent at home. Has DME from a knee surgery if needed. She and family tell me of plans for bone marrow aspiration when cleared, pt has been on Eliquis. Past Medical History:        Past Medical History:   Diagnosis Date    Acquired hypothyroidism 07/11/2018    Chronic kidney disease, stage III (moderate) ([facility] [address] 09/03/2019    Mom and sister + breast CA Lumpectomy - remission    Community acquired pneumonia of left lower lobe of lung 06/01/2018    Malignant neoplasm of upper-inner quadrant of right female breast ([facility] [address] 02/06/2020    Palliative care patient 07/16/2021    Stage 2 moderate COPD by GOLD classification ([facility] [address] 07/11/2018       Advance Directives:    Full code. No advance directive. Offered assistance to complete living will and they will consider. Pt names her  as decision maker. ACP note completed     Pain/Other Symptoms:    Denies pain or soa at this time    Activity:   As tolerated          Psychological/Spiritual:   Good spiritual support from their Worship. Plan:    Further  Work up. Continue medical management and monitoring    Patient/family discussion r/t goals: To return home at discharge.     Review of any needed services:  TBD      Electronically signed by Roel Mejia RN on 7/16/2021 at 11:53 AM

## 2021-07-16 NOTE — ACP (ADVANCE CARE PLANNING)
Advance Care Planning     Advance Care Planning Activator (Inpatient)  Conversation Note      Date of ACP Conversation: 7/16/2021     Conversation Conducted with: Pt, her  and daughter    ACP Activator: Talbert Brittle, [place]:     Current Designated Health Care Decision Maker:     Primary Decision Maker: Ash Sabillon - Spouse - [phone]    Secondary Decision Maker: Jenny Sharpe - Child - [phone]      Care Preferences    Ventilation: \"If you were in your present state of health and suddenly became very ill and were unable to breathe on your own, what would your preference be about the use of a ventilator (breathing machine) if it were available to you? \"      Would the patient desire the use of ventilator (breathing machine)?:  YES          Resuscitation  \"CPR works best to restart the heart when there is a sudden event, like a heart attack, in someone who is otherwise healthy. Unfortunately, CPR does not typically restart the heart for people who have serious health conditions or who are very sick. \"    \"In the event your heart stopped as a result of an underlying serious health condition, would you want attempts to be made to restart your heart (answer \"yes\" for attempt to resuscitate) or would you prefer a natural death (answer \"no\" for do not attempt to resuscitate)? \" YES          Conversation Outcomes:  [x] ACP discussion completed      Electronically signed by Talbert Brittle, RN on 7/16/2021 at 1:57 PM

## 2021-07-16 NOTE — PROGRESS NOTES
Nurse sent pca to BB to get 2nd unit prbc. BB called floor to explain that pt would need h/h drawn after 1st unit prbc before 2nd unit could be issued depending on h/h results.  Electronically signed by Linda Delgadillo RN on 7/15/2021 at 11:24 PM

## 2021-07-16 NOTE — PROGRESS NOTES
4 Eyes Skin Assessment    Gayla Nava is being assessed upon: Admission    I agree that Katie Graham, RN, along with Tammi Munoz (either 2 RN's or 1 LPN and 1 RN) have performed a thorough Head to Toe Skin Assessment on the patient. ALL assessment sites listed below have been assessed. Areas assessed by both nurses:     [x]   Head, Face, and Ears   [x]   Shoulders, Back, and Chest  [x]   Arms, Elbows, and Hands   [x]   Coccyx, Sacrum, and Ischium  [x]   Legs, Feet, and Heels    Does the Patient have Skin Breakdown?  No    Sandro Prevention initiated: NA  Wound Care Orders initiated: NA    WO nurse consulted for Pressure Injury (Stage 3,4, Unstageable, DTI, NWPT, and Complex wounds) and New or Established Ostomies: NA        Primary Nurse eSignature: Zhanna Santos RN on 7/15/2021 at 11:09 PM      Co-Signer eSignature: {Esignature:[id number]}

## 2021-07-16 NOTE — PROGRESS NOTES
[facility]      Progress Note    Patient:  Miladys Cross  YOB: 1945  Date of Service: 7/16/2021  MRN: [id number]   Acct: [de-identified]   Primary Care Physician: Willam Borrero MD  Advance Directive: Full Code  Admit Date: 7/15/2021       Hospital Day: 1    Portions of this note have been copied forward, however, updated to reflect the most current clinical status of this patient. CHIEF COMPLAINT abnormal labs    SUBJECTIVE:  Ms. Sp Harden was resting comfortably in bed this afternoon. Denies fever, shortness of breath or chest pain at this time. CUMULATIVE HOSPITAL COURSE:   The patient is a 67 y.o. female with past medical history of breast cancer, colon polyp removed that was cancerous per patient, CKD 3 hypothyroidism, COPD, PE and who presented to [address] [place] [address] ED complaining of abnormal labs. Ms. Sp Harden stated she had seen nephrology nurse practitioner and was ordered some routine labs. Labs came back abnormal and was advised to come to ED for evaluation. Stated she has been tired recently, increased shortness of breath, lightheadedness and dizziness ongoing for about a month. Stated she recently had an ear infection and was started on steroids and antibiotics. Related history of breast cancer in 1991, had a lumpectomy and no chemotherapy required. Reported colonoscopy 2 years ago with polyp removal that was cancerous, no chemotherapy at that time either. Reported recent mammogram and needle biopsy that was negative about 2 months ago. Denied bleeding, dark stools, or nosebleeds. Denied fever, chills, cough, abdominal pain, nausea, vomiting, or diarrhea. Work-up in ED revealed VSS, creatinine 1.4, GFR 36 (creatinine 1.5, GFR 34 on 5/7/2021), TSH reflex to FT4 <0.01, WBC 3.0, Hgb 6.8, platelet 47. Ferritin 156.2, iron 161, iron saturation 53, vitamin B12 1684. Received  1 unit of PRBC in ED. Patient was admitted to hospital medicine with pancytopenia with hematology consultation. Review of Systems   Constitutional: Negative for chills, diaphoresis, fatigue and fever. HENT: Negative for congestion, ear pain, sinus pain, sore throat and trouble swallowing. Eyes: Negative for visual disturbance. Respiratory: Negative for cough, shortness of breath and wheezing. Denies SOB at this time    Cardiovascular: Negative for chest pain, palpitations and leg swelling. Denies chest pain at this time    Gastrointestinal: Negative for abdominal distention, abdominal pain, blood in stool, constipation, diarrhea, nausea and vomiting. Endocrine: Negative for cold intolerance and heat intolerance. Genitourinary: Negative for difficulty urinating, flank pain, frequency and urgency. Musculoskeletal: Negative for arthralgias and myalgias. Neurological: Negative for dizziness, syncope, weakness, light-headedness, numbness and headaches. Hematological: Does not bruise/bleed easily. Psychiatric/Behavioral: Negative for agitation, confusion and dysphoric mood. Objective:   VITALS:  /67   Pulse 96   Temp 99 °F (37.2 °C) (Temporal)   Resp 18   Ht 5' 1\" (1.549 m)   Wt 212 lb 2 oz (96.2 kg)   LMP  (LMP Unknown)   SpO2 97%   BMI 40.08 kg/m²   24HR INTAKE/OUTPUT:      Intake/Output Summary (Last 24 hours) at 7/16/2021 1613  Last data filed at 7/16/2021 1235  Gross per 24 hour   Intake 1310 ml   Output 700 ml   Net 610 ml         Physical Exam  Constitutional:       General: She is not in acute distress. Appearance: Normal appearance. She is not toxic-appearing or diaphoretic. HENT:      Head: Normocephalic and atraumatic. Right Ear: External ear normal.      Left Ear: External ear normal.      Nose: Nose normal. No congestion or rhinorrhea. Mouth/Throat:      Mouth: Mucous membranes are moist.      Pharynx: Oropharynx is clear. Eyes:      General: No scleral icterus. Extraocular Movements: Extraocular movements intact. sodium chloride, acetaminophen **OR** acetaminophen, glucose, dextrose, glucagon (rDNA), dextrose  ADULT DIET; Regular; Low Microbial     Lab and other Data:     Recent Labs     07/15/21  1527 07/15/21  2357 07/16/21  0534   WBC 3.0*  --  3.3*   HGB 6.8* 8.6* 7.8*   PLT 47*  --  44*     Recent Labs     07/15/21  1527 07/16/21  0534    141   K 4.1 4.6    107   CO2 24 23   BUN 16 14   CREATININE 1.4* 1.2*   GLUCOSE 119* 126*     Recent Labs     07/15/21  1527   AST 17   ALT 16   BILITOT 0.6   ALKPHOS 53     Troponin T:   Recent Labs     07/15/21  1933   TROPONINI <0.01     INR:   Recent Labs     07/15/21  1527   INR 1.18     UA:  Recent Labs     07/15/21  2254   COLORU YELLOW   PHUR 6.0   WBCUA 0   RBCUA 1   BACTERIA NEGATIVE*   CLARITYU Clear   SPECGRAV 1.010   LEUKOCYTESUR Negative   UROBILINOGEN 0.2   BILIRUBINUR Negative   BLOODU TRACE*   GLUCOSEU Negative     A1C:   Recent Labs     07/15/21  2054   LABA1C 6.3*       RAD:     US ABDOMEN COMPLETE  Result Date: 7/16/2021    A previous cholecystectomy. Fatty infiltration of the liver. The spleen is normal. Signed by Dr Ruddy Garvin  Result Date: 7/16/2021    No active cardiopulmonary disease. Signed by Dr Feliciano Ficchacha:    Althia Grams- negative       Assessment/Plan   Principal Problem:    Pancytopenia ([address]  Active Problems:    Chronic kidney disease, stage III (moderate) (HCC)    Controlled type 2 diabetes mellitus without complication, without long-term current use of insulin (HCC)    COPD (chronic obstructive pulmonary disease) ([address]    Hyperthyroidism    Anemia    Palliative care patient  Resolved Problems:    * No resolved hospital problems.  *    Principal Problem:    Pancytopenia ([address]-               -Monitor labs closely              -Hematology/ Oncology following     - recommends Myeloma studies     - Bone marrow biopsy/aspirate     - US abdomen to rule out ESLD/splenomegaly.      - US abdomen showed fatty infiltration of the liver    - Rapid HIV negative         Active Problems:    Anemia-              - Hgb 7.8 today              - Received 1 unit PRBC in ED on 7/15/21               - monitor CBC closely               - Transfuse for hemoglobin less than 7 per protocol              - check Occult blood     Controlled type 2 diabetes mellitus without complication, without long-term current use of insulin ([facility] [address]- hemoglobin A1c 6.3, low dose SSI, monitor Accu-Cheks, hypoglycemia treatment protocol in place        COPD (chronic obstructive pulmonary disease) ([facility] [address]- noted, supplemental oxygen as needed       Hyperthyroidism- history of hypothyroidism, on Synthroid at home, HOLD Synthroid         DVT Prophylaxis: SCDs       Further Orders per Clinical course/attending. Electronically signed by DAVID Cedillo CNP on 7/16/2021 at 4:13 PM       EMR Dragon/Transcription disclaimer:   Much of this encounter note is an electronic transcription/translation of spoken language to printed text.  The electronic translation of spoken language may permit erroneous, or at times, nonsensical words or phrases to be inadvertently transcribed; although attempts have made to review the note for such errors, some may still exist.

## 2021-07-16 NOTE — CONSULTS
MEDICAL ONCOLOGY CONSULTATION    Pt Name: Aleja Torres  MRN: [id number]  YOB: 1945  Date of evaluation: 7/16/2021    REASON FOR CONSULTATION: Pancytopenia  REQUESTING PHYSICIAN: Hospitalist    History Obtained From:    patient, electronic medical record    HISTORY OF PRESENT ILLNESS:    Diagnosis  · Pancytopenia  · Macrocytic anemia    Edi Nava was first seen by me on 7/16/2001 during inpatient hospitalization at [facility].  I was consulted for findings of pancytopenia. She presented to the ER department with hospital 7/15/2021 with low hemoglobin. Apparently, she was not five by her PCP and asked to go to the ER for further evaluation. The patient has complaints of lightheadedness and shortness of breath on exertion. She also had complaints of chest discomfort. She denies any hematochezia, melena, hematemesis, hematuria or any vaginal bleeding. She has a history of hysterectomy. She has a remote history of breast cancer and also history of benign adrenal nodule. She also has a history of pulmonary embolism about a year ago and is currently on Eliquis. CBC at presentation 7/15/2021 showed a WBC count 3.0 with ANC 0.4, hemoglobin 6.8/ and platelet counts 38,301. Anemia profile showed a normal vitamin B12 at 1684, ferritin 156, iron saturation 53%, TIBC 305, folate 7.4, haptoglobin 161, reticulocyte count 0.0495.  7/15/2021-received 1 unit PRBCs  7/16/2021-recommended bone marrow biopsy/aspirate to rule out a primary bone marrow process.     Past Medical History:    Past Medical History:   Diagnosis Date    Acquired hypothyroidism 7/11/2018    Chronic kidney disease, stage III (moderate) ([address] 9/3/2019    Mom and sister + breast CA Lumpectomy - remission    Community acquired pneumonia of left lower lobe of lung 6/1/2018    Malignant neoplasm of upper-inner quadrant of right female breast ([address] 2/6/2020    Stage 2 moderate COPD by GOLD classification ([address] 7/11/2018 Past Surgical History:    Past Surgical History:   Procedure Laterality Date    ADRENALECTOMY      APPENDECTOMY      BACK SURGERY      BREAST BIOPSY Left 1994    benign    BREAST LUMPECTOMY Right 1991    CARPAL TUNNEL RELEASE      CARPAL TUNNEL RELEASE  1990    CATARACT REMOVAL      CORONARY ANGIOPLASTY      GALLBLADDER SURGERY      HYSTERECTOMY  1979    PACEMAKER INSERTION  07/22/2020    PARTIAL HYSTERECTOMY      TONSILLECTOMY  1997    TOTAL KNEE ARTHROPLASTY       BREAST CYST ASPIRATION RIGHT Right 5/20/2021    US BREAST CYST ASPIRATION RIGHT 5/20/2021 [facility] [address]       Social History:    Marital status[de-identified]  Smoking status:  ETOH status:  Resides:    Family History:   Family History   Problem Relation Age of Onset    Breast Cancer Mother 76    Breast Cancer Sister 59       Current Hospital Medications:    Current Facility-Administered Medications   Medication Dose Route Frequency Provider Last Rate Last Admin    0.9 % sodium chloride infusion   Intravenous PRN Shabbir Perez MD        calcium carbonate (TUMS) chewable tablet 500 mg  500 mg Oral TID PRN Debi Mckeon MD        polyethylene glycol (GLYCOLAX) packet 17 g  17 g Oral Daily PRN Debi Mckeon MD        melatonin disintegrating tablet 5 mg  5 mg Oral Nightly PRN Debi Mckeon MD        allopurinol (ZYLOPRIM) tablet 100 mg  100 mg Oral Daily Debi Mckeon MD   100 mg at 07/15/21 2245    Vitamin B-12 TBDP 1 tablet  1 tablet Oral Nightly Debi Mckeon MD        [Held by provider] levothyroxine (SYNTHROID) tablet 75 mcg  75 mcg Oral Daily Debi Mckeon MD        gabapentin (NEURONTIN) capsule 200 mg  200 mg Oral BID Debi Mckeon MD   200 mg at 07/15/21 2245    lactobacillus (CULTURELLE) capsule 1 capsule  1 capsule Oral Daily with breakfast Debi Mckeon MD        oxybutynin (DITROPAN-XL) extended release tablet 10 mg  10 mg Oral Nightly Debi Mckeon MD   10 mg at 07/15/21 2245    sodium bicarbonate tablet 325 mg  325 mg Oral 4x Daily Rama Marcial MD        Vitamin D (CHOLECALCIFEROL) tablet 2,000 Units  2,000 Units Oral Daily Rama Marcial MD        ipratropium (ATROVENT) 0.02 % nebulizer solution 0.5 mg  0.5 mg Nebulization 4x daily Rama Marcial MD   0.5 mg at 07/15/21 2208    albuterol (PROVENTIL) nebulizer solution 2.5 mg  2.5 mg Nebulization Q1H PRN Rama Marcial MD        sodium chloride flush 0.9 % injection 5-40 mL  5-40 mL Intravenous 2 times per day Rama Marcial MD   10 mL at 07/15/21 2246    sodium chloride flush 0.9 % injection 5-40 mL  5-40 mL Intravenous PRN Rama Marcial MD        0.9 % sodium chloride infusion  25 mL Intravenous PRN Rama Marcial MD        acetaminophen (TYLENOL) tablet 650 mg  650 mg Oral Q6H PRN MD Nino Dodge acetaminophen (TYLENOL) suppository 650 mg  650 mg Rectal Q6H PRN Rama Marcial MD        glucose (GLUTOSE) 40 % oral gel 15 g  15 g Oral PRN Chalice Sniff, APRN - CNP        dextrose 50 % IV solution  12.5 g Intravenous PRN Chalice Sniff, APRN - CNP        glucagon (rDNA) injection 1 mg  1 mg Intramuscular PRN Chalice Sniff, APRN - CNP        dextrose 5 % solution  100 mL/hr Intravenous PRN Chalice Sniff, APRN - CNP           Allergies: No Known Allergies      Subjective   REVIEW OF SYSTEMS:   CONSTITUTIONAL: no fever, no night sweats,  fatigue;  HEENT: no blurring of vision, no double vision, no hearing difficulty, no tinnitus, no ulceration, no epistaxis;  LUNGS: no cough, no hemoptysis, no wheeze,  shortness of breath;  CARDIOVASCULAR: no palpitation, no chest pain, shortness of breath;  GI: no abdominal pain, no nausea, no vomiting, no diarrhea, no constipation;  KURTIS: no dysuria, no hematuria, no frequency or urgency, no nephrolithiasis;  MUSCULOSKELETAL: no joint pain, no swelling, no stiffness;  ENDOCRINE: no polyuria, no polydipsia, no cold or heat intolerance;  HEMATOLOGY: no easy bruising or bleeding, no history of clotting disorder;  DERMATOLOGY: no skin rash, no eczema, no pruritus;  PSYCHIATRY: no depression, no anxiety, no panic attacks, no suicidal ideation, no homicidal ideation;  NEUROLOGY: no syncope, no seizures, no numbness or tingling of hands, no numbness or tingling of feet, no paresis;    Objective   BP (!) 126/53   Pulse 107   Temp 99.4 °F (37.4 °C) (Temporal)   Resp 18   Ht 5' 1\" (1.549 m)   Wt 212 lb 2 oz (96.2 kg)   LMP  (LMP Unknown)   SpO2 93%   BMI 40.08 kg/m²     PHYSICAL EXAM:  CONSTITUTIONAL: Alert, appropriate, no acute distress, chronically ill-appearing  EYES: Pallor, Non icteric, EOM intact, pupils equal round   ENT: Mucus membranes moist, no oral pharyngeal lesions, external inspection of ears and nose are normal  NECK: Supple, no masses. No palpable thyroid mass  CHEST/LUNGS: CTA bilaterally, normal respiratory effort   CARDIOVASCULAR: Tachycardic, no murmurs. Bilateral lower extremity edema  ABDOMEN: soft non-tender, active bowel sounds, no HSM. No palpable masses  EXTREMITIES: warm, full ROM in all 4 extremities, no focal weakness. SKIN: warm, dry with no rashes or lesions  LYMPH: No cervical, clavicular, axillary, or inguinal lymphadenopathy  NEUROLOGIC: follows commands, non focal   PSYCH: mood and affect appropriate.  Alert and oriented to time, place, person        LABORATORY RESULTS REVIEWED/ANALYZED BY ME:  Recent Labs     07/15/21  2357 07/15/21  1912 07/15/21  1527   WBC  --   --  3.0*   HGB 8.6*  --  6.8*   HCT 26.3* 21.3* 20.5*   MCV  --   --  105.7*   PLT  --   --  47*       Lab Results   Component Value Date     07/15/2021    K 4.1 07/15/2021     07/15/2021    CO2 24 07/15/2021    BUN 16 07/15/2021    CREATININE 1.4 (H) 07/15/2021    GLUCOSE 119 (H) 07/15/2021    CALCIUM 10.6 (H) 07/15/2021    PROT 6.3 (L) 07/15/2021    LABALBU 3.8 07/15/2021    BILITOT 0.6 07/15/2021    ALKPHOS 53 07/15/2021    AST 17 07/15/2021    ALT 16 07/15/2021    LABGLOM 36 (A) 07/15/2021    GFRAA 44 (L) 07/15/2021       Lab Results   Component Value Date    INR 1.18 07/15/2021    INR 1.36 (H) 08/07/2020    INR 1.05 07/27/2020    PROTIME 15.2 (H) 07/15/2021    PROTIME 16.9 (H) 08/07/2020    PROTIME 13.6 07/27/2020       RADIOLOGY STUDIES REPORT/REVIEWED AND INTERPRETED BY ME:  none    ASSESSMENT:  #Pancytopenia  The differential diagnosis is broad and includes a primary bone marrow process to include MDS, leukemia, multiple myeloma. In addition nutritional deficiency such as copper deficiency or other versus infectious causes versus autoimmune versus end-stage liver disease/portal hypertension splenomegaly. Findings: Macrocytic anemia with neutropenia and thrombocytopenia    Work-up so far  Vitamin [id number]  Ferritin-156, iron saturation 50%, TIBC 305  Folate 7.5    Labs requested 7/16/2021  · Path review: Peripheral blood smear  · Copper, zinc  · SPEP with immunofixation, free light chain, immunoglobulins  · US abdomen to rule out end-stage liver disease  · HIV, hepatitis profile      #Hypercalcemia-mild. Differential diagnosis includes malignant versus nonmalignant causes  Ordered today 7/16/2021:  PTH intact, PTH related protein, vitamin D 25-hydroxy  Continue IV fluid hydration    #Chronic kidney disease stage III  Baseline creatinine 1.4-1.5  Creatinine 716 2001-1 0.4    PLAN:  Myeloma studies. Copper, zinc  Hypercalcemia work-up  Bone marrow biopsy/aspirate  Path review  US abdomen to rule out ESLD/splenomegaly    I have seen, examined and reviewed this patient medication list, appropriate labs and imaging studies. I reviewed relevant medical records and others physicians notes. I discussed the plans of care with the patient. I answered all the questions to the patients satisfaction.  I have also reviewed the chief complaint (CC) and part of the history (History of Present Illness (HPI), Past Family Social History ([facility] [address]), or Review of Systems (ROS) and made changes when appropriated.        (Please note that portions of this note were completed with a voice recognition program. Efforts were made to edit the dictations but occasionally words are mis-transcribed.)    Duyen Pierre MD    07/16/21  6:11 AM

## 2021-07-16 NOTE — PROGRESS NOTES
PHARMACY NOTE  Vernon Carrington was ordered Evista. Per the [address], this medication is non-formulary and not stocked by pharmacy. It has been discontinued. The medication can be reordered at discharge.      Electronically signed by Taylor Redmond, [address] [place] [address] on 7/15/2021 at 9:09 PM

## 2021-07-17 NOTE — PROGRESS NOTES
Contacted by clinical house supervisor for midline catheter insertion on patient. After reviewing chart, patient has hx of CKD Stage III. Nephrology consult suggested citing vessel preservation. Will continue to follow case for updates.

## 2021-07-17 NOTE — PROGRESS NOTES
Patient name: Shelbi Soto  Patient : [id number]  2021  9:41 AM  [address]    Portions of this note have been copied forward, however, changed to reflect the most current clinical status of this patient. Subjective: Low-grade temp of 100.7 at 0706 this a.m. with temp spike of 101.1 last night at midnight, placed on cefepime and blood cultures ordered. Denies any GI complaints. CT-guided bone marrow aspiration and biopsy planned for Monday, 2021 (Eliquis on hold). HISTORY OF PRESENT ILLNESS:    Dmitri Mackenzie was first seen by Dr. Jeffy Sow on 2001 during inpatient hospitalization at [facility], consulted for findings of pancytopenia. She presented to the ER department with hospital 7/15/2021 with low hemoglobin. Apparently, she was not five by her PCP and asked to go to the ER for further evaluation. The patient has complaints of lightheadedness and shortness of breath on exertion. She also had complaints of chest discomfort. She denies any hematochezia, melena, hematemesis, hematuria or any vaginal bleeding. She has a history of hysterectomy. She has a remote history of breast cancer and also history of benign adrenal nodule. She also has a history of pulmonary embolism about a year ago and is currently on Eliquis. CBC at presentation 7/15/2021 showed a WBC count 3.0 with ANC 0.4, hemoglobin 6.8/ and platelet counts 00,669. Anemia profile showed a normal vitamin B12 at 1684, ferritin 156, iron saturation 53%, TIBC 305, folate 7.4, haptoglobin 161, reticulocyte count 0.0495.  7/15/2021-received 1 unit PRBCs  2021-recommended bone marrow biopsy/aspirate to rule out a primary bone marrow process.     Diagnosis  · Pancytopenia  · Macrocytic anemia       OBJECTIVE:  VITALS: /71   Pulse 102   Temp 100.7 °F (38.2 °C) (Temporal)   Resp 18   Ht 5' 1\" (1.549 m)   Wt 212 lb 2 oz (96.2 kg)   LMP  (LMP Unknown)   SpO2 95%   BMI 40.08 kg/m²   I&O: Intake/Output Summary (Last 24 hours) at 7/17/2021 0941  Last data filed at 7/17/2021 0001  Gross per 24 hour   Intake 1100 ml   Output --   Net 1100 ml         Physical Exam  Vitals reviewed. Constitutional:       General: She is not in acute distress. Appearance: She is well-developed. She is obese. She is ill-appearing. She is not diaphoretic. HENT:      Head: Normocephalic and atraumatic. Mouth/Throat:      Pharynx: Uvula midline. Tonsils: No tonsillar exudate. Eyes:      General: Lids are normal. No scleral icterus. Right eye: No discharge. Left eye: No discharge. Conjunctiva/sclera: Conjunctivae normal.      Pupils: Pupils are equal, round, and reactive to light. Neck:      Thyroid: No thyroid mass or thyromegaly. Vascular: No JVD. Trachea: Trachea normal. No tracheal deviation. Cardiovascular:      Rate and Rhythm: Normal rate and regular rhythm. Heart sounds: Normal heart sounds. No murmur heard. No friction rub. No gallop. Pulmonary:      Effort: Pulmonary effort is normal. No respiratory distress. Breath sounds: Normal breath sounds. No wheezing or rales. Chest:      Chest wall: No tenderness. Abdominal:      General: Bowel sounds are normal. There is no distension. Palpations: Abdomen is soft. There is no mass. Tenderness: There is no abdominal tenderness. There is no guarding or rebound. Hernia: No hernia is present. Musculoskeletal:         General: No tenderness or deformity. Cervical back: Normal range of motion and neck supple. Comments: Range of motion within normal limits x4 extremities   Skin:     General: Skin is warm. Coloration: Skin is pale. Findings: No erythema or rash. Comments: Bilateral upper extremity edema significantly to hands   Neurological:      Mental Status: She is alert and oriented to person, place, and time. Cranial Nerves: No cranial nerve deficit. Coordination: Coordination normal.   Psychiatric:         Behavior: Behavior normal.         Thought Content: Thought content normal.         BMP:   Recent Labs     07/16/21  0534 07/17/21  0323    141   K 4.6 4.6    107   CO2 23 22   BUN 14 14   CREATININE 1.2* 1.5*   GLUCOSE 126* 162*   CALCIUM 10.7* 10.0     Recent Labs     07/17/21  0323 07/16/21  0534 07/15/21  2357 07/15/21  1912 07/15/21  1527   WBC 4.0* 3.3*  --   --  3.0*   HGB 7.6* 7.8* 8.6*   < > 6.8*   HCT 23.3* 23.6* 26.3*   < > 20.5*   .9* 103.1*  --   --  105.7*   PLT 39* 44*  --   --  47*    < > = values in this interval not displayed. CMP:    Recent Labs     07/15/21  1527 07/15/21  1527 07/16/21  0534 07/17/21  0323      < > 141 141   K 4.1   < > 4.6 4.6      < > 107 107   CO2 24   < > 23 22   BUN 16   < > 14 14   CREATININE 1.4*   < > 1.2* 1.5*   GFRAA 44*   < > 53* 41*   LABGLOM 36*   < > 44* 34*   GLUCOSE 119*   < > 126* 162*   PROT 6.3*  --   --   --    LABALBU 3.8  --   --   --    CALCIUM 10.6*   < > 10.7* 10.0   BILITOT 0.6  --   --   --    ALKPHOS 53  --   --   --    AST 17  --   --   --    ALT 16  --   --   --     < > = values in this interval not displayed. 30Day lookback of cultures:    Blood Culture Recent: No results for input(s): BC in the last 720 hours. Gram Stain Recent: No results for input(s): LABGRAM in the last 720 hours. Resp Culture Recent: No results for input(s): CULTRESP in the last 720 hours. Body Fluid Recent : No results for input(s): BFCX in the last 720 hours. MRSA Recent : No results for input(s): [facility] in the last 720 hours. Urine Culture Recent : No results for input(s): LABURIN in the last 720 hours. Organism Recent : No results for input(s): ORG in the last 720 hours. Radiology:   US ABDOMEN COMPLETE    Result Date: 7/16/2021  Examination. US ABDOMEN COMPLETE 7/16/2021 6:43 AM History: Pancytopenia. The ultrasound examination of the abdomen is performed.   There is no previous study for comparison. The gallbladder is surgically absent. Common bile duct measures 5 mm in diameter. There is fatty infiltration of the liver. No focal intrinsic abnormality. There is hepatopedal portal venous circulation. The pancreas is normal. A normal right kidney is seen measuring 9.1 x 6.1 cm. No evidence of mass or hydronephrosis. The spleen measures 9.4 x 8.2 x 4 cm. No intrinsic abnormality. A previous cholecystectomy. Fatty infiltration of the liver. The spleen is normal. Signed by Dr Carlos Bolton    Result Date: 7/16/2021  Examination. XR CHEST PORTABLE 7/16/2021 9:19 AM History: Pancytopenia. A single frontal portable upright view of the chest is compared with the previous study dated 8/7/2020. The lungs are poorly expanded but clear. There is a persistent moderate cardiomegaly. The atheromatous changes thoracic aorta are noted. A dual-chamber cardiac pacer is seen in place. There is no acute bony abnormality. Postsurgical changes of the right shoulder are incompletely visualized. No active cardiopulmonary disease.  Signed by Dr Hatch Shock      Medications  Current Facility-Administered Medications   Medication Dose Route Frequency Provider Last Rate Last Admin    ceFEPIme (MAXIPIME) 2,000 mg in sodium chloride (PF) 20 mL IV syringe  2,000 mg Intravenous Q12H Ebb MD Wilfredo   2,000 mg at 07/17/21 0807    glucose (GLUTOSE) 40 % oral gel 15 g  15 g Oral PRN DAVID Puckett - CNP        dextrose 50 % IV solution  12.5 g Intravenous PRN DAVID Puckett - CNP        glucagon (rDNA) injection 1 mg  1 mg Intramuscular PRN DAVID Puckett - CNP        dextrose 5 % solution  100 mL/hr Intravenous PRN DAVID Puckett - CNP        insulin lispro (HUMALOG) injection vial 0-6 Units  0-6 Units Subcutaneous TID  DAVID Puckett CNP   2 Units at 07/16/21 1812    insulin lispro (HUMALOG) injection vial 0-3 Units  0-3 Units Subcutaneous Nightly Mane Baker, APRN - CNP        0.9 % sodium chloride infusion   Intravenous PRN Meghan Dugan MD        calcium carbonate (TUMS) chewable tablet 500 mg  500 mg Oral TID PRN Genaro Sue MD        polyethylene glycol [facility]) packet 17 g  17 g Oral Daily PRN Genaro Sue MD        melatonin disintegrating tablet 5 mg  5 mg Oral Nightly PRN Genaro Sue MD        allopurinol (ZYLOPRIM) tablet 100 mg  100 mg Oral Daily Genaro Sue MD   100 mg at 07/17/21 0809    Vitamin B-12 TBDP 1 tablet  1 tablet Oral Nightly Genaro Sue MD        gabapentin (NEURONTIN) capsule 200 mg  200 mg Oral BID Genaro Sue MD   200 mg at 07/17/21 6813    lactobacillus (CULTURELLE) capsule 1 capsule  1 capsule Oral Daily with breakfast Genaro Sue MD   1 capsule at 07/17/21 0809    oxybutynin (DITROPAN-XL) extended release tablet 10 mg  10 mg Oral Nightly Genaro Sue MD   10 mg at 07/16/21 2027    sodium bicarbonate tablet 325 mg  325 mg Oral 4x Daily Genaro Sue MD   325 mg at 07/17/21 6856    Vitamin D (CHOLECALCIFEROL) tablet 2,000 Units  2,000 Units Oral Daily Genaro Sue MD   2,000 Units at 07/17/21 0813    ipratropium (ATROVENT) 0.02 % nebulizer solution 0.5 mg  0.5 mg Nebulization 4x daily Genaro Sue MD   0.5 mg at 07/16/21 1908    albuterol (PROVENTIL) nebulizer solution 2.5 mg  2.5 mg Nebulization Q1H PRN Genaro Sue MD        sodium chloride flush 0.9 % injection 5-40 mL  5-40 mL Intravenous 2 times per day Genaro Sue MD   10 mL at 07/16/21 2026    sodium chloride flush 0.9 % injection 5-40 mL  5-40 mL Intravenous PRN Genaro Sue MD        0.9 % sodium chloride infusion  25 mL Intravenous PRN Genaro Sue MD        acetaminophen (TYLENOL) tablet 650 mg  650 mg Oral Q6H PRN Genaro Sue MD        Or   Kimmie Zavala acetaminophen (TYLENOL) suppository 650 mg  650 mg Rectal Q6H PRN Genaro Sue MD        glucose (GLUTOSE) 40 % oral gel 15 g  15 g Oral PRN Kale Stewart, APRN - CNP        dextrose 50 % IV solution  12.5 g Intravenous PRN Kale Stewart, APRN - CNP        glucagon (rDNA) injection 1 mg  1 mg Intramuscular PRN Kale Roachis, APRN - CNP        dextrose 5 % solution  100 mL/hr Intravenous PRN Kalemiller Roachis, APRN - CNP           Allergies  Patient has no known allergies. ASSESSMENT:  #Pancytopenia  The differential diagnosis is broad and includes a primary bone marrow process to include MDS, leukemia, multiple myeloma. In addition nutritional deficiency such as copper deficiency or other versus infectious causes versus autoimmune.     Findings: Macrocytic anemia with neutropenia and thrombocytopenia     Labs on 7/15/2021   Vitamin [id number]  Ferritin-156, iron saturation 50%, TIBC 305  Folate 7.5     Labs requested 7/16/2021  · Path review: Peripheral blood smear  · Copper-in process  · Zinc-in process  · SPEP with immunofixation -in process  · Free light chain, immunoglobulins -in process  · US abdomen fatty infiltration of the liver and spleen normal measuring 9.4 x 8.2 x 4 cm. · HIV-nonreactive  · Hepatitis profile-nonreactive       Chest x-ray 7/16/2021 with no acute cardiopulmonary disease. Chest x-ray 7/16/2021            compared to                               chest x-ray 8/7/2020      Low-grade temp of 100.7 at 0706 this a.m. (7/17/2021 with temp spike of 101.1 last night at midnight, placed on cefepime and blood cultures ordered. Request urine culture, obtain In-N-Out cath       #Hypercalcemia-mild. Differential diagnosis includes malignant versus nonmalignant causes    Labs on 7/16/2021:  PTH intact 89.0   PTH related protein -in process   Vitamin D 25-hydroxy-47. 7       #Chronic kidney disease stage III  Baseline creatinine 1.4-1.5  Creatinine 1.5 today 1/17/2021    PLAN:  Awaiting serology results to include myeloma studies, copper, zinc, path review and PTH related protein  CT-guided bone marrow

## 2021-07-17 NOTE — PROGRESS NOTES
[facility]      Progress Note    Patient:  Mary Jo Marc  YOB: 1945  Date of Service: 7/17/2021  MRN: [id number]   Acct: [de-identified]   Primary Care Physician: Rashaad Alvarez MD  Advance Directive: Full Code  Admit Date: 7/15/2021       Hospital Day: 2    Portions of this note have been copied forward, however, updated to reflect the most current clinical status of this patient. CHIEF COMPLAINT abnormal labs    SUBJECTIVE:  Ms. Khoi Awad was resting comfortably in bed this afternoon. States she is doing okay and awaiting biopsy on Monday. Reports fever last night and this morning. Reports usual SOB, nothing change from baseline. Denies nausea, vomiting or diarrhea. CUMULATIVE HOSPITAL COURSE:   The patient is a 67 y.o. female with past medical history of breast cancer, colon polyp removed that was cancerous per patient, CKD 3 hypothyroidism, COPD, PE and who presented to [address] [place] [address] ED complaining of abnormal labs. Ms. Khoi Awad stated she had seen nephrology nurse practitioner and was ordered some routine labs. Labs came back abnormal and was advised to come to ED for evaluation. Stated she has been tired recently, increased shortness of breath, lightheadedness and dizziness ongoing for about a month. Stated she recently had an ear infection and was started on steroids and antibiotics. Related history of breast cancer in 1991, had a lumpectomy and no chemotherapy required. Reported colonoscopy 2 years ago with polyp removal that was cancerous, no chemotherapy at that time either. Reported recent mammogram and needle biopsy that was negative about 2 months ago. Denied bleeding, dark stools, or nosebleeds. Denied fever, chills, cough, abdominal pain, nausea, vomiting, or diarrhea. Work-up in ED revealed VSS, creatinine 1.4, GFR 36 (creatinine 1.5, GFR 34 on 5/7/2021), TSH reflex to FT4 <0.01, WBC 3.0, Hgb 6.8, platelet 47. Ferritin 156.2, iron 161, iron saturation 53, vitamin B12 1684. Received  1 unit of PRBC in ED. Patient was admitted to hospital medicine with pancytopenia with hematology consultation. Patient spiked fever on 7/17/21 early am and throughout the day. Blood cultures ordered. Cefepime started. Patient was noted to have elevated DDimer at 9.74, ordered VQ scan given patient's renal function. Review of Systems   Constitutional: Positive for fever. Negative for chills, diaphoresis and fatigue. HENT: Negative for congestion, ear pain, sinus pain, sore throat and trouble swallowing. Eyes: Negative for visual disturbance. Respiratory: Positive for shortness of breath. Negative for cough and wheezing. Reports SOB with exertion at baseline    Cardiovascular: Negative for chest pain, palpitations and leg swelling. Denies chest pain at this time    Gastrointestinal: Negative for abdominal distention, abdominal pain, blood in stool, constipation, diarrhea, nausea and vomiting. Endocrine: Negative for cold intolerance and heat intolerance. Genitourinary: Negative for difficulty urinating, flank pain, frequency and urgency. Musculoskeletal: Negative for arthralgias and myalgias. Neurological: Negative for dizziness, syncope, weakness, light-headedness, numbness and headaches. Hematological: Does not bruise/bleed easily. Psychiatric/Behavioral: Negative for agitation, confusion and dysphoric mood. Objective:   VITALS:  BP (!) 142/50   Pulse 60   Temp 101.3 °F (38.5 °C) (Temporal)   Resp 18   Ht 5' 1\" (1.549 m)   Wt 212 lb 2 oz (96.2 kg)   LMP  (LMP Unknown)   SpO2 96%   BMI 40.08 kg/m²   24HR INTAKE/OUTPUT:      Intake/Output Summary (Last 24 hours) at 7/17/2021 1646  Last data filed at 7/17/2021 1451  Gross per 24 hour   Intake 1100 ml   Output --   Net 1100 ml         Physical Exam  Constitutional:       General: She is not in acute distress. Appearance: Normal appearance. She is obese. She is not toxic-appearing or diaphoretic. HENT:      Head: Normocephalic and atraumatic. Right Ear: External ear normal.      Left Ear: External ear normal.      Nose: Nose normal. No congestion or rhinorrhea. Mouth/Throat:      Mouth: Mucous membranes are moist.      Pharynx: Oropharynx is clear. Eyes:      General: No scleral icterus. Extraocular Movements: Extraocular movements intact. Conjunctiva/sclera: Conjunctivae normal.   Cardiovascular:      Rate and Rhythm: Normal rate and regular rhythm. Pulses: Normal pulses. Heart sounds: Normal heart sounds. No murmur heard. No friction rub. No gallop. Pulmonary:      Effort: Pulmonary effort is normal. No respiratory distress. Breath sounds: Normal breath sounds. No wheezing, rhonchi or rales. Abdominal:      General: Abdomen is flat. Bowel sounds are normal. There is no distension. Palpations: Abdomen is soft. Tenderness: There is no abdominal tenderness. Musculoskeletal:         General: Swelling present. Normal range of motion. Cervical back: Normal range of motion and neck supple. Right lower leg: No edema. Left lower leg: No edema. Comments: swelling to BLE and BUE   Skin:     General: Skin is warm and dry. Coloration: Skin is not jaundiced. Findings: No erythema, lesion or rash. Neurological:      General: No focal deficit present. Mental Status: She is alert and oriented to person, place, and time. Mental status is at baseline. Cranial Nerves: No cranial nerve deficit. Sensory: No sensory deficit. Motor: No weakness. Psychiatric:         Mood and Affect: Mood normal.         Behavior: Behavior normal.         Thought Content:  Thought content normal.         Judgment: Judgment normal.            Medications:      dextrose      sodium chloride      sodium chloride      dextrose        cefepime  2,000 mg Intravenous Q12H    insulin lispro  0-6 Units Subcutaneous TID     insulin lispro 0-3 Units Subcutaneous Nightly    allopurinol  100 mg Oral Daily    Vitamin B-12  1 tablet Oral Nightly    gabapentin  200 mg Oral BID    lactobacillus  1 capsule Oral Daily with breakfast    oxybutynin  10 mg Oral Nightly    sodium bicarbonate  325 mg Oral 4x Daily    Vitamin D  2,000 Units Oral Daily    ipratropium  0.5 mg Nebulization 4x daily    sodium chloride flush  5-40 mL Intravenous 2 times per day     glucose, dextrose, glucagon (rDNA), dextrose, sodium chloride, calcium carbonate, polyethylene glycol, melatonin, albuterol, sodium chloride flush, sodium chloride, acetaminophen **OR** acetaminophen, glucose, dextrose, glucagon (rDNA), dextrose  ADULT DIET; Regular; Low Microbial     Lab and other Data:     Recent Labs     07/15/21  1527 07/15/21  1527 07/15/21  2357 07/16/21  0534 07/17/21  0323   WBC 3.0*  --   --  3.3* 4.0*   HGB 6.8*   < > 8.6* 7.8* 7.6*   PLT 47*  --   --  44* 39*    < > = values in this interval not displayed. Recent Labs     07/15/21  1527 07/16/21  0534 07/17/21  0323    141 141   K 4.1 4.6 4.6    107 107   CO2 24 23 22   BUN 16 14 14   CREATININE 1.4* 1.2* 1.5*   GLUCOSE 119* 126* 162*     Recent Labs     07/15/21  1527   AST 17   ALT 16   BILITOT 0.6   ALKPHOS 53     Troponin T:   Recent Labs     07/15/21  1933   TROPONINI <0.01     INR:   Recent Labs     07/15/21  1527   INR 1.18     UA:  Recent Labs     07/15/21  2254   COLORU YELLOW   PHUR 6.0   WBCUA 0   RBCUA 1   BACTERIA NEGATIVE*   CLARITYU Clear   SPECGRAV 1.010   LEUKOCYTESUR Negative   UROBILINOGEN 0.2   BILIRUBINUR Negative   BLOODU TRACE*   GLUCOSEU Negative     A1C:   Recent Labs     07/15/21  2054   LABA1C 6.3*       RAD:     US ABDOMEN COMPLETE  Result Date: 7/16/2021    A previous cholecystectomy. Fatty infiltration of the liver. The spleen is normal. Signed by Dr Jakob Bullard  Result Date: 7/16/2021    No active cardiopulmonary disease.  Signed by Dr Dk Anderson Anwer      Micro:    Rapid COVID- negative       Assessment/Plan   Principal Problem:    Pancytopenia (HCC)  Active Problems:    Chronic kidney disease, stage III (moderate) (HCC)    Controlled type 2 diabetes mellitus without complication, without long-term current use of insulin (HCC)    COPD (chronic obstructive pulmonary disease) (HCC)    Hyperthyroidism    Anemia    Palliative care patient  Resolved Problems:    * No resolved hospital problems. *    Principal Problem:    Pancytopenia ([address]-               -Monitor labs closely              -Hematology/ Oncology following     - recommends Myeloma studies     - Bone marrow biopsy/aspirate     - US abdomen to rule out ESLD/splenomegaly.      - US abdomen showed fatty infiltration of the liver    - Rapid HIV negative    - Fever on 7/17/21, blood cultures ordered, started on cefepime    - DDimer 9.74, ordered VQ scan given patient's renal function.         Active Problems:    Anemia-              - Hgb 7.6 today              - Received 1 unit PRBC in ED on 7/15/21               - monitor CBC closely               - Transfuse for hemoglobin less than 7 per protocol              - check Occult blood     Controlled type 2 diabetes mellitus without complication, without long-term current use of insulin ([address]- hemoglobin A1c 6.3, low dose SSI, monitor Accu-Cheks, hypoglycemia treatment protocol in place        COPD (chronic obstructive pulmonary disease) ([address]- noted, supplemental oxygen as needed       Hyperthyroidism- history of hypothyroidism, on Synthroid at home, HOLD Synthroid         DVT Prophylaxis: SCDs       Further Orders per Clinical course/attending. Electronically signed by DAVID Osullivan CNP on 7/17/2021 at 4:46 PM       EMR Dragon/Transcription disclaimer:   Much of this encounter note is an electronic transcription/translation of spoken language to printed text.  The electronic translation of spoken language may permit erroneous, or at times, nonsensical words or phrases to be inadvertently transcribed; although attempts have made to review the note for such errors, some may still exist.

## 2021-07-18 NOTE — PROGRESS NOTES
Patient name: Lisandra Davila  Patient :   2021  9:19 AM  [address]    Portions of this note have been copied forward, however, changed to reflect the most current clinical status of this patient. Subjective: Low-grade temp of 100.7 at 0759 this a.m. with temp spike of 101 @ 1600 on 2021. CT-guided bone marrow aspiration and biopsy planned for Monday, 2021 (Eliquis on hold). CBC stable. HISTORY OF PRESENT ILLNESS:    Steve Grajeda was first seen by Dr. Misty Naranjo on 2001 during inpatient hospitalization at [facility], consulted for findings of pancytopenia. She presented to the ER department with hospital 7/15/2021 with low hemoglobin. Apparently, she was not five by her PCP and asked to go to the ER for further evaluation. The patient has complaints of lightheadedness and shortness of breath on exertion. She also had complaints of chest discomfort. She denies any hematochezia, melena, hematemesis, hematuria or any vaginal bleeding. She has a history of hysterectomy. She has a remote history of breast cancer and also history of benign adrenal nodule. She also has a history of pulmonary embolism about a year ago and is currently on Eliquis. CBC at presentation 7/15/2021 showed a WBC count 3.0 with ANC 0.4, hemoglobin 6.8/ and platelet counts 36,031. Anemia profile showed a normal vitamin B12 at 1684, ferritin 156, iron saturation 53%, TIBC 305, folate 7.4, haptoglobin 161, reticulocyte count 0.0495.  7/15/2021-received 1 unit PRBCs  2021-recommended bone marrow biopsy/aspirate to rule out a primary bone marrow process.     Diagnosis  · Pancytopenia  · Macrocytic anemia  Pain Assessment  Pain Level: 3    OBJECTIVE:  VITALS: /88   Pulse 62   Temp 100.7 °F (38.2 °C) (Temporal)   Resp 18   Ht 5' 1\" (1.549 m)   Wt 221 lb 1 oz (100.3 kg)   LMP  (LMP Unknown)   SpO2 94%   BMI 41.77 kg/m²   I&O:     Intake/Output Summary (Last 24 hours) at 7/18/2021 0919  Last data filed at 7/17/2021 1858  Gross per 24 hour   Intake 840 ml   Output --   Net 840 ml         Physical Exam  Vitals reviewed. Constitutional:       General: She is not in acute distress. Appearance: She is well-developed. She is obese. She is ill-appearing. She is not diaphoretic. HENT:      Head: Normocephalic and atraumatic. Mouth/Throat:      Pharynx: Uvula midline. Tonsils: No tonsillar exudate. Eyes:      General: Lids are normal. No scleral icterus. Right eye: No discharge. Left eye: No discharge. Conjunctiva/sclera: Conjunctivae normal.      Pupils: Pupils are equal, round, and reactive to light. Neck:      Thyroid: No thyroid mass or thyromegaly. Vascular: No JVD. Trachea: Trachea normal. No tracheal deviation. Cardiovascular:      Rate and Rhythm: Normal rate and regular rhythm. Heart sounds: Normal heart sounds. No murmur heard. No friction rub. No gallop. Pulmonary:      Effort: Pulmonary effort is normal. No respiratory distress. Breath sounds: Normal breath sounds. No wheezing or rales. Chest:      Chest wall: No tenderness. Abdominal:      General: Bowel sounds are normal. There is no distension. Palpations: Abdomen is soft. There is no mass. Tenderness: There is no abdominal tenderness. There is no guarding or rebound. Hernia: No hernia is present. Musculoskeletal:         General: No tenderness or deformity. Cervical back: Normal range of motion and neck supple. Comments: Range of motion within normal limits x4 extremities   Skin:     General: Skin is warm. Coloration: Skin is pale. Findings: No erythema or rash. Comments: Bilateral upper extremity edema significantly to hands   Neurological:      Mental Status: She is alert and oriented to person, place, and time. Cranial Nerves: No cranial nerve deficit.       Coordination: Coordination normal. Psychiatric:         Behavior: Behavior normal.         Thought Content: Thought content normal.         BMP:   Recent Labs     07/17/21  0323 07/18/21  0348    139   K 4.6 4.5    105   CO2 22 22   BUN 14 16   CREATININE 1.5* 1.6*   GLUCOSE 162* 195*   CALCIUM 10.0 10.3*     Recent Labs     07/18/21  0348 07/17/21 0323 07/16/21  0534   WBC 4.9 4.0* 3.3*   HGB 8.2* 7.6* 7.8*   HCT 25.3* 23.3* 23.6*   .3* 105.9* 103.1*   PLT 37* 39* 44*     CMP:    Recent Labs     07/15/21  1527 07/16/21  0534 07/17/21  0323 07/18/21  0348      < > 141 139   K 4.1   < > 4.6 4.5      < > 107 105   CO2 24   < > 22 22   BUN 16   < > 14 16   CREATININE 1.4*   < > 1.5* 1.6*   GFRAA 44*   < > 41* 38*   LABGLOM 36*   < > 34* 31*   GLUCOSE 119*   < > 162* 195*   PROT 6.3*  --   --   --    LABALBU 3.8  --   --   --    CALCIUM 10.6*   < > 10.0 10.3*   BILITOT 0.6  --   --   --    ALKPHOS 53  --   --   --    AST 17  --   --   --    ALT 16  --   --   --     < > = values in this interval not displayed. 30Day lookback of cultures:    Blood Culture Recent: No results for input(s): BC in the last 720 hours. Gram Stain Recent: No results for input(s): LABGRAM in the last 720 hours. Resp Culture Recent: No results for input(s): CULTRESP in the last 720 hours. Body Fluid Recent : No results for input(s): BFCX in the last 720 hours. MRSA Recent : No results for input(s): [facility] in the last 720 hours. Urine Culture Recent : No results for input(s): LABURIN in the last 720 hours. Organism Recent : No results for input(s): ORG in the last 720 hours. Radiology:   US ABDOMEN COMPLETE    Result Date: 7/16/2021  Examination. US ABDOMEN COMPLETE 7/16/2021 6:43 AM History: Pancytopenia. The ultrasound examination of the abdomen is performed. There is no previous study for comparison. The gallbladder is surgically absent. Common bile duct measures 5 mm in diameter. There is fatty infiltration of the liver.  No focal intrinsic abnormality. There is hepatopedal portal venous circulation. The pancreas is normal. A normal right kidney is seen measuring 9.1 x 6.1 cm. No evidence of mass or hydronephrosis. The spleen measures 9.4 x 8.2 x 4 cm. No intrinsic abnormality. A previous cholecystectomy. Fatty infiltration of the liver. The spleen is normal. Signed by Dr Abdi Feeling    Result Date: 7/16/2021  Examination. XR CHEST PORTABLE 7/16/2021 9:19 AM History: Pancytopenia. A single frontal portable upright view of the chest is compared with the previous study dated 8/7/2020. The lungs are poorly expanded but clear. There is a persistent moderate cardiomegaly. The atheromatous changes thoracic aorta are noted. A dual-chamber cardiac pacer is seen in place. There is no acute bony abnormality. Postsurgical changes of the right shoulder are incompletely visualized. No active cardiopulmonary disease.  Signed by Dr Giovanni Garcia      Medications  Current Facility-Administered Medications   Medication Dose Route Frequency Provider Last Rate Last Admin    ceFEPIme (MAXIPIME) 2,000 mg in sodium chloride (PF) 20 mL IV syringe  2,000 mg Intravenous Q12H Daniel Holley MD   2,000 mg at 07/18/21 0743    glucose (GLUTOSE) 40 % oral gel 15 g  15 g Oral PRN Yoan Graves APRN - CNP        dextrose 50 % IV solution  12.5 g Intravenous PRN Yoan Graves APRN - CNP        glucagon (rDNA) injection 1 mg  1 mg Intramuscular PRN Yoan Graves APRN - CNP        dextrose 5 % solution  100 mL/hr Intravenous PRN Yoan Graves APRN - CNP        insulin lispro (HUMALOG) injection vial 0-6 Units  0-6 Units Subcutaneous TID  Yoan Graves APRN - CNP   1 Units at 07/17/21 1839    insulin lispro (HUMALOG) injection vial 0-3 Units  0-3 Units Subcutaneous Nightly DAVID Melton - CNP        0.9 % sodium chloride infusion   Intravenous PRN Shayne Acosta MD        calcium carbonate (TUMS) chewable tablet 500 mg  500 mg Oral TID PRN Juan Humphrey MD   500 mg at 07/18/21 0350    polyethylene glycol (GLYCOLAX) packet 17 g  17 g Oral Daily PRN Juan Humphrey MD        melatonin disintegrating tablet 5 mg  5 mg Oral Nightly PRN Juan Humphrey MD   5 mg at 07/17/21 2035    allopurinol (ZYLOPRIM) tablet 100 mg  100 mg Oral Daily Juan Humphrey MD   100 mg at 07/18/21 0743    Vitamin B-12 TBDP 1 tablet  1 tablet Oral Nightly Juan Humphrey MD        gabapentin (NEURONTIN) capsule 200 mg  200 mg Oral BID Juan Humphrey MD   200 mg at 07/18/21 5234    lactobacillus (CULTURELLE) capsule 1 capsule  1 capsule Oral Daily with breakfast Juan Humphrey MD   1 capsule at 07/18/21 0744    oxybutynin (DITROPAN-XL) extended release tablet 10 mg  10 mg Oral Nightly Juan Humphrey MD   10 mg at 07/17/21 2035    sodium bicarbonate tablet 325 mg  325 mg Oral 4x Daily Juan Humphrey MD   325 mg at 07/18/21 4005    Vitamin D (CHOLECALCIFEROL) tablet 2,000 Units  2,000 Units Oral Daily Juan Humphrey MD   2,000 Units at 07/18/21 0743    ipratropium (ATROVENT) 0.02 % nebulizer solution 0.5 mg  0.5 mg Nebulization 4x daily Juan Humphrey MD   0.5 mg at 07/18/21 0724    albuterol (PROVENTIL) nebulizer solution 2.5 mg  2.5 mg Nebulization Q1H PRN Juan Humphrey MD        sodium chloride flush 0.9 % injection 5-40 mL  5-40 mL Intravenous 2 times per day Juan Humphrey MD   10 mL at 07/16/21 2026    sodium chloride flush 0.9 % injection 5-40 mL  5-40 mL Intravenous PRN Juan Humphrey MD        0.9 % sodium chloride infusion  25 mL Intravenous PRN Juan Humphrey MD        acetaminophen (TYLENOL) tablet 650 mg  650 mg Oral Q6H PRN Juan Humphrey MD   650 mg at 07/18/21 0800    Or    acetaminophen (TYLENOL) suppository 650 mg  650 mg Rectal Q6H PRN Juan Humphrey MD        glucose (GLUTOSE) 40 % oral gel 15 g  15 g Oral PRN DAVID Diaz - CNP        dextrose 50 % IV solution  12.5 g Intravenous PRN Andreea DAVID Robb CNP        glucagon (rDNA) injection 1 mg  1 mg Intramuscular PRN DAVID Escalante CNP        dextrose 5 % solution  100 mL/hr Intravenous PRN DAVID Escalante CNP           Allergies  Patient has no known allergies. ASSESSMENT:  #Pancytopenia  The differential diagnosis is broad and includes a primary bone marrow process to include MDS, leukemia, multiple myeloma. In addition nutritional deficiency such as copper deficiency or other versus infectious causes versus autoimmune.     Findings: Macrocytic anemia with neutropenia and thrombocytopenia           Labs on 7/15/2021   Vitamin [id number]  Ferritin-156, iron saturation 50%, TIBC 305  Folate 7.5     Labs requested 7/16/2021  · Path review: Peripheral blood smear  · Copper-in process  · Zinc-in process  · SPEP with immunofixation -in process  · IgG 889, IgM 66 and IgA 135  · Free light chain, immunoglobulins -in process  · US abdomen fatty infiltration of the liver and spleen normal measuring 9.4 x 8.2 x 4 cm. · HIV-nonreactive  · Hepatitis profile-nonreactive  · REILLY detected       Chest x-ray 7/16/2021 with no acute cardiopulmonary disease. Chest x-ray 7/16/2021            compared to                               chest x-ray 8/7/2020      Low-grade temp of 100.7 at 0759 this a.m. with temp spike of 101 @ 1600 on 7/17/2021. Initiated on cefepime on 7/17/2021. Urinalysis on 7/17/2021 reported negative for bacteria with small amount of blood and protein of 30. Blood cultures on 7/17/2021-in process  Blood cultures on 7/18/2021-in process     #Hypercalcemia-mild. Differential diagnosis includes malignant versus nonmalignant causes    Labs on 7/16/2021:  PTH intact 89.0   PTH related protein -in process   Vitamin D 25-hydroxy-47. 7         #Chronic kidney disease stage III  Baseline creatinine 1.4-1.5          PLAN:  Awaiting serology results to include myeloma studies, copper, zinc, path review and PTH related protein  CT-guided bone marrow biopsy/aspirate planned for 7/19/2021, last dose of Eliquis a.m. of 7/15/2021  Continue cefepime  Blood cultures in process      DAVID Mirza    07/18/21  9:19 AM    Physician's attestation and contribution:  I, Dr Magaly Rey, personally and independently performed an evaluation on  Jeanette Fischer        I have reviewed relevant medical information/data to include but not limited to the medication list, relevant appropriate lab work and imaging when applicable. I reviewed other physician's notes, ancillary services and nurses assessments. I have reviewed the above documentation completed by Norva Castleman APRN   Please see my additional addended and/or modified contributions to the history of present illness, physical examination, and assessment/medical decision-making and plan that reflects my findings and impressions. I discussed essential elements of the care plan with Lidya HERNANDEZ and the patient. I have encouraged and answered all the questions raised to the patient's understanding and satisfaction. I concur with the above stated. Subjective-   Low-grade temp of 100.7 at 0759 this a.m. with temp spike of 101 @ 1600 on 7/17/2021. She is feeling okay this morning, in no acute distress. Sitting up in chair. Both arms are very swollen, edematous. Objective-   Clinically stable in no acute distress. Good breath sounds bilaterally on auscultation. Both upper extremities quite edematous. Assessment/plan:  Awaiting serology results to include myeloma studies, copper, zinc, path review and PTH related protein  CT-guided bone marrow biopsy/aspirate planned for 7/19/2021, last dose of Eliquis a.m. of 7/15/2021  Continue cefepime  Blood cultures in process    I have encouraged the patient and her family and discussed with nursing care to keep both arms elevated on a set of pillows to decrease the edema in the upper extremities.     Electronically signed by Talmage Halsted, MD on 7/18/21 at 9:55 AM CDT

## 2021-07-18 NOTE — PROGRESS NOTES
Pt has had a fever of 100.7 that was treated with tylenol and a temp of 100.1. Dr Geeta Richter was notified and a blood culture order was placed. Will continue to monitor.        Electronically signed by Karoline Elliott RN on 7/18/2021 at 5:51 PM

## 2021-07-18 NOTE — PROGRESS NOTES
1684. Received  1 unit of PRBC in ED. Patient was admitted to hospital medicine with pancytopenia with hematology consultation. Patient spiked fever on 7/17/21 early am and throughout the day. Blood cultures ordered. Cefepime started. Patient was noted to have elevated DDimer at 9.74, ordered VQ scan given patient's renal function. VQ scan showed low probability for PE. Review of Systems   Constitutional: Positive for fever. Negative for chills, diaphoresis and fatigue. HENT: Negative for congestion, ear pain, sinus pain, sore throat and trouble swallowing. Eyes: Negative for visual disturbance. Respiratory: Positive for shortness of breath. Negative for cough and wheezing. Reports SOB secondary to right-sided rib pain   Cardiovascular: Negative for chest pain, palpitations and leg swelling. Denies chest pain at this time    Gastrointestinal: Negative for abdominal distention, abdominal pain, blood in stool, constipation, diarrhea, nausea and vomiting. Endocrine: Negative for cold intolerance and heat intolerance. Genitourinary: Negative for difficulty urinating, flank pain, frequency and urgency. Musculoskeletal: Positive for arthralgias. Negative for myalgias. Reports left-sided rib pain   Neurological: Negative for dizziness, syncope, weakness, light-headedness, numbness and headaches. Hematological: Does not bruise/bleed easily. Psychiatric/Behavioral: Negative for agitation, confusion and dysphoric mood.  Hallucinations:           Objective:   VITALS:  /88   Pulse 62   Temp 100.7 °F (38.2 °C) (Temporal)   Resp 18   Ht 5' 1\" (1.549 m)   Wt 221 lb 1 oz (100.3 kg)   LMP  (LMP Unknown)   SpO2 94%   BMI 41.77 kg/m²   24HR INTAKE/OUTPUT:      Intake/Output Summary (Last 24 hours) at 7/18/2021 1321  Last data filed at 7/17/2021 1858  Gross per 24 hour   Intake 480 ml   Output --   Net 480 ml         Physical Exam  Constitutional:       General: She is not in acute distress. Appearance: Normal appearance. She is obese. She is not toxic-appearing or diaphoretic. HENT:      Head: Normocephalic and atraumatic. Right Ear: External ear normal.      Left Ear: External ear normal.      Nose: Nose normal. No congestion or rhinorrhea. Mouth/Throat:      Mouth: Mucous membranes are moist.      Pharynx: Oropharynx is clear. Eyes:      General: No scleral icterus. Extraocular Movements: Extraocular movements intact. Conjunctiva/sclera: Conjunctivae normal.   Cardiovascular:      Rate and Rhythm: Normal rate and regular rhythm. Pulses: Normal pulses. Heart sounds: Normal heart sounds. No murmur heard. No friction rub. No gallop. Pulmonary:      Effort: Pulmonary effort is normal. No respiratory distress. Breath sounds: Normal breath sounds. No wheezing, rhonchi or rales. Abdominal:      General: Abdomen is flat. Bowel sounds are normal. There is no distension. Palpations: Abdomen is soft. Tenderness: There is no abdominal tenderness. Musculoskeletal:         General: Swelling present. Normal range of motion. Cervical back: Normal range of motion and neck supple. Right lower leg: No edema. Left lower leg: No edema. Comments: swelling to BLE and BUE   Skin:     General: Skin is warm and dry. Coloration: Skin is not jaundiced. Findings: No erythema, lesion or rash. Neurological:      General: No focal deficit present. Mental Status: She is alert and oriented to person, place, and time. Mental status is at baseline. Cranial Nerves: No cranial nerve deficit. Sensory: No sensory deficit. Motor: No weakness. Psychiatric:         Mood and Affect: Mood normal.         Behavior: Behavior normal.         Thought Content:  Thought content normal.         Judgment: Judgment normal.            Medications:      dextrose      sodium chloride      sodium chloride      dextrose  cefepime  2,000 mg Intravenous Q12H    insulin lispro  0-6 Units Subcutaneous TID WC    insulin lispro  0-3 Units Subcutaneous Nightly    allopurinol  100 mg Oral Daily    Vitamin B-12  1 tablet Oral Nightly    gabapentin  200 mg Oral BID    lactobacillus  1 capsule Oral Daily with breakfast    oxybutynin  10 mg Oral Nightly    sodium bicarbonate  325 mg Oral 4x Daily    Vitamin D  2,000 Units Oral Daily    ipratropium  0.5 mg Nebulization 4x daily    sodium chloride flush  5-40 mL Intravenous 2 times per day     morphine, glucose, dextrose, glucagon (rDNA), dextrose, sodium chloride, calcium carbonate, polyethylene glycol, melatonin, albuterol, sodium chloride flush, sodium chloride, acetaminophen **OR** acetaminophen, glucose, dextrose, glucagon (rDNA), dextrose  ADULT DIET; Regular; Low Microbial     Lab and other Data:     Recent Labs     07/16/21  0534 07/17/21  0323 07/18/21  0348   WBC 3.3* 4.0* 4.9   HGB 7.8* 7.6* 8.2*   PLT 44* 39* 37*     Recent Labs     07/16/21  0534 07/17/21  0323 07/18/21  0348    141 139   K 4.6 4.6 4.5    107 105   CO2 23 22 22   BUN 14 14 16   CREATININE 1.2* 1.5* 1.6*   GLUCOSE 126* 162* 195*     Recent Labs     07/15/21  1527   AST 17   ALT 16   BILITOT 0.6   ALKPHOS 53     Troponin T:   Recent Labs     07/15/21  1933   TROPONINI <0.01     INR:   Recent Labs     07/15/21  1527   INR 1.18     UA:  Recent Labs     07/17/21  2122   COLORU YELLOW   PHUR 6.5   WBCUA 1   RBCUA 4   BACTERIA NEGATIVE*   CLARITYU Clear   SPECGRAV 1.012   LEUKOCYTESUR Negative   UROBILINOGEN 0.2   BILIRUBINUR Negative   BLOODU SMALL*   GLUCOSEU Negative     A1C:   Recent Labs     07/15/21  2054   LABA1C 6.3*       RAD:     US ABDOMEN COMPLETE  Result Date: 7/16/2021    A previous cholecystectomy. Fatty infiltration of the liver. The spleen is normal. Signed by Dr Maude Arteaga  Result Date: 7/16/2021    No active cardiopulmonary disease.  Signed by  Markus Caba      Micro:    Rapid COVID- negative       Assessment/Plan   Principal Problem:    Pancytopenia ([address]  Active Problems:    Chronic kidney disease, stage III (moderate) (HCC)    Controlled type 2 diabetes mellitus without complication, without long-term current use of insulin (HCC)    COPD (chronic obstructive pulmonary disease) (HCC)    Hyperthyroidism    Anemia    Palliative care patient  Resolved Problems:    * No resolved hospital problems. *    Principal Problem:    Pancytopenia ([address]-               -Monitor labs closely              -Hematology/ Oncology following     - recommends Myeloma studies     - Bone marrow biopsy/aspirate     - US abdomen to rule out ESLD/splenomegaly.      - US abdomen showed fatty infiltration of the liver    - Rapid HIV negative     -Hepatitis panel- nonreactive    -REILLY detected   - Fever on 7/17/21, blood cultures ordered, started on cefepime    - DDimer 9.74, ordered VQ scan given patient's renal function, showed low probability for PE    -X-ray left ribs- negative for fracture       Active Problems:    Anemia-              - Hgb 8.2 today              - Received 1 unit PRBC in ED on 7/15/21               - monitor CBC closely               - Transfuse for hemoglobin less than 7 per protocol              - check Occult blood     Controlled type 2 diabetes mellitus without complication, without long-term current use of insulin ([facility])- hemoglobin A1c 6.3, low dose SSI, monitor Accu-Cheks, hypoglycemia treatment protocol in place         COPD (chronic obstructive pulmonary disease) ([address]- noted, supplemental oxygen as needed       Hyperthyroidism- history of hypothyroidism, on Synthroid at home, HOLD Synthroid         DVT Prophylaxis: SCDs       Further Orders per Clinical course/attending.      Electronically signed by DAVID Watts CNP on 7/18/2021 at 1:21 PM       EMR Dragon/Transcription disclaimer:   Much of this encounter note is an electronic transcription/translation of spoken language to printed text.  The electronic translation of spoken language may permit erroneous, or at times, nonsensical words or phrases to be inadvertently transcribed; although attempts have made to review the note for such errors, some may still exist.

## 2021-07-19 NOTE — CONSULTS
Procedure Laterality Date    ADRENALECTOMY      APPENDECTOMY      BACK SURGERY      BREAST BIOPSY Left 1994    benign    BREAST LUMPECTOMY Right 1991    CARPAL TUNNEL RELEASE      CARPAL TUNNEL RELEASE  1990    CATARACT REMOVAL      CORONARY ANGIOPLASTY      GALLBLADDER SURGERY      HYSTERECTOMY  1979    PACEMAKER INSERTION  07/22/2020    PARTIAL HYSTERECTOMY      TONSILLECTOMY  1997    TOTAL KNEE ARTHROPLASTY       BREAST CYST ASPIRATION RIGHT Right 5/20/2021    US BREAST CYST ASPIRATION RIGHT 5/20/2021 [facility]         MEDICATIONS :       Scheduled Meds:   guaiFENesin  600 mg Oral BID    vancomycin (VANCOCIN) intermittent dosing (placeholder)   Other RX Placeholder    cefepime  2,000 mg Intravenous Q12H    insulin lispro  0-6 Units Subcutaneous TID     insulin lispro  0-3 Units Subcutaneous Nightly    allopurinol  100 mg Oral Daily    Vitamin B-12  1 tablet Oral Nightly    gabapentin  200 mg Oral BID    lactobacillus  1 capsule Oral Daily with breakfast    sodium bicarbonate  325 mg Oral 4x Daily    Vitamin D  2,000 Units Oral Daily    ipratropium  0.5 mg Nebulization 4x daily    sodium chloride flush  5-40 mL Intravenous 2 times per day     Continuous Infusions:   sodium chloride      sodium chloride 100 mL/hr at 07/18/21 2109    dextrose      sodium chloride      sodium chloride      dextrose       PRN Meds:sodium chloride, aluminum & magnesium hydroxide-simethicone, morphine, acetaminophen **OR** acetaminophen, ibuprofen, glucose, dextrose, glucagon (rDNA), dextrose, sodium chloride, calcium carbonate, polyethylene glycol, melatonin, albuterol, sodium chloride flush, sodium chloride, glucose, dextrose, glucagon (rDNA), dextrose    ALLERGIES:      Patient has no known allergies. SOCIAL HISTORY:     TOBACCO:   reports that she has never smoked. She has never used smokeless tobacco.     ETOH:   reports no history of alcohol use.   Patient currently lives at home with her spouse  FAMILY HISTORY:         Problem Relation Age of Onset   Patricia Palma Breast Cancer Mother 76    Breast Cancer Sister 59       REVIEW OF SYSTEMS:     Constitutional: Fever and fatigue  Ears: Minimal hearing difficulty  Mouth/throat: no dysphagia  Lungs: Pleuritic chest pain  CVS: no palpitation, no midsternal chest pain  GI: no abdominal pain, no nausea , no vomiting  KURTIS: no dysuria, frequency and urgency  Musculoskeletal: Chronic back pain  Neuro: no headache  Endocrine: no polyuria, polydipsia  Hematology: Anemia, pancytopenia  Dermatology: no skin rash, no boils, no recent tick bites      PHYSICAL EXAM:     /70   Pulse 100   Temp 97.8 °F (36.6 °C) (Oral)   Resp 22   Ht 5' 1\" (1.549 m)   Wt 219 lb 6 oz (99.5 kg)   LMP  (LMP Unknown)   SpO2 95%   BMI 41.45 kg/m²  Temp (24hrs), Av.9 °F (37.7 °C), Min:97.8 °F (36.6 °C), Max:101.7 °F (38.7 °C)    General: Obese female lying in bed with 2 family members at bedside. Bruno Ra HEENT: pink conjunctiva, anicteric sclera, O2 per nasal cannula in place  Respiratory: Effort even and somewhat labored. She does have some short shallow respirations. Cardiovascular:  RRR , distant heart tones likely due to body habitus. Chest: Patient has some minimal tenderness underneath her left predominantly pleuritic in nature  Abdomen: Obesesoft, non tender to palpation, BS positive  Extremities: Bilateral pretibial edema  Skin:  Warm and dry. CNS: Globally weak in. She is able to answer questions with clear speech.   She does defer to her  quite a bit answer duration questions      LABS:     CBC with DIFF:   Recent Labs     21  0323 21  0348 21  0133   WBC 4.0* 4.9 3.6*   RBC 2.20* 2.38* 1.99*   HGB 7.6* 8.2* 7.0*   HCT 23.3* 25.3* 21.4*   .9* 106.3* 107.5*   MCH 34.5* 34.5* 35.2*   MCHC 32.6* 32.4* 32.7*   RDW 18.0* 18.0* 18.0*   PLT 39* 37* 31*   MPV 11.5 10.0 12.2   NEUTOPHILPCT 16.6* 21.3* 27.1*   LYMPHOPCT 56.6* 52.0* 42.6* MONOPCT 24.5* 22.2* 22.4*   EOSRELPCT 0.8 0.4 0.6   BASOPCT 0.0 0.0 0.3   NEUTROABS 0.7* 1.1* 1.0*   LYMPHSABS 2.2 2.6 1.5   MONOSABS 1.00* 1.10* 0.80   EOSABS 0.00 0.00 0.00   BASOSABS 0.00 0.00 0.00       CMP/BMP:  Recent Labs     07/17/21  0323 07/18/21  0348 07/19/21  0133    139 134*   K 4.6 4.5 4.5    105 104   CO2 22 22 21*   ANIONGAP 12 12 9   GLUCOSE 162* 195* 170*   BUN 14 16 23   CREATININE 1.5* 1.6* 2.6*   LABGLOM 34* 31* 18*   CALCIUM 10.0 10.3* 9.5          Culture:   Recent Labs     07/17/21  1556 07/18/21  0348   BC No Growth to date. Any change in status will be called. --    BLOODCULT2  --  No Growth to date. Any change in status will be called. IMAGING:   US ABDOMEN COMPLETE    Result Date: 7/16/2021  Examination. US ABDOMEN COMPLETE 7/16/2021 6:43 AM History: Pancytopenia. The ultrasound examination of the abdomen is performed. There is no previous study for comparison. The gallbladder is surgically absent. Common bile duct measures 5 mm in diameter. There is fatty infiltration of the liver. No focal intrinsic abnormality. There is hepatopedal portal venous circulation. The pancreas is normal. A normal right kidney is seen measuring 9.1 x 6.1 cm. No evidence of mass or hydronephrosis. The spleen measures 9.4 x 8.2 x 4 cm. No intrinsic abnormality. A previous cholecystectomy. Fatty infiltration of the liver. The spleen is normal. Signed by Dr Carlos Bolton    Result Date: 7/16/2021  Examination. XR CHEST PORTABLE 7/16/2021 9:19 AM History: Pancytopenia. A single frontal portable upright view of the chest is compared with the previous study dated 8/7/2020. The lungs are poorly expanded but clear. There is a persistent moderate cardiomegaly. The atheromatous changes thoracic aorta are noted. A dual-chamber cardiac pacer is seen in place. There is no acute bony abnormality.  Postsurgical changes of the right shoulder are incompletely visualized. No active cardiopulmonary disease. Signed by Dr Geoff Yates [[id number]] Resulted: 07/18/21 2128      Order Status: Completed Updated: 07/18/21 2130     Narrative:       EXAM: XR CHEST PORTABLE - 7/18/2021 9:07 PM   INDICATION: Dyspnea   COMPARISON: 7/16/2021   FINDINGS:   Cardiac silhouette is enlarged but stable. 2-lead cardiac pacing   device is stable in position. Lung volumes are low with mild   atelectasis in the lung bases. No pleural effusion or visible   pneumothorax. Cervical spine fusion hardware is noted. RIGHT humeral   head suture anchor is noted.     Impression:       1.  Low lung volumes with resultant bibasilar atelectasis. 2.  Stable cardiomegaly. Signed by Dr Tonna Siemens     Patient Active Problem List   Diagnosis    Community acquired pneumonia of left lower lobe of lung    Stage 2 moderate COPD by GOLD classification ([address]    Acquired hypothyroidism    Morbid obesity with BMI of 40.0-44.9, adult ([address]    Chronic kidney disease, stage III (moderate) ([address]    Malignant neoplasm of upper-inner quadrant of right female breast ([address]    Adrenal gland cancer, unspecified laterality ([address]    Chest pain    Pulmonary embolism ([address]    Pacemaker complications, initial encounter    Chronic anticoagulation    Second degree type I atrioventricular block    Dyspnea    Malignant neoplasm of colon ([address]    Controlled type 2 diabetes mellitus without complication, without long-term current use of insulin (HCC)    Mass of left submandibular region    Pancytopenia ([address]    COPD (chronic obstructive pulmonary disease) ([address]    Hyperthyroidism    Anemia    Palliative care patient     65-year-old morbidly obese female reportedly sent to the emergency department for admission when noted to be abnormal and outpatient.   The patient describes no acute onset of illness that she has been doing poorly the last health/weakness stand for at least a year if not longer she reports some minimal improvement after her pacemaker was placed    Patient has been having fevers here that does not sound like she was having fevers at home prior to admission    Patient empirically covered for neutropenia associated with fever. Has been on cefepime since admission - change to meropenem. Bone marrow is planned. We will also send Ehrlichia and  [facility] spotted fever titers and empirically start doxycycline pending additional work-up.     Thank you Adams Noe MD for allowing me to participate in this patient's care  Electronically signed by Perla Villela MD on 7/19/2021 at 9:07 AM

## 2021-07-19 NOTE — PROGRESS NOTES
Pharmacy Consult      Lisandra Davila is a 67 y.o. female for whom pharmacy has been consulted to dose meropenem. Patient Active Problem List   Diagnosis    Community acquired pneumonia of left lower lobe of lung    Stage 2 moderate COPD by GOLD classification ([address]    Acquired hypothyroidism    Morbid obesity with BMI of 40.0-44.9, adult ([address]    Chronic kidney disease, stage III (moderate) (HCC)    Malignant neoplasm of upper-inner quadrant of right female breast ([address]    Adrenal gland cancer, unspecified laterality ([address]    Chest pain    Pulmonary embolism ([address]    Pacemaker complications, initial encounter    Chronic anticoagulation    Second degree type I atrioventricular block    Dyspnea    Malignant neoplasm of colon ([address]    Controlled type 2 diabetes mellitus without complication, without long-term current use of insulin (HCC)    Mass of left submandibular region    Pancytopenia ([address]    COPD (chronic obstructive pulmonary disease) ([address]    Hyperthyroidism    Anemia    Palliative care patient       Allergies:  Patient has no known allergies. Recent Labs     07/18/21  0348 07/19/21  0133   CREATININE 1.6* 2.6*       Ht/Wt:   Ht Readings from Last 1 Encounters:   07/15/21 5' 1\" (1.549 m)        Wt Readings from Last 1 Encounters:   07/19/21 219 lb 6 oz (99.5 kg)         Estimated Creatinine Clearance: 20 mL/min (A) (based on SCr of 2.6 mg/dL (H)). Assessment/Plan:    Will initiate meropenem 500 mg IV every 12 hours. Thank you for the consult. Will continue to follow.     Electronically signed by Darvin Wilson [facility] on 7/19/2021 at 2:47 PM

## 2021-07-19 NOTE — CARE COORDINATION
Explained [address]  program.  Provided literature. Patient consented to participate in [facility] program.  Information faxed to [facility].   Electronically signed by Raffaele Shultz RN on 7/19/2021 at 5:36 PM

## 2021-07-19 NOTE — PROGRESS NOTES
Pt o2 dropped to 87%, pt placed on o2 at 2 liters per nasal cannula. Dr. Stewart was messaged regarding fever, shaking, and o2 changes. New orders placed and being done. Family in room and all was explained with pt.  Pt still complains of left rib pain, morphine given per order as xray was negative

## 2021-07-19 NOTE — CARE COORDINATION
CM Initial Assessment   Met with pt's spouse at pt's bedside to assess dc plans/needs. Pt out for testing at this time. The following information has been reviewed and confirmed: Address:  [address]    Phone number:   [phone] (home)     Pt lives at home with her spouse, it is just the 2 of them in the home. She is independent at baseline, has DME at home for herself if needed. They plan for pt to return home at dc. Spouse reports that Eliquis is a little pricey for them, around $40/mo, but this is doable. Plan for spouse to transport pt home at dc. Denied any further needs at this time, [facility]ILMAN will continue to follow.    Electronically signed by Bala Scott on 7/19/2021 at 1:09 PM

## 2021-07-19 NOTE — PROGRESS NOTES
Pt seemingly declining, mental status is not as alert. See vitals. Dr. Nasreen Parham was notified of pt continuing fever after tylenol and motrin given per order, as well as IVF at 100 per order. New order for a 1000 ml bolus of NS now. Will continue to monitor closely and update Dr. Stewart. Family at bedside.

## 2021-07-19 NOTE — CONSULTS
 Pulmonary embolism ([facility] [place] [facility] 07/29/2020    Pacemaker complications, initial encounter 08/05/2020    Chronic anticoagulation 08/24/2020    Second degree type I atrioventricular block 08/24/2020    Dyspnea 08/24/2020    Malignant neoplasm of colon ([facility] 08/02/2018    Controlled type 2 diabetes mellitus without complication, without long-term current use of insulin ([facility] [place] [facility] 01/27/2021    Mass of left submandibular region 02/03/2021     Resolved Ambulatory Problems     Diagnosis Date Noted    Bradycardia 07/22/2020     Past Medical History:   Diagnosis Date    Palliative care patient 07/16/2021         Review of Systems     Constitutional:  No weight loss, no fever/chills  Eyes:  No eye pain, no eye redness  Cardiovascular:  No chest pain, no worsening of edema  Respiratory:  No hemoptysis, no stidor  Gastrointestinal:  No blood in stool, no n/v, no diarrhea  Genitoruinary:  No hematuria, no difficulty with urination  Musculoskeletal:  No joint swelling, no redness  Integumentary:  No Rash, no itching  Neurological:  No focal weakness, No new sensory deficit  Psychiatric:  No depression, no confusion  Endocrine:  No polyuria, no polydipsia       Medications        Current Facility-Administered Medications:     0.9 % sodium chloride infusion, , Intravenous, PRN, Gareld Beverage Dontae, APRN    aluminum & magnesium hydroxide-simethicone (MAALOX) 200-200-20 MG/5ML suspension 30 mL, 30 mL, Oral, Q6H PRN, Gareld Beverage Derington, APRN    fentaNYL (DURAGESIC) 12 MCG/HR 1 patch, 1 patch, Transdermal, Q72H, Debra Saldivar MD, 1 patch at 07/19/21 1028    [START ON 7/20/2021] ceFEPIme (MAXIPIME) 2,000 mg in sodium chloride (PF) 20 mL IV syringe, 2,000 mg, Intravenous, Q24H, Debra Saldivar MD    0.9 % sodium chloride infusion, , Intravenous, Continuous, Elder Chaudhari MD, Last Rate: 100 mL/hr at 07/18/21 2109, New Bag at 07/18/21 2109    guaiFENesin (MUCINEX) extended release tablet 600 mg, 600 mg, Oral, BID, Mela Landau Eliezer Morgan MD, 600 mg at 07/19/21 1028    acetaminophen (TYLENOL) tablet 1,000 mg, 1,000 mg, Oral, Q6H PRN **OR** acetaminophen (TYLENOL) suppository 650 mg, 650 mg, Rectal, Q6H PRN, Chantel Portillo MD    ibuprofen (ADVIL;MOTRIN) tablet 800 mg, 800 mg, Oral, Q6H PRN, Chantel Portillo MD    vancomycin Guajardo Rich) intermittent dosing (placeholder), , Other, RX Placeholder, Chantel Portillo MD    glucose (GLUTOSE) 40 % oral gel 15 g, 15 g, Oral, PRN, DAVID Mosqueda - CNP    dextrose 50 % IV solution, 12.5 g, Intravenous, PRN, DAVID Mosqueda - CNP    glucagon (rDNA) injection 1 mg, 1 mg, Intramuscular, PRN, DAVID Mosqueda - CNP    dextrose 5 % solution, 100 mL/hr, Intravenous, PRN, DAVID Mosqueda - CNP    insulin lispro (HUMALOG) injection vial 0-6 Units, 0-6 Units, Subcutaneous, TID WC, DAVID Mosqueda CNP, 2 Units at 07/18/21 1242    insulin lispro (HUMALOG) injection vial 0-3 Units, 0-3 Units, Subcutaneous, Nightly, DAVID Lindsey CNP    0.9 % sodium chloride infusion, , Intravenous, PRN, Olam Gottron, MD    calcium carbonate (TUMS) chewable tablet 500 mg, 500 mg, Oral, TID PRN, Som Kemp MD, 500 mg at 07/18/21 0350    polyethylene glycol (GLYCOLAX) packet 17 g, 17 g, Oral, Daily PRN, Som Kemp MD    melatonin disintegrating tablet 5 mg, 5 mg, Oral, Nightly PRN, Som Kemp MD, 5 mg at 07/17/21 2035    allopurinol (ZYLOPRIM) tablet 100 mg, 100 mg, Oral, Daily, Som Kemp MD, 100 mg at 07/19/21 1028    Vitamin B-12 TBDP 1 tablet, 1 tablet, Oral, Nightly, Som Kemp MD    gabapentin (NEURONTIN) capsule 200 mg, 200 mg, Oral, BID, Som Kemp MD, 200 mg at 07/19/21 1028    lactobacillus (CULTURELLE) capsule 1 capsule, 1 capsule, Oral, Daily with breakfast, Som Kemp MD, 1 capsule at 07/18/21 0744    sodium bicarbonate tablet 325 mg, 325 mg, Oral, 4x Daily, Som Kemp MD, 325 mg at 07/19/21 1028    Vitamin D (CHOLECALCIFEROL) tablet 2,000 Units, 2,000 Units, Oral, Daily, Julien Toscano MD, 2,000 Units at 07/19/21 1028    ipratropium (ATROVENT) 0.02 % nebulizer solution 0.5 mg, 0.5 mg, Nebulization, 4x daily, Julien Toscano MD, 0.5 mg at 07/19/21 1047    albuterol (PROVENTIL) nebulizer solution 2.5 mg, 2.5 mg, Nebulization, Q1H PRN, Julien Toscano MD    sodium chloride flush 0.9 % injection 5-40 mL, 5-40 mL, Intravenous, 2 times per day, Julien Toscano MD, 10 mL at 07/18/21 0900    sodium chloride flush 0.9 % injection 5-40 mL, 5-40 mL, Intravenous, PRN, Julien Toscano MD    0.9 % sodium chloride infusion, 25 mL, Intravenous, PRN, Julien Toscano MD    glucose (GLUTOSE) 40 % oral gel 15 g, 15 g, Oral, PRN, Elsi Jest, APRN - CNP    dextrose 50 % IV solution, 12.5 g, Intravenous, PRN, Elsi Jest, APRN - CNP    glucagon (rDNA) injection 1 mg, 1 mg, Intramuscular, PRN, Elsi Jest, APRN - CNP    dextrose 5 % solution, 100 mL/hr, Intravenous, PRN, Elsi Jest, APRN - CNP  Outpatient Medications Marked as Taking for the 7/15/21 encounter [facility] Encounter)   Medication Sig Dispense Refill    Levothyroxine Sodium (SYNTHROID PO) Take 77 mcg by mouth daily      allopurinol (ZYLOPRIM) 100 MG tablet Take 1 tablet by mouth once daily 90 tablet 1    ELIQUIS 5 MG TABS tablet Take 1 tablet by mouth twice daily 60 tablet 5    oxybutynin (DITROPAN-XL) 10 MG extended release tablet Take 1 tablet by mouth once daily 30 tablet 5    furosemide (LASIX) 20 MG tablet TAKE 1 TABLET BY MOUTH ON MONDAY, WEDNESDAY AND FRIDAY (Patient taking differently: daily ) 36 tablet 2    vitamin D (ERGOCALCIFEROL) 1.25 MG (93049 UT) CAPS capsule Take 1 capsule by mouth once a week 12 capsule 3    sodium bicarbonate 325 MG tablet Take 325 mg by mouth daily       gabapentin (NEURONTIN) 100 MG capsule Take 2 capsules by mouth 2 times daily for 180 days.  Intended supply: 30 days (Patient taking differently: Take 100 mg by mouth 2 times daily. Intended supply: 30 days) 120 capsule 5    atenolol (TENORMIN) 50 MG tablet Take 1 tablet by mouth nightly 90 tablet 3    raloxifene (EVISTA) 60 MG tablet Take 1 tablet by mouth once daily 90 tablet 3    tiotropium (SPIRIVA RESPIMAT) 2.5 MCG/ACT AERS inhaler Inhale 2 puffs into the lungs daily as needed       albuterol sulfate HFA (VENTOLIN HFA) 108 (90 Base) MCG/ACT inhaler Inhale 2 puffs into the lungs every 6 hours as needed for Wheezing      aspirin EC 81 MG EC tablet Take 81 mg by mouth nightly      lactobacillus (CULTURELLE) capsule Take 1 capsule by mouth daily      Cyanocobalamin (VITAMIN B-12) 5000 MCG TBDP Take 1 tablet by mouth nightly      fexofenadine (ALLEGRA ALLERGY) 180 MG tablet Take 180 mg by mouth daily         Allergies   Patient has no known allergies. Family History       Family History   Problem Relation Age of Onset    Breast Cancer Mother 76    Breast Cancer Sister 59     Family history negative for kidney disease.      Social History      Social History     Socioeconomic History    Marital status:      Spouse name: None    Number of children: None    Years of education: None    Highest education level: None   Occupational History    None   Tobacco Use    Smoking status: Never Smoker    Smokeless tobacco: Never Used   Vaping Use    Vaping Use: Never used   Substance and Sexual Activity    Alcohol use: No    Drug use: No    Sexual activity: None   Other Topics Concern    None   Social History Narrative     61 years    She has 1 son and 1 daughter    Worked at [employer] and also [employer] not presently working    Education high school    [address] [place] [address]    She does drive an automobile    Remains moderately active    Denies alcohol or tobacco consumption or substance usage     Social Determinants of Health     Financial Resource Strain:     Difficulty of Paying Living Expenses:    Food Insecurity:     Worried

## 2021-07-19 NOTE — PROGRESS NOTES
Patient seen and examined at bedside with family present. Still weak. No significant change. Vital/labs/imaging reviewed. Alert/BLAE/RRR/+edema    Pancytopenia/Neutropenic Fever: Unknown etiology. Cefepime. Blood cultures negative to date. Oncology on board. ID consulted. Planned for BM biopsy. SALVATORE on CKD III: IVF. Nephrology consulted. Full progress note to follow.

## 2021-07-19 NOTE — PROGRESS NOTES
Pharmacy Note  Vancomycin Consult    Hugo Toth is a 67 y.o. female started on Vancomycin for Pneumonia; consult received from Dr. Miranda Bowser to manage therapy. Also receiving the following antibiotics: Maxipime    Principal Problem:    Pancytopenia ([address]  Active Problems:    Chronic kidney disease, stage III (moderate) ([facility])    Controlled type 2 diabetes mellitus without complication, without long-term current use of insulin ([facility])    COPD (chronic obstructive pulmonary disease) ([facility])    Hyperthyroidism    Anemia    Palliative care patient  Resolved Problems:    * No resolved hospital problems. *      Allergies:  Patient has no known allergies. Temp max: 101.7    Recent Labs     07/17/21  0323 07/18/21  0348   BUN 14 16       Recent Labs     07/17/21  0323 07/18/21  0348   CREATININE 1.5* 1.6*       Recent Labs     07/17/21  0323 07/18/21  0348   WBC 4.0* 4.9         Intake/Output Summary (Last 24 hours) at 7/19/2021 0041  Last data filed at 7/18/2021 2046  Gross per 24 hour   Intake 720 ml   Output --   Net 720 ml       Culture Date Source Results   07/17/21 Blood No growth                 Ht Readings from Last 1 Encounters:   07/15/21 5' 1\" (1.549 m)        Wt Readings from Last 1 Encounters:   07/18/21 221 lb 1 oz (100.3 kg)         Body mass index is 41.77 kg/m². Estimated Creatinine Clearance: 32 mL/min (A) (based on SCr of 1.6 mg/dL (H)). Assessment/Plan:  Will initiate vancomycin as Pulse Dosing due to the decline in patients renal function. Timing of trough level will be determined based on culture results, renal function, and clinical response. Thank you for the consult. Will continue to follow.     Electronically signed by Tc Lopez, [facility] on 7/19/2021 at 12:41 AM

## 2021-07-19 NOTE — PROGRESS NOTES
[facility] Hospitalists      Patient:  Damian Gomez  YOB: 1945  Date of Service: 7/19/2021  MRN: [id number]   Acct: [de-identified]   Primary Care Physician: Narendra Ayala MD  Advance Directive: Full Code  Admit Date: 7/15/2021       Hospital Day: 4  Portions of this note have been copied forward, however, updated to reflect the most current clinical status of this patient    CHIEF COMPLAINT Generalized weakness    SUBJECTIVE:  Ms. Siomara Delgado is laying in bed ill appearing. She has NC O2 in place and appears tired. Daughter and  are at bedside. Daughter is upset that she was told pt could eat and then pt was unable to have BM biopsy today. Pt complains of muscle spasms and fatigue/weakness. CUMULATIVE HOSPITAL COURSE:   The patient is a 78 y. o. female with past medical history of breast cancer, colon polyp removed that was cancerous per patient, CKD 3 hypothyroidism, COPD, PE and who presented to [facility] ED complaining of abnormal labs. Ms. Siomara Delgado stated she had seen nephrology nurse practitioner and was ordered some routine labs. Labs came back abnormal and was advised to come to ED for evaluation.  Stated she has been tired recently, increased shortness of breath, lightheadedness and dizziness ongoing for about a month. Stated she recently had an ear infection and was started on steroids and antibiotics. Related history of breast cancer in 1991, had a lumpectomy and no chemotherapy required. Reported colonoscopy 2 years ago with polyp removal that was cancerous, no chemotherapy at that time either. Reported recent mammogram and needle biopsy that was negative about 2 months ago. Denied bleeding, dark stools, or nosebleeds.  Denied fever, chills, cough, abdominal pain, nausea, vomiting, or diarrhea.  Work-up in ED revealed VSS, creatinine 1.4, GFR 36 (creatinine 1.5, GFR 34 on 5/7/2021), TSH reflex to FT4 <0.01, WBC 3.0, Hgb 6.8, platelet 47.  Ferritin 156.2, iron 161, iron saturation 53, vitamin B12 1684. Received  1 unit of PRBC in ED. Patient was admitted to hospital medicine with pancytopenia with hematology consultation. Patient spiked fever on 7/17/21 early am and throughout the day. Blood cultures ordered. Cefepime started. Patient was noted to have elevated DDimer at 9.74, ordered VQ scan given patient's renal function. VQ scan showed low probability for PE. Nephrology and ID consulted. Review of Systems:   Review of Systems   Constitutional: Positive for fatigue. Negative for chills, diaphoresis and fever. HENT: Negative for congestion, ear pain, sinus pain, sore throat and trouble swallowing. Eyes: Negative for photophobia and visual disturbance. Respiratory: Positive for shortness of breath. Negative for cough, wheezing and stridor. Cardiovascular: Negative for chest pain, palpitations and leg swelling. Gastrointestinal: Negative for abdominal distention, abdominal pain, constipation, diarrhea, nausea and vomiting. Endocrine: Negative for cold intolerance and heat intolerance. Genitourinary: Negative for difficulty urinating, flank pain, frequency and urgency. Musculoskeletal: Positive for myalgias. Negative for arthralgias. Neurological: Positive for weakness. Negative for dizziness, syncope, light-headedness, numbness and headaches. Hematological: Does not bruise/bleed easily. Psychiatric/Behavioral: Negative for agitation, confusion and dysphoric mood. Objective:   VITALS:  BP (!) 128/52   Pulse 62   Temp 97.9 °F (36.6 °C) (Temporal)   Resp 18   Ht 5' 1\" (1.549 m)   Wt 219 lb 6 oz (99.5 kg)   LMP  (LMP Unknown)   SpO2 93%   BMI 41.45 kg/m²   24HR INTAKE/OUTPUT:      Intake/Output Summary (Last 24 hours) at 7/19/2021 1054  Last data filed at 7/19/2021 8502  Gross per 24 hour   Intake 2249 ml   Output 30 ml   Net 2219 ml       Physical Exam  Constitutional:       General: She is not in acute distress. Appearance: She is ill-appearing.  She is not toxic-appearing or diaphoretic. HENT:      Head: Normocephalic and atraumatic. Right Ear: External ear normal.      Left Ear: External ear normal.      Nose: Nose normal. No congestion or rhinorrhea. Mouth/Throat:      Mouth: Mucous membranes are moist.      Pharynx: Oropharynx is clear. Eyes:      General: No scleral icterus. Extraocular Movements: Extraocular movements intact. Conjunctiva/sclera: Conjunctivae normal.   Cardiovascular:      Rate and Rhythm: Normal rate and regular rhythm. Pulses: Normal pulses. Heart sounds: Normal heart sounds. No murmur heard. No friction rub. No gallop. Pulmonary:      Effort: Pulmonary effort is normal. No respiratory distress. Breath sounds: Normal breath sounds. No stridor. No wheezing, rhonchi or rales. Abdominal:      General: There is no distension. Palpations: Abdomen is soft. Tenderness: There is no abdominal tenderness. Musculoskeletal:         General: No swelling. Normal range of motion. Cervical back: Normal range of motion and neck supple. Right lower leg: Edema present. Left lower leg: Edema present. Skin:     General: Skin is warm and dry. Coloration: Skin is not jaundiced. Findings: No erythema, lesion or rash. Neurological:      General: No focal deficit present. Mental Status: She is alert. Mental status is at baseline. Cranial Nerves: No cranial nerve deficit. Sensory: No sensory deficit. Motor: No weakness. Psychiatric:         Mood and Affect: Mood normal.         Behavior: Behavior normal.         Thought Content:  Thought content normal.         Judgment: Judgment normal.            Medications:      sodium chloride      sodium chloride 100 mL/hr at 07/18/21 7377    dextrose      sodium chloride      sodium chloride      dextrose        fentaNYL  1 patch Transdermal Q72H    guaiFENesin  600 mg Oral BID    vancomycin (VANCOCIN) intermittent dosing (placeholder)   Other RX Placeholder    cefepime  2,000 mg Intravenous Q12H    insulin lispro  0-6 Units Subcutaneous TID WC    insulin lispro  0-3 Units Subcutaneous Nightly    allopurinol  100 mg Oral Daily    Vitamin B-12  1 tablet Oral Nightly    gabapentin  200 mg Oral BID    lactobacillus  1 capsule Oral Daily with breakfast    sodium bicarbonate  325 mg Oral 4x Daily    Vitamin D  2,000 Units Oral Daily    ipratropium  0.5 mg Nebulization 4x daily    sodium chloride flush  5-40 mL Intravenous 2 times per day     sodium chloride, aluminum & magnesium hydroxide-simethicone, acetaminophen **OR** acetaminophen, ibuprofen, glucose, dextrose, glucagon (rDNA), dextrose, sodium chloride, calcium carbonate, polyethylene glycol, melatonin, albuterol, sodium chloride flush, sodium chloride, glucose, dextrose, glucagon (rDNA), dextrose  ADULT DIET; Regular; Low Microbial     Lab and other Data:     Recent Labs     07/17/21  0323 07/18/21  0348 07/19/21  0133   WBC 4.0* 4.9 3.6*   HGB 7.6* 8.2* 7.0*   PLT 39* 37* 31*     Recent Labs     07/17/21  0323 07/18/21  0348 07/19/21  0133    139 134*   K 4.6 4.5 4.5    105 104   CO2 22 22 21*   BUN 14 16 23   CREATININE 1.5* 1.6* 2.6*   GLUCOSE 162* 195* 170*     UA:  Recent Labs     07/17/21  2122   COLORU YELLOW   PHUR 6.5   WBCUA 1   RBCUA 4   BACTERIA NEGATIVE*   CLARITYU Clear   SPECGRAV 1.012   LEUKOCYTESUR Negative   UROBILINOGEN 0.2   BILIRUBINUR Negative   BLOODU SMALL*   GLUCOSEU Negative       RAD:   US ABDOMEN COMPLETE  Result Date: 7/16/2021  A previous cholecystectomy. Fatty infiltration of the liver. The spleen is normal. Signed by Dr Babs Moyer  Result Date: 7/16/2021  No active cardiopulmonary disease.  Signed by Dr Kate Dandy:     Procedure Component Value Units Date/Time   Ehrlichia and Anaplasma Species by PCR [[id number]] Collected: 07/18/21 0348   Order Status: Sent Specimen: Blood Updated: 07/19/21 1351   Culture, Blood 2 [[id number]] Collected: 07/18/21 0348   Order Status: Completed Specimen: Blood Updated: 07/19/21 0514    Culture, Blood 2 No Growth to date.  Any change in status will be called. Narrative:     ORDER#: [id number]                          ORDERED BY: Jazmine Escobedo   SOURCE: Blood Antecubital-Lef              COLLECTED:  07/18/21 03:48   ANTIBIOTICS AT KRIS. :                      RECEIVED :  07/18/21 04:21   Culture, Respiratory [[id number]]    Order Status: No result Specimen: Sputum Expectorated    Legionella Antigen, Urine [[id number]]    Order Status: Sent Specimen: Urine, clean catch    STREP PNEUMONIAE ANTIGEN [[id number]]    Order Status: Sent Specimen: Urine, clean catch    Culture, Blood 2 [[id number]] Collected: 07/18/21 2015   Order Status: Sent Specimen: Blood Updated: 07/18/21 2038   Culture, Blood 1 [[id number]] Collected: 07/17/21 1556   Order Status: Completed Specimen: Blood Updated: 07/18/21 1714    Blood Culture, Routine No Growth to date.  Any change in status will be called. Narrative:     ORDER#: [id number]                          ORDERED BY: Jazmine Ecsobedo   SOURCE: Blood LFA                          COLLECTED:  07/17/21 15:56   ANTIBIOTICS AT KRIS. :                      RECEIVED :  07/17/21 16:21   COVID-19, Rapid [[id number]] Collected: 07/15/21 1911   Order Status: Completed Specimen: Nasopharyngeal Swab Updated: 07/15/21 1936    SARS-CoV-2, NAAT Not Detected             Assessment/Plan   Principal Problem:    Pancytopenia ([address]    Anemia    Fever  - Hematology/Oncology and ID following. Pt received 1 unit 7/15 and 7/19. ID has pt on Meropenem and doxycycline. CT-guided bone marrow biopsy/aspirate. US abdomen to rule out ESLD/splenomegaly.  US abdomen showed fatty infiltration of the liver,Rapid HIV negative, Hepatitis panel- non reactive, REILLY detected, DDimer 9.74, ordered VQ scan given patient's renal function, showed low probability for PE. Continue to monitor closely. Active Problems:    Chronic kidney disease, stage III (moderate) [facility])- Nephrology consulted recommendations appreciated.  IV hydration and repeat labs in AM.       Controlled type 2 diabetes mellitus without complication, without long-term current use of insulin ([facility])-A1C 6.3, low dose SSI, monitor Accu-Cheks, hypoglycemia treatment protocol in place       COPD (chronic obstructive pulmonary disease) ([facility]- O2 and nebs as needed      Hyperthyroidism- synthroid on hold      Palliative care patient-noted      Antibiotic: Meropenem and doxycycline     DVT Prophylaxis: SCDs      Dyana Gillette PA-C  7/19/2021, 10:54 AM

## 2021-07-19 NOTE — PROGRESS NOTES
Blood cultures obtain. Also messaged the hopitalist regarding pt ongoing fevers, not time for tylenol. Pt is currently on maxipime.

## 2021-07-19 NOTE — PROGRESS NOTES
Patient name: Veto Marquez  Patient :   2021  6:51 AM  ROOM [address]    Portions of this note have been copied forward, however, changed to reflect the most current clinical status of this patient. Subjective:  Fatigued. Had shakes with fever yesterday, mild mid chest discomfort going straight through \"like a bubble burst\"    HISTORY OF PRESENT ILLNESS:    Renelda Holter was first seen by Dr. Anita Mcrae on 2001 during inpatient hospitalization at [facility], consulted for findings of pancytopenia. She presented to the ER department with hospital 7/15/2021 with low hemoglobin. Apparently, she was not five by her PCP and asked to go to the ER for further evaluation. The patient has complaints of lightheadedness and shortness of breath on exertion. She also had complaints of chest discomfort. She denies any hematochezia, melena, hematemesis, hematuria or any vaginal bleeding. She has a history of hysterectomy. She has a remote history of breast cancer and also history of benign adrenal nodule. She also has a history of pulmonary embolism about a year ago and is currently on Eliquis. CBC at presentation 7/15/2021 showed a WBC count 3.0 with ANC 0.4, hemoglobin 6.8/ and platelet counts 00,104. Anemia profile showed a normal vitamin B12 at 1684, ferritin 156, iron saturation 53%, TIBC 305, folate 7.4, haptoglobin 161, reticulocyte count 0.0495.  7/15/2021-received 1 unit PRBCs  2021-recommended bone marrow biopsy/aspirate to rule out a primary bone marrow process.     Diagnosis  · Pancytopenia  · Macrocytic anemia  Pain Assessment  Pain Level:  (for fever)  Response to Pain Intervention: Asleep with RR greater than 10    OBJECTIVE:  VITALS: /70   Pulse 100   Temp 97.8 °F (36.6 °C) (Oral)   Resp 22   Ht 5' 1\" (1.549 m)   Wt 219 lb 6 oz (99.5 kg)   LMP  (LMP Unknown)   SpO2 95%   BMI 41.45 kg/m²   I&O:     Intake/Output Summary (Last 24 hours) at 7/19/2021 0651  Last data filed at 7/19/2021 0332  Gross per 24 hour   Intake 2249 ml   Output 30 ml   Net 2219 ml     Physical Exam  Vitals reviewed. Constitutional:       General: She is not in acute distress. Appearance: She is well-developed. She is obese. She is ill-appearing. She is not diaphoretic. Comments: Generalized edema   HENT:      Head: Normocephalic and atraumatic. Mouth/Throat:      Pharynx: Uvula midline. Tonsils: No tonsillar exudate. Eyes:      General: Lids are normal. No scleral icterus. Right eye: No discharge. Left eye: No discharge. Conjunctiva/sclera: Conjunctivae normal.   Neck:      Thyroid: No thyroid mass or thyromegaly. Vascular: No JVD. Trachea: Trachea normal. No tracheal deviation. Cardiovascular:      Rate and Rhythm: Normal rate and regular rhythm. Heart sounds: Normal heart sounds. Pulmonary:      Effort: Pulmonary effort is normal. No respiratory distress. Breath sounds: Normal breath sounds. No wheezing or rales. Abdominal:      General: Bowel sounds are normal. There is no distension. Palpations: Abdomen is soft. Tenderness: There is no abdominal tenderness. There is no guarding. Musculoskeletal:         General: No tenderness or deformity. Cervical back: Normal range of motion and neck supple. Right lower leg: Edema present. Left lower leg: Edema present. Comments: Generalized weakness   Skin:     General: Skin is warm. Coloration: Skin is pale. Findings: No erythema or rash. Comments: Bilateral upper extremity edema significantly to hands   Neurological:      Mental Status: She is alert and oriented to person, place, and time. Cranial Nerves: No cranial nerve deficit. Coordination: Coordination normal.   Psychiatric:         Behavior: Behavior normal.         Thought Content:  Thought content normal.       BMP:   Recent Labs     07/18/21  5609 07/19/21  0133    134*   K 4.5 4.5    104   CO2 22 21*   BUN 16 23   CREATININE 1.6* 2.6*   GLUCOSE 195* 170*   CALCIUM 10.3* 9.5     Recent Labs     07/19/21  0133 07/18/21  0348 07/17/21  0323   WBC 3.6* 4.9 4.0*   HGB 7.0* 8.2* 7.6*   HCT 21.4* 25.3* 23.3*   .5* 106.3* 105.9*   PLT 31* 37* 39*     CMP:    Recent Labs     07/18/21  0348 07/19/21  0133    134*   K 4.5 4.5    104   CO2 22 21*   BUN 16 23   CREATININE 1.6* 2.6*   GFRAA 38* 22*   LABGLOM 31* 18*   GLUCOSE 195* 170*   CALCIUM 10.3* 9.5       30Day lookback of cultures:    Blood Culture Recent:   Recent Labs     07/17/21  1556   BC No Growth to date. Any change in status will be called. Gram Stain Recent: No results for input(s): LABGRAM in the last 720 hours. Resp Culture Recent: No results for input(s): CULTRESP in the last 720 hours. Body Fluid Recent : No results for input(s): BFCX in the last 720 hours. MRSA Recent : No results for input(s): [address] in the last 720 hours. Urine Culture Recent : No results for input(s): LABURIN in the last 720 hours. Organism Recent : No results for input(s): ORG in the last 720 hours. Radiology:   US ABDOMEN COMPLETE    Result Date: 7/16/2021  Examination. US ABDOMEN COMPLETE 7/16/2021 6:43 AM History: Pancytopenia. The ultrasound examination of the abdomen is performed. There is no previous study for comparison. The gallbladder is surgically absent. Common bile duct measures 5 mm in diameter. There is fatty infiltration of the liver. No focal intrinsic abnormality. There is hepatopedal portal venous circulation. The pancreas is normal. A normal right kidney is seen measuring 9.1 x 6.1 cm. No evidence of mass or hydronephrosis. The spleen measures 9.4 x 8.2 x 4 cm. No intrinsic abnormality. A previous cholecystectomy. Fatty infiltration of the liver. The spleen is normal. Signed by Dr Geoff Yates    Result Date: 7/16/2021  Examination.  XR CHEST PORTABLE 7/16/2021 9:19 AM History: Pancytopenia. A single frontal portable upright view of the chest is compared with the previous study dated 8/7/2020. The lungs are poorly expanded but clear. There is a persistent moderate cardiomegaly. The atheromatous changes thoracic aorta are noted. A dual-chamber cardiac pacer is seen in place. There is no acute bony abnormality. Postsurgical changes of the right shoulder are incompletely visualized. No active cardiopulmonary disease.  Signed by Dr Clover Shi      Medications  Current Facility-Administered Medications   Medication Dose Route Frequency Provider Last Rate Last Admin    morphine injection 1 mg  1 mg Intravenous Q3H PRN Chato Suarez MD   1 mg at 07/18/21 2106    0.9 % sodium chloride infusion   Intravenous Continuous Johnny Betancur  mL/hr at 07/18/21 2109 New Bag at 07/18/21 2109    guaiFENesin (MUCINEX) extended release tablet 600 mg  600 mg Oral BID Johnny Betancur MD   600 mg at 07/18/21 2117    acetaminophen (TYLENOL) tablet 1,000 mg  1,000 mg Oral Q6H PRN Johnny Betancur MD        Or   [employer] acetaminophen (TYLENOL) suppository 650 mg  650 mg Rectal Q6H PRN Johnny Betancur MD        ibuprofen (ADVIL;MOTRIN) tablet 800 mg  800 mg Oral Q6H PRN Johnny Beatncur MD        vancomycin [facility]) intermittent dosing (placeholder)   Other RX Placeholder Johnny Betancur MD        ceFEPIme (MAXIPIME) 2,000 mg in sodium chloride (PF) 20 mL IV syringe  2,000 mg Intravenous Q12H Chato Suarez MD   2,000 mg at 07/18/21 2109    glucose (GLUTOSE) 40 % oral gel 15 g  15 g Oral PRN Aurelio Krabbe, APRN - CNP        dextrose 50 % IV solution  12.5 g Intravenous PRN Aurelio Krabbe, APRN - CNP        glucagon (rDNA) injection 1 mg  1 mg Intramuscular PRN Aurelio Krabbe, APRN - CNP        dextrose 5 % solution  100 mL/hr Intravenous PRN Aurelio Davidbbe, APRN - CNP        insulin lispro (HUMALOG) injection vial 0-6 Units  0-6 Units Subcutaneous TID WC DAVID Mata CNP   2 Units at 07/18/21 1242    insulin lispro (HUMALOG) injection vial 0-3 Units  0-3 Units Subcutaneous Nightly DAVID Mata CNP        0.9 % sodium chloride infusion   Intravenous PRN Stan Clark MD        calcium carbonate (TUMS) chewable tablet 500 mg  500 mg Oral TID PRN Susie Braun MD   500 mg at 07/18/21 0350    polyethylene glycol (GLYCOLAX) packet 17 g  17 g Oral Daily PRN Susie Braun MD        melatonin disintegrating tablet 5 mg  5 mg Oral Nightly PRN Susie Braun MD   5 mg at 07/17/21 2035    allopurinol (ZYLOPRIM) tablet 100 mg  100 mg Oral Daily Susie Braun MD   100 mg at 07/18/21 0743    Vitamin B-12 TBDP 1 tablet  1 tablet Oral Nightly Susie Braun MD        gabapentin (NEURONTIN) capsule 200 mg  200 mg Oral BID Susie Braun MD   200 mg at 07/18/21 2109    lactobacillus (CULTURELLE) capsule 1 capsule  1 capsule Oral Daily with breakfast Susie Braun MD   1 capsule at 07/18/21 0744    sodium bicarbonate tablet 325 mg  325 mg Oral 4x Daily Susie Braun MD   325 mg at 07/18/21 2110    Vitamin D (CHOLECALCIFEROL) tablet 2,000 Units  2,000 Units Oral Daily Susie Braun MD   2,000 Units at 07/18/21 0743    ipratropium (ATROVENT) 0.02 % nebulizer solution 0.5 mg  0.5 mg Nebulization 4x daily Susie Braun MD   0.5 mg at 07/18/21 1927    albuterol (PROVENTIL) nebulizer solution 2.5 mg  2.5 mg Nebulization Q1H PRN Susie Braun MD        sodium chloride flush 0.9 % injection 5-40 mL  5-40 mL Intravenous 2 times per day Susie Braun MD   10 mL at 07/18/21 0900    sodium chloride flush 0.9 % injection 5-40 mL  5-40 mL Intravenous PRN Susie Braun MD        0.9 % sodium chloride infusion  25 mL Intravenous PRN Susie Braun MD        glucose (GLUTOSE) 40 % oral gel 15 g  15 g Oral PRN DAVID Mata CNP        dextrose 50 % IV solution  12.5 g Intravenous PRN DAVID Garcia CNP        glucagon (rDNA) injection 1 mg  1 mg Intramuscular PRN DAVID Garcia CNP        dextrose 5 % solution  100 mL/hr Intravenous PRN DAVID Garcia CNP           Allergies  Patient has no known allergies. ASSESSMENT:  #1  Pancytopenia    The differential diagnosis is broad and includes a primary bone marrow process to include MDS, leukemia, multiple myeloma. In addition nutritional deficiency such as copper deficiency or other versus infectious causes versus autoimmune.     Findings: Macrocytic anemia with neutropenia and thrombocytopenia       Recent Labs     07/19/21  0133 07/18/21  0348 07/17/21  0323   WBC 3.6* 4.9 4.0*   HGB 7.0* 8.2* 7.6*   HCT 21.4* 25.3* 23.3*   .5* 106.3* 105.9*   PLT 31* 37* 39*     Labs on 7/15/2021   Vitamin [id number]  Ferritin-156, iron saturation 50%, TIBC 305  Folate 7.5     Labs requested 7/16/2021  · Path review: Peripheral blood smear in process  · Copper-146.4  · Zinc-69.4  · SPEP with immunofixation -in process  · IgG 889, IgM 66 and IgA 135  · Free light chain, immunoglobulins -in process  · US abdomen fatty infiltration of the liver and spleen normal measuring 9.4 x 8.2 x 4 cm. · HIV-nonreactive  · Hepatitis profile-nonreactive  · REILLY detected     Hgb 7.0  Status post 1 unit PRBC 7/15/2021, transfuse 1 unit PRBC today, 7/19/2020    Chest x-ray 7/16/2021 with no acute cardiopulmonary disease. Chest x-ray 7/16/2021            compared to                               chest x-ray 8/7/2020      Temperature of 101.7 on 7/18/2021 at 2237    · Initiated on cefepime on 7/17/2021. · Urinalysis on 7/17/2021 reported negative for bacteria with small amount of blood and protein of 30. · Portable CXR 7/18/2021: Stable cardiomegaly.   Lung volumes are low with mild atelectasis in the lung bases    Blood culture on 7/17/2021-no growth to date  Blood culture on 7/18/2021- no growth to date  Blood culture 7/18/2021 at 8:15 pm: In process    Consult ID to assist with antibiotic therapy    #2  Hypercalcemia- mild improved    Calcium 9.5    Labs on 7/16/2021:  Calcium 10.7  PTH intact 89.0   PTH related protein -in process   Vitamin D 25-hydroxy-47.7         #3  Chronic kidney disease stage III    Baseline creatinine 1.4-1.5        Nephrology following        PLAN:  Transfuse 1 unit PRBC for decreasing Hgb, currently 7.0  CT-guided bone marrow biopsy/aspirate planned for today, 7/19/2021, last dose of Eliquis a.m. of 7/15/2021  Continue cefepime, consult ID to assist with antibiotic therapy  Blood cultures in process  Consult nephrology for worsening renal function    Discussed with patient and    DAVID Daley    07/19/21  6:51 AM    Physician's attestation and contribution:  I, Dr George Irving, personally and independently performed an evaluation on  Sarah Camille        I have reviewed relevant medical information/data to include but not limited to the medication list, relevant appropriate lab work and imaging when applicable. I reviewed other physician's notes, ancillary services and nurses assessments. I have reviewed the above documentation completed by Nelly HERNANDEZ   Please see my additional addended and/or modified contributions to the history of present illness, physical examination, and assessment/medical decision-making and plan that reflects my findings and impressions. I discussed essential elements of the care plan with Nelly HERNANDEZ and the patient. I have encouraged and answered all the questions raised to the patient's understanding and satisfaction. I concur with the above stated. Subjective-still weak and tired, had a bad night with chills and fever    Objective- less edema of the upper extremities but still very weak and tired. No change in exam otherwise    Assessment/plan:  Although she is on cefepime, ID will be consulted due to fevers on antibiotic.   Cultures and chest x-ray done yesterday with fever elevation. #4  Previous history of breast cancer and possible other malignancies  · Check tumor markers  · Await bone marrow biopsy and aspirate      #5    Pulmonary embolus LLL pulmonary artery branches 7/28/2020    CTA pulmonary 7/28/2020:  · Small amount of pulmonary embolus in the left lower lobe pulmonary artery branches. · No central pulmonary emboli appreciated.         Electronically signed by Wilhelmina Severs, MD on 7/19/21 at 5:42 PM CDT

## 2021-07-19 NOTE — PROGRESS NOTES
Pharmacy Renal Adjustment    Iain Gilbert is a 67 y.o. female. Pharmacy has renally adjusted Cefepime per protocol. Recent Labs     07/18/21  0348 07/19/21  0133   BUN 16 23       Recent Labs     07/18/21  0348 07/19/21  0133   CREATININE 1.6* 2.6*       Estimated Creatinine Clearance: 20 mL/min (A) (based on SCr of 2.6 mg/dL (H)).     Height:   Ht Readings from Last 1 Encounters:   07/15/21 5' 1\" (1.549 m)     Weight:  Wt Readings from Last 1 Encounters:   07/19/21 219 lb 6 oz (99.5 kg)       CKD stage: 3          Plan: Adjust Cefepime to 2 g IV q24h    Electronically signed by Kadi Diez RPh, BCPS, 7/19/2021,11:42 AM

## 2021-07-20 NOTE — PROGRESS NOTES
Pharmacy Vancomycin Consult     Vancomycin Day: 2  Current Dosing: Vancomycin pulse dosing     Temp max:  102.3    Recent Labs     07/18/21  0348 07/19/21  0133   BUN 16 23       Recent Labs     07/18/21  0348 07/19/21  0133   CREATININE 1.6* 2.6*       Recent Labs     07/19/21  0133 07/20/21  0254   WBC 3.6* 2.6*         Intake/Output Summary (Last 24 hours) at 7/20/2021 0539  Last data filed at 7/19/2021 1214  Gross per 24 hour   Intake 600 ml   Output --   Net 600 ml     Culture Date Source Results   07/17/21 Blood No growth    07/18/21  Blood x 2  No growth     Respiratory    no result         Ht Readings from Last 1 Encounters:   07/15/21 5' 1\" (1.549 m)        Wt Readings from Last 1 Encounters:   07/20/21 218 lb 1 oz (98.9 kg)         Body mass index is 41.2 kg/m². Estimated Creatinine Clearance: 20 mL/min (A) (based on SCr of 2.6 mg/dL (H)). Random level: 8.5    Assessment/Plan: Continue Vancomycin pulse dosing. Give Vancomycin 1000 mg IV x 1 dose today. Level ordered.     Electronically signed by ANGÉLICA Tapia [facility] on 7/20/2021 at 5:39 AM

## 2021-07-20 NOTE — PROGRESS NOTES
Date of the Proposed Procedure:   7/20/2021     Proposed Procedure:  Bone Marrow Biopsy    Radiologist:  Kaylah Navarro MD    Indications:  Abnormal radiology scan and Abnormal lab results    American Society of Anesthesiologists (ASA) Classification:  ASA 3 - Patient with moderate systemic disease with functional limitations    Plan of Sedation:  Moderate Sedation    Mallampati Classification: III (soft palate, base of uvula visible)    Prior to procedure:  I have reviewed the recent H&P from the referring physician, or other provider, related to ordered procedure. No interval changes were found upon examination/review since the original History and Physical / Consult Note. I have performed a pre-procedure assessment of this patient on 7/20/2021, in the CT procedure room, in the presence of a  Registered Nurse. I discussed with the patient,  in detail,  the risks, benefits, and alternatives to this procedure. Patient/Guardian is aware there are risks associated with moderate sedation which includes transient drop in blood pressure and need for assisted ventilation. Plan for discharge:  Discharge patient after post procedure ordered recovery time. Post breanna score at pre-procedure baseline, score of at least 8 prior to discharge.       Kaylah Navarro MD  1/27/[id number]:16 PM

## 2021-07-20 NOTE — PROCEDURES
Midline Line Insertion Procedure Note    Procedure: Insertion of 4.5 [place] Midline Catheter    Indications:  Long Term IV therapy, Poor Access    Procedure Details   Informed consent was obtained for the procedure, including sedation. Risks of lung perforation, hemorrhage, and adverse drug reaction were discussed. 4.5 [place] Midline Catheter inserted to the L Basilic vein per hospital protocol. Blood return:  yes    Findings:  Catheter inserted to 14 cm, with 1 cm. Exposed. Mid upper arm circumference is 41 cm. There were no changes to vital signs. Catheter was flushed with 10 cc NS. Patient did tolerate procedure well. Recommendations:  Midline Brochure given to patient with teaching instruction.

## 2021-07-20 NOTE — PROGRESS NOTES
Nephrology ([facility]) Progress Note    Patient:  Elizabeth Masterson  YOB: 1945  Date of Service: 7/20/2021  MRN: [id number]   Acct: [de-identified]   Primary Care Physician: Janes Padilla MD  Advance Directive: Full Code  Admit Date: 7/15/2021       Hospital Day: 5  Referring Provider: Kathy Kauffman MD    Patient Seen, Chart, Consults notes, Labs, Radiology studies reviewed. Subjective:  Patient is a 76-year-old pleasant woman with a past medical history of colon cancer, type 2 diabetes and breast cancer. She previously had an adrenal tumor that was causing her chronic hypokalemia. Patient went to [place] and underwent adrenal gland removal.  She has a chronic kidney disease stage III and she is followed at the renal clinic. Patient also has a history of COPD and a pulmonary embolism. More recently she was complaining of some dyspnea lightheadedness and dizziness. She is followed at the renal clinic by HARMAN Basurto who asked her to report to the emergency room after her labs that showed a pancytopenia. On admission, her hemoglobin was 6.8, her platelet count was 11,633 and her white blood cell count was 3000. Her labs also showed some hypercalcemia 10.6. Her renal function was around baseline on admission and her serum creatinine was 1.4. On July 19, her serum creatinine was found to be at 2.6. Renal service was consulted to manage her acute kidney injury. Patient denies change to her urine habits. She has some edema in her upper and lower extremities. Patient has been hypotensive on July 18 and was febrile. Today, she is s/p CT guided bone marrow biopsy. She remained on antibiotics. She was still not feeling well with decreased intake. Allergies:  Patient has no known allergies.     Medicines:  Current Facility-Administered Medications   Medication Dose Route Frequency Provider Last Rate Last Admin    docusate sodium (COLACE) capsule 100 mg  100 mg Oral Daily Tania Daugherty PA-C        [START ON 7/21/2021] polyethylene glycol (GLYCOLAX) packet 17 g  17 g Oral Daily Tania Daugherty PA-C        LORazepam (ATIVAN) injection 1 mg  1 mg Intravenous Once Ernestine Nelson MD        0.9 % sodium chloride infusion   Intravenous PRN DAVID Willoughby        fentaNYL (SUBLIMAZE) 100 MCG/2ML injection             midazolam (VERSED) 2 MG/2ML injection             morphine injection 1 mg  1 mg Intravenous Once Merrill Knowles PA-C        naloxone [facility]) injection 0.4 mg  0.4 mg Intravenous PRN Merrill Knowles PA-C        0.9 % sodium chloride infusion   Intravenous PRN Algis DAVID Beverly        aluminum & magnesium hydroxide-simethicone (MAALOX) 200-200-20 MG/5ML suspension 30 mL  30 mL Oral Q6H PRN AlgDAVID Fernandes        fentaNYL (DURAGESIC) 12 MCG/HR 1 patch  1 patch Transdermal Q72H Ernestine Nelson MD   1 patch at 07/19/21 1028    sodium bicarbonate tablet 650 mg  650 mg Oral BID Shonda Lam MD   650 mg at 07/19/21 2022    doxycycline (VIBRAMYCIN) 100 mg in dextrose 5 % 100 mL IVPB  100 mg Intravenous Q12H Peter Fitch MD   Stopped at 07/20/21 1635    meropenem (MERREM) 500 mg in sterile water 10 mL IV syringe  500 mg Intravenous Q12H Peter Fitch MD   500 mg at 07/20/21 1506    0.9 % sodium chloride infusion   Intravenous Continuous Joon Perry  mL/hr at 07/20/21 1507 New Bag at 07/20/21 1507    guaiFENesin (MUCINEX) extended release tablet 600 mg  600 mg Oral BID Joon Perry MD   600 mg at 07/19/21 2022    acetaminophen (TYLENOL) tablet 1,000 mg  1,000 mg Oral Q6H PRN Joon Perry MD   1,000 mg at 07/19/21 2243    Or    acetaminophen (TYLENOL) suppository 650 mg  650 mg Rectal Q6H PRN Joon Perry MD        ibuprofen (ADVIL;MOTRIN) tablet 800 mg  800 mg Oral Q6H PRN Joon Perry MD   800 mg at 07/19/21 2021    glucose (GLUTOSE) 40 % oral gel 15 g  15 g Oral PRN Kanika Mckeon, APRN - CNP        dextrose 50 % IV solution  12.5 g Intravenous PRN [facility], APRN - CNP        glucagon (rDNA) injection 1 mg  1 mg Intramuscular PRN Kanika Mckeon, DAVID - CNP        dextrose 5 % solution  100 mL/hr Intravenous PRN Kanika Mckeon, DAVID - CNP        insulin lispro (HUMALOG) injection vial 0-6 Units  0-6 Units Subcutaneous TID WC [facility], DAVID - CNP   2 Units at 07/18/21 1242    insulin lispro (HUMALOG) injection vial 0-3 Units  0-3 Units Subcutaneous Nightly [facility], DAVID - CNP        0.9 % sodium chloride infusion   Intravenous PRN Olam Gottron, MD        calcium carbonate (TUMS) chewable tablet 500 mg  500 mg Oral TID PRN Som Kemp MD   500 mg at 07/19/21 2143    melatonin disintegrating tablet 5 mg  5 mg Oral Nightly PRN Som Kemp MD   5 mg at 07/19/21 2022    allopurinol (ZYLOPRIM) tablet 100 mg  100 mg Oral Daily Som Kemp MD   100 mg at 07/19/21 1028    Vitamin B-12 TBDP 1 tablet  1 tablet Oral Nightly Som Kemp MD        gabapentin (NEURONTIN) capsule 200 mg  200 mg Oral BID Som Kemp MD   200 mg at 07/19/21 2022    lactobacillus (CULTURELLE) capsule 1 capsule  1 capsule Oral Daily with breakfast Som Kemp MD   1 capsule at 07/18/21 0744    Vitamin D (CHOLECALCIFEROL) tablet 2,000 Units  2,000 Units Oral Daily Som Kemp MD   2,000 Units at 07/19/21 1028    ipratropium (ATROVENT) 0.02 % nebulizer solution 0.5 mg  0.5 mg Nebulization 4x daily Som Kemp MD   0.5 mg at 07/20/21 1440    albuterol (PROVENTIL) nebulizer solution 2.5 mg  2.5 mg Nebulization Q1H PRN Som Kemp MD        sodium chloride flush 0.9 % injection 5-40 mL  5-40 mL Intravenous 2 times per day Som Kemp MD   10 mL at 07/18/21 0900    sodium chloride flush 0.9 % injection 5-40 mL  5-40 mL Intravenous PRN Som Kemp MD        0.9 % sodium chloride infusion  25 mL Intravenous ELISE Kemp MD Past Medical History:  Past Medical History:   Diagnosis Date    Acquired hypothyroidism 07/11/2018    Chronic kidney disease, stage III (moderate) ([facility] [address] 09/03/2019    Mom and sister + breast CA Lumpectomy - remission    Community acquired pneumonia of left lower lobe of lung 06/01/2018    Malignant neoplasm of upper-inner quadrant of right female breast ([facility] [address] 02/06/2020    Palliative care patient 07/16/2021    Stage 2 moderate COPD by GOLD classification ([facility] [address] 07/11/2018       Past Surgical History:  Past Surgical History:   Procedure Laterality Date    ADRENALECTOMY      APPENDECTOMY      BACK SURGERY      BREAST BIOPSY Left 1994    benign    BREAST LUMPECTOMY Right 1991    CARPAL TUNNEL RELEASE      CARPAL TUNNEL RELEASE  1990    CATARACT REMOVAL      CORONARY ANGIOPLASTY      CT BONE MARROW BIOPSY  7/20/2021    CT BONE MARROW BIOPSY 7/20/2021 [facility] CT SCAN    GALLBLADDER SURGERY      HYSTERECTOMY  1979    PACEMAKER INSERTION  07/22/2020    PARTIAL HYSTERECTOMY      TONSILLECTOMY  1997    TOTAL KNEE ARTHROPLASTY      US BREAST CYST ASPIRATION RIGHT Right 5/20/2021    US BREAST CYST ASPIRATION RIGHT 5/20/2021 [facility] [address]       Family History  Family History   Problem Relation Age of Onset    Breast Cancer Mother 76    Breast Cancer Sister 59       Social History  Social History     Socioeconomic History    Marital status:      Spouse name: Not on file    Number of children: Not on file    Years of education: Not on file    Highest education level: Not on file   Occupational History    Not on file   Tobacco Use    Smoking status: Never Smoker    Smokeless tobacco: Never Used   Vaping Use    Vaping Use: Never used   Substance and Sexual Activity    Alcohol use: No    Drug use: No    Sexual activity: Not on file   Other Topics Concern    Not on file   Social History Narrative     61 years    She has 1 son and 1 daughter    Worked at [employer] and also [employer] not presently working    Education high school    [address] [facility] [address]    She does drive an automobile    Remains moderately active    Denies alcohol or tobacco consumption or substance usage     Social Determinants of Health     Financial Resource Strain:     Difficulty of Paying Living Expenses:    Food Insecurity:     Worried About Running Out of Food in the Last Year:     [address] in the Last Year:    Transportation Needs:     Lack of Transportation (Medical):  Lack of Transportation (Non-Medical):    Physical Activity:     Days of Exercise per Week:     Minutes of Exercise per Session:    Stress:     Feeling of Stress :    Social Connections:     Frequency of Communication with Friends and Family:     Frequency of Social Gatherings with Friends and Family:     Attends Taoism Services:     Active Member of Clubs or Organizations:     Attends Club or Organization Meetings:     Marital Status:    Intimate Partner Violence:     Fear of Current or Ex-Partner:     Emotionally Abused:     Physically Abused:     Sexually Abused:          Review of Systems:  History obtained from chart review and the patient  General ROS: No fever or chills  Respiratory ROS: No cough, shortness of breath, wheezing  Cardiovascular ROS: no chest pain or dyspnea on exertion  Gastrointestinal ROS: No abdominal pain or melena  Genito-Urinary ROS: No dysuria or hematuria  Musculoskeletal ROS: No joint pain or swelling         Objective:  Blood pressure 108/60, pulse 77, temperature 97.6 °F (36.4 °C), temperature source Temporal, resp. rate 16, height 5' 1\" (1.549 m), weight 218 lb 1 oz (98.9 kg), SpO2 97 %, not currently breastfeeding.   No intake or output data in the 24 hours ending 07/20/21 1725  General: alert and oriented x3   Chest:  clear to auscultation bilaterally without respiratory distress  CVS: regular rate and rhythm  Abdominal: soft, nontender, normal bowel sounds  Extremities: no cyanosis but trace peripheral edema  Skin: warm and dry without rash    Labs:  BMP:   Recent Labs     07/18/21 0348 07/19/21 0133 07/20/21 0254    134* 135*   K 4.5 4.5 4.8    104 102   CO2 22 21* 19*   BUN 16 23 33*   CREATININE 1.6* 2.6* 2.8*   CALCIUM 10.3* 9.5 9.3     CBC:   Recent Labs     07/18/21 0348 07/19/21 0133 07/20/21 0254   WBC 4.9 3.6* 2.6*   HGB 8.2* 7.0* 7.5*   HCT 25.3* 21.4* 22.7*   .3* 107.5* 105.1*   PLT 37* 31* 22*     LIVER PROFILE:   Recent Labs     07/20/21 0257   AST 28   ALT 13   BILIDIR 0.5*   BILITOT 1.0   ALKPHOS 45     PT/INR: No results for input(s): PROTIME, INR in the last 72 hours. APTT: No results for input(s): APTT in the last 72 hours. BNP:  No results for input(s): BNP in the last 72 hours. Ionized Calcium:No results for input(s): IONCA in the last 72 hours. Magnesium:  Recent Labs     07/19/21 0133   MG 1.7     Phosphorus:No results for input(s): PHOS in the last 72 hours. HgbA1C: No results for input(s): LABA1C in the last 72 hours. Hepatic:   Recent Labs     07/20/21 0257   ALKPHOS 45   ALT 13   AST 28   PROT 5.0*   BILITOT 1.0   BILIDIR 0.5*   LABALBU 2.7*     Lactic Acid: No results for input(s): LACTA in the last 72 hours. Troponin: No results for input(s): CKTOTAL, CKMB, TROPONINT in the last 72 hours. ABGs: No results for input(s): PH, PCO2, PO2, HCO3, O2SAT in the last 72 hours. CRP:  No results for input(s): CRP in the last 72 hours. Sed Rate:  No results for input(s): SEDRATE in the last 72 hours. Cultures:   No results for input(s): CULTURE in the last 72 hours. Radiology reports as per the Radiologist  Radiology: [address]    Result Date: 7/16/2021  Examination. US ABDOMEN COMPLETE 7/16/2021 6:43 AM History: Pancytopenia. The ultrasound examination of the abdomen is performed. There is no previous study for comparison. The gallbladder is surgically absent. Common bile duct measures 5 mm in diameter.  There is fatty

## 2021-07-20 NOTE — CONSULTS
Palliative Care Consult Note    7/20/2021 8:47 AM  Subjective:  Admit Date: 7/15/2021  PCP: Willam Borrero MD    Date of Service: 7/20/2021    Reason for Consultation:  Goals of Care, Code Status, Family Support     History Obtained From: EMR/Patient and their Family    History Of Present Illness: The patient is a 67 y.o. female with PMH breast cancer, COPD, CKD III, hypothyroidism, Pulmonary emboli who presented to [address] [place] [address] ED complaining of abnormal labs. She additionally described dyspnea, lightheadedness, dizziness that had been worsening for about one month. Work up revealed creatinine 1.4, TSH <0.01, Hgb 6.8 w/ pancytopenia. She received 1 unit PRBC and was admitted to Hospitalist service. Oncology was consulted with myeloma, hypercalcemia work up initiated. Patient developed fever. Was placed on Cefepime and Vancomycin added as well. ID consulted. D dimer elevated. VQ revealed low probability for pulmonary embolus. Nephrology has followed for SALVATORE superimposed on CKD. Palliative care was consulted for goals of care, code status discussion, family support and symptom management.      Past Medical History:        Diagnosis Date    Acquired hypothyroidism 07/11/2018    Chronic kidney disease, stage III (moderate) ([address] 09/03/2019    Mom and sister + breast CA Lumpectomy - remission    Community acquired pneumonia of left lower lobe of lung 06/01/2018    Malignant neoplasm of upper-inner quadrant of right female breast ([address] 02/06/2020    Palliative care patient 07/16/2021    Stage 2 moderate COPD by GOLD classification ([address] 07/11/2018     Past Surgical History:        Procedure Laterality Date    ADRENALECTOMY      APPENDECTOMY      BACK SURGERY      BREAST BIOPSY Left 1994    benign    BREAST LUMPECTOMY Right 1991    CARPAL TUNNEL RELEASE      CARPAL TUNNEL RELEASE  1990    CATARACT REMOVAL      CORONARY ANGIOPLASTY     [address] 07/22/2020    PARTIAL HYSTERECTOMY      TONSILLECTOMY  1997    TOTAL KNEE ARTHROPLASTY       BREAST CYST ASPIRATION RIGHT Right 5/20/2021     BREAST CYST ASPIRATION RIGHT 5/20/2021 [facility] Medications:  Prior to Admission medications    Medication Sig Start Date End Date Taking? Authorizing Provider   Levothyroxine Sodium (SYNTHROID PO) Take 77 mcg by mouth daily   Yes Historical Provider, MD   allopurinol (ZYLOPRIM) 100 MG tablet Take 1 tablet by mouth once daily 7/9/21  Yes Jarvis Romo MD   ELIQUIS 5 MG TABS tablet Take 1 tablet by mouth twice daily 7/7/21  Yes DAVID Banegas   oxybutynin (DITROPAN-XL) 10 MG extended release tablet Take 1 tablet by mouth once daily 4/12/21  Yes Jarvis Romo MD   furosemide (LASIX) 20 MG tablet TAKE 1 TABLET BY MOUTH ON MONDAY, [facility]  Patient taking differently: daily  4/4/21  Yes DAVID Diaz   vitamin D (ERGOCALCIFEROL) 1.25 MG (15434 UT) CAPS capsule Take 1 capsule by mouth once a week 3/1/21  Yes Jarvis Romo MD   sodium bicarbonate 325 MG tablet Take 325 mg by mouth daily    Yes Historical Provider, MD   gabapentin (NEURONTIN) 100 MG capsule Take 2 capsules by mouth 2 times daily for 180 days. Intended supply: 30 days  Patient taking differently: Take 100 mg by mouth 2 times daily.  Intended supply: 30 days 1/27/21 7/26/21 Yes Jarvis Romo MD   atenolol (TENORMIN) 50 MG tablet Take 1 tablet by mouth nightly 1/21/21  Yes Tisha Parra MD   raloxifene (EVISTA) 60 MG tablet Take 1 tablet by mouth once daily 9/23/20  Yes Jarvis Romo MD   tiotropium (SPIRIVA RESPIMAT) 2.5 MCG/ACT AERS inhaler Inhale 2 puffs into the lungs daily as needed    Yes Historical Provider, MD   albuterol sulfate HFA (VENTOLIN HFA) 108 (90 Base) MCG/ACT inhaler Inhale 2 puffs into the lungs every 6 hours as needed for Wheezing   Yes Historical Provider, MD   aspirin EC 81 MG EC tablet Take 81 mg by mouth nightly   Yes Historical Provider, MD   lactobacillus (CULTURELLE) capsule Take 1 capsule by mouth daily   Yes Historical Provider, MD   Cyanocobalamin (VITAMIN B-12) 5000 MCG TBDP Take 1 tablet by mouth nightly   Yes Historical Provider, MD   fexofenadine (ALLEGRA ALLERGY) 180 MG tablet Take 180 mg by mouth daily   Yes Historical Provider, MD   meloxicam (MOBIC) 7.5 MG tablet Take 7.5 mg by mouth daily    Historical Provider, MD     Allergies:    Patient has no known allergies. Social History:    The patient currently lives at home. Tobacco:   reports that she has never smoked. She has never used smokeless tobacco.  Alcohol:   reports no history of alcohol use. Illicit Drugs: denies    Family History:      Problem Relation Age of Onset   [facility] Breast Cancer Mother 76    Breast Cancer Sister 59     Review of Systems:   Constitutional / general: + fever /+ chills / sweats +fatigue  Head:  Denies headache / neck stiffness / trauma / visual change  Eyes:  Denies blurry vision / acute visual change or loss / itching / redness  ENT: Denies sore throat / hoarseness / nasal drainage / ear pain  CV:  Denies chest pain / palpitations/ orthopnea   Respiratory:  Denies cough / +shortness of breath / sputum / hemoptysis  GI: Denies nausea / vomiting / abdominal pain / diarrhea / constipation  :  Denies dysuria / hesitancy / urgency / hematuria   Neuro: Denies paralysis / syncope / seizure / dysphagia / headache / paresthesias  Musculoskeletal:  + muscle weakness /joint stiffness / +pain  Vascular: Denies edema / claudication / varicosities  Heme / endocrine: Denies easy bruising / bleeding / excessive sweating / heat or cold intolerance  Psychiatric:  Denies depression / anxiety / insomnia / mood changes  Skin:  Denies new rashes / lesions / skin hair or nail changes    14 point review of systems is negative except as specifically addressed above.     Physical Examination:  BP (!) 108/54   Pulse 97   Temp 98 °F (36.7 °C) (Temporal)   Resp 20 Ht 5' 1\" (1.549 m)   Wt 218 lb 1 oz (98.9 kg)   LMP  (LMP Unknown)   SpO2 97%   BMI 41.20 kg/m²   General appearance: 68 yo female, cachectic, no acute distress   Head: Normocephalic, without obvious abnormality, atraumatic  Eyes: conjunctivae/corneas clear. PERRL, EOM's intact. Ears: normal external ears and nose, throat without exudate  Neck: no adenopathy, no carotid bruit, no JVD, supple, symmetrical, trachea midline  Lungs: diminished air entry to bases with expiratory wheezing diffusely    Heart: regular rate and rhythm, S1, S2 normal, no murmur  Abdomen:soft, non-tender; non-distended, hypoactive bowel sounds no masses, no organomegaly  Extremities:1+ peripheral edema,  No erythema, no tenderness to palpation  Skin: Skin color, texture, turgor normal.   Lymphatic: No palpable lymph node enlargment  Neurologic: Sluggish, oriented to self, place, year. Generalized weakness. Answers questions appropriately, follows simple commands   Psychiatric: Calm, appropriate affect/insight     Diagnostic Data:  CBC:  Recent Labs     07/18/21  0348 07/19/21  0133 07/20/21  0254   WBC 4.9 3.6* 2.6*   HGB 8.2* 7.0* 7.5*   HCT 25.3* 21.4* 22.7*   PLT 37* 31* 22*     BMP:  Recent Labs     07/18/21  0348 07/19/21  0133 07/20/21  0254    134* 135*   K 4.5 4.5 4.8    104 102   CO2 22 21* 19*   BUN 16 23 33*   CREATININE 1.6* 2.6* 2.8*   CALCIUM 10.3* 9.5 9.3   Urinalysis:  Lab Results   Component Value Date    NITRU Negative 07/17/2021    WBCUA 1 07/17/2021    BACTERIA NEGATIVE 07/17/2021    RBCUA 4 07/17/2021    BLOODU SMALL 07/17/2021    SPECGRAV 1.012 07/17/2021    GLUCOSEU Negative 07/17/2021     US ABDOMEN COMPLETE  A previous cholecystectomy. Fatty infiltration of the liver. The spleen is normal. Signed by Dr Clarisse Zhang  No active cardiopulmonary disease.  Signed by Dr Onesimo Sykes    Palliative Performance Score: 80% prior to hospitalization, 40% currently    Palliative Review of Advance Directives:     Surrogate Decision Maker:yes    Durable Power of :no    Advanced Directives/Living [place]: no    Out of hospital medical orders in place to reflect resuscitation status (MOLST/POLST): no    Information Sharing:  Patient's awareness of illness:  [] Terminal [] Life-Threatening [x] Serious [] Non life-threatening [] Not serious   [] Not discussed    Family awareness of illness:   [] Terminal [] Life-Threatening [x] Serious [] Nonlife-threatening [] Not serious   [] Not discussed    Assessment/Plan:  Principal Problem:    Pancytopenia ([facility] [address]  Active Problems:    Chronic kidney disease, stage III (moderate) ([facility] [address]    Controlled type 2 diabetes mellitus without complication, without long-term current use of insulin (HCC)    COPD (chronic obstructive pulmonary disease) ([facility] [address]    Hyperthyroidism    Anemia    Palliative care patient  Resolved Problems:    * No resolved hospital problems. *     Visit Summary:  Chart reviewed, patient discussed with consulting service and nursing staff. Reviewed health issues, work up and treatment plan as well as factors that lead to hospitalization. I saw Mrs. Joyceann Lanes at bedside with her daughter and  present in the room. She describes sharp pain under left breast that radiates to her back associated with shortness of breath, rigors, and is intermittent in nature. Nothing makes it better or worse. She states it starts with a mild tremor in her left hand and progresses \"full body shaking that moves the bed\". CT head reviewed, US abdomen reviewed. Work up unremarkable thus far. Currently patient has been placed on Fentanyl 12 mcg/hr which has slightly helped. We had an extensive discussion regarding work up findings thus far including pending bone marrow biopsy. Patient and family wish to continue aggressive work up/treatment at this time.  I did encourage them to have open conversations with one another should clinical status continue to worsen or have sudden decline. Support/comfort offered. Will continue to follow    Recommendations:     1. Palliative Care-GOC to be determined by hospital course. Code status FULL CODE. Will have ongoing discussions based on work up/clinical status  2. Pain-undetermined source, describes rigors during acute episode of pain. Continue to follow fever curve. Will trend troponin, repeat LFT's, consider abdominal US to evaluate aorta with development of lower extremity weakness  3. Pancytopenia-work up ongoing, Oncology following  4. Fever-ID following, empiric antibiotics continued     Thank you for consulting palliative care and allowing us to participate in the care of the patient.     CounselingTopics: Goals of care, Code Status, Disease process education, pt/family support    Time Spent Counseling > 50%:  YES                                   Total Time Spent with patient/family counseling, workup/treatment review, counseling and placement of orders/preparation of this note: 75 minutes    Electronically signed by Jos Lindquist PA-C on 7/20/2021 at 8:47 AM    (Please note that portions of this note were completed with a voice recognition program.  Efforts were made to edit the dictations but occasionally words are mis-transcribed.)

## 2021-07-20 NOTE — PROGRESS NOTES
Patient name: Joyce Mccabe  Patient : [id number]  2021  7:11 AM  ROOM [address]    Portions of this note have been copied forward, however, changed to reflect the most current clinical status of this patient. Subjective: Persistent fatigue. Discomfort anterior chest.  Fever 102.3 last p.m. with significant shakes/rigors. Feels weak in general    HISTORY OF PRESENT ILLNESS:    Zelda Faulkner was first seen by Dr. Tio Mcadams on 2001 during inpatient hospitalization at [facility], consulted for findings of pancytopenia. She presented to the ER department with hospital 7/15/2021 with low hemoglobin. Apparently, she was not five by her PCP and asked to go to the ER for further evaluation. The patient has complaints of lightheadedness and shortness of breath on exertion. She also had complaints of chest discomfort. She denies any hematochezia, melena, hematemesis, hematuria or any vaginal bleeding. She has a history of hysterectomy. She has a remote history of breast cancer and also history of benign adrenal nodule. She also has a history of pulmonary embolism about a year ago and is currently on Eliquis. CBC at presentation 7/15/2021 showed a WBC count 3.0 with ANC 0.4, hemoglobin 6.8/ and platelet counts 29,074. Anemia profile showed a normal vitamin B12 at 1684, ferritin 156, iron saturation 53%, TIBC 305, folate 7.4, haptoglobin 161, reticulocyte count 0.0495.  7/15/2021-received 1 unit PRBCs  2021-recommended bone marrow biopsy/aspirate to rule out a primary bone marrow process.     Diagnosis  · Pancytopenia  · Macrocytic anemia    OBJECTIVE:  VITALS: /62   Pulse 90   Temp 96.4 °F (35.8 °C) (Oral)   Resp 20   Ht 5' 1\" (1.549 m)   Wt 218 lb 1 oz (98.9 kg)   LMP  (LMP Unknown)   SpO2 96%   BMI 41.20 kg/m²   I&O:     Intake/Output Summary (Last 24 hours) at 2021 0711  Last data filed at 2021 1214  Gross per 24 hour   Intake 600 ml   Output --   Net 600 ml     Physical Exam  Vitals reviewed. Constitutional:       General: She is not in acute distress. Appearance: She is well-developed. She is obese. She is ill-appearing. She is not diaphoretic. Comments: Generalized edema   HENT:      Head: Normocephalic and atraumatic. Mouth/Throat:      Pharynx: Uvula midline. Tonsils: No tonsillar exudate. Eyes:      General: Lids are normal. No scleral icterus. Right eye: No discharge. Left eye: No discharge. Conjunctiva/sclera: Conjunctivae normal.   Neck:      Thyroid: No thyroid mass or thyromegaly. Vascular: No JVD. Trachea: Trachea normal. No tracheal deviation. Cardiovascular:      Rate and Rhythm: Normal rate and regular rhythm. Heart sounds: Normal heart sounds. Pulmonary:      Effort: Pulmonary effort is normal. No respiratory distress. Breath sounds: Normal breath sounds. No wheezing or rales. Abdominal:      General: Bowel sounds are normal. There is no distension. Palpations: Abdomen is soft. Tenderness: There is no abdominal tenderness. There is no guarding. Musculoskeletal:         General: No tenderness or deformity. Cervical back: Normal range of motion and neck supple. Right lower leg: Edema present. Left lower leg: Edema present. Comments: Generalized weakness   Skin:     General: Skin is warm. Coloration: Skin is pale. Findings: No erythema or rash. Comments: Bilateral upper extremity edema significantly to hands   Neurological:      Mental Status: She is alert and oriented to person, place, and time. Cranial Nerves: No cranial nerve deficit. Coordination: Coordination normal.   Psychiatric:         Behavior: Behavior normal.         Thought Content:  Thought content normal.       BMP:   Recent Labs     07/19/21  0133 07/20/21  0254   * 135*   K 4.5 4.8    102   CO2 21* 19*   BUN 23 33*   CREATININE 2.6* 2.8*   GLUCOSE 170* 129*   CALCIUM 9.5 9.3     Recent Labs     07/20/21  0254 07/19/21  0133 07/18/21  0348   WBC 2.6* 3.6* 4.9   HGB 7.5* 7.0* 8.2*   HCT 22.7* 21.4* 25.3*   .1* 107.5* 106.3*   PLT 22* 31* 37*     CMP:    Recent Labs     07/19/21  0133 07/20/21  0254   * 135*   K 4.5 4.8    102   CO2 21* 19*   BUN 23 33*   CREATININE 2.6* 2.8*   GFRAA 22* 20*   LABGLOM 18* 16*   GLUCOSE 170* 129*   CALCIUM 9.5 9.3     30Day lookback of cultures:    Blood Culture Recent:   Recent Labs     07/17/21  1556   BC No Growth to date. Any change in status will be called. Gram Stain Recent: No results for input(s): LABGRAM in the last 720 hours. Resp Culture Recent: No results for input(s): CULTRESP in the last 720 hours. Body Fluid Recent : No results for input(s): BFCX in the last 720 hours. MRSA Recent : No results for input(s): [facility] in the last 720 hours. Urine Culture Recent : No results for input(s): LABURIN in the last 720 hours. Organism Recent : No results for input(s): ORG in the last 720 hours. Radiology:   US ABDOMEN COMPLETE    Result Date: 7/16/2021  Examination. US ABDOMEN COMPLETE 7/16/2021 6:43 AM History: Pancytopenia. The ultrasound examination of the abdomen is performed. There is no previous study for comparison. The gallbladder is surgically absent. Common bile duct measures 5 mm in diameter. There is fatty infiltration of the liver. No focal intrinsic abnormality. There is hepatopedal portal venous circulation. The pancreas is normal. A normal right kidney is seen measuring 9.1 x 6.1 cm. No evidence of mass or hydronephrosis. The spleen measures 9.4 x 8.2 x 4 cm. No intrinsic abnormality. A previous cholecystectomy. Fatty infiltration of the liver. The spleen is normal.   Signed by Dr Mik Gupta    Result Date: 7/16/2021  Examination. XR CHEST PORTABLE 7/16/2021 9:19 AM  History: Pancytopenia.  A single frontal portable upright view of the chest is compared with the previous study dated 8/7/2020. The lungs are poorly expanded but clear. There is a persistent moderate cardiomegaly. The atheromatous changes thoracic aorta are noted. A dual-chamber cardiac pacer is seen in place. There is no acute bony abnormality. Postsurgical changes of the right shoulder are incompletely visualized. No active cardiopulmonary disease.    Signed by Dr Hatch Shock    Medications  Current Facility-Administered Medications   Medication Dose Route Frequency Provider Last Rate Last Admin    vancomycin (VANCOCIN) 1000 mg in dextrose 5% 250 mL IVPB  1,000 mg Intravenous Once Cheyenne Sauer  mL/hr at 07/20/21 0636 1,000 mg at 07/20/21 0636    0.9 % sodium chloride infusion   Intravenous PRN DAVID Paz        aluminum & magnesium hydroxide-simethicone (MAALOX) 200-200-20 MG/5ML suspension 30 mL  30 mL Oral Q6H PRN DAVID Paz        fentaNYL (DURAGESIC) 12 MCG/HR 1 patch  1 patch Transdermal Q72H Ebb MD Wilfredo   1 patch at 07/19/21 1028    sodium bicarbonate tablet 650 mg  650 mg Oral BID Kunal Garcia MD   650 mg at 07/19/21 2022    doxycycline (VIBRAMYCIN) 100 mg in dextrose 5 % 100 mL IVPB  100 mg Intravenous Q12H Heavenly Hopper MD   Stopped at 07/20/21 0446    meropenem (MERREM) 500 mg in sterile water 10 mL IV syringe  500 mg Intravenous Q12H Heavenly Hopper MD   500 mg at 07/20/21 0507    0.9 % sodium chloride infusion   Intravenous Continuous Cheyenne Sauer  mL/hr at 07/19/21 1739 New Bag at 07/19/21 1739    guaiFENesin (MUCINEX) extended release tablet 600 mg  600 mg Oral BID Cheyenne Sauer MD   600 mg at 07/19/21 2022    acetaminophen (TYLENOL) tablet 1,000 mg  1,000 mg Oral Q6H PRN Cheyenne Sauer MD   1,000 mg at 07/19/21 2243    Or    acetaminophen (TYLENOL) suppository 650 mg  650 mg Rectal Q6H PRN Cheyenne Sauer MD        ibuprofen (ADVIL;MOTRIN) tablet 800 mg  800 mg Oral Q6H PRN Stormy Chen MD   800 mg at 07/19/21 2021    vancomycin (VANCOCIN) intermittent dosing (placeholder)   Other RX Placeholder Stormy Chen MD        glucose (GLUTOSE) 40 % oral gel 15 g  15 g Oral PRN DAVID Garcia CNP        dextrose 50 % IV solution  12.5 g Intravenous PRN DAVID Garcia CNP        glucagon (rDNA) injection 1 mg  1 mg Intramuscular PRN DAVID Garcia - CNP        dextrose 5 % solution  100 mL/hr Intravenous PRN DAVID Garcia - CNP        insulin lispro (HUMALOG) injection vial 0-6 Units  0-6 Units Subcutaneous TID WC DAVID Garcia CNP   2 Units at 07/18/21 1242    insulin lispro (HUMALOG) injection vial 0-3 Units  0-3 Units Subcutaneous Nightly DAVID Garcia CNP        0.9 % sodium chloride infusion   Intravenous PRN Shabbir Perez MD        calcium carbonate (TUMS) chewable tablet 500 mg  500 mg Oral TID PRN Debi Mckeon MD   500 mg at 07/19/21 2143    polyethylene glycol (GLYCOLAX) packet 17 g  17 g Oral Daily PRN Debi Mckeon MD        melatonin disintegrating tablet 5 mg  5 mg Oral Nightly PRN Debi Mckeon MD   5 mg at 07/19/21 2022    allopurinol (ZYLOPRIM) tablet 100 mg  100 mg Oral Daily Debi Mckeon MD   100 mg at 07/19/21 1028    Vitamin B-12 TBDP 1 tablet  1 tablet Oral Nightly Debi Mckeon MD        gabapentin (NEURONTIN) capsule 200 mg  200 mg Oral BID Debi Mckeon MD   200 mg at 07/19/21 2022    lactobacillus (CULTURELLE) capsule 1 capsule  1 capsule Oral Daily with breakfast Debi Mckeon MD   1 capsule at 07/18/21 0744    Vitamin D (CHOLECALCIFEROL) tablet 2,000 Units  2,000 Units Oral Daily Debi Mckeon MD   2,000 Units at 07/19/21 1028    ipratropium (ATROVENT) 0.02 % nebulizer solution 0.5 mg  0.5 mg Nebulization 4x daily Debi Mckeon MD   0.5 mg at 07/20/21 0628    albuterol (PROVENTIL) nebulizer solution 2.5 mg  2.5 mg Nebulization Q1H Mild leukopenia with absolute neutropenia   No circulating blasts. Hgb 7.5, .1  Status post 1 unit PRBC 7/15/2021 and 7/19/2021    · Urinalysis on 7/17/2021 reported negative for bacteria with small amount of blood and protein of 30. · Portable CXR 7/18/2021: Stable cardiomegaly. Lung volumes are low with mild atelectasis in the lung bases    Blood culture on 7/17/2021-no growth to date  Blood culture on 7/18/2021- no growth to date  Blood culture 7/18/2021 at 8:15 pm: In process    ID assisting, appreciate help  Initiated on cefepime on 7/17/2021, also on Vanco and doxycycline  Ehrlichia, RMSF pending    #2  Hypercalcemia- resolved  Calcium 9.3    Labs on 7/16/2021:  Calcium 10.7  PTH intact 89.0   PTH related protein -in process   Vitamin D 25-hydroxy-47.7    #3  Chronic kidney disease stage III  Baseline creatinine 1.4-1.5      Nephrology following      PLAN:  Transfuse pRBC for Hgb < 7.0  Transfuse 1 unit platelet pheresis today  CT-guided bone marrow biopsy/aspirate planned for today, 7/20/2021  last dose of Eliquis a.m. of 7/15/2021- continue to HOLD r/t significant thrombocytopenia  Check tumor markers  Antibiotics as per ID  Nephrology following      DAVID Simmons    07/20/21  7:11 AM      Physician's attestation and contribution:  I, Dr Katheryn Dugan, personally and independently performed an evaluation on  Myla Segura        I have reviewed relevant medical information/data to include but not limited to the medication list, relevant appropriate lab work and imaging when applicable. I reviewed other physician's notes, ancillary services and nurses assessments. I have reviewed the above documentation completed by Amy HERNANDEZ   Please see my additional addended and/or modified contributions to the history of present illness, physical examination, and assessment/medical decision-making and plan that reflects my findings and impressions.   I discussed essential

## 2021-07-20 NOTE — PROGRESS NOTES
[facility] Hospitalists      Patient:  Damian Gomez  YOB: 1945  Date of Service: 7/20/2021  MRN: [id number]   Acct: [de-identified]   Primary Care Physician: Narendra Ayala MD  Advance Directive: Full Code  Admit Date: 7/15/2021       Hospital Day: 5  Portions of this note have been copied forward, however, updated to reflect the most current clinical status of this patient    CHIEF COMPLAINT Generalized weakness    SUBJECTIVE:  Ms. Siomara Delgado is laying in bed s/p bone marrow biopsy. She states that she is uncomfortable and short of breath. She is receiving breathing treatment. Conversation was had with family and Dr. Quintin Baker about pain control and her blood pressure. Family understands that her condition is multifactorial and there are not many answers currently. CUMULATIVE HOSPITAL COURSE:   The patient is a 78 y. o. female with past medical history of breast cancer, colon polyp removed that was cancerous per patient, CKD 3 hypothyroidism, COPD, PE and who presented to [facility] ED complaining of abnormal labs. Ms. Siomara Delgado stated she had seen nephrology nurse practitioner and was ordered some routine labs. Labs came back abnormal and was advised to come to ED for evaluation.  Stated she has been tired recently, increased shortness of breath, lightheadedness and dizziness ongoing for about a month. Stated she recently had an ear infection and was started on steroids and antibiotics. Related history of breast cancer in 1991, had a lumpectomy and no chemotherapy required. Reported colonoscopy 2 years ago with polyp removal that was cancerous, no chemotherapy at that time either. Reported recent mammogram and needle biopsy that was negative about 2 months ago.  Denied bleeding, dark stools, or nosebleeds.  Denied fever, chills, cough, abdominal pain, nausea, vomiting, or diarrhea.  Work-up in ED revealed VSS, creatinine 1.4, GFR 36 (creatinine 1.5, GFR 34 on 5/7/2021), TSH reflex to FT4 <0.01, WBC 3.0, Hgb 6.8, platelet 47. Ferritin 156.2, iron 161, iron saturation 53, vitamin B12 1684. Received  1 unit of PRBC in ED. Patient was admitted to hospital medicine with pancytopenia with hematology consultation. Patient spiked fever on 7/17/21 early am and throughout the day. Blood cultures ordered. Cefepime started. Patient was noted to have elevated DDimer at 9.74, ordered VQ scan given patient's renal function. VQ scan showed low probability for PE. Nephrology and ID consulted as well as palliative care. 7/20/21 Midline placed and bone marrow biopsy. Review of Systems:   Review of Systems   Constitutional: Positive for fatigue and fever. Negative for chills and diaphoresis. HENT: Negative for congestion, ear pain, sinus pain, sore throat and trouble swallowing. Eyes: Negative for photophobia and visual disturbance. Respiratory: Positive for shortness of breath. Negative for cough, wheezing and stridor. Cardiovascular: Negative for chest pain, palpitations and leg swelling. Gastrointestinal: Negative for abdominal distention, abdominal pain, constipation, diarrhea, nausea and vomiting. Endocrine: Negative for cold intolerance and heat intolerance. Genitourinary: Negative for difficulty urinating, flank pain, frequency and urgency. Musculoskeletal: Positive for arthralgias, back pain and myalgias. Neurological: Positive for tremors and weakness. Negative for dizziness, syncope, light-headedness, numbness and headaches. Hematological: Does not bruise/bleed easily. Psychiatric/Behavioral: Negative for agitation, confusion and dysphoric mood.           Objective:   VITALS:  BP (!) 99/43   Pulse 109   Temp 97.1 °F (36.2 °C) (Temporal)   Resp 18   Ht 5' 1\" (1.549 m)   Wt 218 lb 1 oz (98.9 kg)   LMP  (LMP Unknown)   SpO2 96%   BMI 41.20 kg/m²   24HR INTAKE/OUTPUT:      Intake/Output Summary (Last 24 hours) at 7/20/2021 1030  Last data filed at 7/19/2021 1214  Gross per 24 hour   Intake 240 ml Output --   Net 240 ml       Physical Exam  Constitutional:       General: She is not in acute distress. Appearance: She is ill-appearing. She is not toxic-appearing or diaphoretic. HENT:      Head: Normocephalic and atraumatic. Right Ear: External ear normal.      Left Ear: External ear normal.      Nose: Nose normal. No congestion or rhinorrhea. Mouth/Throat:      Mouth: Mucous membranes are moist.      Pharynx: Oropharynx is clear. Eyes:      General: No scleral icterus. Extraocular Movements: Extraocular movements intact. Conjunctiva/sclera: Conjunctivae normal.   Cardiovascular:      Rate and Rhythm: Normal rate and regular rhythm. Pulses: Normal pulses. Heart sounds: Normal heart sounds. No murmur heard. No friction rub. No gallop. Pulmonary:      Effort: No respiratory distress. Breath sounds: Normal breath sounds. No stridor. No wheezing, rhonchi or rales. Abdominal:      General: There is no distension. Palpations: Abdomen is soft. Tenderness: There is no abdominal tenderness. Musculoskeletal:         General: No swelling. Normal range of motion. Cervical back: Normal range of motion and neck supple. Right lower leg: Edema present. Left lower leg: Edema present. Skin:     General: Skin is warm and dry. Coloration: Skin is not jaundiced. Findings: No erythema, lesion or rash. Comments: Midline in place   Neurological:      General: No focal deficit present. Mental Status: She is alert and oriented to person, place, and time. Mental status is at baseline. Cranial Nerves: No cranial nerve deficit. Sensory: No sensory deficit. Motor: No weakness. Psychiatric:         Mood and Affect: Mood normal.         Behavior: Behavior normal.         Thought Content:  Thought content normal.         Judgment: Judgment normal.            Medications:      sodium chloride      sodium chloride      sodium chloride 100 mL/hr at 07/19/21 1739    dextrose      sodium chloride      sodium chloride      dextrose        docusate sodium  100 mg Oral Daily    [START ON 7/21/2021] polyethylene glycol  17 g Oral Daily    LORazepam  1 mg Intravenous Once    fentaNYL  1 patch Transdermal Q72H    sodium bicarbonate  650 mg Oral BID    doxycycline (VIBRAMYCIN) IV  100 mg Intravenous Q12H    meropenem (MERREM) 500 mg in SWFI 10 mL IV syringe  500 mg Intravenous Q12H    guaiFENesin  600 mg Oral BID    vancomycin (VANCOCIN) intermittent dosing (placeholder)   Other RX Placeholder    insulin lispro  0-6 Units Subcutaneous TID     insulin lispro  0-3 Units Subcutaneous Nightly    allopurinol  100 mg Oral Daily    Vitamin B-12  1 tablet Oral Nightly    gabapentin  200 mg Oral BID    lactobacillus  1 capsule Oral Daily with breakfast    Vitamin D  2,000 Units Oral Daily    ipratropium  0.5 mg Nebulization 4x daily    sodium chloride flush  5-40 mL Intravenous 2 times per day     sodium chloride, sodium chloride, aluminum & magnesium hydroxide-simethicone, acetaminophen **OR** acetaminophen, ibuprofen, glucose, dextrose, glucagon (rDNA), dextrose, sodium chloride, calcium carbonate, melatonin, albuterol, sodium chloride flush, sodium chloride, glucose, dextrose, glucagon (rDNA), dextrose  Diet NPO     Lab and other Data:     Recent Labs     07/18/21  0348 07/19/21  0133 07/20/21  0254   WBC 4.9 3.6* 2.6*   HGB 8.2* 7.0* 7.5*   PLT 37* 31* 22*     Recent Labs     07/18/21  0348 07/19/21  0133 07/20/21  0254    134* 135*   K 4.5 4.5 4.8    104 102   CO2 22 21* 19*   BUN 16 23 33*   CREATININE 1.6* 2.6* 2.8*   GLUCOSE 195* 170* 129*     UA:  Recent Labs     07/17/21 2122   COLORU YELLOW   PHUR 6.5   WBCUA 1   RBCUA 4   BACTERIA NEGATIVE*   CLARITYU Clear   SPECGRAV 1.012   LEUKOCYTESUR Negative   UROBILINOGEN 0.2   BILIRUBINUR Negative   BLOODU SMALL*   GLUCOSEU Negative       RAD:   US ABDOMEN COMPLETE  Result Date: 7/16/2021  A previous cholecystectomy. Fatty infiltration of the liver. The spleen is normal. Signed by Dr Vero Rios  Result Date: 7/16/2021  No active cardiopulmonary disease. Signed by Dr Rock Cruisbarbara:     Procedure Component Value Units Date/Time   Ehrlichia and Anaplasma Species by PCR [[id number]] Collected: 07/18/21 0348   Order Status: Sent Specimen: Blood Updated: 07/19/21 1351   Culture, Blood 2 [[id number]] Collected: 07/18/21 0348   Order Status: Completed Specimen: Blood Updated: 07/19/21 0514    Culture, Blood 2 No Growth to date.  Any change in status will be called. Narrative:     ORDER#: [id number]                          ORDERED BY: Sandra Wright   SOURCE: Blood Antecubital-Lef              COLLECTED:  07/18/21 03:48   ANTIBIOTICS AT KRIS. :                      RECEIVED :  07/18/21 04:21   Culture, Respiratory [[id number]]    Order Status: No result Specimen: Sputum Expectorated    Legionella Antigen, Urine [[id number]]    Order Status: Sent Specimen: Urine, clean catch    STREP PNEUMONIAE ANTIGEN [[id number]]    Order Status: Sent Specimen: Urine, clean catch    Culture, Blood 1 [[id number]] Collected: 07/17/21 1556   Order Status: Completed Specimen: Blood Updated: 07/18/21 1714    Blood Culture, Routine No Growth to date.  Any change in status will be called. Narrative:     ORDER#: [id number]                          ORDERED BY: Sandra Wright   SOURCE: Blood LFA                          COLLECTED:  07/17/21 15:56   ANTIBIOTICS AT KRIS. :                      RECEIVED :  07/17/21 16:21   COVID-19, Rapid [[id number]] Collected: 07/15/21 1911   Order Status: Completed Specimen: Nasopharyngeal Swab Updated: 07/15/21 1936    SARS-CoV-2, NAAT Not Detected         Culture, Blood 2 [[id number]] Collected: 07/18/21 2015   Order Status: Completed Specimen: Blood Updated: 07/19/21 2114    Culture, Blood 2 No Growth to date.  Any change in status will be called. Narrative:     ORDER#: [id number]                          ORDERED BY: Luciana Patel   SOURCE: Blood lfa                          COLLECTED:  07/18/21 20:15   ANTIBIOTICS AT KRIS. :                      RECEIVED :  07/18/21 20:38     Culture, Blood 2 [[id number]] Collected: 07/18/21 0348   Order Status: Completed Specimen: Blood Updated: 07/19/21 0514    Culture, Blood 2 No Growth to date.  Any change in status will be called. Narrative:     ORDER#: [id number]                          ORDERED BY: Sandra Wright   SOURCE: Blood Antecubital-Lef              COLLECTED:  07/18/21 03:48   ANTIBIOTICS AT KRIS. :                      RECEIVED :  07/18/21 04:21           Assessment/Plan   Principal Problem:    Pancytopenia (HCC)    Anemia    Fever  - Hematology/Oncology and ID following. Pt received 1 unit 7/15 and 7/19. ID has pt on Meropenem and doxycycline. CT-guided bone marrow biopsy/aspirate. US abdomen to rule out ESLD/splenomegaly. US abdomen showed fatty infiltration of the liver,Rapid HIV negative, Hepatitis panel- non reactive, REILLY detected, DDimer 9.74, ordered VQ scan given patient's renal function, showed low probability for PE. Continue to monitor closely. Bone marrow biopsy completed on 7/20/21      Active Problems:    Chronic kidney disease, stage III (moderate) [facility])- Nephrology consulted recommendations appreciated.  Gentle IV hydration and repeat labs in AM.       Controlled type 2 diabetes mellitus without complication, without long-term current use of insulin ([facility])-A1C 6.3, low dose SSI, monitor Accu-Cheks, hypoglycemia treatment protocol in place       COPD (chronic obstructive pulmonary disease) ([facility] [address]- O2 and nebs as needed      Hyperthyroidism- synthroid on hold      Palliative care patient-noted      Antibiotic: Meropenem, vancomycin and doxycycline     DVT Prophylaxis: LEMUELs      Marcelino Singh PA-C  7/20/2021, 10:30 AM

## 2021-07-20 NOTE — PROGRESS NOTES
INFECTIOUS DISEASES PROGRESS NOTE    Patient:  Mario Delaney  YOB: 1945  MRN: [id number]   Admit date: 7/15/2021   Admitting Physician: Zuly Yeboah MD  Primary Care Physician: Maribel Crawford MD    CHIEF COMPLAINT: \"I feel bad\"      Interval History: The patient was in the hallway about to go down for CT-guided bone marrow biopsy. Nursing was reviewing the consent with the patient. Patient reported to me that she felt worse than when she came in. I told her that I thought she looked better than she did yesterday and that was the first time I had seen her.   She had no focal complaints      Allergies: No Known Allergies    Current Meds: docusate sodium (COLACE) capsule 100 mg, Daily  [START ON 7/21/2021] polyethylene glycol (GLYCOLAX) packet 17 g, Daily  LORazepam (ATIVAN) injection 1 mg, Once  0.9 % sodium chloride infusion, PRN  0.9 % sodium chloride infusion, PRN  aluminum & magnesium hydroxide-simethicone (MAALOX) 200-200-20 MG/5ML suspension 30 mL, Q6H PRN  fentaNYL (DURAGESIC) 12 MCG/HR 1 patch, Q72H  sodium bicarbonate tablet 650 mg, BID  doxycycline (VIBRAMYCIN) 100 mg in dextrose 5 % 100 mL IVPB, Q12H  meropenem (MERREM) 500 mg in sterile water 10 mL IV syringe, Q12H  0.9 % sodium chloride infusion, Continuous  guaiFENesin (MUCINEX) extended release tablet 600 mg, BID  acetaminophen (TYLENOL) tablet 1,000 mg, Q6H PRN   Or  acetaminophen (TYLENOL) suppository 650 mg, Q6H PRN  ibuprofen (ADVIL;MOTRIN) tablet 800 mg, Q6H PRN  vancomycin (VANCOCIN) intermittent dosing (placeholder), RX Placeholder  glucose (GLUTOSE) 40 % oral gel 15 g, PRN  dextrose 50 % IV solution, PRN  glucagon (rDNA) injection 1 mg, PRN  dextrose 5 % solution, PRN  insulin lispro (HUMALOG) injection vial 0-6 Units, TID WC  insulin lispro (HUMALOG) injection vial 0-3 Units, Nightly  0.9 % sodium chloride infusion, PRN  calcium carbonate (TUMS) chewable tablet 500 mg, TID PRN  melatonin disintegrating tablet 5 mg, Nightly PRN  allopurinol (ZYLOPRIM) tablet 100 mg, Daily  Vitamin B-12 TBDP 1 tablet, Nightly  gabapentin (NEURONTIN) capsule 200 mg, BID  lactobacillus (CULTURELLE) capsule 1 capsule, Daily with breakfast  Vitamin D (CHOLECALCIFEROL) tablet 2,000 Units, Daily  ipratropium (ATROVENT) 0.02 % nebulizer solution 0.5 mg, 4x daily  albuterol (PROVENTIL) nebulizer solution 2.5 mg, Q1H PRN  sodium chloride flush 0.9 % injection 5-40 mL, 2 times per day  sodium chloride flush 0.9 % injection 5-40 mL, PRN  0.9 % sodium chloride infusion, PRN  glucose (GLUTOSE) 40 % oral gel 15 g, PRN  dextrose 50 % IV solution, PRN  glucagon (rDNA) injection 1 mg, PRN  dextrose 5 % solution, PRN        Review of Systems   Constitutional: Positive for fever. Neurological: Positive for weakness. Vital Signs:  /65   Pulse 118   Temp 97.3 °F (36.3 °C) (Temporal)   Resp 22   Ht 5' 1\" (1.549 m)   Wt 218 lb 1 oz (98.9 kg)   LMP  (LMP Unknown)   SpO2 96%   BMI 41.20 kg/m²   Temp (24hrs), Av °F (37.2 °C), Min:96.4 °F (35.8 °C), Max:102.3 °F (39.1 °C)      Physical Exam   General: Patient is an obese female appearing awake but a bit stronger than she did yesterday lying in bed in the hallway about to go down for a marrow biopsy  HEENT: Sclera anicteric.   Facemask in place over nose and mouth  Respiratory: Effort even and fairly unlabored  Abdomen: Obese, soft, bowel sounds positive, no focal tenderness  Neuro: Alert and oriented, appears generally weak  Psych: Pleasant and cooperative with exam      LAB RESULTS:    CBC with DIFF:  Recent Labs     21  0348 21  0133 21  0254   WBC 4.9 3.6* 2.6*   RBC 2.38* 1.99* 2.16*   HGB 8.2* 7.0* 7.5*   HCT 25.3* 21.4* 22.7*   .3* 107.5* 105.1*   MCH 34.5* 35.2* 34.7*   MCHC 32.4* 32.7* 33.0   RDW 18.0* 18.0* 18.3*   PLT 37* 31* 22*   MPV 10.0 12.2 10.5   NEUTOPHILPCT 21.3* 27.1* 27.0*   LYMPHOPCT 52.0* 42.6* 68.0*   MONOPCT 22.2* 22.4* 1.0   EOSRELPCT 0.4 0.6 3.0   BASOPCT 0.0 0.3 0.0   NEUTROABS 1.1* 1.0* 0.7*   LYMPHSABS 2.6 1.5 1.8   MONOSABS 1.10* 0.80 0.00   EOSABS 0.00 0.00 0.08   BASOSABS 0.00 0.00 0.00       CMP/BMP:  Recent Labs     07/18/21  0348 07/19/21  0133 07/20/21  0254 07/20/21  0257    134* 135*  --    K 4.5 4.5 4.8  --     104 102  --    CO2 22 21* 19*  --    ANIONGAP 12 9 14  --    GLUCOSE 195* 170* 129*  --    BUN 16 23 33*  --    CREATININE 1.6* 2.6* 2.8*  --    LABGLOM 31* 18* 16*  --    CALCIUM 10.3* 9.5 9.3  --    PROT  --   --   --  5.0*   LABALBU  --   --   --  2.7*   BILITOT  --   --   --  1.0   ALKPHOS  --   --   --  45   ALT  --   --   --  13   AST  --   --   --  28         Culture Results:    No results for input(s): CXSURG in the last 720 hours. Blood Culture Recent:   Recent Labs     07/17/21  1556   BC No Growth to date. Any change in status will be called. RADIOLOGY      US ABDOMEN COMPLETE    Result Date: 7/16/2021  Examination. US ABDOMEN COMPLETE 7/16/2021 6:43 AM History: Pancytopenia. The ultrasound examination of the abdomen is performed. There is no previous study for comparison. The gallbladder is surgically absent. Common bile duct measures 5 mm in diameter. There is fatty infiltration of the liver. No focal intrinsic abnormality. There is hepatopedal portal venous circulation. The pancreas is normal. A normal right kidney is seen measuring 9.1 x 6.1 cm. No evidence of mass or hydronephrosis. The spleen measures 9.4 x 8.2 x 4 cm. No intrinsic abnormality. A previous cholecystectomy. Fatty infiltration of the liver. The spleen is normal. Signed by Dr Reynaldo Quesada    Result Date: 7/16/2021  Examination. XR CHEST PORTABLE 7/16/2021 9:19 AM History: Pancytopenia. A single frontal portable upright view of the chest is compared with the previous study dated 8/7/2020. The lungs are poorly expanded but clear. There is a persistent moderate cardiomegaly.  The atheromatous changes thoracic aorta are noted. A dual-chamber cardiac pacer is seen in place. There is no acute bony abnormality. Postsurgical changes of the right shoulder are incompletely visualized. No active cardiopulmonary disease. Signed by Dr Corina Murrell                  Patient Active Problem List   Diagnosis    Community acquired pneumonia of left lower lobe of lung    Stage 2 moderate COPD by GOLD classification ([address]    Acquired hypothyroidism    Morbid obesity with BMI of 40.0-44.9, adult ([address]    Chronic kidney disease, stage III (moderate) ([address]    Malignant neoplasm of upper-inner quadrant of right female breast ([address]    Adrenal gland cancer, unspecified laterality ([address]    Chest pain    Pulmonary embolism ([address]    Pacemaker complications, initial encounter    Chronic anticoagulation    Second degree type I atrioventricular block    Dyspnea    Malignant neoplasm of colon ([address]    Controlled type 2 diabetes mellitus without complication, without long-term current use of insulin ([address]    Mass of left submandibular region    Pancytopenia ([address]    COPD (chronic obstructive pulmonary disease) ([address]    Hyperthyroidism    Anemia    Palliative care patient       IMPRESSION:    1. Fever appears to be improving. 2. Pancytopenia  3. CKD with Acute kidney injury  4. Generalized weakness    RECOMMENDATIONS :  · Await CT-guided bone marrow biopsy results  · Continue doxycycline  · Continue hydration/fluid management per nephrology  · Monitor temperature curve closely  · Monitor labs.   · Dc Vanco - no resistant gram positives isolated        Kd Machado MD

## 2021-07-21 NOTE — PROGRESS NOTES
Patient name: Martin Rueda  Patient : [birth year]  2021  7:41 AM  [address]    Portions of this note have been copied forward, however, changed to reflect the most current clinical status of this patient. Subjective: Patient resting quietly at this time.  and daughter at bedside states she had a \"bad night\", restless and difficulty drinking through a straw    HISTORY OF PRESENT ILLNESS:    Galina Singh was first seen by Dr. Evaristo Conklin on 2001 during inpatient hospitalization at [facility], consulted for findings of pancytopenia. She presented to the ER department with hospital 7/15/2021 with low hemoglobin. Apparently, she was not five by her PCP and asked to go to the ER for further evaluation. The patient has complaints of lightheadedness and shortness of breath on exertion. She also had complaints of chest discomfort. She denies any hematochezia, melena, hematemesis, hematuria or any vaginal bleeding. She has a history of hysterectomy. She has a remote history of breast cancer and also history of benign adrenal nodule. She also has a history of pulmonary embolism about a year ago and is currently on Eliquis. CBC at presentation 7/15/2021 showed a WBC count 3.0 with ANC 0.4, hemoglobin 6.8/ and platelet counts 37,890. Anemia profile showed a normal vitamin B12 at 1684, ferritin 156, iron saturation 53%, TIBC 305, folate 7.4, haptoglobin 161, reticulocyte count 0.0495.  7/15/2021-received 1 unit PRBCs  2021-recommended bone marrow biopsy/aspirate to rule out a primary bone marrow process.     Diagnosis  · Pancytopenia  · Macrocytic anemia    OBJECTIVE:  VITALS: /65   Pulse 110   Temp 97 °F (36.1 °C) (Temporal)   Resp 20   Ht 5' 1\" (1.549 m)   Wt 218 lb 1 oz (98.9 kg)   LMP  (LMP Unknown)   SpO2 95%   BMI 41.20 kg/m²   I&O:     Intake/Output Summary (Last 24 hours) at 2021 0741  Last data filed at 2021 0327  Gross per 24 hour Intake 1121 ml   Output --   Net 1121 ml     Physical Exam  Vitals reviewed. Constitutional:       General: She is not in acute distress. Appearance: She is well-developed. She is obese. She is ill-appearing. She is not diaphoretic. Comments: Generalized edema   HENT:      Head: Normocephalic and atraumatic. Mouth/Throat:      Mouth: Mucous membranes are dry. Pharynx: Uvula midline. Tonsils: No tonsillar exudate. Eyes:      General: Lids are normal. No scleral icterus. Right eye: No discharge. Left eye: No discharge. Conjunctiva/sclera: Conjunctivae normal.   Neck:      Thyroid: No thyroid mass or thyromegaly. Vascular: No JVD. Trachea: Trachea normal. No tracheal deviation. Cardiovascular:      Rate and Rhythm: Normal rate and regular rhythm. Heart sounds: Normal heart sounds. Pulmonary:      Effort: Pulmonary effort is normal. No respiratory distress. Breath sounds: No wheezing. Comments: Wearing supplemental O2  Abdominal:      General: Bowel sounds are normal. There is no distension. Palpations: Abdomen is soft. Tenderness: There is no abdominal tenderness. There is no guarding. Genitourinary:     Comments: Exam deferred  Musculoskeletal:         General: No tenderness or deformity. Cervical back: Normal range of motion and neck supple. Right lower leg: Edema (1+) present. Left lower leg: Edema (1+) present. Comments: Generalized weakness   Skin:     General: Skin is warm. Coloration: Skin is pale. Findings: No erythema or rash. Comments: Bilateral upper extremity edema significantly to hands   Neurological:      Mental Status: She is alert.       Comments: Resting quietly       BMP:   Recent Labs     07/20/21  0254 07/21/21  0615   * 135*   K 4.8 6.3*    104   CO2 19* 17*   BUN 33* 50*   CREATININE 2.8* 3.5*   GLUCOSE 129* 139*   CALCIUM 9.3 9.0     Recent Labs 07/21/21  0615 07/20/21  0254 07/19/21  0133   WBC 3.9* 2.6* 3.6*   HGB 7.6* 7.5* 7.0*   HCT 24.1* 22.7* 21.4*   .5* 105.1* 107.5*   PLT 31* 22* 31*     CMP:    Recent Labs     07/20/21  0254 07/20/21  0257 07/21/21  0615   *  --  135*   K 4.8  --  6.3*     --  104   CO2 19*  --  17*   BUN 33*  --  50*   CREATININE 2.8*  --  3.5*   GFRAA 20*  --  15*   LABGLOM 16*  --  13*   GLUCOSE 129*  --  139*   PROT  --  5.0*  --    LABALBU  --  2.7*  --    CALCIUM 9.3  --  9.0   BILITOT  --  1.0  --    ALKPHOS  --  45  --    AST  --  28  --    ALT  --  13  --      30Day lookback of cultures:    Blood Culture Recent:   Recent Labs     07/17/21  1556   BC No Growth to date. Any change in status will be called. Gram Stain Recent: No results for input(s): LABGRAM in the last 720 hours. Resp Culture Recent: No results for input(s): CULTRESP in the last 720 hours. Body Fluid Recent : No results for input(s): BFCX in the last 720 hours. MRSA Recent : No results for input(s): [facility] in the last 720 hours. Urine Culture Recent : No results for input(s): LABURIN in the last 720 hours. Organism Recent : No results for input(s): ORG in the last 720 hours. Radiology:   US ABDOMEN COMPLETE    Result Date: 7/16/2021  Examination. US ABDOMEN COMPLETE 7/16/2021 6:43 AM History: Pancytopenia. The ultrasound examination of the abdomen is performed. There is no previous study for comparison. The gallbladder is surgically absent. Common bile duct measures 5 mm in diameter. There is fatty infiltration of the liver. No focal intrinsic abnormality. There is hepatopedal portal venous circulation. The pancreas is normal. A normal right kidney is seen measuring 9.1 x 6.1 cm. No evidence of mass or hydronephrosis. The spleen measures 9.4 x 8.2 x 4 cm. No intrinsic abnormality. A previous cholecystectomy. Fatty infiltration of the liver.    The spleen is normal.   Signed by Dr Sujatha Clarke PORTABLE    Result Date: 7/16/2021  Examination. XR CHEST PORTABLE 7/16/2021 9:19 AM  History: Pancytopenia. A single frontal portable upright view of the chest is compared with the previous study dated 8/7/2020. The lungs are poorly expanded but clear. There is a persistent moderate cardiomegaly. The atheromatous changes thoracic aorta are noted. A dual-chamber cardiac pacer is seen in place. There is no acute bony abnormality. Postsurgical changes of the right shoulder are incompletely visualized. No active cardiopulmonary disease.    Signed by Dr David Machado    Medications  Current Facility-Administered Medications   Medication Dose Route Frequency Provider Last Rate Last Admin    morphine injection 1 mg  1 mg Intravenous Q4H PRN Manolo Arboleda MD   1 mg at 07/21/21 0021    docusate sodium (COLACE) capsule 100 mg  100 mg Oral Daily Nam Lake PA-C        polyethylene glycol [facility]) packet 17 g  17 g Oral Daily Nam Lake PA-C        LORazepam (ATIVAN) injection 1 mg  1 mg Intravenous Once Nile Jacobsen MD        0.9 % sodium chloride infusion   Intravenous PRN DAVID Pan        naloxone [facility]) injection 0.4 mg  0.4 mg Intravenous PRN Constantine Dumont PA-C        0.9 % sodium chloride infusion   Intravenous PRN DAVID Pan        aluminum & magnesium hydroxide-simethicone (MAALOX) 200-200-20 MG/5ML suspension 30 mL  30 mL Oral Q6H PRN DAVID Pan        fentaNYL (DURAGESIC) 12 MCG/HR 1 patch  1 patch Transdermal Q72H Nile Jacobsen MD   1 patch at 07/19/21 1028    sodium bicarbonate tablet 650 mg  650 mg Oral BID Gómez Andersen MD   650 mg at 07/20/21 2035    doxycycline (VIBRAMYCIN) 100 mg in dextrose 5 % 100 mL IVPB  100 mg Intravenous Q12H Karen Can MD   Stopped at 07/21/21 0327    meropenem (MERREM) 500 mg in sterile water 10 mL IV syringe  500 mg Intravenous Q12H Karen Can MD   500 mg at 07/21/21 0227    0.9 % sodium chloride infusion   Intravenous Continuous Juanna Saint,  mL/hr at 07/21/21 0518 New Bag at 07/21/21 0518    guaiFENesin (MUCINEX) extended release tablet 600 mg  600 mg Oral BID Juanna Saint, MD   600 mg at 07/20/21 2035    acetaminophen (TYLENOL) tablet 1,000 mg  1,000 mg Oral Q6H PRN Juanna Saint, MD   1,000 mg at 07/20/21 2217    Or    acetaminophen (TYLENOL) suppository 650 mg  650 mg Rectal Q6H PRN Juanna Saint, MD        ibuprofen (ADVIL;MOTRIN) tablet 800 mg  800 mg Oral Q6H PRN Juanna Saint, MD   800 mg at 07/20/21 1836    glucose (GLUTOSE) 40 % oral gel 15 g  15 g Oral PRN Zapata Dress, APRN - CNP        dextrose 50 % IV solution  12.5 g Intravenous PRN Zapata Dress, APRN - CNP        glucagon (rDNA) injection 1 mg  1 mg Intramuscular PRN Zapata Dress, APRN - CNP        dextrose 5 % solution  100 mL/hr Intravenous PRN Zapata Dress, APRN - CNP        insulin lispro (HUMALOG) injection vial 0-6 Units  0-6 Units Subcutaneous TID WC Zapata Dress, APRN - CNP   2 Units at 07/18/21 1242    insulin lispro (HUMALOG) injection vial 0-3 Units  0-3 Units Subcutaneous Nightly Zapata Dress, APRN - CNP        0.9 % sodium chloride infusion   Intravenous PRN David Watson MD        calcium carbonate (TUMS) chewable tablet 500 mg  500 mg Oral TID PRN Arnel Lawrence MD   500 mg at 07/19/21 2143    melatonin disintegrating tablet 5 mg  5 mg Oral Nightly PRN Arnel Lawrence MD   5 mg at 07/20/21 2035    allopurinol (ZYLOPRIM) tablet 100 mg  100 mg Oral Daily Arnel Lawrence MD   100 mg at 07/19/21 1028    Vitamin B-12 TBDP 1 tablet  1 tablet Oral Nightly Arnel Lawrence MD        gabapentin (NEURONTIN) capsule 200 mg  200 mg Oral BID Arnel Lawrence MD   200 mg at 07/20/21 2035    lactobacillus (CULTURELLE) capsule 1 capsule  1 capsule Oral Daily with breakfast Arnel Lawrence MD   1 capsule at 07/18/21 0730    Vitamin D (CHOLECALCIFEROL) tablet 2,000 Units  2,000 Units Oral Daily Yesica Anderson MD   2,000 Units at 07/19/21 1028    ipratropium (ATROVENT) 0.02 % nebulizer solution 0.5 mg  0.5 mg Nebulization 4x daily Yesica Adnerson MD   0.5 mg at 07/21/21 0635    albuterol (PROVENTIL) nebulizer solution 2.5 mg  2.5 mg Nebulization Q1H PRN Yesica Anderson MD        sodium chloride flush 0.9 % injection 5-40 mL  5-40 mL Intravenous 2 times per day Yesica Anderson MD   10 mL at 07/20/21 2035    sodium chloride flush 0.9 % injection 5-40 mL  5-40 mL Intravenous PRN Yesica Anderson MD        0.9 % sodium chloride infusion  25 mL Intravenous PRN Yesica Anderson MD           Allergies  Patient has no known allergies. ASSESSMENT:  #1  Pancytopenia    The differential diagnosis is broad and includes a primary bone marrow process to include MDS, leukemia, multiple myeloma. In addition nutritional deficiency such as copper deficiency or other versus infectious causes versus autoimmune.     Findings: Macrocytic anemia with neutropenia and thrombocytopenia    Recent Labs     07/21/21  0615 07/20/21  0254 07/19/21  0133   WBC 3.9* 2.6* 3.6*   HGB 7.6* 7.5* 7.0*   HCT 24.1* 22.7* 21.4*   .5* 105.1* 107.5*   PLT 31* 22* 31*     Labs on 7/15/2021   · Vitamin X53-6298  · Ferritin-156, iron saturation 50%, TIBC 305  · Folate 7.5     Labs requested 7/16/2021  · Copper-146.4  · Zinc-69.4  · SPEP with immunofixation: Decreased albumin region. NO monoclonal protein seen, thus immunofixation not completed  · IgG 889, IgM 66 and IgA 135  · Free light chain, immunoglobulins - kappa light chain 3.05, lambda light chain 1.87, normal K/L ration of 1.63  · US abdomen fatty infiltration of the liver and spleen normal measuring 9.4 x 8.2 x 4 cm. · HIV-nonreactive  · Hepatitis profile-nonreactive  · REILLY detected  · Path review: Peripheral blood smear:  Peripheral blood smear pathologist review:   Severe thrombocytopenia.    Moderate macrocytic anemia. Rare circulating schistocytes. Mild leukopenia with absolute neutropenia   No circulating blasts. Hgb 7.6, .5  Status post 1 unit PRBC 7/15/2021 and 7/19/2021  Status post CT-guided bone marrow aspirate and biopsy 7/20/2021, await results    #2  Febrile illness  Last temperature on 7/19/2021 around 10:30 PM, afebrile since  · Urinalysis on 7/17/2021 reported negative for bacteria with small amount of blood and protein of 30. · Portable CXR 7/18/2021: Stable cardiomegaly. Lung volumes are low with mild atelectasis in the lung bases    Blood culture on 7/17/2021-no growth to date  Blood cultures on 7/18/2021- no growth to date  L. pneumonphila: Presumptive negative  Legionella antigen, urine: Negative      ID assisting, appreciate help  Initiated on cefepime on 7/17/2021, also on Vanco and doxycycline  Ehrlichia, RMSF pending    #3  SALVATORE on Chronic kidney disease stage III  Baseline creatinine 1.4-1.5    K+ 6.3  Generalized anasarca - TSH 1.32 on 1/27/2021  Nephrology following    #4  Hypercalcemia- resolved  Calcium 9.0    Labs on 7/16/2021:  Calcium 10.7  PTH intact 89.0   PTH related protein -in process   Vitamin D 25-hydroxy-47.7    PLAN:  Transfuse pRBC for Hgb < 7.0 (Hgb 7.6)  S/p 1 unit platelet pheresis 6/66/4943 (platelets 81,913)  S/p CT-guided bone marrow biopsy/aspirate completed 7/20/2021, await results  SALVATORE with hyperkalemia, as per hospitalist/nephrology - discussed with nursing who notified attending  last dose of Eliquis a.m. of 7/15/2021- discontinued r/t significant thrombocytopenia and SALVATORE  CA 19-9 and CEA normal at 6 and 1.5 respectively. CA 27.29 CA-125 pending  Merrem/doxycycline as per ID, await results of ehrlichia, RSMF    Discussed with patient's spouse and daughter at bedside per Dr. Carter Abarca.     [address] [place] [address]    07/21/21  7:41 AM      Physician's attestation and contribution:  I, Dr Miguelina Gannon, personally and independently performed an evaluation on Moni Salas        I have reviewed relevant medical information/data to include but not limited to the medication list, relevant appropriate lab work and imaging when applicable. I reviewed other physician's notes, ancillary services and nurses assessments. I have reviewed the above documentation completed by Sam HERNANDEZ   Please see my additional addended and/or modified contributions to the history of present illness, physical examination, and assessment/medical decision-making and plan that reflects my findings and impressions. I discussed essential elements of the care plan with Sam HERNANDEZ and the patient. I have encouraged and answered all the questions raised to the patient's understanding and satisfaction. I concur with the above stated. Subjective- and daughter state that Chong Heredia had a very rough night last night. Objective-   Chong Heredia appears very ill  she continues to be very sleepy tired fatigued trying to sleep. Good breath sounds bilaterally, heart regular rate and rhythm  Abdomen protuberant, nonacute, no rebound    Assessment/plan:  Transfuse pRBC for Hgb < 7.0 (Hgb 7.6)  S/p 1 unit platelet pheresis 8/72/9109 (platelets 18,549)  S/p CT-guided bone marrow biopsy/aspirate completed 7/20/2021, await results  SALVATORE with hyperkalemia, as per hospitalist/nephrology - discussed with nursing who notified attending  last dose of Eliquis a.m. of 7/15/2021- discontinued r/t significant thrombocytopenia and SALVATORE  CA 19-9 and CEA normal at 6 and 1.5 respectively. CA 27.29 CA-125 pending  Merrem/doxycycline as per ID, await results of ehrlichia, RMSF    Discussed with patient's spouse and daughter at bedside per Dr. Geeta Richter.       Electronically signed by Perlita Jacob MD on 7/21/21 at 5:59 PM CDT

## 2021-07-21 NOTE — PROGRESS NOTES
INFECTIOUS DISEASES PROGRESS NOTE    Patient:  Kylee Pedro  YOB: 1945  MRN: [id number]   Admit date: 7/15/2021   Admitting Physician: Sobeida Abdullahi MD  Primary Care Physician: Vasu Maza MD    CHIEF COMPLAINT: \"Rough night\" per family    Interval History: The  and daughter standing up at bedside by patient. They note \"she may have to get dialysis\"    I asked patient if she was eating any and she said yes however family said no. They commented that Preet Garcia is getting IV fluids\". They did relay some concerned about her ability to swallow well. Patient had bone marrow biopsy yesterday.       Allergies: No Known Allergies    Current Meds: HYDROmorphone HCl PF (DILAUDID) injection 0.5 mg, Q4H PRN  docusate sodium (COLACE) capsule 100 mg, Daily  polyethylene glycol (GLYCOLAX) packet 17 g, Daily  LORazepam (ATIVAN) injection 1 mg, Once  0.9 % sodium chloride infusion, PRN  naloxone (NARCAN) injection 0.4 mg, PRN  0.9 % sodium chloride infusion, PRN  aluminum & magnesium hydroxide-simethicone (MAALOX) 200-200-20 MG/5ML suspension 30 mL, Q6H PRN  fentaNYL (DURAGESIC) 12 MCG/HR 1 patch, Q72H  sodium bicarbonate tablet 650 mg, BID  doxycycline (VIBRAMYCIN) 100 mg in dextrose 5 % 100 mL IVPB, Q12H  meropenem (MERREM) 500 mg in sterile water 10 mL IV syringe, Q12H  0.9 % sodium chloride infusion, Continuous  guaiFENesin (MUCINEX) extended release tablet 600 mg, BID  acetaminophen (TYLENOL) tablet 1,000 mg, Q6H PRN   Or  acetaminophen (TYLENOL) suppository 650 mg, Q6H PRN  ibuprofen (ADVIL;MOTRIN) tablet 800 mg, Q6H PRN  glucose (GLUTOSE) 40 % oral gel 15 g, PRN  dextrose 50 % IV solution, PRN  glucagon (rDNA) injection 1 mg, PRN  dextrose 5 % solution, PRN  insulin lispro (HUMALOG) injection vial 0-6 Units, TID WC  insulin lispro (HUMALOG) injection vial 0-3 Units, Nightly  0.9 % sodium chloride infusion, PRN  calcium carbonate (TUMS) chewable tablet 500 mg, TID PRN  melatonin disintegrating tablet 5 mg, Nightly PRN  allopurinol (ZYLOPRIM) tablet 100 mg, Daily  Vitamin B-12 TBDP 1 tablet, Nightly  gabapentin (NEURONTIN) capsule 200 mg, BID  lactobacillus (CULTURELLE) capsule 1 capsule, Daily with breakfast  Vitamin D (CHOLECALCIFEROL) tablet 2,000 Units, Daily  ipratropium (ATROVENT) 0.02 % nebulizer solution 0.5 mg, 4x daily  albuterol (PROVENTIL) nebulizer solution 2.5 mg, Q1H PRN  sodium chloride flush 0.9 % injection 5-40 mL, 2 times per day  sodium chloride flush 0.9 % injection 5-40 mL, PRN  0.9 % sodium chloride infusion, PRN        Review of Systems   Constitutional: Negative for fever. Neurological: Positive for weakness. Vital Signs:  BP 91/61   Pulse 113   Temp 97.2 °F (36.2 °C) (Temporal)   Resp 20   Ht 5' 1\" (1.549 m)   Wt 218 lb 1 oz (98.9 kg)   LMP  (LMP Unknown)   SpO2 92%   BMI 41.20 kg/m²   Temp (24hrs), Av.7 °F (36.5 °C), Min:97 °F (36.1 °C), Max:98.5 °F (36.9 °C)      Physical Exam   General: Patient is an obese female lying in bed in a dark room. HEENT: O2 per nasal cannula in place. Place a mask over her nose and mouth  Respiratory: Effort even but labored.   Abdomen obese, slightly distended  Neuro: Patient followed commands  Psych: Pleasant and cooperative but affect somewhat flat    LAB RESULTS:    CBC with DIFF:  Recent Labs     21  0133 21  0254 21  0615   WBC 3.6* 2.6* 3.9*   RBC 1.99* 2.16* 2.20*   HGB 7.0* 7.5* 7.6*   HCT 21.4* 22.7* 24.1*   .5* 105.1* 109.5*   MCH 35.2* 34.7* 34.5*   MCHC 32.7* 33.0 31.5*   RDW 18.0* 18.3* 19.1*   PLT 31* 22* 31*   MPV 12.2 10.5 12.8*   NEUTOPHILPCT 27.1* 27.0* 12.0*   LYMPHOPCT 42.6* 68.0* 29.0   MONOPCT 22.4* 1.0 16.0*   EOSRELPCT 0.6 3.0 4.0   BASOPCT 0.3 0.0 0.0   NEUTROABS 1.0* 0.7* 1.5   LYMPHSABS 1.5 1.8 1.2   MONOSABS 0.80 0.00 0.60   EOSABS 0.00 0.08 0.16   BASOSABS 0.00 0.00 0.00       CMP/BMP:  Recent Labs     21  0133 21  0133 21  0254 21  0257 21  2708 07/21/21  0848   *  --  135*  --  135*  --    K 4.5   < > 4.8  --  6.3* 4.6     --  102  --  104  --    CO2 21*  --  19*  --  17*  --    ANIONGAP 9  --  14  --  14  --    GLUCOSE 170*  --  129*  --  139*  --    BUN 23  --  33*  --  50*  --    CREATININE 2.6*  --  2.8*  --  3.5*  --    LABGLOM 18*  --  16*  --  13*  --    CALCIUM 9.5  --  9.3  --  9.0  --    PROT  --   --   --  5.0*  --   --    LABALBU  --   --   --  2.7*  --   --    BILITOT  --   --   --  1.0  --   --    ALKPHOS  --   --   --  45  --   --    ALT  --   --   --  13  --   --    AST  --   --   --  28  --   --     < > = values in this interval not displayed. Culture Results:    No results for input(s): CXSURG in the last 720 hours. Blood Culture Recent:   Recent Labs     07/17/21  1556   BC No Growth to date. Any change in status will be called. Urine Legionella and urine strep pneumo antigen - July 20    Ehrichia and Anaplasma species by PCR sent July 18 and pending    [facility] spotted fever IgG and IgM pending-unable to locate this under micro however confirmed with Yanely Kumar that it is pending and was added to a July 16 specimen      RADIOLOGY      US ABDOMEN COMPLETE    Result Date: 7/16/2021  Examination. US ABDOMEN COMPLETE 7/16/2021 6:43 AM History: Pancytopenia. The ultrasound examination of the abdomen is performed. There is no previous study for comparison. The gallbladder is surgically absent. Common bile duct measures 5 mm in diameter. There is fatty infiltration of the liver. No focal intrinsic abnormality. There is hepatopedal portal venous circulation. The pancreas is normal. A normal right kidney is seen measuring 9.1 x 6.1 cm. No evidence of mass or hydronephrosis. The spleen measures 9.4 x 8.2 x 4 cm. No intrinsic abnormality. A previous cholecystectomy. Fatty infiltration of the liver. The spleen is normal. Signed by Dr Vero Rios    Result Date: 7/16/2021  Examination.  XR CHEST PORTABLE 7/16/2021 9:19 AM History: Pancytopenia. A single frontal portable upright view of the chest is compared with the previous study dated 8/7/2020. The lungs are poorly expanded but clear. There is a persistent moderate cardiomegaly. The atheromatous changes thoracic aorta are noted. A dual-chamber cardiac pacer is seen in place. There is no acute bony abnormality. Postsurgical changes of the right shoulder are incompletely visualized. No active cardiopulmonary disease. Signed by Dr Louie Cole                  Patient Active Problem List   Diagnosis    Community acquired pneumonia of left lower lobe of lung    Stage 2 moderate COPD by GOLD classification ([address]    Acquired hypothyroidism    Morbid obesity with BMI of 40.0-44.9, adult ([address]    Chronic kidney disease, stage III (moderate) ([address]    Malignant neoplasm of upper-inner quadrant of right female breast ([address]    Adrenal gland cancer, unspecified laterality ([address]    Chest pain    Pulmonary embolism ([address]    Pacemaker complications, initial encounter    Chronic anticoagulation    Second degree type I atrioventricular block    Dyspnea    Malignant neoplasm of colon ([address]    Controlled type 2 diabetes mellitus without complication, without long-term current use of insulin ([address]    Mass of left submandibular region    Pancytopenia ([address]    COPD (chronic obstructive pulmonary disease) ([address]    Hyperthyroidism    Anemia    Palliative care patient       IMPRESSION:    1. Fever resolved  2. Pancytopenia  3. CKD with Acute kidney injury-worsening  4. Generalized weakness    RECOMMENDATIONS :  · Await CT-guided bone marrow biopsy results  · Continue doxycycline  · Discontinue meropenem-ANC 1.5. Would like to monitor off meropenem and on doxycycline alone  · Continue hydration/fluid management per nephrology  · Monitor temperature curve closely  · Monitor labs.       Tabitha Porter MD

## 2021-07-21 NOTE — PROGRESS NOTES
Notified lab that the patient midline is unable to draw. Lab to come draw patient potassium.   Electronically signed by Coco Watts RN on 7/21/2021 at 8:18 AM

## 2021-07-21 NOTE — PROGRESS NOTES
No meds given to this patient at this time due to inability to remain alert.  Electronically signed by Clarisse Joseph RN on 7/21/2021 at 9:44 AM

## 2021-07-21 NOTE — CONSULTS
[facility] Vascular Access Team:  Consult Note      Procedure:   Troubleshoot patient's L Basilic Single Lumen Midline catheter. Indications:    Nursing staff unable to draw blood from midline. Findings:  Patient's family explained to VAT that patient's midline did not draw blood well this morning. They state \"it keith a small amount, but that's it. \" This nurse assessed line, noticed it was infusing with abx and NS without any resistance. This nurse stopped infusion in order to assess line, used aseptic technique to cleanse port with alcohol swab and attempted two flushes and blood draws from line. Line returned small amount of blood, but flushed smoothly without resistance. After positioning arm several times, this nurse then visualized vessel with ultrasound and determined that vessel size diminished since placement. Visualized IVF infusing into vessel with no complications. Pt doesn't c/o pain at midline site. Recommendations:  Referral to Vascular   Informed nursing staff that line will not draw, but is patient and useable to administer IV treatments. If blood draws are necessary, to reduce number of sticks on patient, referral to vascular would be indicated. Thank you for your time and consult to the [facility] VAT.

## 2021-07-21 NOTE — PROGRESS NOTES
[facility] Hospitalists      Patient:  Fransico Sauer  YOB: 1945  Date of Service: 7/21/2021  MRN: [id number]   Acct: [de-identified]   Primary Care Physician: Billy Rivera MD  Advance Directive: Full Code  Admit Date: 7/15/2021       Hospital Day: 6  Portions of this note have been copied forward, however, updated to reflect the most current clinical status of this patient    CHIEF COMPLAINT Generalized weakness    SUBJECTIVE:  Ms. Marium Whitten is laying in bed resting comfortably at the moment. She is not interested in eating and has not been alert enough to swallow po medications. Ortega cath in place. CUMULATIVE HOSPITAL COURSE:   The patient is a 78 y. o. female with past medical history of breast cancer, colon polyp removed that was cancerous per patient, CKD 3 hypothyroidism, COPD, PE and who presented to [facility] ED complaining of abnormal labs. Ms. Marium Whitten stated she had seen nephrology nurse practitioner and was ordered some routine labs. Labs came back abnormal and was advised to come to ED for evaluation.  Stated she has been tired recently, increased shortness of breath, lightheadedness and dizziness ongoing for about a month. Stated she recently had an ear infection and was started on steroids and antibiotics. Related history of breast cancer in 1991, had a lumpectomy and no chemotherapy required. Reported colonoscopy 2 years ago with polyp removal that was cancerous, no chemotherapy at that time either. Reported recent mammogram and needle biopsy that was negative about 2 months ago. Denied bleeding, dark stools, or nosebleeds.  Denied fever, chills, cough, abdominal pain, nausea, vomiting, or diarrhea.  Work-up in ED revealed VSS, creatinine 1.4, GFR 36 (creatinine 1.5, GFR 34 on 5/7/2021), TSH reflex to FT4 <0.01, WBC 3.0, Hgb 6.8, platelet 47. Ferritin 156.2, iron 161, iron saturation 53, vitamin B12 1684. Received  1 unit of PRBC in ED.  Patient was admitted to hospital medicine with pancytopenia with hematology consultation. Patient spiked fever on 7/17/21 early am and throughout the day. Blood cultures ordered. Cefepime started. Patient was noted to have elevated DDimer at 9.74, ordered VQ scan given patient's renal function. VQ scan showed low probability for PE. Nephrology and ID consulted as well as palliative care. 7/20/21 Midline placed and bone marrow biopsy. Kidney function deteriorating. Adjusting pain medications for optimal pain control and keeping vitals stable. 1 unit platelet pheresis 8/01/02. eliquis discontinued due to thrombocytopenia and SALVATORE. Review of Systems:   Review of Systems   Constitutional: Positive for fatigue. Negative for chills, diaphoresis and fever. HENT: Negative for congestion, ear pain, sinus pain, sore throat and trouble swallowing. Eyes: Negative for photophobia and visual disturbance. Respiratory: Positive for shortness of breath. Negative for cough, wheezing and stridor. Cardiovascular: Negative for chest pain, palpitations and leg swelling. Gastrointestinal: Negative for abdominal distention, abdominal pain, constipation, diarrhea, nausea and vomiting. Endocrine: Negative for cold intolerance and heat intolerance. Genitourinary: Negative for difficulty urinating, flank pain, frequency and urgency. Musculoskeletal: Positive for arthralgias, back pain and myalgias. Neurological: Positive for weakness. Negative for dizziness, tremors, syncope, light-headedness, numbness and headaches. Hematological: Does not bruise/bleed easily. Psychiatric/Behavioral: Negative for agitation, confusion and dysphoric mood.           Objective:   VITALS:  BP 91/61   Pulse 113   Temp 97.2 °F (36.2 °C) (Temporal)   Resp 20   Ht 5' 1\" (1.549 m)   Wt 218 lb 1 oz (98.9 kg)   LMP  (LMP Unknown)   SpO2 92%   BMI 41.20 kg/m²   24HR INTAKE/OUTPUT:      Intake/Output Summary (Last 24 hours) at 7/21/2021 1402  Last data filed at 7/21/2021 1050  Gross per 24 hour Intake 1121 ml   Output 200 ml   Net 921 ml       Physical Exam  Constitutional:       General: She is not in acute distress. Appearance: She is obese. She is ill-appearing. She is not toxic-appearing or diaphoretic. HENT:      Head: Normocephalic and atraumatic. Right Ear: External ear normal.      Left Ear: External ear normal.      Nose: Nose normal. No congestion or rhinorrhea. Mouth/Throat:      Mouth: Mucous membranes are dry. Pharynx: Oropharynx is clear. Eyes:      General: No scleral icterus. Extraocular Movements: Extraocular movements intact. Conjunctiva/sclera: Conjunctivae normal.   Cardiovascular:      Rate and Rhythm: Normal rate and regular rhythm. Pulses: Normal pulses. Heart sounds: Normal heart sounds. No murmur heard. No friction rub. No gallop. Pulmonary:      Effort: No respiratory distress. Breath sounds: Normal breath sounds. No stridor. No wheezing, rhonchi or rales. Abdominal:      General: There is no distension. Palpations: Abdomen is soft. Tenderness: There is no abdominal tenderness. Genitourinary:     Comments: Ortega cath in place. Lindsey colored urine in bag   Musculoskeletal:         General: No swelling. Normal range of motion. Cervical back: Normal range of motion and neck supple. Right lower leg: Edema present. Left lower leg: Edema present. Skin:     General: Skin is warm and dry. Coloration: Skin is not jaundiced. Findings: No erythema, lesion or rash. Comments: Midline in place   Neurological:      General: No focal deficit present. Mental Status: She is alert. Cranial Nerves: No cranial nerve deficit. Sensory: No sensory deficit. Motor: No weakness.             Medications:      sodium chloride      sodium chloride      sodium chloride 75 mL/hr at 07/21/21 1121    dextrose      sodium chloride      sodium chloride        docusate sodium  100 mg Oral Daily    polyethylene glycol  17 g Oral Daily    LORazepam  1 mg Intravenous Once    fentaNYL  1 patch Transdermal Q72H    sodium bicarbonate  650 mg Oral BID    doxycycline (VIBRAMYCIN) IV  100 mg Intravenous Q12H    meropenem (MERREM) 500 mg in SWFI 10 mL IV syringe  500 mg Intravenous Q12H    guaiFENesin  600 mg Oral BID    insulin lispro  0-6 Units Subcutaneous TID WC    insulin lispro  0-3 Units Subcutaneous Nightly    allopurinol  100 mg Oral Daily    Vitamin B-12  1 tablet Oral Nightly    gabapentin  200 mg Oral BID    lactobacillus  1 capsule Oral Daily with breakfast    Vitamin D  2,000 Units Oral Daily    ipratropium  0.5 mg Nebulization 4x daily    sodium chloride flush  5-40 mL Intravenous 2 times per day     HYDROmorphone, sodium chloride, naloxone, sodium chloride, aluminum & magnesium hydroxide-simethicone, acetaminophen **OR** acetaminophen, ibuprofen, glucose, dextrose, glucagon (rDNA), dextrose, sodium chloride, calcium carbonate, melatonin, albuterol, sodium chloride flush, sodium chloride  ADULT DIET; Easy to Chew; 4 carb choices (60 gm/meal)     Lab and other Data:     Recent Labs     07/19/21  0133 07/20/21  0254 07/21/21  0615   WBC 3.6* 2.6* 3.9*   HGB 7.0* 7.5* 7.6*   PLT 31* 22* 31*     Recent Labs     07/19/21  0133 07/19/21  0133 07/20/21  0254 07/21/21  0615 07/21/21  0848   *  --  135* 135*  --    K 4.5   < > 4.8 6.3* 4.6     --  102 104  --    CO2 21*  --  19* 17*  --    BUN 23  --  33* 50*  --    CREATININE 2.6*  --  2.8* 3.5*  --    GLUCOSE 170*  --  129* 139*  --     < > = values in this interval not displayed. RAD:   US ABDOMEN COMPLETE  Result Date: 7/16/2021  A previous cholecystectomy. Fatty infiltration of the liver. The spleen is normal. Signed by Dr Geoff Yates  Result Date: 7/16/2021  No active cardiopulmonary disease.  Signed by Dr Tarik Nava Perfusion Only  Result Date: 7/17/21  Low probability for pulmonary embolism. Signed by Dr Bharathi Diamond    XR Ribs Left  Result Date: 7/18/21  1. No appreciable left-sided rib fracture. No left pleural effusion or   pneumothorax. Left Subclavian cardiac pacer device. Signed by Dr Bernardino Montiel    CT Head WO Contrast  Result Date:7/19/21  Impression:  1. No acute intracranial process. Signed by Dr Suly Castañeda    CT Biopsy Bone Marrow  Result Date: 7/20/21  Impression:   Successful CT guided bone marrow aspirate and bone biopsy from the right ileum. Signed by Dr Shane Byrd Renal Complete  Result Date: 7/21/21  Impression:  Negative for hydronephrosis or renal atrophy. Signed by Dr Arvid Olszewski:   Anabelle Newtonats #[id number] ([id number]) (82 y.o. F) (Adm: 07/15/21)  [facility] [id number]  Results    Procedure Component Value Units Date/Time   Legionella Antigen, Urine [[id number]] Collected: 07/20/21 0615   Order Status: Completed Specimen: Urine, clean catch Updated: 07/20/21 0814    L. pneumophila Serogp 1 Ur Ag --    Presumptive Negative   No Legionella pneumophila serogroup 1 antigens detected. A negative result does not exclude infection with   Legionella pneumophila serogroup 1 nor does it rule out   other microbial-caused respiratory infections or   disease caused by other serogroups of   Legionella pneumophila. Normal Range: Presumptive Negative    Narrative:     ORDER#: [id number]                          ORDERED BY: Tricia Irvin   SOURCE: Urine Clean Catch  Urine           COLLECTED:  07/20/21 06:15   ANTIBIOTICS AT KRIS. :                      RECEIVED :  07/20/21 06:31   STREP PNEUMONIAE ANTIGEN [[id number]] Collected: 07/20/21 0615   Order Status: Completed Specimen: Urine, clean catch Updated: 07/20/21 0814    STREP PNEUMONIAE ANTIGEN, URINE --    Presumptive Negative   Presumptive negative suggests no current or recent   pneumococcal infection.  Infection due to Strep pneumoniae   cannot be ruled out since the antigen present in the sample   may be below the detection limit of the test.   Normal Range:Presumptive Negative    Narrative:     ORDER#: [id number]                          ORDERED BY: Rain Rayo   SOURCE: Urine Clean Catch  Urine           COLLECTED:  07/20/21 06:15   ANTIBIOTICS AT KRIS. :                      RECEIVED :  07/20/21 06:31   Culture, Blood 2 [[id number]] Collected: 07/18/21 2015   Order Status: Completed Specimen: Blood Updated: 07/19/21 2114    Culture, Blood 2 No Growth to date.  Any change in status will be called. Narrative:     ORDER#: [id number]                          ORDERED BY: Machelle Castañeda   SOURCE: Blood lfa                          COLLECTED:  07/18/21 20:15   ANTIBIOTICS AT KRIS. :                      RECEIVED :  70/05/15 85:38   Ehrlichia and Anaplasma Species by PCR [[id number]] Collected: 07/18/21 0348   Order Status: Sent Specimen: Blood Updated: 07/19/21 1351   Culture, Blood 2 [[id number]] Collected: 07/18/21 0348   Order Status: Completed Specimen: Blood Updated: 07/19/21 0514    Culture, Blood 2 No Growth to date.  Any change in status will be called. Narrative:     ORDER#: [id number]                          ORDERED BY: Peg Galvan   SOURCE: Blood Antecubital-Lef              COLLECTED:  07/18/21 03:48   ANTIBIOTICS AT KRIS. :                      RECEIVED :  07/18/21 04:21   Culture, Respiratory [[id number]]    Order Status: No result Specimen: Sputum Expectorated    Culture, Blood 1 [[id number]] Collected: 07/17/21 1556   Order Status: Completed Specimen: Blood Updated: 07/18/21 1714    Blood Culture, Routine No Growth to date.  Any change in status will be called. Narrative:     ORDER#: [id number]                          ORDERED BY: Peg Galvan   SOURCE: Blood LFA                          COLLECTED:  07/17/21 15:56   ANTIBIOTICS AT KRIS. :                      RECEIVED :  07/17/21 16:21   COVID-19, Rapid [[id number]] Collected: 07/15/21 1911   Order Status: Completed Specimen: Nasopharyngeal Swab Updated: 07/15/21 1936    SARS-CoV-2, NAAT Not Detected                 Assessment/Plan   Principal Problem:    Pancytopenia ([facility]    Anemia    Fever  - Hematology/Oncology and ID following. Pt received 1 unit 7/15 and 7/19. ID has pt on Meropenem and doxycycline. CT-guided bone marrow biopsy/aspirate. US abdomen to rule out ESLD/splenomegaly. US abdomen showed fatty infiltration of the liver,Rapid HIV negative, Hepatitis panel- non reactive, REILLY detected, DDimer 9.74, ordered VQ scan given patient's renal function, showed low probability for PE. Continue to monitor closely. Bone marrow biopsy completed on 7/20/21      Active Problems:   SALVATORE on Chronic kidney disease, stage III (moderate) [facility])- Nephrology consulted recommendations appreciated. ATN likely due to hypotension with worsening renal function. Continuing Gentle IV hydration and repeat labs in AM. Ortega cath in place. Nephrology notes pt may need HD if kidney fxn continues to worsen.        Controlled type 2 diabetes mellitus without complication, without long-term current use of insulin ([facility])-A1C 6.3, low dose SSI, monitor Accu-Cheks, hypoglycemia treatment protocol in place       COPD (chronic obstructive pulmonary disease) ([facility]- O2 and nebs as needed      Hyperthyroidism- synthroid on hold      Palliative care patient-noted      Antibiotic: Meropenem and doxycycline     DVT Prophylaxis: Cristina Cummings PA-C  7/21/2021, 2:02 PM

## 2021-07-21 NOTE — PROGRESS NOTES
Palliative Care Progress Note  7/21/2021 12:05 PM    Patient:  Elizabeth Masterson  YOB: 1945  Primary Care Physician: Janes Padilla MD  Advance Directive: Full Code  Admit Date: 7/15/2021       Hospital Day: 6  Portions of this note have been copied forward, however, changed to reflect the most current clinical status of this patient. CHIEF COMPLAINT/REASON FOR CONSULTATION Goals of care, code status, family support    SUBJECTIVE: Mrs. Cherry Lee is lethargic. She is easily awakened. Has no new complaints. Daughter and  are at bedside and report she had a difficult night. Has not slept in several days due to pain, chills. Interval History:  68 yo female with COPD, CKD III, hypothyroidism, history of pulmonary emboli admitted to Hospitalist service for pancytopenia, [place]. Oncology, ID, Nephrology following. Continued on Merrem/Doxycycline. Infectious work up ongoing. Bone marrow biopsy completed 07/20/2021 and pending. Renal function with continued decline. Nephrology following. IV Dilaudid added as needed for pain    Review of Systems:   14 point review of systems is negative except as specifically addressed above. Objective:   VITALS:  /65   Pulse 110   Temp 97 °F (36.1 °C) (Temporal)   Resp 20   Ht 5' 1\" (1.549 m)   Wt 218 lb 1 oz (98.9 kg)   LMP  (LMP Unknown)   SpO2 95%   BMI 41.20 kg/m²   24HR INTAKE/OUTPUT:      Intake/Output Summary (Last 24 hours) at 7/21/2021 1205  Last data filed at 7/21/2021 0327  Gross per 24 hour   Intake 1121 ml   Output --   Net 1121 ml     General appearance: 68 yo female, cachectic, laying in supine position with head slightly elevated   Head: Normocephalic, without obvious abnormality, atraumatic  Eyes: conjunctivae/corneas clear. PERRL, EOM's intact.    Ears: normal external ears and nose, throat without exudate  Neck: no adenopathy, no carotid bruit, no JVD, supple, symmetrical, trachea midline   Lungs: diminished air entry bilateral bases, mild tachypnea, no rhonchi or wheezing  Heart: tachycardic regular rhythm, S1, S2 normal, no murmur  Abdomen:soft,obese, non-tender; non-distended,sluggish bowel sounds present    Extremities: Anasarca,  No erythema, no tenderness to palpation  Skin: Skin color, texture, turgor normal. No rashes or lesions  Lymphatic: No palpable lymph node enlargment  Neurologic: Lethargic, awakened with verbal stimuli then quickly falls asleep, worsening generalized weakness, confused at times  Psychiatric: Unable to fully assess, no signs of anxiety     Medications:      sodium chloride      sodium chloride      sodium chloride 75 mL/hr at 07/21/21 1121    dextrose      sodium chloride      sodium chloride        docusate sodium  100 mg Oral Daily    polyethylene glycol  17 g Oral Daily    LORazepam  1 mg Intravenous Once    fentaNYL  1 patch Transdermal Q72H    sodium bicarbonate  650 mg Oral BID    doxycycline (VIBRAMYCIN) IV  100 mg Intravenous Q12H    meropenem (MERREM) 500 mg in SWFI 10 mL IV syringe  500 mg Intravenous Q12H    guaiFENesin  600 mg Oral BID    insulin lispro  0-6 Units Subcutaneous TID WC    insulin lispro  0-3 Units Subcutaneous Nightly    allopurinol  100 mg Oral Daily    Vitamin B-12  1 tablet Oral Nightly    gabapentin  200 mg Oral BID    lactobacillus  1 capsule Oral Daily with breakfast    Vitamin D  2,000 Units Oral Daily    ipratropium  0.5 mg Nebulization 4x daily    sodium chloride flush  5-40 mL Intravenous 2 times per day     HYDROmorphone, sodium chloride, naloxone, sodium chloride, aluminum & magnesium hydroxide-simethicone, acetaminophen **OR** acetaminophen, ibuprofen, glucose, dextrose, glucagon (rDNA), dextrose, sodium chloride, calcium carbonate, melatonin, albuterol, sodium chloride flush, sodium chloride  ADULT DIET; Easy to Chew; 4 carb choices (60 gm/meal)     Lab and other Data:     Recent Labs     07/19/21  0133 07/20/21  0254 07/21/21  0615   WBC 3.6* 2. 6* 3.9*   HGB 7.0* 7.5* 7.6*   PLT 31* 22* 31*     Recent Labs     07/19/21  0133 07/19/21  0133 07/20/21  0254 07/21/21  0615 07/21/21  0848   *  --  135* 135*  --    K 4.5   < > 4.8 6.3* 4.6     --  102 104  --    CO2 21*  --  19* 17*  --    BUN 23  --  33* 50*  --    CREATININE 2.6*  --  2.8* 3.5*  --    GLUCOSE 170*  --  129* 139*  --     < > = values in this interval not displayed. Recent Labs     07/20/21  0257   AST 28   ALT 13   BILITOT 1.0   ALKPHOS 45     Troponin T:   Recent Labs     07/20/21  0257 07/20/21  1353 07/20/21  1810   TROPONINI 0.08* 0.06* 0.06*     Assessment/Plan   Principal Problem:    Pancytopenia (HCC)  Active Problems:    Chronic kidney disease, stage III (moderate) (HCC)    Controlled type 2 diabetes mellitus without complication, without long-term current use of insulin (HCC)    COPD (chronic obstructive pulmonary disease) (HCC)    Hyperthyroidism    Anemia    Palliative care patient  Resolved Problems:    * No resolved hospital problems. *    Visit Summary:  Chart reviewed, patient discussed with consulting service and nursing staff. Reviewed health issues, work up and treatment plan as well as factors that lead to hospitalization. I saw Mrs. Zaida Bonds at her bedside with her  and daughter present. They report she finally appears comfortable and would like to let her sleep today if possible. We discussed decreased urine output and worsening renal function. They would be agreeable for dialysis while awaiting results of bone marrow biopsy and infectious work up. They understand the severity of her illness at this time to include high risk of acute decline. Mrs. Zaida Bonds has spoken with her family about her wishes which are to continue aggressive measures. She is a full code and would want cardiopulmonary resuscitation to include ventilator management.  She voiced that she would not want to be maintained on the ventilator for a long period of time should she not improve. Her  and daughter voiced the same as well. Support/comfort offered. Recommendations:   1. Palliative care-GOC to be determined by hospital course. Code status: FULL CODE. Will have ongoing discussion based on work up/clinical status    2. Pain-undetermined source. Improved control. D/w Hospitalist-recommend change IV Morphine to Dilaudid with worsening renal function. Continue Fentanyl patch for now    3. Pancytopenia-work up ongoing    4. Fever-temperature curve improving, ID following     Thank you for consulting Palliative Care and allowing us to participate in the care of this patient.    Time Spent Counseling > 50%:  YES                                   Total Time Spent with patient/family counseling, workup/treatment review, counseling and placement of orders/preparation of this note: 36 minutes    Electronically signed by Randolph Velasquez PA-C on 7/21/2021 at 12:05 PM    (Please note that portions of this note were completed with a voice recognition program.  Ofelia Dickson made to edit the dictations but occasionally words are mis-transcribed.)

## 2021-07-21 NOTE — PROGRESS NOTES
Nurse attempted to draw blood from midline w/o success. Blood return noted in line, but unable to draw any blood from line. Charge nurse also attempted w/o success. Notified lab.  Electronically signed by Jeannette Khan RN on 7/21/2021 at 5:58 AM

## 2021-07-21 NOTE — PROGRESS NOTES
Physical Therapy  Attempted PT eval , but RN states to wait due to pt's lethargy.   Electronically signed by William Sage PT on 7/21/2021 at 2:24 PM

## 2021-07-21 NOTE — PROGRESS NOTES
Nephrology ([facility]) Progress Note    Patient:  Kylee Pedro  YOB: 1945  Date of Service: 7/21/2021  MRN: [id number]   Acct: [de-identified]   Primary Care Physician: Vasu Maza MD  Advance Directive: Full Code  Admit Date: 7/15/2021       Hospital Day: 6  Referring Provider: Sobeida Abdullahi MD    Patient Seen, Chart, Consults notes, Labs, Radiology studies reviewed. Subjective:  Patient is a 54-year-old pleasant woman with a past medical history of colon cancer, type 2 diabetes and breast cancer. She previously had an adrenal tumor that was causing her chronic hypokalemia. Patient went to [place] and underwent adrenal gland removal.  She has a chronic kidney disease stage III and she is followed at the renal clinic. Patient also has a history of COPD and a pulmonary embolism. More recently she was complaining of some dyspnea lightheadedness and dizziness. She is followed at the renal clinic by HARMAN Olea who asked her to report to the emergency room after her labs that showed a pancytopenia. On admission, her hemoglobin was 6.8, her platelet count was 64,699 and her white blood cell count was 3000. Her labs also showed some hypercalcemia 10.6. Her renal function was around baseline on admission and her serum creatinine was 1.4. On July 19, her serum creatinine was found to be at 2.6. Renal service was consulted to manage her acute kidney injury. Patient denies change to her urine habits. She has some edema in her upper and lower extremities. Patient has been hypotensive on July 18 and was febrile. She is s/p CT guided bone marrow biopsy on 7/20. She remained on antibiotics. Today, patient is lethargic. In fact, she was agitated and restless at night and could not get good sleep. She was found with elevated potassium at 6.3 but the repeat 1 was 4.6 since the first sample was hemolyzed.       Allergies:  Patient has no known allergies.     Medicines:  Current Facility-Administered Medications   Medication Dose Route Frequency Provider Last Rate Last Admin    HYDROmorphone HCl PF (DILAUDID) injection 0.5 mg  0.5 mg Intravenous Q4H PRN Anshul Alatorre MD        docusate sodium (COLACE) capsule 100 mg  100 mg Oral Daily Nam Lake PA-C        polyethylene glycol [facility]) packet 17 g  17 g Oral Daily Nam Lake PA-C        LORazepam (ATIVAN) injection 1 mg  1 mg Intravenous Once Nile Jacobsen MD        0.9 % sodium chloride infusion   Intravenous PRN DAVID Pan        naloxone [facility]) injection 0.4 mg  0.4 mg Intravenous PRN Constantinefariba Dumont PA-C        0.9 % sodium chloride infusion   Intravenous PRN DAVID Pan        aluminum & magnesium hydroxide-simethicone (MAALOX) 200-200-20 MG/5ML suspension 30 mL  30 mL Oral Q6H PRN DAVID Pan        fentaNYL (DURAGESIC) 12 MCG/HR 1 patch  1 patch Transdermal Q72H Nile Jacobsen MD   1 patch at 07/19/21 1028    sodium bicarbonate tablet 650 mg  650 mg Oral BID Gómez Andersen MD   650 mg at 07/20/21 2035    doxycycline (VIBRAMYCIN) 100 mg in dextrose 5 % 100 mL IVPB  100 mg Intravenous Q12H Karen Can MD   Stopped at 07/21/21 0327    meropenem (MERREM) 500 mg in sterile water 10 mL IV syringe  500 mg Intravenous Q12H Karen Can MD   500 mg at 07/21/21 0227    0.9 % sodium chloride infusion   Intravenous Continuous Anshul Alatorre MD 75 mL/hr at 07/21/21 1121 Rate Change at 07/21/21 1121    guaiFENesin (MUCINEX) extended release tablet 600 mg  600 mg Oral BID Sindy Mendoza MD   600 mg at 07/20/21 2035    acetaminophen (TYLENOL) tablet 1,000 mg  1,000 mg Oral Q6H PRN Sindy Mendoza MD   1,000 mg at 07/20/21 2217    Or    acetaminophen (TYLENOL) suppository 650 mg  650 mg Rectal Q6H PRN Sindy Mendoza MD        ibuprofen (ADVIL;MOTRIN) tablet 800 mg  800 mg Oral Q6H PRN Pleasant Glimpse Epi Ramachandran MD   800 mg at 07/20/21 1836    glucose (GLUTOSE) 40 % oral gel 15 g  15 g Oral PRN DAVID Morales - CNP        dextrose 50 % IV solution  12.5 g Intravenous PRN Ya Hu APRN - CNP        glucagon (rDNA) injection 1 mg  1 mg Intramuscular PRN Ya Hu APRN - CNP        dextrose 5 % solution  100 mL/hr Intravenous PRN DAVID Morales - CNP        insulin lispro (HUMALOG) injection vial 0-6 Units  0-6 Units Subcutaneous TID WC DAVID Morales - CNP   2 Units at 07/18/21 1242    insulin lispro (HUMALOG) injection vial 0-3 Units  0-3 Units Subcutaneous Nightly DAVID Morales - CNP        0.9 % sodium chloride infusion   Intravenous PRN Megan Corrales MD        calcium carbonate (TUMS) chewable tablet 500 mg  500 mg Oral TID PRN Yesica Anderson MD   500 mg at 07/19/21 2143    melatonin disintegrating tablet 5 mg  5 mg Oral Nightly PRN Yesica Anderson MD   5 mg at 07/20/21 2035    allopurinol (ZYLOPRIM) tablet 100 mg  100 mg Oral Daily Yesica Anderson MD   100 mg at 07/19/21 1028    Vitamin B-12 TBDP 1 tablet  1 tablet Oral Nightly Yesica Anderson MD        gabapentin (NEURONTIN) capsule 200 mg  200 mg Oral BID Yesica Anderson MD   200 mg at 07/20/21 2035    lactobacillus (CULTURELLE) capsule 1 capsule  1 capsule Oral Daily with breakfast Yesica Anderson MD   1 capsule at 07/18/21 0744    Vitamin D (CHOLECALCIFEROL) tablet 2,000 Units  2,000 Units Oral Daily Yesica Anderson MD   2,000 Units at 07/19/21 1028    ipratropium (ATROVENT) 0.02 % nebulizer solution 0.5 mg  0.5 mg Nebulization 4x daily Yesica Anderson MD   0.5 mg at 07/21/21 0635    albuterol (PROVENTIL) nebulizer solution 2.5 mg  2.5 mg Nebulization Q1H PRN Yesica Anderson MD        sodium chloride flush 0.9 % injection 5-40 mL  5-40 mL Intravenous 2 times per day Yesica Anderson MD   10 mL at 07/20/21 2035    sodium chloride flush 0.9 % injection 5-40 mL  5-40 mL Intravenous PRN Tim Bateman Sherley Falcon MD        0.9 % sodium chloride infusion  25 mL Intravenous PRN Som Kemp MD           Past Medical History:  Past Medical History:   Diagnosis Date    Acquired hypothyroidism 07/11/2018    Chronic kidney disease, stage III (moderate) ([facility] [address] 09/03/2019    Mom and sister + breast CA Lumpectomy - remission    Community acquired pneumonia of left lower lobe of lung 06/01/2018    Malignant neoplasm of upper-inner quadrant of right female breast ([facility] [address] 02/06/2020    Palliative care patient 07/16/2021    Stage 2 moderate COPD by GOLD classification ([facility] [address] 07/11/2018       Past Surgical History:  Past Surgical History:   Procedure Laterality Date    ADRENALECTOMY      APPENDECTOMY      BACK SURGERY      BREAST BIOPSY Left 1994    benign    BREAST LUMPECTOMY Right 1991    CARPAL TUNNEL RELEASE      CARPAL TUNNEL RELEASE  1990    CATARACT REMOVAL      CORONARY ANGIOPLASTY      CT BONE MARROW BIOPSY  7/20/2021    CT BONE MARROW BIOPSY 7/20/2021 [facility] CT SCAN    GALLBLADDER SURGERY      HYSTERECTOMY  1979    PACEMAKER INSERTION  07/22/2020    PARTIAL HYSTERECTOMY      TONSILLECTOMY  1997    TOTAL KNEE ARTHROPLASTY      US BREAST CYST ASPIRATION RIGHT Right 5/20/2021    US BREAST CYST ASPIRATION RIGHT 5/20/2021 [phone]       Family History  Family History   Problem Relation Age of Onset    Breast Cancer Mother 76    Breast Cancer Sister 59       Social History  Social History     Socioeconomic History    Marital status:      Spouse name: Not on file    Number of children: Not on file    Years of education: Not on file    Highest education level: Not on file   Occupational History    Not on file   Tobacco Use    Smoking status: Never Smoker    Smokeless tobacco: Never Used   Vaping Use    Vaping Use: Never used   Substance and Sexual Activity    Alcohol use: No    Drug use: No    Sexual activity: Not on file   Other Topics Concern    Not on file   Social History Narrative     61 years    She has 1 son and 1 daughter    Worked at [employer] and also [employer] not presently working    Education high school    [address] [facility]    She does drive an automobile    Remains moderately active    Denies alcohol or tobacco consumption or substance usage     Social Determinants of Health     Financial Resource Strain:     Difficulty of Paying Living Expenses:    Food Insecurity:     Worried About Running Out of Food in the Last Year:     [address] in the Last Year:    Transportation Needs:     Lack of Transportation (Medical):  Lack of Transportation (Non-Medical):    Physical Activity:     Days of Exercise per Week:     Minutes of Exercise per Session:    Stress:     Feeling of Stress :    Social Connections:     Frequency of Communication with Friends and Family:     Frequency of Social Gatherings with Friends and Family:     Attends [employer] Services:     Active Member of Clubs or Organizations:     Attends Club or Organization Meetings:     Marital Status:    Intimate Partner Violence:     Fear of Current or Ex-Partner:     Emotionally Abused:     Physically Abused:     Sexually Abused:          Review of Systems:  History obtained from chart review and the patient  General ROS: No fever or chills  Respiratory ROS: No cough, shortness of breath, wheezing  Cardiovascular ROS: no chest pain or dyspnea on exertion  Gastrointestinal ROS: No abdominal pain or melena  Genito-Urinary ROS: No dysuria or hematuria  Musculoskeletal ROS: No joint pain or swelling         Objective:  Blood pressure 108/65, pulse 110, temperature 97 °F (36.1 °C), temperature source Temporal, resp. rate 20, height 5' 1\" (1.549 m), weight 218 lb 1 oz (98.9 kg), SpO2 95 %, not currently breastfeeding.     Intake/Output Summary (Last 24 hours) at 7/21/2021 1149  Last data filed at 7/21/2021 0327  Gross per 24 hour   Intake 1121 ml   Output --   Net 1121 ml     General: Lethargic, sleepy  Chest:  clear to auscultation bilaterally without respiratory distress  CVS: regular rate and rhythm  Abdominal: soft, nontender, normal bowel sounds  Extremities: no cyanosis but trace peripheral edema  Skin: warm and dry without rash    Labs:  BMP:   Recent Labs     07/19/21 0133 07/19/21 0133 07/20/21 0254 07/21/21  0615 07/21/21  0848   *  --  135* 135*  --    K 4.5   < > 4.8 6.3* 4.6     --  102 104  --    CO2 21*  --  19* 17*  --    BUN 23  --  33* 50*  --    CREATININE 2.6*  --  2.8* 3.5*  --    CALCIUM 9.5  --  9.3 9.0  --     < > = values in this interval not displayed. CBC:   Recent Labs     07/19/21 0133 07/20/21 0254 07/21/21  0615   WBC 3.6* 2.6* 3.9*   HGB 7.0* 7.5* 7.6*   HCT 21.4* 22.7* 24.1*   .5* 105.1* 109.5*   PLT 31* 22* 31*     LIVER PROFILE:   Recent Labs     07/20/21 0257   AST 28   ALT 13   BILIDIR 0.5*   BILITOT 1.0   ALKPHOS 45     PT/INR: No results for input(s): PROTIME, INR in the last 72 hours. APTT: No results for input(s): APTT in the last 72 hours. BNP:  No results for input(s): BNP in the last 72 hours. Ionized Calcium:No results for input(s): IONCA in the last 72 hours. Magnesium:  Recent Labs     07/19/21 0133   MG 1.7     Phosphorus:No results for input(s): PHOS in the last 72 hours. HgbA1C: No results for input(s): LABA1C in the last 72 hours. Hepatic:   Recent Labs     07/20/21 0257   ALKPHOS 45   ALT 13   AST 28   PROT 5.0*   BILITOT 1.0   BILIDIR 0.5*   LABALBU 2.7*     Lactic Acid: No results for input(s): LACTA in the last 72 hours. Troponin: No results for input(s): CKTOTAL, CKMB, TROPONINT in the last 72 hours. ABGs: No results for input(s): PH, PCO2, PO2, HCO3, O2SAT in the last 72 hours. CRP:  No results for input(s): CRP in the last 72 hours. Sed Rate:  No results for input(s): SEDRATE in the last 72 hours. Cultures:   No results for input(s): CULTURE in the last 72 hours.     Radiology reports as per the Radiologist  Radiology: US ABDOMEN COMPLETE    Result Date: 7/16/2021  Examination. US ABDOMEN COMPLETE 7/16/2021 6:43 AM History: Pancytopenia. The ultrasound examination of the abdomen is performed. There is no previous study for comparison. The gallbladder is surgically absent. Common bile duct measures 5 mm in diameter. There is fatty infiltration of the liver. No focal intrinsic abnormality. There is hepatopedal portal venous circulation. The pancreas is normal. A normal right kidney is seen measuring 9.1 x 6.1 cm. No evidence of mass or hydronephrosis. The spleen measures 9.4 x 8.2 x 4 cm. No intrinsic abnormality. A previous cholecystectomy. Fatty infiltration of the liver. The spleen is normal. Signed by Dr Brittney Kan    Result Date: 7/16/2021  Examination. XR CHEST PORTABLE 7/16/2021 9:19 AM History: Pancytopenia. A single frontal portable upright view of the chest is compared with the previous study dated 8/7/2020. The lungs are poorly expanded but clear. There is a persistent moderate cardiomegaly. The atheromatous changes thoracic aorta are noted. A dual-chamber cardiac pacer is seen in place. There is no acute bony abnormality. Postsurgical changes of the right shoulder are incompletely visualized. No active cardiopulmonary disease. Signed by Dr Urban Franklin     1. Acute kidney injury-due to ATN likely due to hypotension with worsening renal function  2. Pancytopenia  3. Chronic kidney disease stage III a  4. Type 2 diabetes with renal disease  5. Neutropenic sepsis  6. Urinary retention    Plan:  Continue IV fluids for now. Avoid hypotension and nephrotoxins. Patient may be having urine retention. I instructed the nurse to do a bladder scan and insert a Ortega catheter. We will continue to monitor close her kidney function. If she continues to worsen, she may need renal replacement therapy. Plans were discussed with her nurse and family.

## 2021-07-22 PROBLEM — N17.0 ACUTE KIDNEY FAILURE WITH TUBULAR NECROSIS (HCC): Status: ACTIVE | Noted: 2021-01-01

## 2021-07-22 NOTE — PROGRESS NOTES
Patient unable to take medications, unable to stay alert long enough.  Electronically signed by Tayla Garcia RN on 7/22/2021 at 10:54 AM

## 2021-07-22 NOTE — PROGRESS NOTES
Palliative Care: Follow up visit as pt is progressively worsening, experiencing air hunger. Family at the bedside, discussion with pt's  and daughter about goals of care at this point. Dr. Rhonda Figueredo present, assessed pt and offers information to the family as well. At this time pt will transition to DNR status and the focus will become comfort measures. Hospice consult placed and we will work toward transition to the [facility].     Electronically signed by Santy Morales RN on 7/22/2021 at 11:11 AM

## 2021-07-22 NOTE — CONSULTS
Comprehensive Nutrition Assessment    Type and Reason for Visit:  Initial, Consult    Nutrition Recommendations/Plan: comfort measures    Nutrition Assessment:  Received cosnult for decreased po intake. Pt is very lethargic at time of visit. And is to be admitted to Hospice    Malnutrition Assessment:  Malnutrition Status: At risk for malnutrition (Comment)    Context:  Acute Illness     Findings of the 6 clinical characteristics of malnutrition:  Energy Intake:  7 - 50% or less of estimated energy requirements for 5 or more days  Weight Loss:  No significant weight loss     Body Fat Loss:  No significant body fat loss     Muscle Mass Loss:  No significant muscle mass loss    Fluid Accumulation:  1 - Mild Extremities   Strength:  Not Performed    Estimated Daily Nutrient Needs:  Energy (kcal):  791-1483 kcals (8-15 kcals/kg);  Weight Used for Energy Requirements:  Current     Protein (g):  95 g; Weight Used for Protein Requirements:  Ideal        Fluid (ml/day):  791-1483 ml; Method Used for Fluid Requirements:  1 ml/kcal      Nutrition Related Findings:  +1, +2 edema      Wounds:  None       Current Nutrition Therapies:    ADULT DIET; Easy to Chew; 4 carb choices (60 gm/meal)    Anthropometric Measures:  · Height: 5' 1\" (154.9 cm)  · Current Body Weight: 218 lb 1 oz (98.9 kg)   · Admission Body Weight: 215 lb (97.5 kg)    · Usual Body Weight: 214 lb (97.1 kg) (6/2021)     · Ideal Body Weight: 105 lbs; % Ideal Body Weight 207.7 %   · BMI: 41.2  · Adjusted Body Weight:  ; No Adjustment    · BMI Categories: Obese Class 3 (BMI 40.0 or greater)       Nutrition Diagnosis:   · Inadequate oral intake related to cognitive or neurological impairment, acute injury/trauma as evidenced by intake 0-25%      Nutrition Interventions:   Food and/or Nutrient Delivery:  Modify Current Diet  Nutrition Education/Counseling:  No recommendation at this time   Coordination of Nutrition Care:  Continue to monitor while inpatient    Goals:  Comfort measures       Nutrition Monitoring and Evaluation:   Behavioral-Environmental Outcomes:  None Identified   Food/Nutrient Intake Outcomes:   Other (Comment) (comfort measures)  Physical Signs/Symptoms Outcomes:        Discharge Planning:    No discharge needs at this time     Electronically signed by Rhonda Cooper MS, RD, LD on 7/22/21 at 1:16 PM CDT    Contact: [phone]

## 2021-07-22 NOTE — TELEPHONE ENCOUNTER
Hospice has a referral for this pt - will Dr Ciro Pallas sign off on the initial Hospice referral orders  ?

## 2021-07-22 NOTE — PROGRESS NOTES
Nephrology ([address]) Progress Note    Patient:  Myla Segura  YOB: 1945  Date of Service: 7/22/2021  MRN: [id number]   Acct: [de-identified]   Primary Care Physician: Moira Glass MD  Advance Directive: DNR-CC  Admit Date: 7/15/2021       Hospital Day: 7  Referring Provider: Trent Perez MD    Patient Seen, Chart, Consults notes, Labs, Radiology studies reviewed. Subjective:  Patient is a 71-year-old pleasant woman with a past medical history of colon cancer, type 2 diabetes and breast cancer. She previously had an adrenal tumor that was causing her chronic hypokalemia. Patient went to [place] and underwent adrenal gland removal.  She has a chronic kidney disease stage III and she is followed at the renal clinic. Patient also has a history of COPD and a pulmonary embolism. More recently she was complaining of some dyspnea lightheadedness and dizziness. She is followed at the renal clinic by HARMAN Cox who asked her to report to the emergency room after her labs that showed a pancytopenia. On admission, her hemoglobin was 6.8, her platelet count was 08,966 and her white blood cell count was 3000. Her labs also showed some hypercalcemia 10.6. Her renal function was around baseline on admission and her serum creatinine was 1.4. On July 19, her serum creatinine was found to be at 2.6. Renal service was consulted to manage her acute kidney injury. Patient denies change to her urine habits. She has some edema in her upper and lower extremities. Patient has been hypotensive on July 18 and was febrile. She is s/p CT guided bone marrow biopsy on 7/20. She remained on antibiotics. Today, patient remains very lethargic and difficult to arouse. Her breathing pattern is also different. On July 21, she was having urinary retention and a Ortega catheter was placed. Her urine is dark.  Family is now contemplating hospice option in light of her clinical and lab deterioration. Allergies:  Patient has no known allergies.     Medicines:  Current Facility-Administered Medications   Medication Dose Route Frequency Provider Last Rate Last Admin    morphine injection 2 mg  2 mg Intravenous Q2H PRN Randee Liar MD   2 mg at 07/22/21 1122    Or    morphine injection 4 mg  4 mg Intravenous Q2H PRN Randee Lira MD        LORazepam (ATIVAN) 2 MG/ML concentrated solution 1 mg  1 mg Oral Q2H PRN Randee Lira MD   1 mg at 07/22/21 1153    sodium bicarbonate 75 mEq in sodium chloride 0.45 % 1,000 mL infusion   Intravenous Continuous Claudy Mayfield MD        dextrose 50 % IV solution  25 g Intravenous Once Claudy Mayfield MD        insulin regular (HUMULIN R;NOVOLIN R) injection 10 Units  10 Units Intravenous Once Claudy Mayfield MD        HYDROmorphone HCl PF (DILAUDID) injection 0.5 mg  0.5 mg Intravenous Q4H PRN Randee Lira MD   0.5 mg at 07/22/21 0448    docusate sodium (COLACE) capsule 100 mg  100 mg Oral Daily Alease Bowels, PA-C        polyethylene glycol [facility]) packet 17 g  17 g Oral Daily Alease Bowels, PA-C        LORazepam (ATIVAN) injection 1 mg  1 mg Intravenous Once Jerad Oh MD        0.9 % sodium chloride infusion   Intravenous PRN Es WadeDAVID Valencia        naloxone [facility]) injection 0.4 mg  0.4 mg Intravenous PRN Ova Lands, PA-C        0.9 % sodium chloride infusion   Intravenous PRN Es WadeDAVID Valencia        aluminum & magnesium hydroxide-simethicone (MAALOX) 200-200-20 MG/5ML suspension 30 mL  30 mL Oral Q6H PRN Es WadeDAVID Valencia        fentaNYL (DURAGESIC) 12 MCG/HR 1 patch  1 patch Transdermal Q72H Jerad Oh MD   1 patch at 07/22/21 1039    sodium bicarbonate tablet 650 mg  650 mg Oral BID Claudy Mayfield MD   650 mg at 07/20/21 8758    doxycycline (VIBRAMYCIN) 100 mg in dextrose 5 % 100 mL IVPB  100 mg Intravenous Q12H Yesica Prakash MD   Stopped at 07/22/21 6100    (ATROVENT) 0.02 % nebulizer solution 0.5 mg  0.5 mg Nebulization 4x daily Larry Ochoa MD   0.5 mg at 07/22/21 1120    albuterol (PROVENTIL) nebulizer solution 2.5 mg  2.5 mg Nebulization Q1H PRN Larry Ochoa MD        sodium chloride flush 0.9 % injection 5-40 mL  5-40 mL Intravenous 2 times per day Larry Ochoa MD   10 mL at 07/20/21 2035    sodium chloride flush 0.9 % injection 5-40 mL  5-40 mL Intravenous PRN Larry Ochoa MD        0.9 % sodium chloride infusion  25 mL Intravenous PRN Larry Ochoa MD           Past Medical History:  Past Medical History:   Diagnosis Date    Acquired hypothyroidism 07/11/2018    Chronic kidney disease, stage III (moderate) ([address] 09/03/2019    Mom and sister + breast CA Lumpectomy - remission    Community acquired pneumonia of left lower lobe of lung 06/01/2018    Malignant neoplasm of upper-inner quadrant of right female breast ([address] 02/06/2020    Palliative care patient 07/16/2021    Stage 2 moderate COPD by GOLD classification ([address] 07/11/2018       Past Surgical History:  Past Surgical History:   Procedure Laterality Date    ADRENALECTOMY      APPENDECTOMY      BACK SURGERY      BREAST BIOPSY Left 1994    benign    BREAST LUMPECTOMY Right 1991    CARPAL TUNNEL RELEASE      CARPAL TUNNEL RELEASE  1990    CATARACT REMOVAL      CORONARY ANGIOPLASTY      CT BONE MARROW BIOPSY  7/20/2021    CT BONE MARROW BIOPSY 7/20/2021 L CT SCAN    GALLBLADDER SURGERY      HYSTERECTOMY  1979    PACEMAKER INSERTION  07/22/2020    PARTIAL HYSTERECTOMY      TONSILLECTOMY  1997    TOTAL KNEE ARTHROPLASTY      US BREAST CYST ASPIRATION RIGHT Right 5/20/2021    US BREAST CYST ASPIRATION RIGHT 5/20/2021 [phone]       Family History  Family History   Problem Relation Age of Onset    Breast Cancer Mother 76    Breast Cancer Sister 59       Social History  Social History     Socioeconomic History    Marital status:      Spouse name: Not on file  Number of children: Not on file    Years of education: Not on file    Highest education level: Not on file   Occupational History    Not on file   Tobacco Use    Smoking status: Never Smoker    Smokeless tobacco: Never Used   Vaping Use    Vaping Use: Never used   Substance and Sexual Activity    Alcohol use: No    Drug use: No    Sexual activity: Not on file   Other Topics Concern    Not on file   Social History Narrative     61 years    She has 1 son and 1 daughter    Worked at [employer] and also [employer] not presently working    Education high school    [address] [place] [address]    She does drive an automobile    Remains moderately active    Denies alcohol or tobacco consumption or substance usage     Social Determinants of Health     Financial Resource Strain:     Difficulty of Paying Living Expenses:    Food Insecurity:     Worried About Running Out of Food in the Last Year:     [address] in the Last Year:    Transportation Needs:     Lack of Transportation (Medical):  Lack of Transportation (Non-Medical):    Physical Activity:     Days of Exercise per Week:     Minutes of Exercise per Session:    Stress:     Feeling of Stress :    Social Connections:     Frequency of Communication with Friends and Family:     Frequency of Social Gatherings with Friends and Family:     Attends [employer] Services:     Active Member of Clubs or Organizations:     Attends Club or Organization Meetings:     Marital Status:    Intimate Partner Violence:     Fear of Current or Ex-Partner:     Emotionally Abused:     Physically Abused:     Sexually Abused:          Review of Systems:  Unable to obtain    Objective:  Blood pressure 118/60, pulse 124, temperature 97 °F (36.1 °C), temperature source Temporal, resp. rate 26, height 5' 1\" (1.549 m), weight 218 lb 1 oz (98.9 kg), SpO2 90 %, not currently breastfeeding.     Intake/Output Summary (Last 24 hours) at 7/22/2021 1322  Last data filed at 7/22/2021 0936  Gross per 24 hour   Intake 1 ml   Output --   Net 1 ml     General: Lethargic, sleepy  Chest:  clear to auscultation bilaterally without respiratory distress  CVS: regular rate and rhythm  Abdominal: soft, nontender, normal bowel sounds  Extremities: no cyanosis but trace peripheral edema  Skin: warm and dry without rash    Labs:  BMP:   Recent Labs     07/21/21  0615 07/21/21  0848 07/22/21  0837 07/22/21  1033   *  --  140 139  139   K 6.3*   < > 5.7* 5.7*  5.6*     --  107 107  106   CO2 17*  --  16* 18*  17*   BUN 50*  --  74* 75*  75*   CREATININE 3.5*  --  3.1* 3.1*  3.1*   CALCIUM 9.0  --  9.9 10.0  9.9    < > = values in this interval not displayed. CBC:   Recent Labs     07/21/21  0615 07/22/21  0837 07/22/21  1033   WBC 3.9* 4.2* 4.3*   HGB 7.6* 7.7* 7.5*   HCT 24.1* 23.2* 23.3*   .5* 104.0* 107.4*   PLT 31* 34* 31*     LIVER PROFILE:   Recent Labs     07/20/21 0257 07/22/21  1033   AST 28 47*   ALT 13 22   BILIDIR 0.5*  --    BILITOT 1.0 0.7   ALKPHOS 45 58     PT/INR: No results for input(s): PROTIME, INR in the last 72 hours. APTT: No results for input(s): APTT in the last 72 hours. BNP:  No results for input(s): BNP in the last 72 hours. Ionized Calcium:No results for input(s): IONCA in the last 72 hours. Magnesium:  No results for input(s): MG in the last 72 hours. Phosphorus:No results for input(s): PHOS in the last 72 hours. HgbA1C: No results for input(s): LABA1C in the last 72 hours. Hepatic:   Recent Labs     07/20/21 0257 07/22/21  1033   ALKPHOS 45 58   ALT 13 22   AST 28 47*   PROT 5.0* 5.7*   BILITOT 1.0 0.7   BILIDIR 0.5*  --    LABALBU 2.7* 2.6*     Lactic Acid: No results for input(s): LACTA in the last 72 hours. Troponin:   Recent Labs     07/22/21  0837   CKTOTAL 402*     ABGs: No results for input(s): PH, PCO2, PO2, HCO3, O2SAT in the last 72 hours. CRP:  No results for input(s): CRP in the last 72 hours.   Sed Rate:  No results for input(s): SEDRATE in the last 72 hours. Cultures:   No results for input(s): CULTURE in the last 72 hours. Radiology reports as per the Radiologist  Radiology: [address]    Result Date: 7/16/2021  Examination. US ABDOMEN COMPLETE 7/16/2021 6:43 AM History: Pancytopenia. The ultrasound examination of the abdomen is performed. There is no previous study for comparison. The gallbladder is surgically absent. Common bile duct measures 5 mm in diameter. There is fatty infiltration of the liver. No focal intrinsic abnormality. There is hepatopedal portal venous circulation. The pancreas is normal. A normal right kidney is seen measuring 9.1 x 6.1 cm. No evidence of mass or hydronephrosis. The spleen measures 9.4 x 8.2 x 4 cm. No intrinsic abnormality. A previous cholecystectomy. Fatty infiltration of the liver. The spleen is normal. Signed by Dr Clarisse Zhang    Result Date: 7/16/2021  Examination. XR CHEST PORTABLE 7/16/2021 9:19 AM History: Pancytopenia. A single frontal portable upright view of the chest is compared with the previous study dated 8/7/2020. The lungs are poorly expanded but clear. There is a persistent moderate cardiomegaly. The atheromatous changes thoracic aorta are noted. A dual-chamber cardiac pacer is seen in place. There is no acute bony abnormality. Postsurgical changes of the right shoulder are incompletely visualized. No active cardiopulmonary disease. Signed by Dr Aissatou Bello     1. Acute kidney injury-due to ATN likely due to hypotension with worsening renal function and urine retention  2. Pancytopenia  3. Chronic kidney disease stage III a  4. Type 2 diabetes with renal disease  5. Neutropenic sepsis  6. Urinary retention  7. Hyperkalemia  8. Metabolic acidosis    Plan: At this time, patient appears to be critically with severe acidosis hyperkalemia and kidney failure. She did continues to be pancytopenic.  Her clinical condition has significantly worsened especially her neuro status. I will provide dextrose and insulin. I will place her on IV bicarbonate. Palliative care and hospice have met with his family.

## 2021-07-22 NOTE — DISCHARGE SUMMARY
Discharge Summary  Date:7/22/2021        Patient Stan Hopkins     YOB: 1945     Age:75 y.o. Admit Date:7/15/2021   Admission Condition:poor   Discharged Condition:poor  Discharge Date: 07/22/21     Discharge Diagnoses   Principal Problem:    Pancytopenia ([facility]  Active Problems:    Chronic kidney disease, stage III (moderate) ([facility])    Controlled type 2 diabetes mellitus without complication, without long-term current use of insulin (HCC)    COPD (chronic obstructive pulmonary disease) ([facility]    Hyperthyroidism    Anemia    Palliative care patient  Resolved Problems:    * No resolved hospital problems. [facility] Stay   Narrative of Hospital Course: Patient was admitted 7/15, 67 y. o. female with past medical history of breast cancer, colon polyp removed that was cancerous per patient, CKD 3 hypothyroidism, COPD, PE and who presented to [facility] ED complaining of abnormal labs. Ms. Feroz Mena stated she had seen nephrology nurse practitioner and was ordered some routine labs. Labs came back abnormal and was advised to come to ED for evaluation.  Stated she has been tired recently, increased shortness of breath, lightheadedness and dizziness ongoing for about a month. Stated she recently had an ear infection and was started on steroids and antibiotics. Related history of breast cancer in 1991, had a lumpectomy and no chemotherapy required. Reported colonoscopy 2 years ago with polyp removal that was cancerous, no chemotherapy at that time either. Reported recent mammogram and needle biopsy that was negative about 2 months ago. Denied bleeding, dark stools, or nosebleeds.  Denied fever, chills, cough, abdominal pain, nausea, vomiting, or diarrhea.  Work-up in ED revealed VSS, creatinine 1.4, GFR 36 (creatinine 1.5, GFR 34 on 5/7/2021), TSH reflex to FT4 <0.01, WBC 3.0, Hgb 6.8, platelet 47. Ferritin 156.2, iron 161, iron saturation 53, vitamin B12 1684. Received  1 unit of PRBC in ED.  Patient was admitted to hospital medicine with pancytopenia with hematology consultation. Patient spiked fever on 7/17/21 early am and throughout the day. Blood cultures ordered. Cefepime started. Patient was noted to have elevated DDimer at 9.74, ordered VQ scan given patient's renal function.  VQ scan showed low probability for PE. Nephrology and ID consulted as well as palliative care. 7/20/21 Midline placed and bone marrow biopsy. Kidney function deteriorating. Adjusting pain medications for optimal pain control and keeping vitals stable. 1 unit platelet pheresis 9/71/75. eliquis discontinued due to thrombocytopenia and SALVATORE. Patient seen and examined 7/22: \"Patient seen and examined this a.m. during rounds, patient reviewed with nursing staff, labs reviewed, met daughter as well as patient's  at the bedside. Conversation had over guarded prognosis, noted severe change in respiratory status with intercostal muscle active. Patient minimally responsive. Creatinine noted to be 3.1, CA 27.29-14.9, CA-125-12, HIV negative, blood cultures with no growth to date, peripheral smear obtained 7/16 with findings of pancytopenia. Bone marrow biopsy continues to be in process. Renal ultrasound was completed which was unremarkable. At current time patient continues on doxycycline.          Conversation had with patient daughter as well as  at the bedside, noted air hunger, likely will need to undergoing code at some point today.  stated many years ago conversation was had between him and his wife that she did not want to undergo intubation. At this time CODE STATUS was transitioned to DNR comfort care modalities.        Comfort care orders were placed, appreciate the assistance of palliative nurse. Hospice consultation has been placed. \"    Patient has been accepted to inpatient hospice care will be transitioned.         Consultants:   IP CONSULT TO HEM/ONC  PALLIATIVE CARE EVAL  IP CONSULT TO PHARMACY  IP 07/22/2021     07/22/2021     07/22/2021     01/17/2012    K 5.7 07/22/2021    K 5.6 07/22/2021    K 5.0 10/03/2011     07/22/2021     07/22/2021     01/17/2012    CO2 18 07/22/2021    CO2 17 07/22/2021    BUN 75 07/22/2021    BUN 75 07/22/2021    CREATININE 3.1 07/22/2021    CREATININE 3.1 07/22/2021    CREATININE 1.3 01/17/2012    ANIONGAP 14 07/22/2021    ANIONGAP 16 07/22/2021    ALKPHOS 58 07/22/2021    ALKPHOS 68 09/30/2011    ALT 22 07/22/2021    AST 47 07/22/2021    BILITOT 0.7 07/22/2021    LABALBU 2.6 07/22/2021    LABALBU 3.2 09/30/2011    LABGLOM 15 07/22/2021    LABGLOM 15 07/22/2021    GFRAA 18 07/22/2021    GFRAA 18 07/22/2021    PROT 5.7 07/22/2021    PROT 5.3 09/30/2011    CALCIUM 10.0 07/22/2021    CALCIUM 9.9 07/22/2021     PT/INR:    Lab Results   Component Value Date    PROTIME 15.2 07/15/2021    PROTIME 10.60 09/08/2011    INR 1.18 07/15/2021     PTT:   Lab Results   Component Value Date    APTT 25.2 07/15/2021     FLP:    Lab Results   Component Value Date    CHOL 147 01/27/2021    TRIG 289 01/27/2021    HDL 36 01/27/2021     U/A:    Lab Results   Component Value Date    COLORU YELLOW 07/17/2021    SPECGRAV 1.012 07/17/2021    PHUR 6.5 07/17/2021    PROTEINU 30 07/17/2021    GLUCOSEU Negative 07/17/2021    KETUA TRACE 07/17/2021    BILIRUBINUR Negative 07/17/2021    BILIRUBINUR NEGATIVE 09/08/2011    UROBILINOGEN 0.2 07/17/2021    NITRU Negative 07/17/2021    LEUKOCYTESUR Negative 07/17/2021     TSH:    Lab Results   Component Value Date    TSH 1.320 01/27/2021       Radiology Last 7 Days:  XR RIBS LEFT (2 VIEWS)    Result Date: 7/18/2021  1. No appreciable left-sided rib fracture. No left pleural effusion or pneumothorax. Left Subclavian cardiac pacer device. Signed by Dr Vahid Fortune    CT Head WO Contrast    Result Date: 7/19/2021  1. No acute intracranial process.  Signed by Dr Clemencia Pink    Result Date: 7/17/2021  Low probability for pulmonary embolism. Signed by Dr Janice Main    Result Date: 7/16/2021  A previous cholecystectomy. Fatty infiltration of the liver. The spleen is normal. Signed by Dr Abdias Gan RENAL COMPLETE    Result Date: 7/21/2021  Negative for hydronephrosis or renal atrophy. Signed by Dr Mic Miles    Result Date: 7/18/2021  1. Low lung volumes with resultant bibasilar atelectasis. 2.  Stable cardiomegaly. Signed by Dr Mic Miles    Result Date: 7/16/2021  No active cardiopulmonary disease. Signed by Dr Yomaira Ward    Result Date: 7/20/2021  Impression: Successful CT guided bone marrow aspirate and bone biopsy from the right ileum. Signed by Dr Giovanni Parra reviewed. Constitutional:       Appearance: She is obese. She is ill-appearing. Comments: Fatigued appearing   HENT:      Head: Normocephalic and atraumatic. Nose: Nose normal.      Mouth/Throat:      Comments: Mouth open  Cardiovascular:      Rate and Rhythm: Tachycardia present. Rhythm irregular. Pulses: Normal pulses. Pulmonary:      Effort: Respiratory distress present. Comments: Nasal cannula oxygen in place    Patient utilizing accessory muscles for breathing  Abdominal:      General: Bowel sounds are normal. There is distension. Musculoskeletal:      Right lower leg: Edema present. Left lower leg: Edema present. Skin:     Coloration: Skin is pale. Neurological:      Mental Status: She is alert. Motor: Weakness present. Comments: Change from patient's clinical baseline, oriented to person           Discharge Plan   Disposition: Discharge/[facility])     Provider Follow-Up:   No follow-up provider specified.        Patient Instructions   Diet: regular diet    Activity: activity as tolerated      Discharge Medications         Medication List      ASK your doctor

## 2021-07-22 NOTE — PROGRESS NOTES
Report called to [facility], RN on inpatient hospice.  Electronically signed by Margherita Leyden, RN on 7/22/2021 at 1:59 PM

## 2021-07-22 NOTE — SIGNIFICANT EVENT
Chart Note    Date:2021  Patient: Ольга Navas  :   HEZ:[id number]  Luis To MD    Patient seen and examined this a.m. during rounds, patient reviewed with nursing staff, labs reviewed, met daughter as well as patient's  at the bedside. Conversation had over guarded prognosis, noted severe change in respiratory status with intercostal muscle active. Patient minimally responsive. Creatinine noted to be 3.1, CA 27.29-14.9, CA-125-12, HIV negative, blood cultures with no growth to date, peripheral smear obtained  with findings of pancytopenia. Bone marrow biopsy continues to be in process. Renal ultrasound was completed which was unremarkable. At current time patient continues on doxycycline. Conversation had with patient daughter as well as  at the bedside, noted air hunger, likely will need to undergoing code at some point today.  stated many years ago conversation was had between him and his wife that she did not want to undergo intubation. At this time CODE STATUS was transitioned to DNR comfort care modalities. Comfort care orders were placed, appreciate the assistance of palliative nurse. Hospice consultation has been placed. EMR Dragon/Transcription disclaimer:   Much of this encounter note is an electronic transcription/translation of spoken language to printed text.  The electronic translation of spoken language may permit erroneous, or at times, nonsensical words or phrases to be inadvertently transcribed; although attempts have made to review the note for such errors, some may still exist.    Electronically signed by   Anshul Alatorre MD   Internal Medicine Hospitalist  On 2021  At 11:06 AM

## 2021-07-22 NOTE — PROGRESS NOTES
Met with patient's spouse, Campos Retana, and daughter, Fran Jackson, to explain Hospice services and complete sign in process. Spouse and daughter are agreeable to patient moving to [address] at the [facility], and [facility] RN has been informed that the sign in process has been completed. SW allowed time for Campos Retana and Fran Jackson to share their thoughts, concerns, and feelings. Ruy Duarte RN is aware that patient has been signed into Hospice and that she can call report to the [facility] when applicable. Plan to follow this patient at the [facility] and assist accordingly.

## 2021-07-23 PROBLEM — I50.33 ACUTE ON CHRONIC DIASTOLIC HEART FAILURE (HCC): Status: ACTIVE | Noted: 2021-01-01

## 2021-07-23 PROBLEM — J96.01 ACUTE RESPIRATORY FAILURE WITH HYPOXIA (HCC): Status: ACTIVE | Noted: 2021-01-01

## 2021-07-24 LAB
ANA IGG, ELISA: NORMAL
C3 COMPLEMENT: 145 MG/DL (ref 88–201)
C4 COMPLEMENT: 42 MG/DL (ref 10–40)

## 2021-07-25 LAB — ANCA IFA: NORMAL

## 2021-07-29 LAB — PTH RELATED PEPTIDE: <2 PMOL/L (ref 0–3.4)

## 2021-10-18 ENCOUNTER — TELEPHONE (OUTPATIENT)
Dept: INFUSION THERAPY | Age: 76
End: 2021-10-18

## 2022-05-09 NOTE — PROGRESS NOTES
Patient name: Britt Marshall  Patient :   2021  2:25 PM  [address]    Portions of this note have been copied forward, however, changed to reflect the most current clinical status of this patient. Subjective: Clinical condition continues to deteriorate. Mild labored breathing this morning, has discomfort/moans with little movement. Patient's family at bedside states Priscilla Jose has been sleeping heavily since received analgesia    HISTORY OF PRESENT ILLNESS:    Francesca Palmer was first seen by Dr. Oniel Perez on 2001 during inpatient hospitalization at [facility], consulted for findings of pancytopenia. She presented to the ER department with hospital 7/15/2021 with low hemoglobin. Apparently, she was not five by her PCP and asked to go to the ER for further evaluation. The patient has complaints of lightheadedness and shortness of breath on exertion. She also had complaints of chest discomfort. She denies any hematochezia, melena, hematemesis, hematuria or any vaginal bleeding. She has a history of hysterectomy. She has a remote history of breast cancer and also history of benign adrenal nodule. She also has a history of pulmonary embolism about a year ago and is currently on Eliquis. CBC at presentation 7/15/2021 showed a WBC count 3.0 with ANC 0.4, hemoglobin 6.8/ and platelet counts 35,338. Anemia profile showed a normal vitamin B12 at 1684, ferritin 156, iron saturation 53%, TIBC 305, folate 7.4, haptoglobin 161, reticulocyte count 0.0495.  7/15/2021-received 1 unit PRBCs  2021-recommended bone marrow biopsy/aspirate to rule out a primary bone marrow process.   2021- CT guided BMAB completed, results pendind    Diagnosis  Pancytopenia  Macrocytic anemia    OBJECTIVE:  VITALS: /60   Pulse 124   Temp 97 °F (36.1 °C) (Temporal)   Resp 26   Ht 5' 1\" (1.549 m)   Wt 218 lb 1 oz (98.9 kg)   LMP  (LMP Unknown)   SpO2 90%   BMI 41.20 kg/m²   I&O: Intake/Output Summary (Last 24 hours) at 7/22/2021 1425  Last data filed at 7/22/2021 1308  Gross per 24 hour   Intake 1 ml   Output --   Net 1 ml     Physical Exam  Vitals reviewed. Constitutional:       Appearance: She is well-developed. She is obese. She is ill-appearing. She is not diaphoretic. Comments: Generalized edema   HENT:      Head: Normocephalic and atraumatic. Nose: Nose normal.      Mouth/Throat:      Mouth: Mucous membranes are dry. Pharynx: Uvula midline. Tonsils: No tonsillar exudate. Eyes:      General: Lids are normal.   Neck:      Thyroid: No thyroid mass or thyromegaly. Vascular: No JVD. Trachea: Trachea normal. No tracheal deviation. Cardiovascular:      Rate and Rhythm: Normal rate and regular rhythm. Heart sounds: Normal heart sounds. Pulmonary:      Effort: No respiratory distress. Breath sounds: No wheezing. Comments: Wearing supplemental O2, mild accessory muscle use  Abdominal:      General: Bowel sounds are normal. There is no distension. Palpations: Abdomen is soft. Tenderness: There is no abdominal tenderness. There is no guarding. Genitourinary:     Comments: Exam deferred  Musculoskeletal:         General: No tenderness or deformity. Cervical back: Normal range of motion and neck supple. Right lower leg: Edema (1+) present. Left lower leg: Edema (1+) present. Comments: Generalized weakness   Skin:     General: Skin is warm. Coloration: Skin is pale. Findings: No erythema or rash. Comments: Bilateral upper extremity edema significantly to hands   Neurological:      Mental Status: She is alert.       Comments: Resting soundly       BMP:   Recent Labs     07/22/21  0837 07/22/21  0837 07/22/21  1033     --  139  139   K 5.7*   < > 5.7*  5.6*     --  107  106   CO2 16*  --  18*  17*   BUN 74*  --  75*  75*   CREATININE 3.1*  --  3.1*  3.1*   GLUCOSE 100  --  97  99 [Use of Plain Language] : use of plain language [Adequate] : adequate [None] : none focal intrinsic abnormality. There is hepatopedal portal venous circulation. The pancreas is normal. A normal right kidney is seen measuring 9.1 x 6.1 cm. No evidence of mass or hydronephrosis. The spleen measures 9.4 x 8.2 x 4 cm. No intrinsic abnormality. A previous cholecystectomy. Fatty infiltration of the liver. The spleen is normal.   Signed by Dr Sonya Bautista    Result Date: 7/16/2021  Examination. XR CHEST PORTABLE 7/16/2021 9:19 AM  History: Pancytopenia. A single frontal portable upright view of the chest is compared with the previous study dated 8/7/2020. The lungs are poorly expanded but clear. There is a persistent moderate cardiomegaly. The atheromatous changes thoracic aorta are noted. A dual-chamber cardiac pacer is seen in place. There is no acute bony abnormality. Postsurgical changes of the right shoulder are incompletely visualized. No active cardiopulmonary disease.    Signed by Dr Salina Schilder    Medications  Current Facility-Administered Medications   Medication Dose Route Frequency Provider Last Rate Last Admin    morphine injection 2 mg  2 mg Intravenous Q2H PRN Carlyn Matos MD   2 mg at 07/22/21 1122    Or    morphine injection 4 mg  4 mg Intravenous Q2H PRN Carlyn Mtaos MD        LORazepam (ATIVAN) 2 MG/ML concentrated solution 1 mg  1 mg Oral Q2H PRN Carlyn Matos MD   1 mg at 07/22/21 1153    sodium bicarbonate 75 mEq in sodium chloride 0.45 % 1,000 mL infusion   Intravenous Continuous Jeny Zaman MD        dextrose 50 % IV solution  25 g Intravenous Once Jeny Zaman MD        insulin regular (HUMULIN R;NOVOLIN R) injection 10 Units  10 Units Intravenous Once Jeny Zaman MD        HYDROmorphone HCl PF (DILAUDID) injection 0.5 mg  0.5 mg Intravenous Q4H PRN Carlyn Matos MD   0.5 mg at 07/22/21 4008    docusate sodium (COLACE) capsule 100 mg  100 mg Oral Daily Dk Abdi PA-C        polyethylene glycol Jennifer Richter) packet 17 g  17 g Oral Daily Cliff Woodward PA-C        LORazepam (ATIVAN) injection 1 mg  1 mg Intravenous Once Laurence Gray MD        0.9 % sodium chloride infusion   Intravenous PRN DAVID Allen        naloxone [facility]) injection 0.4 mg  0.4 mg Intravenous PRN Florian Westfall PA-C        0.9 % sodium chloride infusion   Intravenous PRN DAVID Allen        aluminum & magnesium hydroxide-simethicone (MAALOX) 200-200-20 MG/5ML suspension 30 mL  30 mL Oral Q6H PRN DAVID Allen        fentaNYL (DURAGESIC) 12 MCG/HR 1 patch  1 patch Transdermal Q72H Laurence Gray MD   1 patch at 07/22/21 1039    sodium bicarbonate tablet 650 mg  650 mg Oral BID Angie Sullivan MD   650 mg at 07/20/21 2035    doxycycline (VIBRAMYCIN) 100 mg in dextrose 5 % 100 mL IVPB  100 mg Intravenous Q12H Landon Duque MD   Stopped at 07/22/21 0527    guaiFENesin [facility]) extended release tablet 600 mg  600 mg Oral BID Nik Ko MD   600 mg at 07/20/21 2035    acetaminophen (TYLENOL) tablet 1,000 mg  1,000 mg Oral Q6H PRN Nik Ko MD   1,000 mg at 07/20/21 2217    Or    acetaminophen (TYLENOL) suppository 650 mg  650 mg Rectal Q6H PRN Nik Ko MD        ibuprofen (ADVIL;MOTRIN) tablet 800 mg  800 mg Oral Q6H PRN Nik Ko MD   800 mg at 07/20/21 1836    glucose (GLUTOSE) 40 % oral gel 15 g  15 g Oral PRN DAVID Duke - CNP        dextrose 50 % IV solution  12.5 g Intravenous PRN DAVID Duke - CNP        glucagon (rDNA) injection 1 mg  1 mg Intramuscular PRN DAVID Duke - CNP        dextrose 5 % solution  100 mL/hr Intravenous PRN DAVID Duke CNP        insulin lispro (HUMALOG) injection vial 0-6 Units  0-6 Units Subcutaneous TID  DAVID Duke CNP   2 Units at 07/18/21 1242    insulin lispro (HUMALOG) injection vial 0-3 Units  0-3 Units Subcutaneous Nightly Rosa Nash, APRN - CNP 0.9 % sodium chloride infusion   Intravenous PRN Shabbir Perez MD        calcium carbonate (TUMS) chewable tablet 500 mg  500 mg Oral TID PRN Debi Mckeon MD   500 mg at 07/19/21 2143    melatonin disintegrating tablet 5 mg  5 mg Oral Nightly PRN Debi Mckeon MD   5 mg at 07/20/21 2035    allopurinol (ZYLOPRIM) tablet 100 mg  100 mg Oral Daily Debi Mckeon MD   100 mg at 07/19/21 1028    Vitamin B-12 TBDP 1 tablet  1 tablet Oral Nightly Debi Mckeon MD        gabapentin (NEURONTIN) capsule 200 mg  200 mg Oral BID Debi Mckeon MD   200 mg at 07/20/21 2035    lactobacillus (CULTURELLE) capsule 1 capsule  1 capsule Oral Daily with breakfast Debi Mckeon MD   1 capsule at 07/18/21 0744    Vitamin D (CHOLECALCIFEROL) tablet 2,000 Units  2,000 Units Oral Daily Debi Mckeon MD   2,000 Units at 07/19/21 1028    ipratropium (ATROVENT) 0.02 % nebulizer solution 0.5 mg  0.5 mg Nebulization 4x daily Debi Mckeon MD   0.5 mg at 07/22/21 1120    albuterol (PROVENTIL) nebulizer solution 2.5 mg  2.5 mg Nebulization Q1H PRN Debi Mckeon MD        sodium chloride flush 0.9 % injection 5-40 mL  5-40 mL Intravenous 2 times per day Debi Mckeon MD   10 mL at 07/20/21 2035    sodium chloride flush 0.9 % injection 5-40 mL  5-40 mL Intravenous PRN Debi Mckeon MD        0.9 % sodium chloride infusion  25 mL Intravenous PRN Debi Mckeon MD           Allergies  Patient has no known allergies. ASSESSMENT:  #1  Pancytopenia    The differential diagnosis is broad and includes a primary bone marrow process to include MDS, leukemia, multiple myeloma. In addition nutritional deficiency such as copper deficiency or other versus infectious causes versus autoimmune.      Findings: Macrocytic anemia with neutropenia and thrombocytopenia    Recent Labs     07/22/21  1033 07/22/21  0837 07/21/21  0615   WBC 4.3* 4.2* 3.9*   HGB 7.5* 7.7* 7.6*   HCT 23.3* 23.2* 24.1*   .4* 104.0* 109.5*   PLT 31* 34* 31* Labs on 7/15/2021   Vitamin Z03-1326  Ferritin-156, iron saturation 50%, TIBC 305  Folate 7.5     Labs requested 7/16/2021  Copper-146.4  Zinc-69.4  SPEP with immunofixation: Decreased albumin region. NO monoclonal protein seen, thus immunofixation not completed  IgG 889, IgM 66 and IgA 135  Free light chain, immunoglobulins - kappa light chain 3.05, lambda light chain 1.87, normal K/L ration of 1.63  US abdomen fatty infiltration of the liver and spleen normal measuring 9.4 x 8.2 x 4 cm. HIV-nonreactive  Hepatitis profile-nonreactive  REILLY detected  Path review: Peripheral blood smear:  Peripheral blood smear pathologist review:   Severe thrombocytopenia. Moderate macrocytic anemia. Rare circulating schistocytes. Mild leukopenia with absolute neutropenia   No circulating blasts. Tumor markers 7/20/2021:  CA 19-9:  6   CEA:  1.5  CA 27.29:  14.9  :  12    Hgb 7.5, .4  Status post 1 unit PRBC 7/15/2021 and 7/19/2021  Status post CT-guided bone marrow aspirate and biopsy 7/20/2021, await results    #2  Febrile illness  Last temperature on 7/19/2021 around 10:30 PM, afebrile since  Urinalysis on 7/17/2021 reported negative for bacteria with small amount of blood and protein of 30. Portable CXR 7/18/2021: Stable cardiomegaly.   Lung volumes are low with mild atelectasis in the lung bases    Blood culture on 7/17/2021-no growth to date  Blood cultures on 7/18/2021- no growth to date  L. pneumonphila: Presumptive negative  Legionella antigen, urine: Negative      ID assisting, appreciate help  Initiated on cefepime on 7/17/2021, also on Vanco and doxycycline  Ehrlichia, RMSF pending    #3  SALVATORE on Chronic kidney disease stage III  Baseline creatinine 1.4-1.5    K+ 5.7  Generalized anasarca - TSH 1.32 on 1/27/2021  Nephrology following    #4  Hypercalcemia- resolved  Calcium 10.0    Labs on 7/16/2021:  Calcium 10.7  PTH intact 89.0   Vitamin D 25-hydroxy-47.7    PLAN:  Transfuse pRBC for Hgb < 7.0 (Hgb 7.5)  S/p 1 unit platelet pheresis 3/57/1456 (platelets 69,383)  S/p CT-guided bone marrow biopsy/aspirate completed 7/20/2021, await results - I spoke with pathologist Dr. Desi Sorto this am for preliminary path. Dr. Desi Sorto stated he would contact hematogen. Dr. Samantha White called and [facility] aspirate and biopsy. He did not see evidence of acute leukemia on FLOW cytometry and suggests possibly an inflammatory process. He has also spoken to Dr. Desi Sorto and will await final report. SALVATORE with hyperkalemia, as per hospitalist/nephrology   last dose of Eliquis a.m. of 7/15/2021- discontinued r/t significant thrombocytopenia and SALVATORE  Merrem/doxycycline as per ID, await results of ehrlichia, RMSF    Lengthy discussion with patient's spouse and daughter regarding guarded condition and continued deterioration. Discussed that if Bone marrow aspirate and biopsy shows leukemia, Edilson Nash is not a candidate for systemic chemotherapy due to essential bedridden state, respiratory issues and worsening renal function. Recommend DNR. Edilson Nash will call her brother who lives in [place] for final decision making. Also discussed with Parviz Perez RN.      DAVID Acevedo    07/22/21  2:25 PM    Al Swartz MD on 7/22/21 at 5:10 PM CDT
